# Patient Record
Sex: FEMALE | Race: WHITE | Employment: OTHER | URBAN - METROPOLITAN AREA
[De-identification: names, ages, dates, MRNs, and addresses within clinical notes are randomized per-mention and may not be internally consistent; named-entity substitution may affect disease eponyms.]

---

## 2017-01-21 ENCOUNTER — HOSPITAL ENCOUNTER (EMERGENCY)
Age: 27
Discharge: HOME | End: 2017-01-22
Attending: EMERGENCY MEDICINE | Admitting: EMERGENCY MEDICINE
Payer: SELF-PAY

## 2017-01-21 ENCOUNTER — APPOINTMENT (OUTPATIENT)
Dept: ULTRASOUND IMAGING | Age: 27
End: 2017-01-21
Attending: EMERGENCY MEDICINE
Payer: SELF-PAY

## 2017-01-21 DIAGNOSIS — B96.89 BV (BACTERIAL VAGINOSIS): ICD-10-CM

## 2017-01-21 DIAGNOSIS — N76.0 BV (BACTERIAL VAGINOSIS): ICD-10-CM

## 2017-01-21 DIAGNOSIS — O26.891 ABDOMINAL PAIN IN PREGNANCY, FIRST TRIMESTER: Primary | ICD-10-CM

## 2017-01-21 DIAGNOSIS — R19.7 DIARRHEA, UNSPECIFIED TYPE: ICD-10-CM

## 2017-01-21 DIAGNOSIS — R10.9 ABDOMINAL PAIN IN PREGNANCY, FIRST TRIMESTER: Primary | ICD-10-CM

## 2017-01-21 DIAGNOSIS — R11.2 NON-INTRACTABLE VOMITING WITH NAUSEA, UNSPECIFIED VOMITING TYPE: ICD-10-CM

## 2017-01-21 LAB
ALBUMIN SERPL BCP-MCNC: 3.8 G/DL (ref 3.4–5)
ALBUMIN/GLOB SERPL: 1.1 {RATIO} (ref 0.8–1.7)
ALP SERPL-CCNC: 47 U/L (ref 45–117)
ALT SERPL-CCNC: 15 U/L (ref 13–56)
ANION GAP BLD CALC-SCNC: 7 MMOL/L (ref 3–18)
APPEARANCE UR: CLEAR
AST SERPL W P-5'-P-CCNC: 5 U/L (ref 15–37)
BASOPHILS # BLD AUTO: 0 K/UL (ref 0–0.1)
BASOPHILS # BLD: 1 % (ref 0–2)
BILIRUB SERPL-MCNC: 0.2 MG/DL (ref 0.2–1)
BILIRUB UR QL: NEGATIVE
BUN SERPL-MCNC: 7 MG/DL (ref 7–18)
BUN/CREAT SERPL: 11 (ref 12–20)
CALCIUM SERPL-MCNC: 9.2 MG/DL (ref 8.5–10.1)
CHLORIDE SERPL-SCNC: 104 MMOL/L (ref 100–108)
CO2 SERPL-SCNC: 27 MMOL/L (ref 21–32)
COLOR UR: YELLOW
CREAT SERPL-MCNC: 0.62 MG/DL (ref 0.6–1.3)
DIFFERENTIAL METHOD BLD: ABNORMAL
EOSINOPHIL # BLD: 0.1 K/UL (ref 0–0.4)
EOSINOPHIL NFR BLD: 1 % (ref 0–5)
ERYTHROCYTE [DISTWIDTH] IN BLOOD BY AUTOMATED COUNT: 15.6 % (ref 11.6–14.5)
GLOBULIN SER CALC-MCNC: 3.4 G/DL (ref 2–4)
GLUCOSE SERPL-MCNC: 78 MG/DL (ref 74–99)
GLUCOSE UR STRIP.AUTO-MCNC: NEGATIVE MG/DL
HCT VFR BLD AUTO: 34.3 % (ref 35–45)
HGB BLD-MCNC: 11.6 G/DL (ref 12–16)
HGB UR QL STRIP: NEGATIVE
KETONES UR QL STRIP.AUTO: NEGATIVE MG/DL
LEUKOCYTE ESTERASE UR QL STRIP.AUTO: NEGATIVE
LIPASE SERPL-CCNC: 85 U/L (ref 73–393)
LYMPHOCYTES # BLD AUTO: 31 % (ref 21–52)
LYMPHOCYTES # BLD: 2.7 K/UL (ref 0.9–3.6)
MCH RBC QN AUTO: 28.2 PG (ref 24–34)
MCHC RBC AUTO-ENTMCNC: 33.8 G/DL (ref 31–37)
MCV RBC AUTO: 83.5 FL (ref 74–97)
MONOCYTES # BLD: 0.9 K/UL (ref 0.05–1.2)
MONOCYTES NFR BLD AUTO: 10 % (ref 3–10)
NEUTS SEG # BLD: 5.1 K/UL (ref 1.8–8)
NEUTS SEG NFR BLD AUTO: 57 % (ref 40–73)
NITRITE UR QL STRIP.AUTO: NEGATIVE
PH UR STRIP: 7.5 [PH] (ref 5–8)
PLATELET # BLD AUTO: 205 K/UL (ref 135–420)
PMV BLD AUTO: 10.5 FL (ref 9.2–11.8)
POTASSIUM SERPL-SCNC: 3.6 MMOL/L (ref 3.5–5.5)
PROT SERPL-MCNC: 7.2 G/DL (ref 6.4–8.2)
PROT UR STRIP-MCNC: NEGATIVE MG/DL
RBC # BLD AUTO: 4.11 M/UL (ref 4.2–5.3)
SODIUM SERPL-SCNC: 138 MMOL/L (ref 136–145)
SP GR UR REFRACTOMETRY: 1.01 (ref 1–1.03)
UROBILINOGEN UR QL STRIP.AUTO: 0.2 EU/DL (ref 0.2–1)
WBC # BLD AUTO: 8.8 K/UL (ref 4.6–13.2)

## 2017-01-21 PROCEDURE — 99285 EMERGENCY DEPT VISIT HI MDM: CPT

## 2017-01-21 PROCEDURE — 85025 COMPLETE CBC W/AUTO DIFF WBC: CPT | Performed by: PHYSICIAN ASSISTANT

## 2017-01-21 PROCEDURE — 83690 ASSAY OF LIPASE: CPT | Performed by: PHYSICIAN ASSISTANT

## 2017-01-21 PROCEDURE — 81003 URINALYSIS AUTO W/O SCOPE: CPT | Performed by: EMERGENCY MEDICINE

## 2017-01-21 PROCEDURE — 76817 TRANSVAGINAL US OBSTETRIC: CPT

## 2017-01-21 PROCEDURE — 84702 CHORIONIC GONADOTROPIN TEST: CPT | Performed by: PHYSICIAN ASSISTANT

## 2017-01-21 PROCEDURE — 80053 COMPREHEN METABOLIC PANEL: CPT | Performed by: PHYSICIAN ASSISTANT

## 2017-01-22 VITALS
TEMPERATURE: 98.3 F | DIASTOLIC BLOOD PRESSURE: 61 MMHG | HEART RATE: 83 BPM | SYSTOLIC BLOOD PRESSURE: 103 MMHG | WEIGHT: 145.6 LBS | RESPIRATION RATE: 16 BRPM | BODY MASS INDEX: 23.5 KG/M2 | OXYGEN SATURATION: 100 %

## 2017-01-22 LAB
HCG SERPL-ACNC: ABNORMAL MIU/ML (ref 0–10)
SERVICE CMNT-IMP: NORMAL
WET PREP GENITAL: NORMAL

## 2017-01-22 PROCEDURE — 87491 CHLMYD TRACH DNA AMP PROBE: CPT | Performed by: PHYSICIAN ASSISTANT

## 2017-01-22 PROCEDURE — 87210 SMEAR WET MOUNT SALINE/INK: CPT | Performed by: PHYSICIAN ASSISTANT

## 2017-01-22 RX ORDER — METRONIDAZOLE 500 MG/1
500 TABLET ORAL 2 TIMES DAILY
Qty: 14 TAB | Refills: 0 | Status: SHIPPED | OUTPATIENT
Start: 2017-01-22 | End: 2017-01-29

## 2017-01-22 NOTE — ED NOTES
Pt states she has been having cramping stomach pain for about a week and started having N/V/D yesterday and stabbing pelvic pain today. Pt states it is intermittent and a 6/10.  Pt states no medical problems, meds, does have mental health issues

## 2017-01-22 NOTE — ED NOTES
I have reviewed discharge instructions with the patient. The patient verbalized understanding. Pt ambulated from ED without any difficulty.

## 2017-01-22 NOTE — ED NOTES
Hourly rounding-pt resting on stretcher, vitals stable, call bell in reach, blanket provided, SO at bedside, no issues or concerns at this time.

## 2017-01-23 LAB
C TRACH RRNA SPEC QL NAA+PROBE: NEGATIVE
N GONORRHOEA RRNA SPEC QL NAA+PROBE: NEGATIVE
SPECIMEN SOURCE: NORMAL

## 2017-03-09 ENCOUNTER — HOSPITAL ENCOUNTER (EMERGENCY)
Age: 27
Discharge: PSYCHIATRIC OR SUBSTANCE USE FACILITY | End: 2017-03-10
Attending: EMERGENCY MEDICINE
Payer: SELF-PAY

## 2017-03-09 DIAGNOSIS — Z32.01 PREGNANCY TEST PERFORMED, PREGNANCY CONFIRMED: ICD-10-CM

## 2017-03-09 DIAGNOSIS — R44.0 AUDITORY HALLUCINATIONS: ICD-10-CM

## 2017-03-09 DIAGNOSIS — F22 DELUSIONS (HCC): ICD-10-CM

## 2017-03-09 DIAGNOSIS — E87.6 HYPOKALEMIA: Primary | ICD-10-CM

## 2017-03-09 LAB
AMPHET UR QL SCN: NEGATIVE
ANION GAP BLD CALC-SCNC: 12 MMOL/L (ref 3–18)
APPEARANCE UR: CLEAR
BACTERIA URNS QL MICRO: ABNORMAL /HPF
BARBITURATES UR QL SCN: NEGATIVE
BASOPHILS # BLD AUTO: 0 K/UL (ref 0–0.1)
BASOPHILS # BLD: 0 % (ref 0–2)
BENZODIAZ UR QL: NEGATIVE
BILIRUB UR QL: NEGATIVE
BUN SERPL-MCNC: 4 MG/DL (ref 7–18)
BUN/CREAT SERPL: 7 (ref 12–20)
CALCIUM SERPL-MCNC: 9.1 MG/DL (ref 8.5–10.1)
CANNABINOIDS UR QL SCN: NEGATIVE
CHLORIDE SERPL-SCNC: 100 MMOL/L (ref 100–108)
CO2 SERPL-SCNC: 23 MMOL/L (ref 21–32)
COCAINE UR QL SCN: NEGATIVE
COLOR UR: YELLOW
CREAT SERPL-MCNC: 0.61 MG/DL (ref 0.6–1.3)
DIFFERENTIAL METHOD BLD: ABNORMAL
EOSINOPHIL # BLD: 0 K/UL (ref 0–0.4)
EOSINOPHIL NFR BLD: 0 % (ref 0–5)
EPITH CASTS URNS QL MICRO: ABNORMAL /LPF (ref 0–5)
ERYTHROCYTE [DISTWIDTH] IN BLOOD BY AUTOMATED COUNT: 15.2 % (ref 11.6–14.5)
ETHANOL SERPL-MCNC: <3 MG/DL (ref 0–3)
GLUCOSE SERPL-MCNC: 115 MG/DL (ref 74–99)
GLUCOSE UR STRIP.AUTO-MCNC: NEGATIVE MG/DL
HCG SERPL-ACNC: ABNORMAL MIU/ML (ref 0–10)
HCT VFR BLD AUTO: 34.4 % (ref 35–45)
HDSCOM,HDSCOM: NORMAL
HGB BLD-MCNC: 12 G/DL (ref 12–16)
HGB UR QL STRIP: NEGATIVE
KETONES UR QL STRIP.AUTO: NEGATIVE MG/DL
LEUKOCYTE ESTERASE UR QL STRIP.AUTO: ABNORMAL
LYMPHOCYTES # BLD AUTO: 10 % (ref 21–52)
LYMPHOCYTES # BLD: 1.2 K/UL (ref 0.9–3.6)
MCH RBC QN AUTO: 29.3 PG (ref 24–34)
MCHC RBC AUTO-ENTMCNC: 34.9 G/DL (ref 31–37)
MCV RBC AUTO: 84.1 FL (ref 74–97)
METHADONE UR QL: NEGATIVE
MONOCYTES # BLD: 1 K/UL (ref 0.05–1.2)
MONOCYTES NFR BLD AUTO: 9 % (ref 3–10)
NEUTS SEG # BLD: 9.1 K/UL (ref 1.8–8)
NEUTS SEG NFR BLD AUTO: 81 % (ref 40–73)
NITRITE UR QL STRIP.AUTO: NEGATIVE
OPIATES UR QL: NEGATIVE
PCP UR QL: NEGATIVE
PH UR STRIP: 6 [PH] (ref 5–8)
PLATELET # BLD AUTO: 241 K/UL (ref 135–420)
PMV BLD AUTO: 10.2 FL (ref 9.2–11.8)
POTASSIUM SERPL-SCNC: 3.1 MMOL/L (ref 3.5–5.5)
PROT UR STRIP-MCNC: NEGATIVE MG/DL
RBC # BLD AUTO: 4.09 M/UL (ref 4.2–5.3)
RBC #/AREA URNS HPF: ABNORMAL /HPF (ref 0–5)
SODIUM SERPL-SCNC: 135 MMOL/L (ref 136–145)
SP GR UR REFRACTOMETRY: 1.01 (ref 1–1.03)
UROBILINOGEN UR QL STRIP.AUTO: 0.2 EU/DL (ref 0.2–1)
WBC # BLD AUTO: 11.3 K/UL (ref 4.6–13.2)
WBC URNS QL MICRO: ABNORMAL /HPF (ref 0–4)

## 2017-03-09 PROCEDURE — 80307 DRUG TEST PRSMV CHEM ANLYZR: CPT | Performed by: PHYSICIAN ASSISTANT

## 2017-03-09 PROCEDURE — 81001 URINALYSIS AUTO W/SCOPE: CPT | Performed by: PHYSICIAN ASSISTANT

## 2017-03-09 PROCEDURE — 85025 COMPLETE CBC W/AUTO DIFF WBC: CPT | Performed by: PHYSICIAN ASSISTANT

## 2017-03-09 PROCEDURE — 99285 EMERGENCY DEPT VISIT HI MDM: CPT

## 2017-03-09 PROCEDURE — 84702 CHORIONIC GONADOTROPIN TEST: CPT | Performed by: PHYSICIAN ASSISTANT

## 2017-03-09 PROCEDURE — 80048 BASIC METABOLIC PNL TOTAL CA: CPT | Performed by: PHYSICIAN ASSISTANT

## 2017-03-10 VITALS
RESPIRATION RATE: 18 BRPM | SYSTOLIC BLOOD PRESSURE: 141 MMHG | OXYGEN SATURATION: 99 % | TEMPERATURE: 97.5 F | HEART RATE: 105 BPM | DIASTOLIC BLOOD PRESSURE: 88 MMHG

## 2017-03-10 RX ORDER — POTASSIUM CHLORIDE 20 MEQ/1
40 TABLET, EXTENDED RELEASE ORAL
Status: DISCONTINUED | OUTPATIENT
Start: 2017-03-10 | End: 2017-03-10

## 2017-03-10 NOTE — ED NOTES
Received report from Machelle, [facility]. Pt currently outside of room 24 conversing with herself. Pt refusing to go into room. Pt noted to have flight of ideas. Pt waiting for transport to [facility]. Pt is on a TDO.

## 2017-03-10 NOTE — ED NOTES
Pt is in the hallway yelling racial remarks such as \"stupid niggers\", and \"black bitches\". Multiple staff members have attempted to redirect pt into her room but pt continues to be resistant.

## 2017-03-10 NOTE — ED NOTES
Pt arrived to the ED by Law Enforcement voluntary for inappropriate behavior. According to Pt BF, pt was DC from [facility] yesterday and pt is approx 3 mo pregnant. BF states pt was up all night and was speaking like several different people. BF called police. Pt displays inappropriate behavior and conversation, loose associations, and aggressive body language. Pt denies SI or HI. According to BF pt only takes latuda.  Hx Bipolar
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
## 2017-03-10 NOTE — ED NOTES
Pt at doorway of room yelling verbally abusive slurs. Pt redirected to room.  [facility]

## 2017-03-10 NOTE — BSMART NOTE
Pt has been accepted on a TDO back to [facility] by Dr Rosa Graf, per Delphia Habermann with [facility].
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
## 2017-03-10 NOTE — ED NOTES
Verbal report given to Tee Reynaga RN(name) on [facility]  being transferred to [facility]) for routine progression of care   Report consisted of patients Situation, Background, Assessment and Recommendations(SBAR). Information from the following report(s) SBAR, ED Summary, Procedure Summary, Intake/Output, MAR and Recent Results was reviewed with the receiving nurse. Opportunity for questions and clarification was provided.       Patient transported with law enforcement

## 2017-03-10 NOTE — ED NOTES
Pt ambulated to the restroom. Pt banging on paper towel dispenser. Pt redirected to room. Sitter at the bedside.

## 2017-04-17 ENCOUNTER — HOSPITAL ENCOUNTER (INPATIENT)
Age: 27
LOS: 3 days | Discharge: HOME | DRG: 781 | End: 2017-04-21
Attending: EMERGENCY MEDICINE | Admitting: PSYCHIATRY & NEUROLOGY
Payer: COMMERCIAL

## 2017-04-17 DIAGNOSIS — R45.851 SUICIDAL IDEATION: Primary | ICD-10-CM

## 2017-04-17 LAB
AMPHET UR QL SCN: NEGATIVE
ANION GAP BLD CALC-SCNC: 8 MMOL/L (ref 3–18)
APAP SERPL-MCNC: <2 UG/ML (ref 10–30)
APPEARANCE UR: CLEAR
BARBITURATES UR QL SCN: NEGATIVE
BASOPHILS # BLD AUTO: 0 K/UL (ref 0–0.1)
BASOPHILS # BLD: 0 % (ref 0–2)
BENZODIAZ UR QL: NEGATIVE
BILIRUB UR QL: NEGATIVE
BUN SERPL-MCNC: 3 MG/DL (ref 7–18)
BUN/CREAT SERPL: 6 (ref 12–20)
CALCIUM SERPL-MCNC: 9.3 MG/DL (ref 8.5–10.1)
CANNABINOIDS UR QL SCN: NEGATIVE
CHLORIDE SERPL-SCNC: 103 MMOL/L (ref 100–108)
CO2 SERPL-SCNC: 26 MMOL/L (ref 21–32)
COCAINE UR QL SCN: NEGATIVE
COLOR UR: YELLOW
CREAT SERPL-MCNC: 0.54 MG/DL (ref 0.6–1.3)
DIFFERENTIAL METHOD BLD: ABNORMAL
EOSINOPHIL # BLD: 0 K/UL (ref 0–0.4)
EOSINOPHIL NFR BLD: 0 % (ref 0–5)
ERYTHROCYTE [DISTWIDTH] IN BLOOD BY AUTOMATED COUNT: 14.2 % (ref 11.6–14.5)
ETHANOL SERPL-MCNC: <3 MG/DL (ref 0–3)
GLUCOSE SERPL-MCNC: 77 MG/DL (ref 74–99)
GLUCOSE UR STRIP.AUTO-MCNC: NEGATIVE MG/DL
HCT VFR BLD AUTO: 33.2 % (ref 35–45)
HDSCOM,HDSCOM: NORMAL
HGB BLD-MCNC: 11.4 G/DL (ref 12–16)
HGB UR QL STRIP: NEGATIVE
KETONES UR QL STRIP.AUTO: 15 MG/DL
LEUKOCYTE ESTERASE UR QL STRIP.AUTO: NEGATIVE
LYMPHOCYTES # BLD AUTO: 16 % (ref 21–52)
LYMPHOCYTES # BLD: 1.9 K/UL (ref 0.9–3.6)
MCH RBC QN AUTO: 30.2 PG (ref 24–34)
MCHC RBC AUTO-ENTMCNC: 34.3 G/DL (ref 31–37)
MCV RBC AUTO: 88.1 FL (ref 74–97)
METHADONE UR QL: NEGATIVE
MONOCYTES # BLD: 0.8 K/UL (ref 0.05–1.2)
MONOCYTES NFR BLD AUTO: 7 % (ref 3–10)
NEUTS SEG # BLD: 9.2 K/UL (ref 1.8–8)
NEUTS SEG NFR BLD AUTO: 77 % (ref 40–73)
NITRITE UR QL STRIP.AUTO: NEGATIVE
OPIATES UR QL: NEGATIVE
PCP UR QL: NEGATIVE
PH UR STRIP: 8.5 [PH] (ref 5–8)
PLATELET # BLD AUTO: 220 K/UL (ref 135–420)
PMV BLD AUTO: 9.3 FL (ref 9.2–11.8)
POTASSIUM SERPL-SCNC: 3.2 MMOL/L (ref 3.5–5.5)
PROT UR STRIP-MCNC: NEGATIVE MG/DL
RBC # BLD AUTO: 3.77 M/UL (ref 4.2–5.3)
SALICYLATES SERPL-MCNC: <2.8 MG/DL (ref 2.8–20)
SODIUM SERPL-SCNC: 137 MMOL/L (ref 136–145)
SP GR UR REFRACTOMETRY: 1.01 (ref 1–1.03)
UROBILINOGEN UR QL STRIP.AUTO: 1 EU/DL (ref 0.2–1)
WBC # BLD AUTO: 11.9 K/UL (ref 4.6–13.2)

## 2017-04-17 PROCEDURE — 99284 EMERGENCY DEPT VISIT MOD MDM: CPT

## 2017-04-17 PROCEDURE — 80048 BASIC METABOLIC PNL TOTAL CA: CPT | Performed by: EMERGENCY MEDICINE

## 2017-04-17 PROCEDURE — 80307 DRUG TEST PRSMV CHEM ANLYZR: CPT | Performed by: EMERGENCY MEDICINE

## 2017-04-17 PROCEDURE — 81003 URINALYSIS AUTO W/O SCOPE: CPT | Performed by: EMERGENCY MEDICINE

## 2017-04-17 PROCEDURE — 85025 COMPLETE CBC W/AUTO DIFF WBC: CPT | Performed by: EMERGENCY MEDICINE

## 2017-04-17 NOTE — IP AVS SNAPSHOT
[address] [facility] Patient: Gwendolyn Johns MRN: [id number] You are allergic to the following Allergen Reactions Robitussin (Guaifenesin) Nausea and Vomiting Recent Documentation Height Weight BMI OB Status Smoking Status 1.676 m 63.5 kg 22.6 kg/m2 Pregnant Current Every Day Smoker Emergency Contacts Name Discharge Info Relation Home Work Mobile None,None DECLINED CAREGIVER [4] About your hospitalization You were admitted on:  April 18, 2017 You last received care in the:  [facility] ADULT CHEM DEP You were discharged on:  April 21, 2017 Unit phone number:  [phone] Why you were hospitalized Your primary diagnosis was:  Not on File Your diagnoses also included:  Schizophrenia (Hcc) Providers Seen During Your Hospitalizations Provider Role Specialty Primary office phone Massiel Munroe DO Attending Provider Emergency Medicine [phone] Ely Dowell DO Attending Provider Emergency Medicine [phone] John Thomas MD Attending Provider Psychiatry [phone] Your Primary Care Physician (PCP) Primary Care Physician Office Phone Office Fax UNKNOWN, PROVIDER ** None ** ** None ** Follow-up Information Follow up With Details Comments Contact Info Provider Unknown   Patient not available to ask [facility]  Patient has follow up therapy appointment with [facility] with Mrs. Fortune on April 27, 2017 at 8:00 am.  [address] [place] [id number] [phone] Current Discharge Medication List  
  
START taking these medications Dose & Instructions Dispensing Information Comments Morning Noon Evening Bedtime  
 hydrOXYzine pamoate 25 mg capsule Commonly known as:  VISTARIL Your last dose was: Your next dose is:    
   
   
 Dose:  25 mg Take 1 Cap by mouth four (4) times daily as needed for Anxiety for up to 10 days. Indications: anxiety Quantity:  60 Cap Refills:  0  
     
   
   
   
  
 prenatal vit-calcium-iron-fa 27 mg iron- 1 mg Tab Commonly known as:  PRENATAL PLUS with CALCIUM Your last dose was: Your next dose is:    
   
   
 Dose:  1 Tab Take 1 Tab by mouth daily for 30 days. Indications: Pregnancy Quantity:  30 Tab Refills:  0 CONTINUE these medications which have CHANGED Dose & Instructions Dispensing Information Comments Morning Noon Evening Bedtime  
 lurasidone 20 mg Tab tablet Commonly known as:  Merrick Rollins What changed:   
- medication strength 
- how much to take - when to take this Your last dose was: Your next dose is:    
   
   
 Dose:  20 mg Take 1 Tab by mouth daily (with breakfast) for 30 days. Indications: DEPRESSION ASSOCIATED WITH BIPOLAR DISORDER Quantity:  30 Tab Refills:  0 STOP taking these medications   
 haloperidol 1 mg tablet Commonly known as:  HALDOL KlonoPIN 1 mg tablet Generic drug:  clonazePAM  
   
  
 LITHIUM CARBONATE PO  
   
  
 OLANZapine 10 mg tablet Commonly known as:  ZyPREXA OXcarbazepine 300 mg tablet Commonly known as:  TRILEPTAL RisperDAL 1 mg tablet Generic drug:  risperiDONE Where to Get Your Medications Information on where to get these meds will be given to you by the nurse or doctor. ! Ask your nurse or doctor about these medications  
  hydrOXYzine pamoate 25 mg capsule  
 lurasidone 20 mg Tab tablet  
 prenatal vit-calcium-iron-fa 27 mg iron- 1 mg Tab Discharge Instructions BEHAVIORAL HEALTH NURSING DISCHARGE NOTE Emergency Numbers [address] [facility] Desk: [phone] [place] [facility]: [phone] Suicide Prevention Line: 1 [phone] (TALK) The following personal items collected during your admission are returned to you:  
Dental Appliance: Dental Appliances: None Vision:   
Hearing Aid:   
Jewelry: Jewelry: None Clothing: Clothing: Footwear, Pajamas, Pants, Shirt, Socks, Undergarments, With patient Other Valuables: Other Valuables: Personal electronic devices (comment), Personal toiletries Valuables sent to safe: Personal Items Sent to Safe:  (computer) The discharge information has been reviewed with the patient. The patient verbalized understanding. [employer] Activation Thank you for requesting access to [employer]. Please follow the instructions below to securely access and download your online medical record. [employer] allows you to send messages to your doctor, view your test results, renew your prescriptions, schedule appointments, and more. How Do I Sign Up? 1. In your internet browser, go to [email or web address] 
2. Click on the First Time User? Click Here link in the Sign In box. You will be redirect to the New Member Sign Up page. 3. Enter your [employer]Tree Access Code exactly as it appears below. You will not need to use this code after youve completed the sign-up process. If you do not sign up before the expiration date, you must request a new code. [employer] Access Code: [email or web address] Expires: 2017  7:05 AM (This is the date your [employer] access code will ) 4. Enter the last four digits of your Social Security Number (xxxx) and Date of Birth (mm/dd/yyyy) as indicated and click Submit. You will be taken to the next sign-up page. 5. Create a My[employer]atingTree ID. This will be your My[employer]atingTree login ID and cannot be changed, so think of one that is secure and easy to remember. 6. Create a My[employer]atingTree password. You can change your password at any time. 7. Enter your Password Reset Question and Answer.  This can be used at a later time if you forget your password. 8. Enter your e-mail address. You will receive e-mail notification when new information is available in [address] 9. Click Sign Up. You can now view and download portions of your medical record. 10. Click the Download Summary menu link to download a portable copy of your medical information. Additional Information If you have questions, please visit the Frequently Asked Questions section of the [employer] website at [email or web address] Remember, [employer] is NOT to be used for urgent needs. For medical emergencies, dial 911. Patient armband removed and shredded Discharge Orders None [employer] Announcement We are excited to announce that we are making your provider's discharge notes available to you in [employer]. You will see these notes when they are completed and signed by the physician that discharged you from your recent hospital stay. If you have any questions or concerns about any information you see in [employer], please call the Health Information Department where you were seen or reach out to your Primary Care Provider for more information about your plan of care. Introducing [facility]! [facility] introduces The [employer] patient portal. Now you can access parts of your medical record, email your doctor's office, and request medication refills online. 1. In your internet browser, go to [email or web address] 2. Click on the First Time User? Click Here link in the Sign In box. You will see the New Member Sign Up page. 3. Enter your [employer] Access Code exactly as it appears below. You will not need to use this code after youve completed the sign-up process. If you do not sign up before the expiration date, you must request a new code. · [employer] Access Code: [email or web address] Expires: 5/28/2017  7:05 AM 
 
4.  Enter the last four digits of your Social Security Number (xxxx) and Date of Birth (mm/dd/yyyy) as indicated and click Submit. You will be taken to the next sign-up page. 5. Create a Cognia ID. This will be your Cognia login ID and cannot be changed, so think of one that is secure and easy to remember. 6. Create a Cognia password. You can change your password at any time. 7. Enter your Password Reset Question and Answer. This can be used at a later time if you forget your password. 8. Enter your e-mail address. You will receive e-mail notification when new information is available in [address] 9. Click Sign Up. You can now view and download portions of your medical record. 10. Click the Download Summary menu link to download a portable copy of your medical information. If you have questions, please visit the Frequently Asked Questions section of the [employer]ia website. Remember, [employer] is NOT to be used for urgent needs. For medical emergencies, dial 911. Now available from your iPhone and Android! General Information Please provide this summary of care documentation to your next provider. Patient Signature:  ____________________________________________________________ Date:  ____________________________________________________________  
  
[place] Anderson Provider Signature:  ____________________________________________________________ Date:  ____________________________________________________________

## 2017-04-17 NOTE — IP AVS SNAPSHOT
Current Discharge Medication List  
  
START taking these medications Dose & Instructions Dispensing Information Comments Morning Noon Evening Bedtime  
 hydrOXYzine pamoate 25 mg capsule Commonly known as:  VISTARIL Your last dose was: Your next dose is:    
   
   
 Dose:  25 mg Take 1 Cap by mouth four (4) times daily as needed for Anxiety for up to 10 days. Indications: anxiety Quantity:  60 Cap Refills:  0  
     
   
   
   
  
 prenatal vit-calcium-iron-fa 27 mg iron- 1 mg Tab Commonly known as:  PRENATAL PLUS with CALCIUM Your last dose was: Your next dose is:    
   
   
 Dose:  1 Tab Take 1 Tab by mouth daily for 30 days. Indications: Pregnancy Quantity:  30 Tab Refills:  0 CONTINUE these medications which have CHANGED Dose & Instructions Dispensing Information Comments Morning Noon Evening Bedtime  
 lurasidone 20 mg Tab tablet Commonly known as:  Sandra Patricio What changed:   
- medication strength 
- how much to take - when to take this Your last dose was: Your next dose is:    
   
   
 Dose:  20 mg Take 1 Tab by mouth daily (with breakfast) for 30 days. Indications: DEPRESSION ASSOCIATED WITH BIPOLAR DISORDER Quantity:  30 Tab Refills:  0 STOP taking these medications   
 haloperidol 1 mg tablet Commonly known as:  HALDOL KlonoPIN 1 mg tablet Generic drug:  clonazePAM  
   
  
 LITHIUM CARBONATE PO  
   
  
 OLANZapine 10 mg tablet Commonly known as:  ZyPREXA OXcarbazepine 300 mg tablet Commonly known as:  TRILEPTAL RisperDAL 1 mg tablet Generic drug:  risperiDONE Where to Get Your Medications Information on where to get these meds will be given to you by the nurse or doctor. ! Ask your nurse or doctor about these medications  
  hydrOXYzine pamoate 25 mg capsule lurasidone 20 mg Tab tablet  
 prenatal vit-calcium-iron-fa 27 mg iron- 1 mg Tab

## 2017-04-17 NOTE — ED PROVIDER NOTES
HPI Comments:   7:23 PM Ming Jett is an 22 week pregnant 32 y.o. Female with a h/o bipolar disorder and schizophrenia, who presents to the ED for the evaluation of psychiatric evaluation. Pt reports confusion SI, HI, and hallucinations. States that the voices tell her to take nude videos of her self. Also states that she was recently released from [facility], but has not really been able to care for herself since. States that in effort to get psychiatric help, she tried to get arrested and eloped from the waiting room earlier today. Also reports mild abd pain and bilateral low back pain. No vaginal bleeding. Pt does however suspect that she has an STD, as she reports getting meds for prior infection, but not completing them after her sx improved. Pt reports taking her psych meds as prescribed. Also states that she plotted on getting arrested and states that she eloped from the hospital this morning. States that her relationship with her boyfirend often stresses her out. No other complaints at this time. PCP: PROVIDER UNKNOWN     The history is provided by the patient. Past Medical History:   Diagnosis Date    Bipolar disorder ([address]     ETOH abuse     Marijuana abuse        Past Surgical History:   Procedure Laterality Date    HX  SECTION           History reviewed. No pertinent family history. Social History     Social History    Marital status: SINGLE     Spouse name: N/A    Number of children: N/A    Years of education: N/A     Occupational History    Not on file. Social History Main Topics    Smoking status: Current Every Day Smoker    Smokeless tobacco: Not on file    Alcohol use Yes    Drug use: Yes     Special: Marijuana    Sexual activity: Not on file     Other Topics Concern    Not on file     Social History Narrative         ALLERGIES: Robitussin [guaifenesin]    Review of Systems   Constitutional: Negative for chills and fever.    HENT: Negative for congestion and sneezing. Eyes: Negative for visual disturbance. Respiratory: Negative for cough and shortness of breath. Cardiovascular: Negative for chest pain. Gastrointestinal: Positive for abdominal pain. Negative for nausea and vomiting. Genitourinary: Negative for difficulty urinating and dysuria. Musculoskeletal: Positive for back pain. Skin: Negative for rash. Neurological: Negative for weakness and headaches. Psychiatric/Behavioral: Positive for confusion, hallucinations and suicidal ideas. The patient is nervous/anxious. Vitals:    04/17/17 1611 04/17/17 2040 04/17/17 2249 04/18/17 0431   BP: 115/72 116/54 100/56 105/61   Pulse: (!) 147 89 93 79   Resp: 22 21 16 20   Temp: 98.1 °F (36.7 °C) 97.3 °F (36.3 °C) 97.3 °F (36.3 °C) 97.8 °F (36.6 °C)   SpO2: 96% 100% 97% 100%   Weight: 59 kg (130 lb)  61.2 kg (135 lb)    Height: 5' 6\" (1.676 m)  5' 6\" (1.676 m)         96% within normal limits     Physical Exam   Constitutional: She is oriented to person, place, and time. She appears well-developed and well-nourished. HENT:   Head: Normocephalic and atraumatic. Neck: Neck supple. No JVD present. Cardiovascular: Normal rate and regular rhythm. Pulmonary/Chest: Effort normal and breath sounds normal. No respiratory distress. Abdominal: Soft. She exhibits no distension. There is no tenderness. There is no rebound and no guarding. Gravid Uterus   Musculoskeletal: She exhibits no edema. Neurological: She is alert and oriented to person, place, and time. Skin: Skin is warm and dry. No erythema. Psychiatric: Her mood appears anxious. Her speech is rapid and/or pressured. She expresses homicidal and suicidal ideation.    Rapid speech scattered thoughts        MDM  Number of Diagnoses or Management Options  Diagnosis management comments: 33 y/o female presents with SI, HI    Also mild abd pain, will check fetal hr, ua  No indication for pelvic at this time  Check basic labs, HR on my exam 90. Amount and/or Complexity of Data Reviewed  Clinical lab tests: ordered and reviewed      ED Course       Procedures      Medications ordered:   Medications - No data to display      Lab findings:  Labs Reviewed   CBC WITH AUTOMATED DIFF - Abnormal; Notable for the following:        Result Value    RBC 3.77 (*)     HGB 11.4 (*)     HCT 33.2 (*)     NEUTROPHILS 77 (*)     LYMPHOCYTES 16 (*)     ABS. NEUTROPHILS 9.2 (*)     All other components within normal limits   METABOLIC PANEL, BASIC - Abnormal; Notable for the following:     Potassium 3.2 (*)     BUN 3 (*)     Creatinine 0.54 (*)     BUN/Creatinine ratio 6 (*)     All other components within normal limits   URINALYSIS W/ RFLX MICROSCOPIC - Abnormal; Notable for the following:     pH (UA) 8.5 (*)     Ketone 15 (*)     All other components within normal limits   SALICYLATE - Abnormal; Notable for the following:     SALICYLATE <1.5 (*)     All other components within normal limits   ACETAMINOPHEN - Abnormal; Notable for the following:     ACETAMINOPHEN <2 (*)     All other components within normal limits   DRUG SCREEN, URINE   ETHYL ALCOHOL       Progress notes, Consult notes or additional Procedure notes:   9:08 PM Consult:  Discussed care with Ondina Garcia. Standard discussion; including history of patients chief complaint, available diagnostic results, and treatment course. Agrees to evaluate the pt.    2:53 AM CSB worker, Sallie Fong, at bedside to evaluate for TDO    Pt TDO, will be admitted. Dispo:  Patient was admitted in stable condition. 1. Suicidal ideation        SCRIBE ATTESTATION STATEMENT  Documented by: London Mortensen [facility] for, and in the presence of, Tala Torrez DO 7:28 PM     Signed by: Opal Diaz, 4/17/2017 7:28 PM     PROVIDER ATTESTATION STATEMENT  I personally performed the services described in the documentation, reviewed the documentation, as recorded by the scribe in my presence, and it accurately and completely records my words and actions.   Blease Good, DO

## 2017-04-17 NOTE — IP AVS SNAPSHOT
Summary of Care Report The Summary of Care report has been created to help improve care coordination. Users with access to [employer] or [address] (Web-based application) may access additional patient information including the Discharge Summary. If you are not currently a [address] user and need more information, please call the number listed below in the [place] [address] section and ask to be connected with Medical Records. Facility Information Name Address Phone [place] [id number] [facility] [address] [phone] Patient Information Patient Name Sex  Maple Seip ([id number]) Female 1990 Discharge Information Admitting Provider Service Area Unit Megan Ingram MD / [id number] [place] [address] Adult [facility] / [phone] Discharge Provider Discharge Date/Time Discharge Disposition Destination (none) 2017 Morning (Pending) AHR (none) Patient Language Language ENGLISH [13] Hospital Problems as of 2017  Never Reviewed Class Noted - Resolved Last Modified POA Active Problems Schizophrenia ([facility] [address]  2017 - Present 2017 by Megan Ingram MD Unknown Entered by Megan Ingram MD  
  
You are allergic to the following Allergen Reactions Robitussin (Guaifenesin) Nausea and Vomiting Current Discharge Medication List  
  
START taking these medications Dose & Instructions Dispensing Information Comments  
 hydrOXYzine pamoate 25 mg capsule Commonly known as:  VISTARIL Dose:  25 mg Take 1 Cap by mouth four (4) times daily as needed for Anxiety for up to 10 days. Indications: anxiety Quantity:  60 Cap Refills:  0  
   
 prenatal vit-calcium-iron-fa 27 mg iron- 1 mg Tab Commonly known as:  PRENATAL PLUS with CALCIUM Dose:  1 Tab Take 1 Tab by mouth daily for 30 days. Indications: Pregnancy Quantity:  30 Tab Refills:  0 CONTINUE these medications which have CHANGED Dose & Instructions Dispensing Information Comments  
 lurasidone 20 mg Tab tablet Commonly known as:  Juan Carlos Hutton What changed:   
- medication strength 
- how much to take - when to take this Dose:  20 mg Take 1 Tab by mouth daily (with breakfast) for 30 days. Indications: DEPRESSION ASSOCIATED WITH BIPOLAR DISORDER Quantity:  30 Tab Refills:  0 STOP taking these medications Comments  
 haloperidol 1 mg tablet Commonly known as:  HALDOL KlonoPIN 1 mg tablet Generic drug:  clonazePAM  
   
   
 LITHIUM CARBONATE PO  
   
   
 OLANZapine 10 mg tablet Commonly known as:  ZyPREXA OXcarbazepine 300 mg tablet Commonly known as:  TRILEPTAL RisperDAL 1 mg tablet Generic drug:  risperiDONE Follow-up Information Follow up With Details Comments Contact Info Provider Unknown   Patient not available to ask [facility]  Patient has follow up therapy appointment with [facility] with Mrs. Fortune on April 27, 2017 at 8:00 am.  [address] [facility] [address] [id number] 
[phone] Discharge Instructions BEHAVIORAL HEALTH NURSING DISCHARGE NOTE Emergency Numbers [facility] Desk: [phone] [place] [facility]: [phone] Suicide Prevention Line: [phone] (TALK) The following personal items collected during your admission are returned to you:  
Dental Appliance: Dental Appliances: None Vision:   
Hearing Aid:   
Jewelry: Jewelry: None Clothing: Clothing: Footwear, Pajamas, Pants, Shirt, Socks, Undergarments, With patient Other Valuables: Other Valuables: Personal electronic devices (comment), Personal toiletries Valuables sent to safe: Personal Items Sent to Safe:  (computer) The discharge information has been reviewed with the patient. The patient verbalized understanding. MyChart Activation Thank you for requesting access to [employer]. Please follow the instructions below to securely access and download your online medical record. [employer] allows you to send messages to your doctor, view your test results, renew your prescriptions, schedule appointments, and more. How Do I Sign Up? 1. In your internet browser, go to [email or web address] 
2. Click on the First Time User? Click Here link in the Sign In box. You will be redirect to the New Member Sign Up page. 3. Enter your [employer] Access Code exactly as it appears below. You will not need to use this code after youve completed the sign-up process. If you do not sign up before the expiration date, you must request a new code. [employer] Access Code: [email or web address] Expires: 2017  7:05 AM (This is the date your [employer] access code will ) 4. Enter the last four digits of your Social Security Number (xxxx) and Date of Birth (mm/dd/yyyy) as indicated and click Submit. You will be taken to the next sign-up page. 5. Create a [employer] ID. This will be your [employer] login ID and cannot be changed, so think of one that is secure and easy to remember. 6. Create a [employer] password. You can change your password at any time. 7. Enter your Password Reset Question and Answer. This can be used at a later time if you forget your password. 8. Enter your e-mail address. You will receive e-mail notification when new information is available in [address] 9. Click Sign Up. You can now view and download portions of your medical record. 10. Click the Download Summary menu link to download a portable copy of your medical information. Additional Information If you have questions, please visit the Frequently Asked Questions section of the [employer] website at [email or web address] Remember, [employer] is NOT to be used for urgent needs. For medical emergencies, dial 911. Patient armband removed and shredded Chart Review Routing History No Routing History on File

## 2017-04-17 NOTE — ED TRIAGE NOTES
Pt states she is 18 weeks pregnant. Pt states she would like to be committed to a mental health facility. Pt states she does not feel like she belongs in society. Pt denies SI & HI.  Pt states she would like to be committed for OCD, schizophrenia, disassociated identity disorder.

## 2017-04-17 NOTE — ED NOTES
I performed a brief evaluation, including history and physical, of the patient here in triage and I have determined that pt will need further treatment and evaluation from the main side ER physician. I have placed initial orders to help in expediting patients care.      April 17, 2017 at 5:18 PM - Melvina Saldana DO        Visit Vitals    /72 (BP 1 Location: Left arm, BP Patient Position: At rest)    Pulse (!) 147    Temp 98.1 °F (36.7 °C)    Resp 22    Ht 5' 6\" (1.676 m)    Wt 59 kg (130 lb)    SpO2 96%    BMI 20.98 kg/m2

## 2017-04-18 PROBLEM — F20.9 SCHIZOPHRENIA (HCC): Status: ACTIVE | Noted: 2017-04-18

## 2017-04-18 PROCEDURE — 74011250637 HC RX REV CODE- 250/637: Performed by: PSYCHIATRY & NEUROLOGY

## 2017-04-18 PROCEDURE — 65220000003 HC RM SEMIPRIVATE PSYCH

## 2017-04-18 RX ORDER — LORAZEPAM 2 MG/ML
1-2 INJECTION INTRAMUSCULAR
Status: DISCONTINUED | OUTPATIENT
Start: 2017-04-18 | End: 2017-04-19

## 2017-04-18 RX ORDER — LORAZEPAM 1 MG/1
1-2 TABLET ORAL
Status: DISCONTINUED | OUTPATIENT
Start: 2017-04-18 | End: 2017-04-19

## 2017-04-18 RX ORDER — HALOPERIDOL 5 MG/1
5 TABLET ORAL
Status: DISCONTINUED | OUTPATIENT
Start: 2017-04-18 | End: 2017-04-21 | Stop reason: HOSPADM

## 2017-04-18 RX ORDER — RISPERIDONE 1 MG/1
TABLET, FILM COATED ORAL 3 TIMES DAILY
COMMUNITY
End: 2017-04-21

## 2017-04-18 RX ORDER — TRAZODONE HYDROCHLORIDE 50 MG/1
50 TABLET ORAL
Status: DISCONTINUED | OUTPATIENT
Start: 2017-04-18 | End: 2017-04-21 | Stop reason: HOSPADM

## 2017-04-18 RX ORDER — HALOPERIDOL 5 MG/ML
5 INJECTION INTRAMUSCULAR
Status: DISCONTINUED | OUTPATIENT
Start: 2017-04-18 | End: 2017-04-21 | Stop reason: HOSPADM

## 2017-04-18 RX ADMIN — .BETA.-CAROTENE, SODIUM ACETATE, ASCORBIC ACID, CHOLECALCIFEROL, .ALPHA.-TOCOPHEROL ACETATE, DL-, THIAMINE MONONITRATE, RIBOFLAVIN, NIACINAMIDE, PYRIDOXINE HYDROCHLORIDE, FOLIC ACID, CYANOCOBALAMIN, CALCIUM CARBONATE, FERROUS FUMARATE, ZINC OXIDE AND CUPRIC OXIDE 1 TABLET: 2000; 2000; 120; 400; 22; 1.84; 3; 20; 10; 1; 12; 200; 27; 25; 2 TABLET ORAL at 12:33

## 2017-04-18 RX ADMIN — TRAZODONE HYDROCHLORIDE 50 MG: 50 TABLET ORAL at 21:08

## 2017-04-18 NOTE — ED NOTES
Patient's belongings locked away into locked closet room 7. Patient's belongings include a laptop, purse with cell phone, tramaine bear, large bag containing patient's clothes and shoes. Patient is presently in hospital attire, with red socks. Patient is cooperative, looks comfortable, and denies any complaints at this time.
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
## 2017-04-18 NOTE — ED NOTES
TRANSFER - OUT REPORT:    Verbal report given to rosario dahl RN(name) on Ashley Patel  being transferred to behavioral [facility](unit) for routine progression of care       Report consisted of patients Situation, Background, Assessment and   Recommendations(SBAR). Information from the following report(s) SBAR was reviewed with the receiving nurse. Lines:       Opportunity for questions and clarification was provided.       Patient transported with:   Shanice Gunter

## 2017-04-18 NOTE — BSMART NOTE
ART THERAPY GROUP PROGRESS NOTE    PATIENT SCHEDULED FOR GROUP AT: 14:30    ATTENDANCE: Full    PARTICIPATION LEVEL: Participates fully in the art process    ATTENTION LEVEL: Able to focus on task    FOCUS: Goals    SYMBOLIC & THEMATIC CONTENT AS NOTED IN IMAGERY: She was calm and presented with a blunted affect. Her associations and imagery and a detached and loose quality. She claimed that she felt as if she were \"empty, has no direction, and needs structure. She referenced a \"blue-eyed beast\" that she feels is \"attempting to save\" her from her addiction and get her on \"the right path. \" She also loosely mentioned \"sin and survival.\"

## 2017-04-18 NOTE — BSMART NOTE
Pt seen by Crisis in ED room [place] by Damaso Shah. CC: SI, HI, Command auditory hallucinations. Pt is alert, oriented, cooperative. Her speech has some latency, thought process is slow with some loose associations. She reports suicidal thoughts with multiple vague plans, to walk in the heat until she passes out or not eat until she dies. Pt reports she was thinking of 3 ways to kill a lady in the [place] earlier. Pt stated to ED staff she was hearing voices to take nude pictures of her self. When asked about this pt said she was using her cell phone as a mirror but did endorse hearing Levar and Tarnabod voices. Pt reports she has dissociative disorder and woke up as [place]\" earlier today and was planning parties and talking in a [place] accent. She also reported she was going to get arrested to get psychiatric help. Pt reports being D/C from [facility] one week ago after being in pt for 30 days. She states she is compliant with her medications. Pt presents a danger to self and others due to her mental illness and lacks capacity to make informed consent at this time. Plan: request TDO evaluation. Called and spoke with CSB worker, Petitioners statement filled out and notarized . Pt reported she brought in her Rx bottle of Latuda, and did not know the dose. We attempted to find the bottle so we could restart her on her medicine but could not find it in her belongings.

## 2017-04-18 NOTE — BH NOTES
Pt has been doing well this shift and observed interacting with others on unit. Since transfer to   unit,was social with select male on unit thinking that she and that person could play   cards in the room which was explained to her they could not. Has since that time has been compliant. Did complain of some nausea but reported no vomiting. Rested briefly then able to return to day area. Safety on unit wearing non skid socks  And informed to let staff know  of any further discomfort.

## 2017-04-19 ENCOUNTER — APPOINTMENT (OUTPATIENT)
Dept: ULTRASOUND IMAGING | Age: 27
DRG: 781 | End: 2017-04-19
Attending: PSYCHIATRY & NEUROLOGY
Payer: COMMERCIAL

## 2017-04-19 PROCEDURE — 74011250637 HC RX REV CODE- 250/637: Performed by: PSYCHIATRY & NEUROLOGY

## 2017-04-19 PROCEDURE — 65220000003 HC RM SEMIPRIVATE PSYCH

## 2017-04-19 PROCEDURE — 76815 OB US LIMITED FETUS(S): CPT

## 2017-04-19 RX ORDER — DIPHENHYDRAMINE HCL 25 MG
25 CAPSULE ORAL
Status: DISCONTINUED | OUTPATIENT
Start: 2017-04-19 | End: 2017-04-21 | Stop reason: HOSPADM

## 2017-04-19 RX ORDER — HYDROXYZINE PAMOATE 25 MG/1
25 CAPSULE ORAL
Status: DISCONTINUED | OUTPATIENT
Start: 2017-04-19 | End: 2017-04-21 | Stop reason: HOSPADM

## 2017-04-19 RX ADMIN — TRAZODONE HYDROCHLORIDE 50 MG: 50 TABLET ORAL at 21:22

## 2017-04-19 RX ADMIN — .BETA.-CAROTENE, SODIUM ACETATE, ASCORBIC ACID, CHOLECALCIFEROL, .ALPHA.-TOCOPHEROL ACETATE, DL-, THIAMINE MONONITRATE, RIBOFLAVIN, NIACINAMIDE, PYRIDOXINE HYDROCHLORIDE, FOLIC ACID, CYANOCOBALAMIN, CALCIUM CARBONATE, FERROUS FUMARATE, ZINC OXIDE AND CUPRIC OXIDE 1 TABLET: 2000; 2000; 120; 400; 22; 1.84; 3; 20; 10; 1; 12; 200; 27; 25; 2 TABLET ORAL at 08:30

## 2017-04-19 RX ADMIN — LURASIDONE HYDROCHLORIDE 20 MG: 20 TABLET, FILM COATED ORAL at 14:16

## 2017-04-19 NOTE — PROGRESS NOTES
Problem: Falls - Risk of  Goal: *Absence of falls  Will have no fall daily during his hospital stay. Outcome: Progressing Towards Goal  Patient remains free of falls while hospitalized. Problem: Suicide/Homicide (Adult/Pediatric)  Goal: *STG: Remains safe in hospital  Will not engage in any self injurious behaviors daily during her hospital stay. Outcome: Progressing Towards Goal  Patient remains safe while hospitalized. Goal: *STG/LTG: No longer expresses self destructive or suicidal/homicidal thoughts  Will have decreased or no ideations daily during her hospital stay. Outcome: Progressing Towards Goal  Patient denies SI/HI. Comments:   Patient has been mainly in her room resting today, although she does come out for medications and meals. Patient is pleasant and cooperative on the unit and denies SI/HI and A/V/H. Patient states that she just needs to get her depression under control. Patient is excited about her pregnancy and is hopeful that everything goes smoothly. Patient had an episode earlier where she felt like her blood sugar was dropping and she was given a snack and she felt better. Patient was questioned about being diabetic and she stated that she has issues with her blood sugar dropping and then she feels dizzy.

## 2017-04-19 NOTE — BH NOTES
Bruna Marin is  participating in [place]. Group time: 30 minutes    Personal goal for participation: Community announcement    Goal orientation: community    Group therapy participation: fully participated    Therapeutic interventions reviewed and discussed: Staff discussed  Staff discussed the Mental Health programs offered. Unit schedule for groups,  Visiting hours, patient advocate name and phone number and where this information is posted on the unit. , etc,,. Report any maintenance/housekeeping or treatment concerns to staff so it can be addressed. Impression of participation: Pt.did not have any maintenance/housekeeping or treatment concerns to report to staff .

## 2017-04-19 NOTE — H&P
BEHAVIORAL HEALTH ADMISSION NOTE    Patient: Hannah Cook               Sex: female          DOA: 4/17/2017       YOB: 1990      Age:  32 y.o. HISTORY OF PRESENT ILLNESS:       CC: Psychosis    HPI: The patient is an 22 weeks pregnant, single, unemployed, domicile (lives w/ baby father) WF w/ a PPhx of OCD, schizophrenia, and disassociated identity disorder/Multiple PD. Patient has a history of inpatient admission at [facility] for 30 days and was last discharged 1 week ago. On this admission patient was admitted under volunteer status for psychosis, delusional, and H/S/I/P. Per chart patient stated that she doesnt feel like she belongs in society. She reports having auditory hallucination telling her to take nude video of herself. She reports that she hasnt been able to take care of herself since discharged from [facility]. Patient aslo reports that she wants to kill the father of her unborn. She tried to get arrested by eloping from the waiting room prior to admission. Per chart patient was at the ED early today but walked out [facility] w/ a stranger reportedly to do drugs w/ him. She reports that she has mild abdominal pain and lower back pain. She was also suspect of having STD but UA are negative for UTI. She reported getting meds for prior infection, but not completing them after her symptoms improved. Patient also did endorse hearing [employer] and [place] voices. Patient reports that she has dissociative disorder and woke up as \"[place]\" prior to admission. Patient brought in her Rx bottle of Arlon Meo and has been on latuda for 1 one. She does understand the risk, benefit, and the risk of not take medications during her pregnancy. In addition when she was asked why she came to the hospital she endorsed, \" I was having suicidal thoughts, subconscious homicidal thoughts, lapse in memory, felt overwhelmed about life and having night ren.  At present, patient reports that shes feeling safe in the hospital. She denies A/V/H and H/S/I/P. She reports having mood swing but NOT manic-depressive episode. She reports that she was having perceptual disturbance prior to admission but they are diminished. She denies being depressed and willing to comply w/ treatment recommendation in order to improve her psychiatric symptoms so she can go home. Patient denies having accessed to firearms or weapon at home. Active Problems:    Schizophrenia ([facility] [address] (2017)         Past Medical History:   Diagnosis Date    Bipolar disorder ([facility] [address]     ETOH abuse     Marijuana abuse         Past Surgical History:   Procedure Laterality Date    HX  SECTION         Social History   Substance Use Topics    Smoking status: Current Every Day Smoker    Smokeless tobacco: Not on file    Alcohol use Yes        History reviewed. No pertinent family history. Allergies   Allergen Reactions    Robitussin [Guaifenesin] Nausea and Vomiting        Prior to Admission medications    Medication Sig Start Date End Date Taking? Authorizing Provider   risperiDONE (RISPERDAL) 1 mg tablet Take  by mouth three (3) times daily. Yes Historical Provider   LURASIDONE HCL (LATUDA PO) Take  by mouth. Jm Mendez MD   clonazePAM (KLONOPIN) 1 mg tablet Take  by mouth two (2) times a day. Jm Mendez MD   OLANZapine (ZYPREXA) 10 mg tablet Take 10 mg by mouth nightly. Jm Mendez MD   OXcarbazepine (TRILEPTAL) 300 mg tablet Take  by mouth. Jm Mendez MD   LITHIUM CARBONATE PO Take  by mouth. Jm Mendez MD   haloperidol (HALDOL) 1 mg tablet Take  by mouth.     Jm Mendez MD       VITALS:    Visit Vitals    BP (!) 89/52 (BP 1 Location: Right arm, BP Patient Position: Sitting)    Pulse 83    Temp 97.3 °F (36.3 °C)    Resp 14    Ht 5' 6\" (1.676 m)    Wt 63.5 kg (140 lb)    LMP 2016    SpO2 100%    BMI 22.6 kg/m2       Labs: Reviewed and in chart  PSYCHIATRIC HISTORY:  DIAGNOSIS: OCD, schizophrenia, and disassociated identity disorder/Multiple PD by history  CURRENT PSYCHIATRIST: MARIAN  THERAPIST: TBAVINASH  ADMISSIONS: Inpatient admission at [facility] for 30 days and was last discharged 1 week ago. SUICIDE ATTEMPTS: None prior      REVIEW OF SYSTEMS:     GENERAL:Patient alert, awake and oriented times 3, able to communicate full sentences and not in distress. HEENT: No change in vision, no earache, tinnitus, sore throat or sinus congestion. NECK: No pain or stiffness. PULMONARY: No shortness of breath, cough or wheeze. GASTROINTESTINAL: No abdominal pain, nausea, vomiting or diarrhea, melena or bright red blood per rectum. GENITOURINARY: No urinary frequency, urgency, hesitancy or dysuria. MUSCULOSKELETAL: No joint or muscle pain, no back pain, no recent trauma. DERMATOLOGIC: No rash, no itching, no lesions. ENDOCRINE: No polyuria, polydipsia, no heat or cold intolerance. No recent change in weight. HEMATOLOGICAL: No anemia or easy bruising or bleeding. \NEUROLOGIC: No headache, seizures, numbness, tingling or weakness. \denies f/c, pain, n/v, d/c, SOB, CP, weakness/numbness, difficulty urinating    MINI MENTAL STATUS EXAM: :   Orientation- Oriented in all spheres  Short-term memory: shows no evidence of impairment  Attention:Normal  Repeat phrase \"no ifs, ands, or buts. \"  Follow three stage command- follow written command (CLOSE YOUR EYES)\- write a spontaneous sentence  Copy a simple design      MENTAL STATUS EXAM:  Appearance:shows no evidence of impairment  Behavior: shows no evidence of impairment  Motor: within normal limits  Speech: shows no evidence of impairment  Mood: within normal limits  Affect: anxious  Thought Process: shows no evidence of impairment  Thought Content: no evidence of impairment  Perception: None elicited  Cognition:  appropriate decision making  Insight: The patient shows little insight  Judgment: is psychologically impaired    RISK ASSESSMENT:   Prior Attempts: YES  Lethality of Attempts: YES  Weapons at [employer] at Home: NO  Alcohol/Drug Use: NO  Protective Factors:contacts reliable for safety, denies intent, future oriented/reasn to live, no organized plan and no means/access to weapons      ASSESSMENT: The patient is an 18 weeks pregnant, single, unemployed, domicile (lives w/ baby father) WF w/ a PPhx of OCD, schizophrenia, and disassociated identity disorder/Multiple PD. Patient has a history of inpatient admission at [facility] for 30 days and was last discharged 1 week ago. On this admission patient was admitted under volunteer status for psychosis, delusional, and H/S/I/P. Per chart patient stated that she doesnt feel like she belongs in society. She reports having auditory hallucination telling her to take nude video of herself. She reports that she hasnt been able to take care of herself since discharged from [facility]. Patient aslo reports that she wants to kill the father of her unborn. She tried to get arrested by eloping from the waiting room prior to admission. Per chart patient was at the ED early today but walked out [facility] w/ a stranger reportedly to do drugs w/ him. At present, patient reports that shes feeling safe in the hospital. She denies A/V/H and H/S/I/P. She reports having mood swing but NOT manic-depressive episode. She reports that she was having perceptual disturbance prior to admission but they are diminished. She denies being depressed and willing to comply w/ treatment recommendation in order to improve her psychiatric symptoms so she can go home. Axis I:  Schizophrenia, paranoid and disassociated identity disorder and OCD by history  Axis II: Deferred  Axis II: 18 weeks pregnant. Axis IV: Poor psychosocial supports  Axis V:  GAF: 30    Plan:  1. Continue with inpatient psychiatric treatment  2. Continue with suicide or assault precautions  3. Patient is to continue with Art/OT and family therapy sessions  4.  Will need to talk with outpatient psychiatrist/therapist for more collateral  5. Treatment plan: Restart all home medical medications and consult medicine if possible.  -Patient voices understanding of the risk, benefit, alternative treatment, and the risk of no treatment.   -Patient requests to be on Psychotropic medications (Latuda and Hydrozyzine)  -Per On call OB-GYN: Order an OB US to detect fetus heartbeat and LND will f/u w/ patient.  -Patient consents and willing to comply with treatment. -Psychotropic medications:  -1. Start: Bendaryl 25 mg PO qHS PRN (Cat B)  -2. Start: Latuda 20 mg PO qdaily w/ food (Cat B)  -3. Start: Hydrozyzine 25-50 mg PO q6hrs PRN  (Cat C)    6.  Labs: None necessary at this time  7. SW to help with disposition    EST LOS: 3-5 days    Disposition:  Home w/Family           ___________________________________________________    Attending Physician: Karmen Huggins MD     ALLERGIES:   Allergies   Allergen Reactions    Robitussin [Guaifenesin] Nausea and Vomiting       SUBSTANCE USE:none    Patient Vitals for the past 24 hrs:   Temp Pulse Resp BP   04/19/17 0930 97.3 °F (36.3 °C) 83 14 (!) 89/52

## 2017-04-19 NOTE — BH NOTES
Late Entry:    ARUN Contact: Pt.is a 32year old female with history of Schizophrenia paranoid type. Pt.was admitted to the facility for having command hallucinations to harm her boyfriend. SW met with pt to discuss d.c planning. Pt. Stated she had an argument with her boyfriend. Pt stated the boyfriend and her only have been dating for 6 -7 months. Pt stated the  boyfriend said something to annoy her. Pt. Stated she was having thoughts to harm him but rojas snot want to . Pt stated she has multiple personality disorder. Pt stated one of her personality was trying to get her to do something. Pt stated she was starting to feel weird and decided to come to the  Hospital for help. Pt stated she has been feeling out of sorts. SW provided pt with mental health education, discussed coping skills, safety plan and aftercare. Pt. Stated she receives outpt mental health services with [facility]. Pt. Stated she plans  to her home with her father.

## 2017-04-19 NOTE — BH NOTES
GROUP THERAPY PROGRESS NOTE    Ashley Patel is participating in Leisure-Creative Group. Group time: 30 minutes    Personal goal for participation: The Pt will participate in group activities with their peers that will produce healthy coping skills. Goal orientation: relaxation    Group therapy participation: active    Therapeutic interventions reviewed and discussed:     Impression of participation: The Pt attended and participated fully in group.

## 2017-04-19 NOTE — PROGRESS NOTES
conducted an initial consultation and Spiritual Assessment for Inés Lane, who is a 32 y.o.,female. Patients Primary Language is: [place]. According to the patients EMR Zoroastrianism Affiliation is: No Episcopalian. The reason the Patient came to the hospital is:   Patient Active Problem List    Diagnosis Date Noted    Schizophrenia [facility]) 04/18/2017        The  provided the following Interventions:  Initiated a relationship of care and support. Explored issues of sadi, belief, spirituality and Denominational/ritual needs while hospitalized. Listened empathically. Provided chaplaincy education. Provided information about Spiritual Care Services. Offered prayer and assurance of continued prayers on patient's behalf. Chart reviewed. The following outcomes where achieved:  Patient shared limited information about both their medical narrative and spiritual journey/beliefs.  confirmed Patient's Zoroastrianism Affiliation. Patient processed feeling about current hospitalization. Patient expressed gratitude for 's visit. Assessment:  Patient does not have any Denominational/cultural needs that will affect patients preferences in health care. There are no spiritual or Denominational issues which require intervention at this time. Plan:  Chaplains will continue to follow and will provide pastoral care on an as needed/requested basis.  recommends bedside caregivers page  on duty if patient shows signs of acute spiritual or emotional distress.       [address] [facility]   [phone]

## 2017-04-20 VITALS
SYSTOLIC BLOOD PRESSURE: 95 MMHG | HEART RATE: 70 BPM | OXYGEN SATURATION: 100 % | WEIGHT: 140 LBS | TEMPERATURE: 97.2 F | RESPIRATION RATE: 16 BRPM | DIASTOLIC BLOOD PRESSURE: 58 MMHG | HEIGHT: 66 IN | BODY MASS INDEX: 22.5 KG/M2

## 2017-04-20 PROCEDURE — 65220000003 HC RM SEMIPRIVATE PSYCH

## 2017-04-20 PROCEDURE — 74011250637 HC RX REV CODE- 250/637: Performed by: PSYCHIATRY & NEUROLOGY

## 2017-04-20 RX ADMIN — .BETA.-CAROTENE, SODIUM ACETATE, ASCORBIC ACID, CHOLECALCIFEROL, .ALPHA.-TOCOPHEROL ACETATE, DL-, THIAMINE MONONITRATE, RIBOFLAVIN, NIACINAMIDE, PYRIDOXINE HYDROCHLORIDE, FOLIC ACID, CYANOCOBALAMIN, CALCIUM CARBONATE, FERROUS FUMARATE, ZINC OXIDE AND CUPRIC OXIDE 1 TABLET: 2000; 2000; 120; 400; 22; 1.84; 3; 20; 10; 1; 12; 200; 27; 25; 2 TABLET ORAL at 08:30

## 2017-04-20 RX ADMIN — LURASIDONE HYDROCHLORIDE 20 MG: 20 TABLET, FILM COATED ORAL at 08:30

## 2017-04-20 NOTE — BH NOTES
Lenora Narayan is  participating in [place]. Group time: 15 minutes    Personal goal for participation: Community announcement    Goal orientation: community    Group therapy participation: fully participated    Therapeutic interventions reviewed and discussed: Staff discussed  Staff discussed the Mental Health programs offered. Unit schedule for groups,  Visiting hours, patient advocate name and phone number and where this information is posted on the unit. , etc,,. Report any maintenance/housekeeping or treatment concerns to staff so it can be addressed. Impression of participation: Pt.did not have any maintenance/housekeeping or treatment concerns to report to staff .

## 2017-04-20 NOTE — BSMART NOTE
OCCUPATIONAL THERAPY PROGRESS NOTE  Group Time:  0885  Attendance: The patient attended full group. Participation:  The patient participated with moderate elaboration in the activity. Attention:  The patient was able to focus on the activity. Interaction:  The patient acknowledges others or responds to questions,  with no spontaneous interaction. Began to participate more as group progressed. Answers on subject.

## 2017-04-20 NOTE — PROGRESS NOTES
Behavioral Health Progress Note      4/20/2017    Jcarlos Servin    Current Diagnosis: Schizophrenia, paranoid and disassociated identity disorder and OCD by history      Report from the nursing staff changes in the medical condition while patient has been on the unit:    Patient Vitals for the past 24 hrs:   Temp Pulse Resp BP   04/20/17 0830 97.2 °F (36.2 °C) 70 16 95/58       No results found for this or any previous visit (from the past 24 hour(s)). Sleep:shows no evidence of impairment    Interval intake: Patient reports feeling better since admission. She reports that she is excited about her pregnancy and she hopes that there is NO complication and it will be smooth. Patient hasn't posed any management problems on the unit since admission. She has been calm, cooperative, and compliant w/ meds tolerating them well w/o any SE reported. She has been attending psychotherapy group sessions and participating in group activities. She indicates to one of the staff that she just need to get her depression under controlled. She denies A/VH and H/S/I/P. NO neurovegetative si/x reported or observed. Mental Status Exam: Affect/Mood are brighter. Insight/judgment are improving. Treatment Plan:  -Patient voices understanding of the risk, benefit, alternative treatment, and the risk of no treatment.   -Patient requests to be on Psychotropic medications (Latuda and Hydrozyzine)  -Per On call OB-GYN: Order an OB US to detect fetus heartbeat and LND will f/u w/ patient.  -Patient consents and willing to comply with treatment. -Psychotropic medications:  -1. Continue: Bendaryl 25 mg PO qHS PRN (Cat B)  -2. Continue: Latuda 20 mg PO qdaily w/ food (Cat B)  -3. Continue: Hydrozyzine 25-50 mg PO q6hrs PRN (Cat C)     OB/US: 4/19/2017: Single viable intrauterine gestation whose average ultrasound age is 24 weeks 4  Days. LND will be called bc patient has a history of vaginosis. Nurse was informed to call LND. Anticipated Discharge:  Tomorrow: Friday: 4/21/2017    Navjot Lyle MD  4/20/2017

## 2017-04-20 NOTE — BH NOTES
Pt out for vital signs,meals and medication. She did attend group briefly but also spent   time in room resting but not sleeping when approached on rounds. Reports feeling a   little better but still concerned about her physical issues and hoping to resolve the issue    regarding family and job. Continue to offer support and 1-1 to allow to express feelings.

## 2017-04-20 NOTE — BH NOTES
Patient has been up at will on unit. She spent most of the evening in milieu. She was cooperative and compliant. she has been medication compliant. Will continue to monitor.

## 2017-04-20 NOTE — BH NOTES
GROUP THERAPY PROGRESS NOTE    Donna Verdugo is not  participating in [place]. Staff encouraged and pt refused.

## 2017-04-20 NOTE — BH NOTES
ARUN discussed case with the treating psychiatrist    SW Contact: Pt.is a 32year old female with history of Schizophrenia paranoid type. Pt.was admitted to the facility for having command hallucinations to harm her boyfriend. SW met with pt to discuss d.c planning. Pt. Expressed she was feeling okay. Pt stated she plans to continue to take medications. Pt.is dn eying ideations and hallucinations. SW discussed continued compliance, safety plan and support system. Pt. Stated she has a support system.  Pt. Has an appointment with [facility] behavioral healthcare for 4/27/17 @ 8:00 with her therapist.

## 2017-04-20 NOTE — BSMART NOTE
ART THERAPY GROUP PROGRESS NOTE    PATIENT SCHEDULED FOR GROUP AT: 14:45    ATTENDANCE: Full    PARTICIPATION LEVEL: Participates fully in the art process    ATTENTION LEVEL: Able to focus on task    FOCUS: Problem-solving    SYMBOLIC & THEMATIC CONTENT AS NOTED IN IMAGERY: She was calm and presented with a flat affect. Her imagery had a bizarre and detached quality and she spoke of following the [employer]" using \"durgs for medicational ailments and purposes. \"

## 2017-04-21 PROCEDURE — 74011250637 HC RX REV CODE- 250/637: Performed by: PSYCHIATRY & NEUROLOGY

## 2017-04-21 RX ORDER — HYDROXYZINE PAMOATE 25 MG/1
25 CAPSULE ORAL
Qty: 60 CAP | Refills: 0 | Status: SHIPPED | OUTPATIENT
Start: 2017-04-21 | End: 2017-05-01

## 2017-04-21 RX ADMIN — LURASIDONE HYDROCHLORIDE 20 MG: 20 TABLET, FILM COATED ORAL at 08:27

## 2017-04-21 RX ADMIN — .BETA.-CAROTENE, SODIUM ACETATE, ASCORBIC ACID, CHOLECALCIFEROL, .ALPHA.-TOCOPHEROL ACETATE, DL-, THIAMINE MONONITRATE, RIBOFLAVIN, NIACINAMIDE, PYRIDOXINE HYDROCHLORIDE, FOLIC ACID, CYANOCOBALAMIN, CALCIUM CARBONATE, FERROUS FUMARATE, ZINC OXIDE AND CUPRIC OXIDE 1 TABLET: 2000; 2000; 120; 400; 22; 1.84; 3; 20; 10; 1; 12; 200; 27; 25; 2 TABLET ORAL at 08:27

## 2017-04-21 NOTE — BH NOTES
SW Contact: Pt.is a 32year old female with history of Schizophrenia paranoid type. Pt.was admitted to the facility for having command hallucinations to harm her boyfriend. SW met with pt to discuss d.c planning. Pt. stated she was feeling better. Pt denies ideations and hallucinations. Pt stated she does not want to harm anyone. SW discussed positive  ways to control moods, positive distractions, continued compliance, safety plan and support system. Pt. plans to return to current address. Pt.s  transportation to home fail through. The pt. will require a bus pass. Pt. Has an appointment with [facility] behavioral healthcare for 4/27/17 @ 8:00 with her therapist.    Case was discussed with the treating psychiatrist and unit nurses.

## 2017-04-21 NOTE — DISCHARGE INSTRUCTIONS
BEHAVIORAL HEALTH NURSING DISCHARGE NOTE      Emergency Numbers    : [facility] Emergency Services: [phone]  Suicide Prevention Line: 6 [phone] (TALK)      The following personal items collected during your admission are returned to you:   Dental Appliance: Dental Appliances: None  Vision:    Hearing Aid:    Jewelry: Jewelry: None  Clothing: Clothing: Footwear, Pajamas, Pants, Shirt, Socks, Undergarments, With patient  Other Valuables: Other Valuables: Personal electronic devices (comment), Personal toiletries  Valuables sent to safe: Personal Items Sent to Safe:  (computer)        The discharge information has been reviewed with the patient. The patient verbalized understanding. [employer] Activation    Thank you for requesting access to [employer]. Please follow the instructions below to securely access and download your online medical record. [employer] allows you to send messages to your doctor, view your test results, renew your prescriptions, schedule appointments, and more. How Do I Sign Up? 1. In your internet browser, go to [email or web address]  2. Click on the First Time User? Click Here link in the Sign In box. You will be redirect to the New Member Sign Up page. 3. Enter your [employer] Access Code exactly as it appears below. You will not need to use this code after youve completed the sign-up process. If you do not sign up before the expiration date, you must request a new code. [employer] Access Code: [email or web address]  Expires: 2017  7:05 AM (This is the date your [employer] access code will )    4. Enter the last four digits of your Social Security Number (xxxx) and Date of Birth (mm/dd/yyyy) as indicated and click Submit. You will be taken to the next sign-up page. 5. Create a [employer] ID. This will be your [employer] login ID and cannot be changed, so think of one that is secure and easy to remember. 6. Create a [employer] password.  You can change your password at any time. 7. Enter your Password Reset Question and Answer. This can be used at a later time if you forget your password. 8. Enter your e-mail address. You will receive e-mail notification when new information is available in [address] 9. Click Sign Up. You can now view and download portions of your medical record. 10. Click the Download Summary menu link to download a portable copy of your medical information. Additional Information    If you have questions, please visit the Frequently Asked Questions section of the [employer] website at [email or web address] Remember, [employer] is NOT to be used for urgent needs. For medical emergencies, dial 911.     Patient armband removed and shredded

## 2017-04-21 NOTE — BH NOTES
Patient has been discharged to self and will follow up with [facility]. Patient has been provided with information regarding mental health follow up care, with (scripts) and follow up appointment Provided. Patient has been educated regarding seeking additional support if needed with information listed on discharge paper work and emergency card provided. Patient has been escorted off unit to call taxi service per request and is awaiting transportation for transport to home.

## 2017-04-21 NOTE — DISCHARGE SUMMARY
[facility]  Discharge Summary     Patient ID:  Brandi Gonzalez  [id number]  50 y.o.  1990    Admit date: 2017    Discharge date and time: 2017 and morning. Admission Diagnoses: Schizophrenia [facility])    Discharge Diagnoses: Schizophrenia, paranoid and disassociated identity disorder and OCD by history    Disposition: home    Discharged Condition: good    Past Medical History:   Diagnosis Date    Bipolar disorder ([address]     ETOH abuse     Marijuana abuse       History reviewed. No pertinent family history. Social History   Substance Use Topics    Smoking status: Current Every Day Smoker    Smokeless tobacco: Not on file    Alcohol use Yes     Past Surgical History:   Procedure Laterality Date    HX  SECTION        Prior to Admission medications    Medication Sig Start Date End Date Taking? Authorizing Provider   risperiDONE (RISPERDAL) 1 mg tablet Take  by mouth three (3) times daily. Yes Historical Provider   LURASIDONE HCL (LATUDA PO) Take  by mouth. Jm Mendez MD   clonazePAM (KLONOPIN) 1 mg tablet Take  by mouth two (2) times a day. Jm Mendez MD   OLANZapine (ZYPREXA) 10 mg tablet Take 10 mg by mouth nightly. Jm Mendez MD   OXcarbazepine (TRILEPTAL) 300 mg tablet Take  by mouth. Jm Mendez MD   LITHIUM CARBONATE PO Take  by mouth. Jm Mendez MD   haloperidol (HALDOL) 1 mg tablet Take  by mouth. Jm Mendez MD     Allergies   Allergen Reactions    Robitussin [Guaifenesin] Nausea and Vomiting      Hospital course: The patient was admitted to the special treatment unit on 2017. The patient was engaged in individual and group therapies, and occupational therapy. The patient was involved in chemical dependence educational classes and NA/AA. During hospitalization patient posed NO management problems. Patient was compliant w/ meds and w/ meals w/o any SE reported or observed. Patient also attended psychotherapy group sessions.  Initially patient mood was labile and she was anxious but once psychotropic medications were started patient mood improved and stabilized. Patient reports that the current treatment regimen is effective at controlling his psychiatric symptoms. At the time of discharge, the patient denied homicidal or suicidal ideation. Patient was not psychotic and was capable of self-care and competent to make their own financial and medical decisions. The patient has an understanding of treatment recommendations and medication management on discharge. Discharge Exams:   Mental Status exam: WNL   Physical exam: No exam performed today, NO physical complaints reported. Chest X-Ray: None  ECG: None    Lab/Data Review: All lab results for the last 24 hours reviewed. Consultations (including impressions and outcomes): None necessary  Psychiatric Testing: Reality based  Treatment and Response: Effective  Significant adverse reaction to drugs: none  Procedures/Operations: none  Aftercare safety treatment plan was discussed with the patient before discharge. Discharge medications:  -Patient voices understanding of the risk, benefit, alternative treatment, and the risk of no treatment.   -Patient requests to be on Psychotropic medications (Latuda and Hydrozyzine) because she voces that   the benefit outweigh the risk.   -Per On call OB-GYN: Order an OB US and they were able to detect fetus heartbeat. -Per LND patient will f/u w/ outpatient OB/GYN  -Patient consents and willing to comply with treatment. -Psychotropic medications:    -1. Continue: Latuda 20 mg PO qdaily w/ food (Cat B)  -2. Continue: Hydrozyzine 25-50 mg PO q6hrs PRN (Cat C)  -3. Continue: Prenatal Vitamin 1 T PO daily      OB/US: 4/19/2017: Single viable intrauterine gestation whose average ultrasound age is 24 weeks 4 Days.     Activity: Activity as tolerated  Diet: Regular Diet    All f/u were arranged for the patient by the discharge planner at the time of discharge.        Signed:  Starr Koroma MD  4/21/2017  6:38 AM

## 2017-04-25 ENCOUNTER — HOSPITAL ENCOUNTER (EMERGENCY)
Age: 27
Discharge: PSYCHIATRIC OR SUBSTANCE USE FACILITY | End: 2017-04-25
Attending: EMERGENCY MEDICINE | Admitting: EMERGENCY MEDICINE
Payer: SELF-PAY

## 2017-04-25 VITALS
BODY MASS INDEX: 20.89 KG/M2 | OXYGEN SATURATION: 100 % | RESPIRATION RATE: 16 BRPM | TEMPERATURE: 97.9 F | SYSTOLIC BLOOD PRESSURE: 103 MMHG | WEIGHT: 130 LBS | DIASTOLIC BLOOD PRESSURE: 59 MMHG | HEIGHT: 66 IN | HEART RATE: 90 BPM

## 2017-04-25 DIAGNOSIS — R45.1 AGITATION: ICD-10-CM

## 2017-04-25 DIAGNOSIS — F29 PSYCHOSIS, UNSPECIFIED PSYCHOSIS TYPE (HCC): Primary | ICD-10-CM

## 2017-04-25 DIAGNOSIS — Z32.01 PREGNANCY TEST PERFORMED, PREGNANCY CONFIRMED: ICD-10-CM

## 2017-04-25 LAB
ALBUMIN SERPL BCP-MCNC: 3.2 G/DL (ref 3.4–5)
ALBUMIN/GLOB SERPL: 0.8 {RATIO} (ref 0.8–1.7)
ALP SERPL-CCNC: 52 U/L (ref 45–117)
ALT SERPL-CCNC: 20 U/L (ref 13–56)
AMPHET UR QL SCN: NEGATIVE
ANION GAP BLD CALC-SCNC: 9 MMOL/L (ref 3–18)
APPEARANCE UR: CLEAR
AST SERPL W P-5'-P-CCNC: 16 U/L (ref 15–37)
BARBITURATES UR QL SCN: NEGATIVE
BASOPHILS # BLD AUTO: 0 K/UL (ref 0–0.1)
BASOPHILS # BLD: 0 % (ref 0–2)
BENZODIAZ UR QL: NEGATIVE
BILIRUB SERPL-MCNC: 0.4 MG/DL (ref 0.2–1)
BILIRUB UR QL: NEGATIVE
BUN SERPL-MCNC: 5 MG/DL (ref 7–18)
BUN/CREAT SERPL: 11 (ref 12–20)
CALCIUM SERPL-MCNC: 8.8 MG/DL (ref 8.5–10.1)
CANNABINOIDS UR QL SCN: NEGATIVE
CHLORIDE SERPL-SCNC: 103 MMOL/L (ref 100–108)
CO2 SERPL-SCNC: 25 MMOL/L (ref 21–32)
COCAINE UR QL SCN: NEGATIVE
COLOR UR: YELLOW
CREAT SERPL-MCNC: 0.47 MG/DL (ref 0.6–1.3)
DIFFERENTIAL METHOD BLD: ABNORMAL
EOSINOPHIL # BLD: 0 K/UL (ref 0–0.4)
EOSINOPHIL NFR BLD: 0 % (ref 0–5)
ERYTHROCYTE [DISTWIDTH] IN BLOOD BY AUTOMATED COUNT: 13.7 % (ref 11.6–14.5)
ETHANOL SERPL-MCNC: <3 MG/DL (ref 0–3)
GLOBULIN SER CALC-MCNC: 3.9 G/DL (ref 2–4)
GLUCOSE SERPL-MCNC: 77 MG/DL (ref 74–99)
GLUCOSE UR STRIP.AUTO-MCNC: NEGATIVE MG/DL
HCG SERPL-ACNC: ABNORMAL MIU/ML (ref 0–10)
HCG UR QL: POSITIVE
HCT VFR BLD AUTO: 32.7 % (ref 35–45)
HDSCOM,HDSCOM: NORMAL
HGB BLD-MCNC: 11.4 G/DL (ref 12–16)
HGB UR QL STRIP: NEGATIVE
KETONES UR QL STRIP.AUTO: 15 MG/DL
LEUKOCYTE ESTERASE UR QL STRIP.AUTO: NEGATIVE
LYMPHOCYTES # BLD AUTO: 17 % (ref 21–52)
LYMPHOCYTES # BLD: 1.7 K/UL (ref 0.9–3.6)
MCH RBC QN AUTO: 30.4 PG (ref 24–34)
MCHC RBC AUTO-ENTMCNC: 34.9 G/DL (ref 31–37)
MCV RBC AUTO: 87.2 FL (ref 74–97)
METHADONE UR QL: NEGATIVE
MONOCYTES # BLD: 0.9 K/UL (ref 0.05–1.2)
MONOCYTES NFR BLD AUTO: 9 % (ref 3–10)
NEUTS SEG # BLD: 7.1 K/UL (ref 1.8–8)
NEUTS SEG NFR BLD AUTO: 74 % (ref 40–73)
NITRITE UR QL STRIP.AUTO: NEGATIVE
OPIATES UR QL: NEGATIVE
PCP UR QL: NEGATIVE
PH UR STRIP: 7 [PH] (ref 5–8)
PLATELET # BLD AUTO: 223 K/UL (ref 135–420)
PMV BLD AUTO: 9.2 FL (ref 9.2–11.8)
POTASSIUM SERPL-SCNC: 4.1 MMOL/L (ref 3.5–5.5)
PROT SERPL-MCNC: 7.1 G/DL (ref 6.4–8.2)
PROT UR STRIP-MCNC: NEGATIVE MG/DL
RBC # BLD AUTO: 3.75 M/UL (ref 4.2–5.3)
SODIUM SERPL-SCNC: 137 MMOL/L (ref 136–145)
SP GR UR REFRACTOMETRY: 1.01 (ref 1–1.03)
UROBILINOGEN UR QL STRIP.AUTO: 0.2 EU/DL (ref 0.2–1)
WBC # BLD AUTO: 9.7 K/UL (ref 4.6–13.2)

## 2017-04-25 PROCEDURE — 80307 DRUG TEST PRSMV CHEM ANLYZR: CPT | Performed by: EMERGENCY MEDICINE

## 2017-04-25 PROCEDURE — 99283 EMERGENCY DEPT VISIT LOW MDM: CPT

## 2017-04-25 PROCEDURE — 80053 COMPREHEN METABOLIC PANEL: CPT | Performed by: EMERGENCY MEDICINE

## 2017-04-25 PROCEDURE — 85025 COMPLETE CBC W/AUTO DIFF WBC: CPT | Performed by: EMERGENCY MEDICINE

## 2017-04-25 PROCEDURE — 84702 CHORIONIC GONADOTROPIN TEST: CPT | Performed by: EMERGENCY MEDICINE

## 2017-04-25 PROCEDURE — 81003 URINALYSIS AUTO W/O SCOPE: CPT | Performed by: EMERGENCY MEDICINE

## 2017-04-25 PROCEDURE — 81025 URINE PREGNANCY TEST: CPT | Performed by: EMERGENCY MEDICINE

## 2017-04-25 RX ORDER — HYDROXYZINE 50 MG/ML
50 INJECTION, SOLUTION INTRAMUSCULAR
Status: DISCONTINUED | OUTPATIENT
Start: 2017-04-25 | End: 2017-04-25

## 2017-04-25 RX ORDER — LORAZEPAM 1 MG/1
1 TABLET ORAL
Status: DISCONTINUED | OUTPATIENT
Start: 2017-04-25 | End: 2017-04-25

## 2017-04-25 RX ORDER — DIPHENHYDRAMINE HCL 25 MG
25 CAPSULE ORAL
Status: DISCONTINUED | OUTPATIENT
Start: 2017-04-25 | End: 2017-04-26 | Stop reason: HOSPADM

## 2017-04-25 RX ORDER — HYDROXYZINE 50 MG/ML
25 INJECTION, SOLUTION INTRAMUSCULAR
Status: DISCONTINUED | OUTPATIENT
Start: 2017-04-25 | End: 2017-04-26 | Stop reason: HOSPADM

## 2017-04-25 NOTE — ED NOTES
VS updated  Pt refused meds.  Will return to pharmacy  Report given to Diego Azul RN at [facility]
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
## 2017-04-26 NOTE — ED NOTES
EMTALA completed, signed by MD< RN< and [place] police  Form copied and given to  to be scanned into pt's chart  Original copy, facesheet, discharge paperwork, and pt's belongings sent with pt via [place] police due to TDO

## 2017-05-13 ENCOUNTER — HOSPITAL ENCOUNTER (EMERGENCY)
Age: 27
Discharge: PSYCHIATRIC OR SUBSTANCE USE FACILITY | End: 2017-05-14
Attending: EMERGENCY MEDICINE
Payer: SELF-PAY

## 2017-05-13 DIAGNOSIS — F20.0 PARANOID SCHIZOPHRENIA (HCC): Primary | ICD-10-CM

## 2017-05-13 PROCEDURE — 99284 EMERGENCY DEPT VISIT MOD MDM: CPT

## 2017-05-13 PROCEDURE — 80307 DRUG TEST PRSMV CHEM ANLYZR: CPT | Performed by: EMERGENCY MEDICINE

## 2017-05-14 VITALS
OXYGEN SATURATION: 98 % | HEART RATE: 94 BPM | TEMPERATURE: 98 F | BODY MASS INDEX: 20.56 KG/M2 | RESPIRATION RATE: 18 BRPM | WEIGHT: 127.4 LBS | DIASTOLIC BLOOD PRESSURE: 66 MMHG | SYSTOLIC BLOOD PRESSURE: 110 MMHG

## 2017-05-14 LAB
AMPHET UR QL SCN: NEGATIVE
ANION GAP BLD CALC-SCNC: 6 MMOL/L (ref 3–18)
BARBITURATES UR QL SCN: NEGATIVE
BASOPHILS # BLD AUTO: 0 K/UL (ref 0–0.1)
BASOPHILS # BLD: 0 % (ref 0–2)
BENZODIAZ UR QL: NEGATIVE
BUN SERPL-MCNC: 6 MG/DL (ref 7–18)
BUN/CREAT SERPL: 11 (ref 12–20)
CALCIUM SERPL-MCNC: 8.7 MG/DL (ref 8.5–10.1)
CANNABINOIDS UR QL SCN: NEGATIVE
CHLORIDE SERPL-SCNC: 104 MMOL/L (ref 100–108)
CO2 SERPL-SCNC: 29 MMOL/L (ref 21–32)
COCAINE UR QL SCN: NEGATIVE
CREAT SERPL-MCNC: 0.54 MG/DL (ref 0.6–1.3)
DIFFERENTIAL METHOD BLD: ABNORMAL
EOSINOPHIL # BLD: 0.1 K/UL (ref 0–0.4)
EOSINOPHIL NFR BLD: 1 % (ref 0–5)
ERYTHROCYTE [DISTWIDTH] IN BLOOD BY AUTOMATED COUNT: 13.3 % (ref 11.6–14.5)
ETHANOL SERPL-MCNC: <3 MG/DL (ref 0–3)
GLUCOSE SERPL-MCNC: 87 MG/DL (ref 74–99)
HCT VFR BLD AUTO: 30.3 % (ref 35–45)
HDSCOM,HDSCOM: NORMAL
HGB BLD-MCNC: 10.6 G/DL (ref 12–16)
LYMPHOCYTES # BLD AUTO: 19 % (ref 21–52)
LYMPHOCYTES # BLD: 2.1 K/UL (ref 0.9–3.6)
MCH RBC QN AUTO: 30.8 PG (ref 24–34)
MCHC RBC AUTO-ENTMCNC: 35 G/DL (ref 31–37)
MCV RBC AUTO: 88.1 FL (ref 74–97)
METHADONE UR QL: NEGATIVE
MONOCYTES # BLD: 0.9 K/UL (ref 0.05–1.2)
MONOCYTES NFR BLD AUTO: 8 % (ref 3–10)
NEUTS SEG # BLD: 8 K/UL (ref 1.8–8)
NEUTS SEG NFR BLD AUTO: 72 % (ref 40–73)
OPIATES UR QL: NEGATIVE
PCP UR QL: NEGATIVE
PLATELET # BLD AUTO: 196 K/UL (ref 135–420)
PMV BLD AUTO: 8.8 FL (ref 9.2–11.8)
POTASSIUM SERPL-SCNC: 3.3 MMOL/L (ref 3.5–5.5)
RBC # BLD AUTO: 3.44 M/UL (ref 4.2–5.3)
SODIUM SERPL-SCNC: 139 MMOL/L (ref 136–145)
WBC # BLD AUTO: 11.2 K/UL (ref 4.6–13.2)

## 2017-05-14 PROCEDURE — 80307 DRUG TEST PRSMV CHEM ANLYZR: CPT | Performed by: EMERGENCY MEDICINE

## 2017-05-14 PROCEDURE — 80048 BASIC METABOLIC PNL TOTAL CA: CPT | Performed by: EMERGENCY MEDICINE

## 2017-05-14 PROCEDURE — 85025 COMPLETE CBC W/AUTO DIFF WBC: CPT | Performed by: EMERGENCY MEDICINE

## 2017-05-14 NOTE — ED NOTES
[employer] officer at doorway to room with ECO received from [facility]. Pt is currently resting in bed on her left side, family has gone home.

## 2017-05-14 NOTE — BSMART NOTE
1300 - Per Hari EARL clinician, Client is accepted for admission at [facility] on TDO by Avni Rader.  confirmed bed, number for report is [phone].
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
## 2017-05-14 NOTE — ED NOTES
Behavioral safety check sheet filled out on paper, scanned into chart covering time frame: [id number] today.

## 2017-05-14 NOTE — ED TRIAGE NOTES
Pt states she feels like she has water on her left knee. .  Pt states she is having cramping in left fallopian tubes. Pt states she is 26 weeks. Pt alert then has episode of word salad. Friend states she has been acting weird today. Pt now denies abdominal pain. While I am entering information.   Pt states \" I am sorry I thought I heard you say my name with your hand\"  Pt states she took her latuda this morning, she states she then vomited,  And took an additional dose of latuda

## 2017-05-14 NOTE — BSMART NOTE
0730 - Per Jordan Orosco clinician, bed search continues for TDO placement and will be resumed by on-coming shift.  notified.

## 2017-05-14 NOTE — ED NOTES
Patient sitting at the side of the bed. Family members at bedside. Pt denies any needs. Will continue to monitor.

## 2017-05-14 NOTE — ED PROVIDER NOTES
HPI Comments: 12:38 AM Stevenson Briones is a 32 y.o. female with a h/o Bipolar disorder, who presents to the ED for the evaluation of Paranoia. Pt states that her paranoia onset earlier today after thinking of germs. Pt significant other states that the pt has been hallucinating and talking to herself at home. Pt denies SI or HI. Pt reports several pending appointments with her  and therapists. No other complaints at this time. PCP: None       The history is provided by the patient. Past Medical History:   Diagnosis Date    Bipolar disorder ([address]     ETOH abuse     Marijuana abuse        Past Surgical History:   Procedure Laterality Date    HX  SECTION           History reviewed. No pertinent family history. Social History     Social History    Marital status: SINGLE     Spouse name: N/A    Number of children: N/A    Years of education: N/A     Occupational History    Not on file. Social History Main Topics    Smoking status: Current Every Day Smoker    Smokeless tobacco: Not on file    Alcohol use Yes    Drug use: Yes     Special: Marijuana    Sexual activity: Not on file     Other Topics Concern    Not on file     Social History Narrative         ALLERGIES: Robitussin [guaifenesin]    Review of Systems   Unable to perform ROS: Psychiatric disorder       Vitals:    17 2342   BP: 114/63   Pulse: 88   Resp: 15   Temp: 97.9 °F (36.6 °C)   SpO2: 99%   Weight: 57.8 kg (127 lb 6.4 oz)        99% on RA, indicating adequate oxygenation. Physical Exam   Constitutional: She is oriented to person, place, and time. She appears well-developed and well-nourished. HENT:   Head: Normocephalic and atraumatic. Right Ear: External ear normal.   Left Ear: External ear normal.   Nose: Nose normal.   Mouth/Throat: Oropharynx is clear and moist.   Eyes: Conjunctivae and EOM are normal. Pupils are equal, round, and reactive to light. Neck: Normal range of motion.  Neck supple. Cardiovascular: Regular rhythm, normal heart sounds and intact distal pulses. Pulmonary/Chest: Effort normal and breath sounds normal. No respiratory distress. She has no wheezes. She has no rales. She exhibits no tenderness. Abdominal: Soft. Bowel sounds are normal. She exhibits no distension and no mass. There is no tenderness. There is no rebound and no guarding. Musculoskeletal: Normal range of motion. She exhibits no edema. Neurological: She is alert and oriented to person, place, and time. Skin: Skin is warm and dry. No rash noted. No erythema. Psychiatric: She has a normal mood and affect. Her behavior is normal. Judgment normal. Her speech is tangential. Thought content is paranoid. Nursing note and vitals reviewed. MDM  Number of Diagnoses or Management Options  Paranoid schizophrenia [facility]):   Diagnosis management comments: Patient presents with flight of ideas,paranoia, intermittent incoherent speech  Will trend labs, refer to Crisis  . Amount and/or Complexity of Data Reviewed  Clinical lab tests: ordered  Tests in the radiology section of CPT®: ordered    Risk of Complications, Morbidity, and/or Mortality  Presenting problems: moderate      ED Course       Procedures    Medications ordered:   Medications - No data to display      Lab findings:  Recent Results (from the past 12 hour(s))   DRUG SCREEN, URINE    Collection Time: 05/13/17 11:48 PM   Result Value Ref Range    BENZODIAZEPINE NEGATIVE  NEG      BARBITURATES NEGATIVE  NEG      THC (TH-CANNABINOL) NEGATIVE  NEG      OPIATES NEGATIVE  NEG      PCP(PHENCYCLIDINE) NEGATIVE  NEG      COCAINE NEGATIVE  NEG      AMPHETAMINE NEGATIVE  NEG      METHADONE NEGATIVE  NEG      HDSCOM (NOTE)        X-Ray, CT or other radiology findings or impressions:  No results found. Progress notes, Consult notes or additional Procedure notes:   1:34 AM Consult:  Discussed care with Ondina Alves.  Standard discussion; including history of patients chief complaint, available diagnostic results, and treatment course. Agrees to evaluate the pt.     2:30 AM Consult:  Discussed care with Ondina Alves. Standard discussion; including history of patients chief complaint, available diagnostic results, and treatment course. States that she will consult [facility] for TDO. Dispo:  Patient was Transferred to a psychiatric facility     No diagnosis found. Patient's Medications   Start Taking    No medications on file   Continue Taking    LURASIDONE (LATUDA) 20 MG TAB TABLET    Take 1 Tab by mouth daily (with breakfast) for 30 days. Indications: DEPRESSION ASSOCIATED WITH BIPOLAR DISORDER    PRENATAL VIT-CALCIUM-IRON-FA (PRENATAL PLUS WITH CALCIUM) 27 MG IRON- 1 MG TAB    Take 1 Tab by mouth daily for 30 days. Indications: Pregnancy   These Medications have changed    No medications on file   Stop Taking    No medications on file       SCRIBE ATTESTATION STATEMENT  Documented by: Anitha Hickman. Colleen Umana for, and in the presence of, Madhu Purcell MD 12:37 AM     Signed by: Anitha Hickman. Esthela [address], 5/14/2017 12:37 AM     PROVIDER ATTESTATION STATEMENT  I personally performed the services described in the documentation, reviewed the documentation, as recorded by the scribe in my presence, and it accurately and completely records my words and actions.   Madhu Purcell MD

## 2017-05-14 NOTE — ED NOTES
Pt gives authorization for ED staff to update her boyfriend, Avelino Sheffield, on her admission status and treatment plan. Boyfriend is going home but will call to check on her and pt requests that ED staff update him at that time.

## 2017-07-30 ENCOUNTER — HOSPITAL ENCOUNTER (INPATIENT)
Age: 27
LOS: 8 days | Discharge: HOME | DRG: 781 | End: 2017-08-07
Attending: PSYCHIATRY & NEUROLOGY | Admitting: PSYCHIATRY & NEUROLOGY
Payer: COMMERCIAL

## 2017-07-30 ENCOUNTER — HOSPITAL ENCOUNTER (EMERGENCY)
Age: 27
Discharge: PSYCHIATRIC OR SUBSTANCE USE FACILITY | End: 2017-07-30
Attending: OBSTETRICS & GYNECOLOGY | Admitting: OBSTETRICS & GYNECOLOGY
Payer: MEDICAID

## 2017-07-30 ENCOUNTER — HOSPITAL ENCOUNTER (EMERGENCY)
Age: 27
Discharge: PSYCHIATRIC OR SUBSTANCE USE FACILITY | End: 2017-07-30
Attending: EMERGENCY MEDICINE | Admitting: OBSTETRICS & GYNECOLOGY
Payer: MEDICAID

## 2017-07-30 VITALS — DIASTOLIC BLOOD PRESSURE: 68 MMHG | SYSTOLIC BLOOD PRESSURE: 116 MMHG | OXYGEN SATURATION: 98 % | HEART RATE: 90 BPM

## 2017-07-30 VITALS
HEART RATE: 110 BPM | DIASTOLIC BLOOD PRESSURE: 70 MMHG | OXYGEN SATURATION: 100 % | TEMPERATURE: 98.3 F | RESPIRATION RATE: 18 BRPM | SYSTOLIC BLOOD PRESSURE: 108 MMHG

## 2017-07-30 DIAGNOSIS — Z3A.34 34 WEEKS GESTATION OF PREGNANCY: ICD-10-CM

## 2017-07-30 DIAGNOSIS — R45.6 VIOLENT BEHAVIOR: ICD-10-CM

## 2017-07-30 DIAGNOSIS — F20.3 UNDIFFERENTIATED SCHIZOPHRENIA (HCC): Primary | ICD-10-CM

## 2017-07-30 PROBLEM — O46.90 VAGINAL BLEEDING IN PREGNANCY: Status: ACTIVE | Noted: 2017-07-30

## 2017-07-30 LAB
ABO + RH BLD: NORMAL
ALBUMIN SERPL BCP-MCNC: 2.9 G/DL (ref 3.4–5)
ALBUMIN/GLOB SERPL: 0.8 {RATIO} (ref 0.8–1.7)
ALP SERPL-CCNC: 110 U/L (ref 45–117)
ALT SERPL-CCNC: 14 U/L (ref 13–56)
AMPHET UR QL SCN: NEGATIVE
ANION GAP BLD CALC-SCNC: 10 MMOL/L (ref 3–18)
ANION GAP BLD CALC-SCNC: 12 MMOL/L (ref 3–18)
APPEARANCE UR: CLEAR
AST SERPL W P-5'-P-CCNC: 20 U/L (ref 15–37)
BACTERIA URNS QL MICRO: ABNORMAL /HPF
BARBITURATES UR QL SCN: NEGATIVE
BASOPHILS # BLD AUTO: 0 K/UL (ref 0–0.06)
BASOPHILS # BLD AUTO: 0 K/UL (ref 0–0.06)
BASOPHILS # BLD: 0 % (ref 0–2)
BASOPHILS # BLD: 0 % (ref 0–2)
BENZODIAZ UR QL: NEGATIVE
BILIRUB SERPL-MCNC: 0.6 MG/DL (ref 0.2–1)
BILIRUB UR QL: NEGATIVE
BLOOD GROUP ANTIBODIES SERPL: NORMAL
BUN SERPL-MCNC: 5 MG/DL (ref 7–18)
BUN SERPL-MCNC: 6 MG/DL (ref 7–18)
BUN/CREAT SERPL: 11 (ref 12–20)
BUN/CREAT SERPL: 8 (ref 12–20)
CALCIUM SERPL-MCNC: 8.4 MG/DL (ref 8.5–10.1)
CALCIUM SERPL-MCNC: 8.7 MG/DL (ref 8.5–10.1)
CANNABINOIDS UR QL SCN: NEGATIVE
CHLORIDE SERPL-SCNC: 103 MMOL/L (ref 100–108)
CHLORIDE SERPL-SCNC: 105 MMOL/L (ref 100–108)
CO2 SERPL-SCNC: 21 MMOL/L (ref 21–32)
CO2 SERPL-SCNC: 24 MMOL/L (ref 21–32)
COCAINE UR QL SCN: NEGATIVE
COLOR UR: YELLOW
CREAT SERPL-MCNC: 0.54 MG/DL (ref 0.6–1.3)
CREAT SERPL-MCNC: 0.59 MG/DL (ref 0.6–1.3)
DIFFERENTIAL METHOD BLD: ABNORMAL
DIFFERENTIAL METHOD BLD: ABNORMAL
EOSINOPHIL # BLD: 0 K/UL (ref 0–0.4)
EOSINOPHIL # BLD: 0 K/UL (ref 0–0.4)
EOSINOPHIL NFR BLD: 0 % (ref 0–5)
EOSINOPHIL NFR BLD: 0 % (ref 0–5)
EPITH CASTS URNS QL MICRO: ABNORMAL /LPF (ref 0–5)
ERYTHROCYTE [DISTWIDTH] IN BLOOD BY AUTOMATED COUNT: 13.3 % (ref 11.6–14.5)
ERYTHROCYTE [DISTWIDTH] IN BLOOD BY AUTOMATED COUNT: 13.4 % (ref 11.6–14.5)
ETHANOL SERPL-MCNC: <3 MG/DL (ref 0–3)
GLOBULIN SER CALC-MCNC: 3.7 G/DL (ref 2–4)
GLUCOSE SERPL-MCNC: 78 MG/DL (ref 74–99)
GLUCOSE SERPL-MCNC: 86 MG/DL (ref 74–99)
GLUCOSE UR STRIP.AUTO-MCNC: NEGATIVE MG/DL
HBA1C MFR BLD: 4.6 % (ref 4.2–5.6)
HBV SURFACE AG SER QL: 0.14 INDEX
HBV SURFACE AG SER QL: NEGATIVE
HCT VFR BLD AUTO: 31.1 % (ref 35–45)
HCT VFR BLD AUTO: 31.6 % (ref 35–45)
HDSCOM,HDSCOM: NORMAL
HGB BLD-MCNC: 10.6 G/DL (ref 12–16)
HGB BLD-MCNC: 11 G/DL (ref 12–16)
HGB UR QL STRIP: NEGATIVE
HIV1+2 AB SPEC QL IA.RAPID: NEGATIVE
KETONES UR QL STRIP.AUTO: ABNORMAL MG/DL
LEUKOCYTE ESTERASE UR QL STRIP.AUTO: ABNORMAL
LYMPHOCYTES # BLD AUTO: 22 % (ref 21–52)
LYMPHOCYTES # BLD AUTO: 23 % (ref 21–52)
LYMPHOCYTES # BLD: 1.9 K/UL (ref 0.9–3.6)
LYMPHOCYTES # BLD: 2.4 K/UL (ref 0.9–3.6)
MCH RBC QN AUTO: 29.5 PG (ref 24–34)
MCH RBC QN AUTO: 30 PG (ref 24–34)
MCHC RBC AUTO-ENTMCNC: 34.1 G/DL (ref 31–37)
MCHC RBC AUTO-ENTMCNC: 34.8 G/DL (ref 31–37)
MCV RBC AUTO: 86.1 FL (ref 74–97)
MCV RBC AUTO: 86.6 FL (ref 74–97)
METHADONE UR QL: NEGATIVE
MONOCYTES # BLD: 1 K/UL (ref 0.05–1.2)
MONOCYTES # BLD: 1.3 K/UL (ref 0.05–1.2)
MONOCYTES NFR BLD AUTO: 12 % (ref 3–10)
MONOCYTES NFR BLD AUTO: 12 % (ref 3–10)
NEUTS SEG # BLD: 6 K/UL (ref 1.8–8)
NEUTS SEG # BLD: 6.7 K/UL (ref 1.8–8)
NEUTS SEG NFR BLD AUTO: 65 % (ref 40–73)
NEUTS SEG NFR BLD AUTO: 66 % (ref 40–73)
NITRITE UR QL STRIP.AUTO: NEGATIVE
OPIATES UR QL: NEGATIVE
PCP UR QL: NEGATIVE
PH UR STRIP: 6.5 [PH] (ref 5–8)
PLATELET # BLD AUTO: 196 K/UL (ref 135–420)
PLATELET # BLD AUTO: 204 K/UL (ref 135–420)
PMV BLD AUTO: 8.6 FL (ref 9.2–11.8)
PMV BLD AUTO: 8.6 FL (ref 9.2–11.8)
POTASSIUM SERPL-SCNC: 3.6 MMOL/L (ref 3.5–5.5)
POTASSIUM SERPL-SCNC: 3.9 MMOL/L (ref 3.5–5.5)
PROT SERPL-MCNC: 6.6 G/DL (ref 6.4–8.2)
PROT UR STRIP-MCNC: NEGATIVE MG/DL
RBC # BLD AUTO: 3.59 M/UL (ref 4.2–5.3)
RBC # BLD AUTO: 3.67 M/UL (ref 4.2–5.3)
RBC #/AREA URNS HPF: 0 /HPF (ref 0–5)
RPR SER QL: NONREACTIVE
RUBV IGG SER-IMP: NORMAL
SODIUM SERPL-SCNC: 137 MMOL/L (ref 136–145)
SODIUM SERPL-SCNC: 138 MMOL/L (ref 136–145)
SP GR UR REFRACTOMETRY: 1.01 (ref 1–1.03)
SPECIMEN EXP DATE BLD: NORMAL
UROBILINOGEN UR QL STRIP.AUTO: 0.2 EU/DL (ref 0.2–1)
WBC # BLD AUTO: 10.5 K/UL (ref 4.6–13.2)
WBC # BLD AUTO: 9 K/UL (ref 4.6–13.2)
WBC URNS QL MICRO: ABNORMAL /HPF (ref 0–4)

## 2017-07-30 PROCEDURE — 84112 EVAL AMNIOTIC FLUID PROTEIN: CPT

## 2017-07-30 PROCEDURE — 80307 DRUG TEST PRSMV CHEM ANLYZR: CPT | Performed by: OBSTETRICS & GYNECOLOGY

## 2017-07-30 PROCEDURE — 86803 HEPATITIS C AB TEST: CPT | Performed by: OBSTETRICS & GYNECOLOGY

## 2017-07-30 PROCEDURE — 85025 COMPLETE CBC W/AUTO DIFF WBC: CPT | Performed by: OBSTETRICS & GYNECOLOGY

## 2017-07-30 PROCEDURE — 86762 RUBELLA ANTIBODY: CPT | Performed by: OBSTETRICS & GYNECOLOGY

## 2017-07-30 PROCEDURE — 59025 FETAL NON-STRESS TEST: CPT

## 2017-07-30 PROCEDURE — 74011250636 HC RX REV CODE- 250/636: Performed by: EMERGENCY MEDICINE

## 2017-07-30 PROCEDURE — 96376 TX/PRO/DX INJ SAME DRUG ADON: CPT

## 2017-07-30 PROCEDURE — 81001 URINALYSIS AUTO W/SCOPE: CPT | Performed by: OBSTETRICS & GYNECOLOGY

## 2017-07-30 PROCEDURE — 99285 EMERGENCY DEPT VISIT HI MDM: CPT

## 2017-07-30 PROCEDURE — 87340 HEPATITIS B SURFACE AG IA: CPT | Performed by: OBSTETRICS & GYNECOLOGY

## 2017-07-30 PROCEDURE — 86592 SYPHILIS TEST NON-TREP QUAL: CPT | Performed by: OBSTETRICS & GYNECOLOGY

## 2017-07-30 PROCEDURE — 83036 HEMOGLOBIN GLYCOSYLATED A1C: CPT | Performed by: OBSTETRICS & GYNECOLOGY

## 2017-07-30 PROCEDURE — 80053 COMPREHEN METABOLIC PANEL: CPT | Performed by: OBSTETRICS & GYNECOLOGY

## 2017-07-30 PROCEDURE — 74011250637 HC RX REV CODE- 250/637: Performed by: EMERGENCY MEDICINE

## 2017-07-30 PROCEDURE — 99284 EMERGENCY DEPT VISIT MOD MDM: CPT

## 2017-07-30 PROCEDURE — 87086 URINE CULTURE/COLONY COUNT: CPT | Performed by: OBSTETRICS & GYNECOLOGY

## 2017-07-30 PROCEDURE — 36415 COLL VENOUS BLD VENIPUNCTURE: CPT | Performed by: OBSTETRICS & GYNECOLOGY

## 2017-07-30 PROCEDURE — 96374 THER/PROPH/DIAG INJ IV PUSH: CPT

## 2017-07-30 PROCEDURE — 86900 BLOOD TYPING SEROLOGIC ABO: CPT | Performed by: OBSTETRICS & GYNECOLOGY

## 2017-07-30 PROCEDURE — 80048 BASIC METABOLIC PNL TOTAL CA: CPT | Performed by: EMERGENCY MEDICINE

## 2017-07-30 PROCEDURE — 65220000003 HC RM SEMIPRIVATE PSYCH

## 2017-07-30 PROCEDURE — 80307 DRUG TEST PRSMV CHEM ANLYZR: CPT | Performed by: EMERGENCY MEDICINE

## 2017-07-30 PROCEDURE — 74011250636 HC RX REV CODE- 250/636: Performed by: NURSE PRACTITIONER

## 2017-07-30 PROCEDURE — 86703 HIV-1/HIV-2 1 RESULT ANTBDY: CPT | Performed by: OBSTETRICS & GYNECOLOGY

## 2017-07-30 PROCEDURE — 87491 CHLMYD TRACH DNA AMP PROBE: CPT | Performed by: OBSTETRICS & GYNECOLOGY

## 2017-07-30 RX ORDER — DIPHENHYDRAMINE HYDROCHLORIDE 50 MG/ML
50 INJECTION, SOLUTION INTRAMUSCULAR; INTRAVENOUS ONCE
Status: COMPLETED | OUTPATIENT
Start: 2017-07-30 | End: 2017-07-30

## 2017-07-30 RX ORDER — DIPHENHYDRAMINE HCL 50 MG
50 CAPSULE ORAL
Status: DISCONTINUED | OUTPATIENT
Start: 2017-07-30 | End: 2017-08-02 | Stop reason: HOSPADM

## 2017-07-30 RX ORDER — DIPHENHYDRAMINE HYDROCHLORIDE 50 MG/ML
50 INJECTION, SOLUTION INTRAMUSCULAR; INTRAVENOUS
Status: DISCONTINUED | OUTPATIENT
Start: 2017-07-30 | End: 2017-08-02 | Stop reason: HOSPADM

## 2017-07-30 RX ORDER — HALOPERIDOL 5 MG/1
5 TABLET ORAL
Status: DISCONTINUED | OUTPATIENT
Start: 2017-07-30 | End: 2017-08-02 | Stop reason: HOSPADM

## 2017-07-30 RX ORDER — HALOPERIDOL 5 MG/ML
5 INJECTION INTRAMUSCULAR
Status: DISCONTINUED | OUTPATIENT
Start: 2017-07-30 | End: 2017-08-02 | Stop reason: HOSPADM

## 2017-07-30 RX ADMIN — LURASIDONE HYDROCHLORIDE 20 MG: 20 TABLET, FILM COATED ORAL at 17:54

## 2017-07-30 RX ADMIN — DIPHENHYDRAMINE HYDROCHLORIDE 50 MG: 50 INJECTION, SOLUTION INTRAMUSCULAR; INTRAVENOUS at 21:47

## 2017-07-30 RX ADMIN — DIPHENHYDRAMINE HYDROCHLORIDE 50 MG: 50 INJECTION, SOLUTION INTRAMUSCULAR; INTRAVENOUS at 16:24

## 2017-07-30 NOTE — ED NOTES
Purposeful rounding completed:    Side rails up x 2:  YES  Bed in low position and wheels locked: YES  Call bell within reach: N/A (Sitter at bedside)  Comfort addressed: YES    Toileting needs addressed: YES  Plan of care reviewed/updated with patient and or family members: NO  IV site assessed: YES  Pain assessed and addressed: YES, 0

## 2017-07-30 NOTE — ED NOTES
Spoke with manager at [employer] at [phone], patient is a resident there and her bill is paid by [facility], they know nothing about this patient

## 2017-07-30 NOTE — PROGRESS NOTES
1540: Patient in room yelling, \"Nigger Get out of my room! \" and word salad words, Patient kicking at RN and throwing ice chips. 1545: Security on floor following code formerly [facility] 1558: Patient got into wheelchair with security, discharged off unit per Dr. Dana Velasco.

## 2017-07-30 NOTE — ED TRIAGE NOTES
Patient 8 months pregnant. Was in OB then transferred down to ED after patient got combative with incoherent speech.

## 2017-07-30 NOTE — PROGRESS NOTES
Code gray called. Security on floor. Pt screaming and throwing things.  Medically cleared from ob floor

## 2017-07-30 NOTE — ED NOTES
Per Sitter at bedside, patient stating she is hungry and would like something to eat. Lean cuisine meal currently being prepped for patient.

## 2017-07-30 NOTE — PROGRESS NOTES
Patient took herself off monitor. Pt is out of control. Yelling obscenities at sitter, pulling her clothes off. Code silver called. Security on unit. Pt naked, peeing on the floor, throwing ice at staff, pulling monitors off. Nursing Supervisor Glynn on floor and accompanied myself, security and this patient to ER.  Bed #1

## 2017-07-30 NOTE — ED NOTES
6:11 PM Assumed care of patient awaiting TDO and placement. Sissy Alfaro from [facility] is working on TDO paper work. Bairon Rojoton is aware of needing a bed. Gregoria Minor NP     8:50 PM  Patient more calm, although continues singing out, will attempt to remove foot restraints. Gregoria Minor NP     9:51 PM   Patient has been accepted by Dr Holger Howard at [facility], TDO is to be brought to department by [facility] and patient transported by Police.   Gregoria Minor NP

## 2017-07-30 NOTE — ROUTINE PROCESS
Pt arrived to unit via EMS with complaints of bleeding, no bleeding noted, Monitors applied and BP obtained.

## 2017-07-30 NOTE — ED NOTES
Spoke with [facility], she is a [facility] patient that they go and check on daily, I asked [facility] to please call them and call us so we can get information on this patient
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
## 2017-07-30 NOTE — ED NOTES
Attempted to take patient off restraints, but she became combative again. Restraint stayed in place. Police at bedside.

## 2017-07-30 NOTE — ED NOTES
Patient has sitter in the room and police officers at bedside. Patient is awaiting transport to bed in [facility]. Patient is conversing with sitter and singing.

## 2017-07-30 NOTE — PROGRESS NOTES
Labor and delivery screening visit  Triage Visit    Name: Devin Boudreaux MRN: [id number]  SSN: [id number]    YOB: 1990  Age: 32 y.o. Sex: female    UNASSIGNED PATIENT  Patient seen and examined    Maxine Renee is a 32 y.o.  female who is due 2017, by Other Basis. This makes her 33w6d. She  is being seen for vaginal bleeding. Patient is a poor historian. H/o significant for schizoaffective disorder. She is being seen in screening location 3416/01. Admits to masturbation 2 days ago, but did not answer my question if had intercourse recently. Patient speaks incoherently intermittently. Has had no prenatal care, but also states she was in [place] or [facility]. Patient speaks in \"spanglish\". Denies contractions, decreased fetal movement, leak of fluid. Denies urinary symptoms  H/o prior CD x 1, delivered by a Dr. Janes Awan from [place]. Patient uncertain if she wants a TOLAC or ERCD. Probably does not have the mental capacity to make this decision. Denies urinary symptoms. Patient was brought in by EMS. No information given as far as where patient was picked up from. Patient states she lives in a hotel. States that baby may have two dads, wants paternity testing. States it could be a \"Dwain\" or \"Gopal\"  First child was was adopted. Patient gave me her mother's phone number [phone], but no answer and unable to leave a message, and message states the voicemail belongs to a [employer]. Patient states this is her stepdad/grandfather. Spoke informally with an EMS who has previously took care of this patient. He confirmed that she resides in a hotel on [address], unable to recall hotel name. States there's concern about possible trafficking? He states there is a . Patient states her 's name is Sravanthi Childress, but does not have her contact information.         Additional Diagnoses:Present on Admission:  **None**       Historical Additional  Problem List.:   Problem List as of 2017  Never Reviewed          Codes Class Noted - Resolved    Vaginal bleeding in pregnancy ICD-10-CM: O46.90  ICD-9-CM: 641.93  2017 - Present        Schizophrenia ([facility] ICD-10-CM: F20.9  ICD-9-CM: 295.90  2017 - Present             Allergies   Allergen Reactions    Robitussin [Guaifenesin] Nausea and Vomiting     Past Medical History:   Diagnosis Date    Bipolar disorder ([facility]     ETOH abuse     Marijuana abuse        Prior to Admission medications    Not on File        Objective:  Visit Vitals    /68    Pulse 90    SpO2 98%       Prenatal Labs:   No results found for: RUBELLAEXT, GRBSEXT, HBSAGEXT, HIVEXT, RPREXT, GONNOEXT, CHLAMEXT    WBC   Date/Time Value Ref Range Status   2017 01:17 AM 11.2 4.6 - 13.2 K/uL Final   2017 12:05 PM 9.7 4.6 - 13.2 K/uL Final   2017 08:21 PM 11.9 4.6 - 13.2 K/uL Final     HGB   Date/Time Value Ref Range Status   2017 01:17 AM 10.6 (L) 12.0 - 16.0 g/dL Final   2017 12:05 PM 11.4 (L) 12.0 - 16.0 g/dL Final   2017 08:21 PM 11.4 (L) 12.0 - 16.0 g/dL Final     PLATELET   Date/Time Value Ref Range Status   2017 01:17  135 - 420 K/uL Final       Labs:  No results found for this or any previous visit (from the past 24 hour(s)). Fetal Heart Rate:   Fetal Non-stress Test: Reactive  [place]:  irregular           Estimated Date of Delivery: 17  OB History      Para Term  AB Living    1         SAB TAB Ectopic Molar Multiple Live Births                     Physical Exam:   Patient without distress  HEENT: No obvious head trauma, she can hear at a conversational level, vision is adequate, and no choking or difficulty swallowing. Neurologically: She oriented to time and place as well as understands her situation and give a good medical history.     Abdomen: benign, gravid, nontender, no CVA tenderness; fundal size appropriate to said gestational age  Pelvic: External genitalia normal without inflammation, lesions or abnormal discharge  Extremities: with some swelling but not hyperreflexia  CervicalExam: / / / closed/thick high; no vaginal bleeding, no old clots. Limited TAUS:  Cephalic  BPP 8/8  BPD 39A1N  Placenta:  Anterior/fundal    Impression/Plan:     Vaginal bleeding in pregnancy  No prenatal care  Schizoaffective disorder      Plan:  CEFM, PO hydration, routine prenatal labs, including T&S to determine if patient needs Rhogam.    No obstetrical issues at this time. No evidence of bleeding or  labor. Patient may follow up in our office if planning to delivery at [facility]. However, patient not stable for discharge home, especially since there is no information regarding patient's residence. Will need admission to a psychiatric unit. Will transfer to ED after labs have been obtained. Appreciate assistance. Encounter time : 45 minutes    I have verbalized the plan of care with patient. The patient was given a full opportunity to ask questions and indicated that her questions had been answered.                                                          Signed By:  Valerie Gonsalez DO     2017

## 2017-07-30 NOTE — ED PROVIDER NOTES
The history is provided by the patient and medical records. Pt brought into ED after being seen by Labor and Delivery. Pt is a  who presents with c/o vaginal bleeding. Pt examined by OB upstairs and is cleared medically as pt believed to be 33 weeks 6days. No vaginal bleeding. Pt is followed by [facility] team per [facility] (nurse called) and is living in a hotel paid for by the [facility] team.   being contacted for current med list.    Pt has h/o schizophrenia; is not oriented to place, time. H/o ETOH abuse. Past Medical History:   Diagnosis Date    Bipolar disorder ([address]     ETOH abuse     Marijuana abuse     Psychiatric disorder        Past Surgical History:   Procedure Laterality Date    HX  SECTION           History reviewed. No pertinent family history. Social History     Social History    Marital status: SINGLE     Spouse name: N/A    Number of children: N/A    Years of education: N/A     Occupational History    Not on file. Social History Main Topics    Smoking status: Current Every Day Smoker    Smokeless tobacco: Not on file    Alcohol use Yes    Drug use: Yes     Special: Marijuana    Sexual activity: Not on file     Other Topics Concern    Not on file     Social History Narrative         ALLERGIES: Robitussin [guaifenesin]    Review of Systems   Unable to perform ROS: Psychiatric disorder   Genitourinary: Positive for pelvic pain and vaginal bleeding. All other systems reviewed and are negative. Vitals:    17 1659   BP: 110/68   Pulse: 84   Resp: 16   Temp: 98.3 °F (36.8 °C)   SpO2: 100%            Physical Exam   Constitutional: She is oriented to person, place, and time. She appears well-developed. She appears distressed. Combative, swining at nurses, tech. Restraints placed. HENT:   Head: Normocephalic and atraumatic. Eyes: Pupils are equal, round, and reactive to light. Neck: No JVD present. No tracheal deviation present.  No thyromegaly present. Cardiovascular: Normal rate, regular rhythm and normal heart sounds. Exam reveals no gallop and no friction rub. No murmur heard. Pulmonary/Chest: Effort normal and breath sounds normal. No stridor. No respiratory distress. She has no wheezes. She has no rales. She exhibits no tenderness. Abdominal: Soft. She exhibits distension. She exhibits no mass. There is no tenderness. There is no rebound and no guarding. Musculoskeletal: She exhibits no edema or tenderness. Lymphadenopathy:     She has no cervical adenopathy. Neurological: She is alert and oriented to person, place, and time. Skin: Skin is warm and dry. No rash noted. No erythema. No pallor. Psychiatric:   delusional   Nursing note and vitals reviewed. MDM  Number of Diagnoses or Management Options  34 weeks gestation of pregnancy:   Undifferentiated schizophrenia [facility]):   Violent behavior:   Diagnosis management comments: Pt placed in 4 point restraints. Performing labs.  bpm obtained by me. Pelvic exam shows os closed. Waiting for psychiatry and [facility] consults. 3:63 PM  Police arrived; [facility] will be called by police. Psychiatrist Derek Sharif remembers pt from prior visit in April. Was on Latuda 20mg bid with PO and Vistaril 50mg PO bid.      5:37 PM  Spoke with neda at [facility]; there is a bed available. She will start having psychiatry review chart. 5:48 PM  [facility] has arrived. 5:50 PM  [address] made aware that [facility] has arrived. Amount and/or Complexity of Data Reviewed  Clinical lab tests: ordered and reviewed      ED Course       Pelvic Exam  Date/Time: 7/30/2017 4:32 PM  Performed by: PA  Procedure duration:  3 minutes. Type of exam performed: speculum. External genitalia appearance: normal.    Cervical exam:  normal and os closed. Specimen(s) collected:  none.   Patient tolerance: Patient tolerated the procedure well with no immediate complications  Comments: States \"my cervix is connected to my television\". Recent Results (from the past 12 hour(s))   DRUG SCREEN, URINE    Collection Time: 07/30/17  2:25 PM   Result Value Ref Range    BENZODIAZEPINE NEGATIVE  NEG      BARBITURATES NEGATIVE  NEG      THC (TH-CANNABINOL) NEGATIVE  NEG      OPIATES NEGATIVE  NEG      PCP(PHENCYCLIDINE) NEGATIVE  NEG      COCAINE NEGATIVE  NEG      AMPHETAMINES NEGATIVE  NEG      METHADONE NEGATIVE       HDSCOM (NOTE)    URINALYSIS W/MICROSCOPIC    Collection Time: 07/30/17  2:25 PM   Result Value Ref Range    Color YELLOW      Appearance CLEAR      Specific gravity 1.009 1.005 - 1.030      pH (UA) 6.5 5.0 - 8.0      Protein NEGATIVE  NEG mg/dL    Glucose NEGATIVE  NEG mg/dL    Ketone TRACE (A) NEG mg/dL    Bilirubin NEGATIVE  NEG      Blood NEGATIVE  NEG      Urobilinogen 0.2 0.2 - 1.0 EU/dL    Nitrites NEGATIVE  NEG      Leukocyte Esterase SMALL (A) NEG      WBC 4 to 6 0 - 4 /hpf    RBC 0 0 - 5 /hpf    Epithelial cells 1+ 0 - 5 /lpf    Bacteria FEW (A) NEG /hpf   TYPE & SCREEN    Collection Time: 07/30/17  2:48 PM   Result Value Ref Range    Crossmatch Expiration 08/02/2017     ABO/Rh(D) A POSITIVE     Antibody screen NEG    CBC WITH AUTOMATED DIFF    Collection Time: 07/30/17  2:48 PM   Result Value Ref Range    WBC 9.0 4.6 - 13.2 K/uL    RBC 3.59 (L) 4.20 - 5.30 M/uL    HGB 10.6 (L) 12.0 - 16.0 g/dL    HCT 31.1 (L) 35.0 - 45.0 %    MCV 86.6 74.0 - 97.0 FL    MCH 29.5 24.0 - 34.0 PG    MCHC 34.1 31.0 - 37.0 g/dL    RDW 13.4 11.6 - 14.5 %    PLATELET 394 172 - 367 K/uL    MPV 8.6 (L) 9.2 - 11.8 FL    NEUTROPHILS 66 40 - 73 %    LYMPHOCYTES 22 21 - 52 %    MONOCYTES 12 (H) 3 - 10 %    EOSINOPHILS 0 0 - 5 %    BASOPHILS 0 0 - 2 %    ABS. NEUTROPHILS 6.0 1.8 - 8.0 K/UL    ABS. LYMPHOCYTES 1.9 0.9 - 3.6 K/UL    ABS. MONOCYTES 1.0 0.05 - 1.2 K/UL    ABS. EOSINOPHILS 0.0 0.0 - 0.4 K/UL    ABS.  BASOPHILS 0.0 0.0 - 0.06 K/UL    DF AUTOMATED     METABOLIC PANEL, COMPREHENSIVE    Collection Time: 07/30/17  2:48 PM   Result Value Ref Range    Sodium 137 136 - 145 mmol/L    Potassium 3.9 3.5 - 5.5 mmol/L    Chloride 103 100 - 108 mmol/L    CO2 24 21 - 32 mmol/L    Anion gap 10 3.0 - 18 mmol/L    Glucose 78 74 - 99 mg/dL    BUN 6 (L) 7.0 - 18 MG/DL    Creatinine 0.54 (L) 0.6 - 1.3 MG/DL    BUN/Creatinine ratio 11 (L) 12 - 20      GFR est AA >60 >60 ml/min/1.73m2    GFR est non-AA >60 >60 ml/min/1.73m2    Calcium 8.4 (L) 8.5 - 10.1 MG/DL    Bilirubin, total 0.6 0.2 - 1.0 MG/DL    ALT (SGPT) 14 13 - 56 U/L    AST (SGOT) 20 15 - 37 U/L    Alk. phosphatase 110 45 - 117 U/L    Protein, total 6.6 6.4 - 8.2 g/dL    Albumin 2.9 (L) 3.4 - 5.0 g/dL    Globulin 3.7 2.0 - 4.0 g/dL    A-G Ratio 0.8 0.8 - 1.7     HIV 1/2 RAPID SCREEN    Collection Time: 07/30/17  2:48 PM   Result Value Ref Range    HIV-1/2 rapid screen NEGATIVE      RUBELLA AB, IGG    Collection Time: 07/30/17  2:48 PM   Result Value Ref Range    Rubella, IgG Ql. IMMUNE IMM     HEMOGLOBIN A1C W/O EAG    Collection Time: 07/30/17  2:48 PM   Result Value Ref Range    Hemoglobin A1c 4.6 4.2 - 5.6 %   CBC WITH AUTOMATED DIFF    Collection Time: 07/30/17  4:11 PM   Result Value Ref Range    WBC 10.5 4.6 - 13.2 K/uL    RBC 3.67 (L) 4.20 - 5.30 M/uL    HGB 11.0 (L) 12.0 - 16.0 g/dL    HCT 31.6 (L) 35.0 - 45.0 %    MCV 86.1 74.0 - 97.0 FL    MCH 30.0 24.0 - 34.0 PG    MCHC 34.8 31.0 - 37.0 g/dL    RDW 13.3 11.6 - 14.5 %    PLATELET 392 728 - 358 K/uL    MPV 8.6 (L) 9.2 - 11.8 FL    NEUTROPHILS 65 40 - 73 %    LYMPHOCYTES 23 21 - 52 %    MONOCYTES 12 (H) 3 - 10 %    EOSINOPHILS 0 0 - 5 %    BASOPHILS 0 0 - 2 %    ABS. NEUTROPHILS 6.7 1.8 - 8.0 K/UL    ABS. LYMPHOCYTES 2.4 0.9 - 3.6 K/UL    ABS. MONOCYTES 1.3 (H) 0.05 - 1.2 K/UL    ABS. EOSINOPHILS 0.0 0.0 - 0.4 K/UL    ABS.  BASOPHILS 0.0 0.0 - 0.06 K/UL    DF AUTOMATED     METABOLIC PANEL, BASIC    Collection Time: 07/30/17  4:11 PM   Result Value Ref Range    Sodium 138 136 - 145 mmol/L    Potassium 3.6 3.5 - 5.5 mmol/L    Chloride 105 100 - 108 mmol/L    CO2 21 21 - 32 mmol/L    Anion gap 12 3.0 - 18 mmol/L    Glucose 86 74 - 99 mg/dL    BUN 5 (L) 7.0 - 18 MG/DL    Creatinine 0.59 (L) 0.6 - 1.3 MG/DL    BUN/Creatinine ratio 8 (L) 12 - 20      GFR est AA >60 >60 ml/min/1.73m2    GFR est non-AA >60 >60 ml/min/1.73m2    Calcium 8.7 8.5 - 10.1 MG/DL   ETHYL ALCOHOL    Collection Time: 07/30/17  4:11 PM   Result Value Ref Range    ALCOHOL(ETHYL),SERUM <3 0 - 3 MG/DL       5:50 PM  Diagnosis:   1. Undifferentiated schizophrenia ([address]    2. Violent behavior    3. 34 weeks gestation of pregnancy          Disposition: tbd; pt will be TDO'd once all is finalized as patient unable to care for herself and pregnancy. Care turned over to oncoming provider at end of shift.     Follow-up Information     None          Patient's Medications    No medications on file

## 2017-07-30 NOTE — IP AVS SNAPSHOT
Summary of Care Report The Summary of Care report has been created to help improve care coordination. Users with access to [employer] or [address] (Web-based application) may access additional patient information including the Discharge Summary. If you are not currently a [address] user and need more information, please call the number listed below in the [place] [address] section and ask to be connected with Medical Records. Facility Information Name Address Phone [address] [facility] [facility] [address] [phone] Patient Information Patient Name Sex  Magalis Burns ([id number]) Female 1990 Discharge Information Admitting Provider Service Area Unit Triston Riddle MD / Ryan 1 Adult Chem Dep / [phone] Discharge Provider Discharge Date/Time Discharge Disposition Destination Triston Riddle MD / [phone] 2017 22:40 (Pending) [facility] (none) Patient Language Language ENGLISH [13] Hospital Problems as of 2017  Never Reviewed Class Noted - Resolved Last Modified POA Active Problems * (Principal)Schizophrenia ([facility]  2017 - Present 2017 by Triston Riddle MD Unknown Entered by Triston Riddle MD  
  
Non-Hospital Problems as of 2017  Never Reviewed Class Noted - Resolved Last Modified Active Problems Vaginal bleeding in pregnancy  2017 - Present 2017 by Claudene Maffucci, DO Entered by Claudene Maffucci, DO You are allergic to the following Allergen Reactions Robitussin (Guaifenesin) Nausea and Vomiting Current Discharge Medication List  
  
ASK your doctor about these medications Dose & Instructions Dispensing Information Comments OTHER(NON-FORMULARY)  Dose:  20 mg  
 Take 20 mg by mouth daily. Refills:  0  
   
 VISTARIL 25 mg capsule Generic drug:  hydrOXYzine pamoate Dose:  25 mg Take 25 mg by mouth four (4) times daily as needed for Anxiety. Refills:  0 Follow-up Information Follow up With Details Comments Contact Info On 2017 at 11am with Dr. Natasha Laughlin 
[address] [place], [address] [place];  
Phone: [phone]). Discharge Instructions BEHAVIORAL HEALTH NURSING DISCHARGE NOTE Emergency Numbers [address] [facility] Desk: [phone] [place] [facility]: [phone] Suicide Prevention Line: [phone] (TALK) The following personal items collected during your admission are returned to you:  
Dental Appliance: Dental Appliances: None Vision:   
Hearing Aid:   
Jewelry: Jewelry: None Clothing: Clothing: Shorts, Shirt, Footwear, Undergarments, With patient Other Valuables: Other Valuables:  (watch, id) Valuables sent to safe: The discharge information has been reviewed with the patient. The patient verbalized understanding. [employer]ven Activation Thank you for requesting access to [employer]leven. Please follow the instructions below to securely access and download your online medical record. [employer]leven allows you to send messages to your doctor, view your test results, renew your prescriptions, schedule appointments, and more. How Do I Sign Up? In your internet browser, go to [email or web address] Click on the First Time User? Click Here link in the Sign In box. You will be redirect to the New Member Sign Up page. Enter your [employer]leven Access Code exactly as it appears below. You will not need to use this code after youve completed the sign-up process. If you do not sign up before the expiration date, you must request a new code. [employer]leven Access Code: [email or web address] Expires: 10/28/2017 10:47 PM (This is the date your [employer]leven access code will ) Enter the last four digits of your Social Security Number (xxxx) and Date of Birth (mm/dd/yyyy) as indicated and click Submit. You will be taken to the next sign-up page. Create a [employer] ID. This will be your [employer] login ID and cannot be changed, so think of one that is secure and easy to remember. Create a [employer] password. You can change your password at any time. Enter your Password Reset Question and Answer. This can be used at a later time if you forget your password. Enter your e-mail address. You will receive e-mail notification when new information is available in [address] Click Sign Up. You can now view and download portions of your medical record. Click the [email or web address] link to download a portable copy of your medical information. Additional Information If you have questions, please visit the Frequently Asked Questions section of the [employer] website at [email or web address] Remember, [employer] is NOT to be used for urgent needs. For medical emergencies, dial 911. Patient armband removed and shredded Chart Review Routing History No Routing History on File

## 2017-07-30 NOTE — IP AVS SNAPSHOT
[address] [facility] Patient: Janessa Kennedy MRN: [id number] You are allergic to the following Allergen Reactions Robitussin (Guaifenesin) Nausea and Vomiting Recent Documentation OB Status Smoking Status Pregnant Current Every Day Smoker Emergency Contacts Name Discharge Info Relation Home Work Mobile Gopal Sheffield DISCHARGE CAREGIVER [3] Boyfriend [17] [phone] About your hospitalization You were admitted on:  July 30, 2017 You last received care in the:  [facility] ADULT CHEM DEP You were discharged on:  August 7, 2017 Unit phone number:  [phone] Why you were hospitalized Your primary diagnosis was:  Schizophrenia (Hcc) Providers Seen During Your Hospitalizations Provider Role Specialty Primary office phone Theresa Fernando MD Attending Provider Psychiatry [phone] Gisselle Dalal MD Attending Provider Psychiatry [phone] Your Primary Care Physician (PCP) Primary Care Physician Office Phone Office Fax NONE ** None ** ** None ** Follow-up Information Follow up With Details Comments Contact Info On 8/2/2017 at 11am with Dr. Nahun Jernigan 
[address] [place] [address] [address] [place], [address];  
Phone: [phone]). Current Discharge Medication List  
  
ASK your doctor about these medications Dose & Instructions Dispensing Information Comments Morning Noon Evening Bedtime OTHER(NON-FORMULARY) Your last dose was: Your next dose is:    
   
   
 Dose:  20 mg Take 20 mg by mouth daily. Refills:  0  
     
   
   
   
  
 VISTARIL 25 mg capsule Generic drug:  hydrOXYzine pamoate Your last dose was:     
   
Your next dose is:    
   
   
 Dose:  25 mg  
 Take 25 mg by mouth four (4) times daily as needed for Anxiety. Refills:  0 Discharge Instructions BEHAVIORAL HEALTH NURSING DISCHARGE NOTE Emergency Numbers [facility] Desk: [phone] [facility] Emergency Services: [phone] Suicide Prevention Line: 1 [phone] (TALK) The following personal items collected during your admission are returned to you:  
Dental Appliance: Dental Appliances: None Vision:   
Hearing Aid:   
Jewelry: Jewelry: None Clothing: Clothing: Shorts, Shirt, Footwear, Undergarments, With patient Other Valuables: Other Valuables:  (watch, id) Valuables sent to safe: The discharge information has been reviewed with the patient. The patient verbalized understanding. Qik Activation Thank you for requesting access to Qik. Please follow the instructions below to securely access and download your online medical record. Qik allows you to send messages to your doctor, view your test results, renew your prescriptions, schedule appointments, and more. How Do I Sign Up? In your internet browser, go to [email or web address] Click on the First Time User? Click Here link in the Sign In box. You will be redirect to the New Member Sign Up page. Enter your Qik Access Code exactly as it appears below. You will not need to use this code after youve completed the sign-up process. If you do not sign up before the expiration date, you must request a new code. Qik Access Code: [email or web address] Expires: 10/28/2017 10:47 PM (This is the date your Qik access code will ) Enter the last four digits of your Social Security Number (xxxx) and Date of Birth (mm/dd/yyyy) as indicated and click Submit. You will be taken to the next sign-up page. Create a Qik ID. This will be your Qik login ID and cannot be changed, so think of one that is secure and easy to remember. Create a [employer] password. You can change your password at any time. Enter your Password Reset Question and Answer. This can be used at a later time if you forget your password. Enter your e-mail address. You will receive e-mail notification when new information is available in [address] Click Sign Up. You can now view and download portions of your medical record. Click the Xeebel link to download a portable copy of your medical information. Additional Information If you have questions, please visit the Frequently Asked Questions section of the [employer] website at [email or web address] Remember, [employer] is NOT to be used for urgent needs. For medical emergencies, dial 911. Patient armband removed and shredded Discharge Orders None Introducing [facility]! [address] introduces [employer] patient portal. Now you can access parts of your medical record, email your doctor's office, and request medication refills online. 1. In your internet browser, go to [email or web address] 2. Click on the First Time User? Click Here link in the Sign In box. You will see the New Member Sign Up page. 3. Enter your [employer] Access Code exactly as it appears below. You will not need to use this code after youve completed the sign-up process. If you do not sign up before the expiration date, you must request a new code. · [employer] Access Code: [email or web address] Expires: 10/28/2017 10:47 PM 
 
4. Enter the last four digits of your Social Security Number (xxxx) and Date of Birth (mm/dd/yyyy) as indicated and click Submit. You will be taken to the next sign-up page. 5. Create a [employer] ID. This will be your [employer] login ID and cannot be changed, so think of one that is secure and easy to remember. 6. Create a [employer] password. You can change your password at any time. 7. Enter your Password Reset Question and Answer.  This can be used at a later time if you forget your password. 8. Enter your e-mail address. You will receive e-mail notification when new information is available in [address] 9. Click Sign Up. You can now view and download portions of your medical record. 10. Click the Download Summary menu link to download a portable copy of your medical information. If you have questions, please visit the Frequently Asked Questions section of the [employer] website. Remember, [employer] is NOT to be used for urgent needs. For medical emergencies, dial 911. Now available from your iPhone and Android! General Information Please provide this summary of care documentation to your next provider. Patient Signature:  ____________________________________________________________ Date:  ____________________________________________________________  
  
Joan Sandoval Provider Signature:  ____________________________________________________________ Date:  ____________________________________________________________

## 2017-07-30 NOTE — ED NOTES
Purposeful rounding completed:    Side rails up x 2:  YES  Bed in low position and wheels locked: YES  Call bell within reach: NO (Sitter at bedside)  Comfort addressed: YES    Toileting needs addressed: YES  Plan of care reviewed/updated with patient and or family members: YES  IV site assessed: YES  Pain assessed and addressed: YES, 0

## 2017-07-30 NOTE — CONSULTS
Name: Sonya Richards  Date: 2017  Time: 4:30 PM via telepsychiatry  : 1990  Reason for consult: psychosis, agitation   History of Present Illness: Sonya Richards is a 32 y.o. woman who is 34 weeks pregnant with schizophrenia. She first presented to the hospital for vaginal bleeding but quickly became agitated, screaming curses and throwing things at staff. She has exhibited delusional thinking, saying that her cervix controls the TV. She has been given IV benadryl and put on 4 point restraints to maintain safety. Patient is disorganized and minimally able to answer questions but is able to confirm a schizophrenia/schizoaffective diagnosis. She is followed by PACT for regular mental health follow up and is temporarily set up in a hotel. A call to PACT has been placed to get updated information on patient's outpatient regimen. In between tangents, patient is able to say that she does not want to hurt herself or other people. Patient has been hospitalized for psychosis with hallucinations this spring. Patient will not outright admit to hallucinations during interview but does appear to be responding to internal stimuli. SI/HI/Self harm/Violence: denies SI/HI    Collateral: EMR, hospital staff: PAU [place]  Psychiatric History/Treatment History: most recent inpatient (per EMR)  to 17 where patient's psychosis was treated with latuda and vistaril; outpatient follow up with PACT; past meds include Li, trileptal, klonopin, and risperdal      Drug/Alcohol History: UDS negative; past alcohol and marijuana  Medical History:   Past Medical History:   Diagnosis Date    Bipolar disorder ([facility] [address]     ETOH abuse     Marijuana abuse      Medications & Freq:   Prior to Admission medications    Not on File   per d/c summary in April: latuda 20mg daily and vistaril 25mg q6h prn    Allergies:    Allergies   Allergen Reactions    Robitussin [Guaifenesin] Nausea and Vomiting     Family Psych History/History of suicide: No family history on file. Social History: [employer], followed by PACT    Mental Status Exam:   Appearance and behavior: disheveled, writhing wild-eyed while in restraints on gurney  Speech: childlike voice, mostly incoherent  Affect and mood: distressed  Association and thought processes: disorganized   Thought content: SI/HI: denies   Perception: AVH: unclear but seems to be responding to internal stimuli; Delusions: bizarre  Sensorium and orientation: alert but does not seem oriented  Memory and Intellectual functioning: impaired  Insight and judgment: poor    Impression/Risk Assessment:   Zoë Cole is a 32 y.o. woman with schizophrenia who is 34 weeks pregnant. She is psychotic with delusions, disorganization, and the appearance of responding to internal stimuli. Patient denies thoughts to harm self or others but is otherwise limited in her ability to communicate her wishes. Her decision making capacity is impaired. Risk factors include limited supports and florid psychosis. Principal Diagnosis: F20.9 Schizophrenia    Other Diagnoses:  Patient Active Problem List   Diagnosis Code    [facility] F20.9    Vaginal bleeding in pregnancy O46.90     Treatment Recommendations:  1. Disposition: inpatient psychiatry -- will need TDO  2. Psychiatric medications: latuda 20mg twice daily, vistaril 50mg twice daily -- may need adjusted as outpatient records are updated    The above were discussed with the patient and the referring provider; able parties stated understanding and agreement with the recommendations.

## 2017-07-30 NOTE — IP AVS SNAPSHOT
Hedy Patel 
 
 
 [address] [facility] Patient: Nisreen Saini MRN: [id number] VD Current Discharge Medication List  
  
ASK your doctor about these medications Dose & Instructions Dispensing Information Comments Morning Noon Evening Bedtime OTHER(NON-FORMULARY) Your last dose was: Your next dose is:    
   
   
 Dose:  20 mg Take 20 mg by mouth daily. Refills:  0  
     
   
   
   
  
 VISTARIL 25 mg capsule Generic drug:  hydrOXYzine pamoate Your last dose was: Your next dose is:    
   
   
 Dose:  25 mg Take 25 mg by mouth four (4) times daily as needed for Anxiety. Refills:  0

## 2017-07-31 LAB
C TRACH RRNA SPEC QL NAA+PROBE: NEGATIVE
HCV AB SER IA-ACNC: 0.08 INDEX
HCV AB SERPL QL IA: NEGATIVE
HCV COMMENT,HCGAC: NORMAL
N GONORRHOEA RRNA SPEC QL NAA+PROBE: NEGATIVE
SPECIMEN SOURCE: NORMAL

## 2017-07-31 PROCEDURE — 74011250636 HC RX REV CODE- 250/636: Performed by: PSYCHIATRY & NEUROLOGY

## 2017-07-31 PROCEDURE — 74011250637 HC RX REV CODE- 250/637: Performed by: PSYCHIATRY & NEUROLOGY

## 2017-07-31 PROCEDURE — 65220000003 HC RM SEMIPRIVATE PSYCH

## 2017-07-31 RX ORDER — HYDROXYZINE PAMOATE 25 MG/1
25 CAPSULE ORAL
COMMUNITY
End: 2017-08-07

## 2017-07-31 RX ADMIN — PRENATAL VITAMINS-IRON FUMARATE 27 MG IRON-FOLIC ACID 0.8 MG TABLET 1 TABLET: at 13:08

## 2017-07-31 RX ADMIN — LURASIDONE HYDROCHLORIDE 40 MG: 20 TABLET, FILM COATED ORAL at 13:04

## 2017-07-31 RX ADMIN — HALOPERIDOL 5 MG: 5 TABLET ORAL at 20:57

## 2017-07-31 RX ADMIN — HALOPERIDOL LACTATE 5 MG: 5 INJECTION, SOLUTION INTRAMUSCULAR at 09:20

## 2017-07-31 NOTE — BH NOTES
GROUP THERAPY PROGRESS NOTE    Archana Wills is participating in Recreational Therapy. Group time: 30 min    Personal goal for participation: Being able to use music as a relaxing tool. Goal orientation: relaxation    Group therapy participation: active    Therapeutic interventions reviewed and discussed: Music Therapy    Impression of participation: Pt actively participated in group activity.

## 2017-07-31 NOTE — BH NOTES
Pt back out in the day area disrupting the unit dancing cursing imitating this nurse banging on the tables.

## 2017-07-31 NOTE — ED NOTES
Patient transported by JOSE JUAN STEINBERG [facility] with LITTLE

## 2017-07-31 NOTE — BH NOTES
Pt agitated talking nonstop not making any sense with word salad. She is cursing using racial slurs exposing herself in the day are. Pt is disrupting the unit. She poured water all over the desk slamming her hand on the desk. Pt will not follow any direction. Making very inappropriate sexual comments calling staff names. She was medicated for agitation with Benadryl 50 mg IM and Haldol 5 mg IM after multiple attempts to keep her calm and redirect her failed.

## 2017-07-31 NOTE — BSMART NOTE
OCCUPATIONAL THERAPY PROGRESS NOTE    Group Time:  3840  Attendance: The patient attended 1/4 of group. The patient left and returned to activity at least once. Initially eating lunch. Participation:   The patient seemed unable to participate in the activity. Attention:  The patient was unable to attend to the activity. Interaction:  The patient shows no awareness of others. Talking to self in group, occasional smiling. Unable to participate due to preoccupation and distraction. Did respond to direct questions (concrete, simple, generic).

## 2017-07-31 NOTE — ED NOTES
Spoke with smita from [facility], per smita patient has an accepting physician at [facility]) and Gisella Kauffman is enroute to ED with TDO.  Number for report is [phone]

## 2017-07-31 NOTE — H&P
[address] [facility] ADMIT NOTE-P    Name:  Gabriel Fang  MR#:  [id number]  :  1990  Account #:  [de-identified]  Date of Adm:  2017      CHIEF COMPLAINT: Psychotic disorder. HISTORY OF PRESENT ILLNESS: The patient is a 20-year-old  female with a history of schizophrenia who is 34 weeks pregnant (due 17) presented under TDO for inpatient psychiatric hospitalization after displaying decompensated psychotic symptoms. The patient presented to this facility by way of [facility]. While at the outside hospital, the patient was verbally aggressive towards staff, disrobed herself in the emergency  room and urinated on the floor and expressed delusional statements  that her cervix is connected to the television. Of note, this patient was  recently discharged from [facility] on 2017. Per record  review, she has a PACT team.    On initial assessment, the patient required much encouragement and  redirection to join the treatment team for the session. She is very  disorganized on initial assessment. The patient is unable to give any  direct answers to questioning. She does not appear to be in any acute  distress. The patient currently denies any suicidal or homicidal ideation. She  was unable to answer questions related to hallucinations and delusions  due to her active thought disorder. MEDICAL REVIEW OF SYSTEMS:  The patient reports poor sleep. Appetite is fair. A 14-point review of system was completed. Significant  findings were found in the HPI or mental status examination. PSYCHIATRIC TREATMENT HISTORY: Chart review states the  patient had a PACT team. She was previously tried on  Lithium, Trileptal, Klonopin and Risperdal. The patient recently  discharged from [facility] on 2017. Other psychiatric  hospitalizations as well as outpatient treatment information is unknown  at this time. ALLERGIES: ROBITUSSIN.     MEDICAL DIAGNOSES: Current 8 month pregnancy. MEDICATIONS:  1. Latuda 20 mg b.i.d. with meals. 2. Vistaril 50 mg b.i.d. p.r.n. SUBSTANCE ABUSE HISTORY: Unknown. FAMILY HISTORY: Unknown. SOCIAL HISTORY: Chart review states that the patient lives in a [facility],  [place], room [address], [facility]. VITAL SIGNS: Blood pressure 108/73, heart rate 90, respiration rate  18, temperature 97.4 degrees Fahrenheit. LABORATORIES:   Results for orders placed or performed during the hospital encounter of 07/30/17   CBC WITH AUTOMATED DIFF   Result Value Ref Range    WBC 10.5 4.6 - 13.2 K/uL    RBC 3.67 (L) 4.20 - 5.30 M/uL    HGB 11.0 (L) 12.0 - 16.0 g/dL    HCT 31.6 (L) 35.0 - 45.0 %    MCV 86.1 74.0 - 97.0 FL    MCH 30.0 24.0 - 34.0 PG    MCHC 34.8 31.0 - 37.0 g/dL    RDW 13.3 11.6 - 14.5 %    PLATELET 084 219 - 068 K/uL    MPV 8.6 (L) 9.2 - 11.8 FL    NEUTROPHILS 65 40 - 73 %    LYMPHOCYTES 23 21 - 52 %    MONOCYTES 12 (H) 3 - 10 %    EOSINOPHILS 0 0 - 5 %    BASOPHILS 0 0 - 2 %    ABS. NEUTROPHILS 6.7 1.8 - 8.0 K/UL    ABS. LYMPHOCYTES 2.4 0.9 - 3.6 K/UL    ABS. MONOCYTES 1.3 (H) 0.05 - 1.2 K/UL    ABS. EOSINOPHILS 0.0 0.0 - 0.4 K/UL    ABS. BASOPHILS 0.0 0.0 - 0.06 K/UL    DF AUTOMATED     METABOLIC PANEL, BASIC   Result Value Ref Range    Sodium 138 136 - 145 mmol/L    Potassium 3.6 3.5 - 5.5 mmol/L    Chloride 105 100 - 108 mmol/L    CO2 21 21 - 32 mmol/L    Anion gap 12 3.0 - 18 mmol/L    Glucose 86 74 - 99 mg/dL    BUN 5 (L) 7.0 - 18 MG/DL    Creatinine 0.59 (L) 0.6 - 1.3 MG/DL    BUN/Creatinine ratio 8 (L) 12 - 20      GFR est AA >60 >60 ml/min/1.73m2    GFR est non-AA >60 >60 ml/min/1.73m2    Calcium 8.7 8.5 - 10.1 MG/DL   ETHYL ALCOHOL   Result Value Ref Range    ALCOHOL(ETHYL),SERUM <3 0 - 3 MG/DL     MENTAL STATUS: The patient is a 60-year-old  female. She is currently 34 weeks pregnant with a due date  on 09/11/2017. The patient is grossly disorganized. Her speech is  rapid and pressured.  Mood and affect are euphoric. Thought process is  tangential and incoherent. Thought content is significant for delusions,  ideas of reference and responding to internal stimuli. She is unable to  report any hallucinations. The patient denies any suicidal or homicidal  ideation. Her insight and judgment are poor due to psychotic process. ASSESSMENT AND PLAN:    PSYCHIATRIC DIAGNOSIS: Schizophrenia. MEDICAL DIAGNOSIS: 34 weeks pregnant    PSYCHOSOCIAL AND CONTEXTUAL FACTORS: Noncompliance  with medication regimen. LEVEL OF IMPAIRMENT AND DISABILITY: Severe. The patient is a 72-year-old 29 weeks pregnant female who  presents for acute stabilization after experiencing decompensated  psychotic symptoms. It is unclear if the patient was psychiatrically  stable upon her discharge from [facility] on 07/28/2017. The  patient is unable to provide a clear understanding of her mental illness  and medication regimen. Chart review suggests that the patient is  recently prescribed Latuda and Vistaril. Due to her pregnancy status  will continue with Latuda and monitor for any side effects. Will  consider  OB/GYN support as patient is in her late pregnancy. PLAN:  1. Admit the patient to locked Behavioral Health Unit. 2. Start Latuda 40 mg daily with lunch. This medicine must be given  with a meal of 350 calories. 3. Continue Vistaril p.r.n. for anxiety. 4.  Discussed case with Dr. Bassam Best (Ob-Gyn) at [phone] who recommended twice daily fetal heart monitoring while hospitalized. 5.  will contact [facility] for records, as well as her  PACT team for continuity of care. 6. Tentative date of discharge pending.         MD Aide Santamaria / Emile Mims  D:  07/31/2017   10:09  T:  07/31/2017   10:41  Job #:  [id number]

## 2017-07-31 NOTE — ED NOTES
Per Winsome Addison RN, patient received Benadryl. Noticied that patient is calling out, but is not physically violent and is not directing any violence towards staff. Ankle restraints removed. Informed patient that if she is not violent then ankle restraints can be discontinued.

## 2017-07-31 NOTE — PROGRESS NOTES
NUTRITION    Nutrition Screen      RECOMMENDATIONS / PLAN:     - Adjust diet double portions.   - Start HS snack-yogurt     NUTRITION DIAGNOSIS & INTERVENTIONS:     [x] Meals/Snacks: general/healthful diet-adjust diet to double portions, HS snack  [] Coordination of nutrition care     Nutrition Diagnosis:  Inadequate protein energy intake related to increased needs of pregnancy third trimester as evidenced by diet order does not provide adequate kcal, protein. ASSESSMENT:     Pt stopped RD in common area with questions-questions answered to pt satisfaction. Reports good appetite, pre-pregnancy weight of 120 lbs, and current weight of 144 lbs. Average intake adequate to meet patients estimated nutritional needs:   [] Yes     [x] No      [] Unable to determine at this time    Diet: DIET REGULAR    Food Allergies: none  Current Appetite:   [x] Good     [] Fair     [] Poor     [] Other:  Appetite/meal intake prior to admission:   [x] Good     [] Fair     [] Poor     [] Other:   Feeding Limitations:  [] Swallowing Difficulty       [] Chewing Difficulty       [] Other   Current Meal Intake: No data found. Gastrointestinal Issues:  [] Yes    [x] No   Skin Integrity:  WDL    Pertinent Medications:  Reviewed. Prenatal + iron   Labs:  Reviewed      Anthropometrics:  Ht Readings from Last 1 Encounters:   04/25/17 5' 6\" (1.676 m)       There were no vitals filed for this visit. There is no height or weight on file to calculate BMI.       Weight History:   Weight Metrics 5/13/2017 4/25/2017 4/18/2017 2/27/2017 1/21/2017 11/14/2016 10/16/2016   Weight 127 lb 6.4 oz 130 lb 140 lb 140 lb 145 lb 9.6 oz 110 lb 145 lb   BMI 20.56 kg/m2 20.98 kg/m2 22.6 kg/m2 22.6 kg/m2 23.5 kg/m2 17.75 kg/m2 22.05 kg/m2       Admitting Diagnosis: Schizophrenia  Schizophrenia ([facility] [address]  Past Medical History:   Diagnosis Date    Bipolar disorder ([facility]     ETOH abuse     Marijuana abuse     Psychiatric disorder         Education Needs: [x] None identified  [] Identified - Not appropriate at this time  []  Identified and addressed - refer to education log  Learning Limitations:   [] None identified  [x] Identified psychotic disorder   Cultural, Restorationist & ethnic food preferences identified:  [x] None    [] Yes       ESTIMATED NUTRITION NEEDS:     4437-6426 kcal (MSJx1.2-1.3  + 452) Based on:   [x] Pre-pregnancy BW 54 kg     [] IBW        71  gm protein (1.1 gm/kg), 1 mL/kcal  Based on:   [x] Actual BW  61 kg     [] IBW          MONITORING & EVALUATION:     Nutrition Goal(s):   1. Po intake of meals will meet >75% of patient estimated nutritional needs within the next 7 days.   Outcome:   [] Met    []  Not Met   [x] New/Initial Goal    Monitor:  [x] Meal intake    [x] Diet tolerance    [] Supplement intake    Previous Recommendations (for follow-up assessments only):     []   Implemented       []   Not Implemented (RD to address)    [] No Recommendation Made      Discharge Planning: regular diet   [x]  Participated in care planning, discharge planning, & interdisciplinary rounds as appropriate      April Giles,  [address] [place] [address]   Pager: [phone]

## 2017-07-31 NOTE — BH NOTES
Provider received called from L&D concerning monitoring recommendations. Provider order OB/GYN consult and left voicemail for Dr. Alisha Cline at [phone]. Need monitoring recommendations for a 31 yo female who is psychotic and 34 weeks pregnant.

## 2017-07-31 NOTE — BH NOTES
Patient is erratic and loose in her conversation. Patient refuses to wear any pants or gowns. Patient is talking non-stop without maintaining a single sentence. Patient is cursing and calling all the staff some sort of racial slur. Patient has tried to proposition peers. Staff is maintaining a 1:1 observation.

## 2017-07-31 NOTE — BSMART NOTE
ART ACTIVITY PROGRESS NOTE    PATIENT SCHEDULED FOR GROUP AT :  2078    The patient was not disturbed for group at staff discretion. Patient had been medication prior to group and was in bed.
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
## 2017-07-31 NOTE — BH NOTES
GROUP THERAPY PROGRESS NOTE    Omar Shine is not participating in [place].      Group time: 30 minutes    Personal goal for participation: none    Goal orientation: community    Group therapy participation: uncontrolled    Therapeutic interventions reviewed and discussed: goals and procedures    Impression of participation: encouraged

## 2017-07-31 NOTE — PROGRESS NOTES
Patient is TDO status; escorted to [facility] [address] by police and security officers; patient is oriented to person, place, and time, but exhibits loose associations, flight of ideas, disorganized, irritable affect; required verbal redirection for calling staff names like the \"N\" word, cake, banana; disrobed from scrub pants, then removed disposable belief; refused to put on hospital gown; patient has no roommate at this time; according to chart, patient is 34 weeks pregnant, denied abdominal discomfort; patient is uncooperative; refused to answer questions for admission assessment; DOE [place], is 1:1 with patient; will continue to monitor and redirect as needed.

## 2017-08-01 LAB
BACTERIA SPEC CULT: NORMAL
CHOLEST SERPL-MCNC: 225 MG/DL
EST. AVERAGE GLUCOSE BLD GHB EST-MCNC: NORMAL MG/DL
HBA1C MFR BLD: 4.5 % (ref 4.2–5.6)
HDLC SERPL-MCNC: 90 MG/DL (ref 40–60)
HDLC SERPL: 2.5 {RATIO} (ref 0–5)
LDLC SERPL CALC-MCNC: 111.2 MG/DL (ref 0–100)
LIPID PROFILE,FLP: ABNORMAL
SERVICE CMNT-IMP: NORMAL
TRIGL SERPL-MCNC: 119 MG/DL (ref ?–150)
VLDLC SERPL CALC-MCNC: 23.8 MG/DL

## 2017-08-01 PROCEDURE — 74011250637 HC RX REV CODE- 250/637: Performed by: PSYCHIATRY & NEUROLOGY

## 2017-08-01 PROCEDURE — 83036 HEMOGLOBIN GLYCOSYLATED A1C: CPT | Performed by: PSYCHIATRY & NEUROLOGY

## 2017-08-01 PROCEDURE — 36415 COLL VENOUS BLD VENIPUNCTURE: CPT | Performed by: PSYCHIATRY & NEUROLOGY

## 2017-08-01 PROCEDURE — 80061 LIPID PANEL: CPT | Performed by: PSYCHIATRY & NEUROLOGY

## 2017-08-01 PROCEDURE — 65220000003 HC RM SEMIPRIVATE PSYCH

## 2017-08-01 RX ADMIN — ALUMINUM HYDROXIDE AND MAGNESIUM HYDROXIDE 30 ML: 200; 200 SUSPENSION ORAL at 12:17

## 2017-08-01 RX ADMIN — DIPHENHYDRAMINE HYDROCHLORIDE 50 MG: 50 CAPSULE ORAL at 11:35

## 2017-08-01 RX ADMIN — ALUMINUM HYDROXIDE AND MAGNESIUM HYDROXIDE 30 ML: 200; 200 SUSPENSION ORAL at 08:47

## 2017-08-01 RX ADMIN — ALUMINUM HYDROXIDE AND MAGNESIUM HYDROXIDE 30 ML: 200; 200 SUSPENSION ORAL at 19:00

## 2017-08-01 RX ADMIN — DIPHENHYDRAMINE HYDROCHLORIDE 50 MG: 50 CAPSULE ORAL at 20:59

## 2017-08-01 RX ADMIN — PRENATAL VITAMINS-IRON FUMARATE 27 MG IRON-FOLIC ACID 0.8 MG TABLET 1 TABLET: at 08:16

## 2017-08-01 RX ADMIN — LURASIDONE HYDROCHLORIDE 40 MG: 20 TABLET, FILM COATED ORAL at 08:16

## 2017-08-01 NOTE — BSMART NOTE
SW ENCOUNTER: The patient stated that she is feeling good, organized and creative this morning. She was sitting int he day area coloring without incident and concern. The patient did not speak with a [place] accent today; seemed amenable, alert and presented good eye contact. She denied current SI/HI, intent, AVH and concerns regarding her health and safety. The patient disclosed that she has been speaking with the father of her unborn child via phone since current hospitalization and that they are working on their relationship. She also disclosed that she is  (6 years) to a man named Leigh Randhawa but she does not have contact with him; has a 9year old son who has been adopted by her mother. The SW updated the patient on information provided by her [employer] [facility] CM. The patient will continue to be monitored.

## 2017-08-01 NOTE — BH NOTES
GROUP THERAPY PROGRESS NOTE    Nisreen Saini is participating in [place]. Group time: 15 minutes    Goal orientation: community    Group therapy participation: active    Therapeutic interventions reviewed and discussed:   Unit Guidelines and daily routine were reviewed. Impression of participation:   Tian Best stated \"I don't really have a goal for the day but I do like to color. \"  Patient was told she could be given coloring material right after group.

## 2017-08-01 NOTE — BH NOTES
The patient has been up at will on unit. She has been cooperative and compliant. She complained of indigestion earlier this evening and received relief with PRN medication ordered for indigestion. There has been no aggressive behavior this evening, and patient has not been agitated. She has been pleasant and cooperative. She contracts for safety.

## 2017-08-01 NOTE — BH NOTES
Patient has been maintained on 1 to 1 with staff with every 15 minute checks done. At beginning of shift patient was agitated and try to throw a cup of water on staff . Patient was redirected about her behavior  and she was able to calm down. She has slept good during the night. Fasting lab was done this morning.

## 2017-08-01 NOTE — BSMART NOTE
ART ACTIVITY PROGRESS NOTE    PATIENT SCHEDULED FOR GROUP AT : 0945    ART ACTIVITY: paper tetrahedron    ATTENDANCE : Patient attended the entire session. PARTICIPATION LEVEL :. Needs only minimal encouragement. ATTENTION LEVEL : Needs occasional re-direction. Patient came willingly to group. During introductions, in response to the question \"what would you being doing if you were at home now and feeling good? \" Patient responded that she would be making some coffee, smoking. She quickly detieriorated into an almost word salad, at one point mentioning sugar and crack. It was not possible to follow her train of thought when questioned. Patient said she had problems with her physical body because she had a head injury to the back of her head and to her frontal lobes. Patient was sensitive to perceived criticism and later challenged the therapist's question about crack. She was able to perform the activity, answering on the sides of the tetrahedron, general questions - such as: a positive thing about me that a friend would say. When repeating back what she had written, her response became longer and a litany of clang associations. Patient was able to assemble the tetrahedron without instruction, in fact, she refused instruction and problem-solved herself. Patient's affect was bright, speech was rapid, but, not particularly pressured. She did seem to lack awareness of how she is acting and also seemed to intellectualize her actions.

## 2017-08-01 NOTE — BSMART NOTE
OCCUPATIONAL THERAPY PROGRESS NOTE    Group Time:  5082  Attendance:  . The patient attended 3/4 of group. Participation:  The patient participated with moderate elaboration in the activity. Attention:  The patient needed frequent redirection to activity. Interaction:  The patient occasionally  interacts with others. Loose and very disorganized in activity approach and unable to follow instructions. Became angry at this recorder (? Reason other than reality orientation) and began yelling, cursing, demeaning and had to leave group. Apologized about 15 minutes later.

## 2017-08-01 NOTE — BH NOTES
MHT Note:  Patient has been pleasant and cooperative this shift. She has participated in all unit activities. Patient has flight of ideas when trying to have a conversation but has been much more able to hold conversation than reported from the previous shift. She speaks in a soft, quiet voice. She has been observed responding to internal stimuli, i.e., talking and laughing to self at times. She has been meal and medication compliant. Will continue to monitor for safety and provide support.

## 2017-08-01 NOTE — BH NOTES
GROUP THERAPY PROGRESS NOTE    Felicia Fields is participating in Recre[place] Therapy.      Group time: 45 minutes    Personal goal for participation: relaxation    Goal orientation: relaxation    Group therapy participation: minimal    Therapeutic interventions reviewed and discussed: PT watched educational program    Impression of participation: happy

## 2017-08-01 NOTE — PROGRESS NOTES
Problem: Falls - Risk of  Goal: *Absence of falls  Patient will remain free from falls during hospital stay. Outcome: Progressing Towards Goal  No falls     Problem: Psychosis  Goal: *STG: Decreased hallucinations  Patient will verbalize decreased hallucinations daily during hospital stay. Outcome: Not Progressing Towards Goal  Variance: Patient Condition  Comments: Auditory   Goal: *STG: Decreased delusional thinking  Patient will demonstrate decreased delusional thinking daily while hospitalized. Outcome: Not Progressing Towards Goal  Variance: Patient Condition  Comments: Paranoid   Goal: *STG: Remains safe in hospital  Patient will demonstrate safe behavior daily while in the hospital.   Outcome: Progressing Towards Goal  No unsafe behavior today   Goal: *STG: Participates in individual and group therapy  Daily, patient will attend 2-3 groups while in the hospital.   Outcome: Progressing Towards Goal  Participating in all groups today   Goal: *STG/LTG: Complies with medication therapy  Patient will comply with medications daily while in the hospital.   Outcome: Progressing Towards Goal  Compliant   Goal: *STG/LTG: Demonstrates improved social functioning by responding appropriately to staff  Patient will demonstrate improved social functioning by responding appropriately to staff AEB patient will refrain from calling names and using profane language. Outcome: Progressing Towards Goal  Not cursing or using racial slurs today and is able to have brief conversation. Problem: Nutrition Deficit  Goal: *Optimize nutritional status  Outcome: Progressing Towards Goal  Eating all meals and drinking fluids. Comments:   Pt states she feels better today and that she thinks she is making progress. She is able to briefly make sense in conversation. She does however continue to ramble and not make any sense at times.  She will just make random statements at times like Easter in July why isn't it Misty in July.  Yesterday she was mainly talking in word salad but is not doing that today. Pt is not cursing using racial slurs or aggressive today. She has been participating in all groups today and is compliant with medications. She frequently comes to the desk making somatic complaints. She is complaining of heartburn after she eats and she is 34 weeks pregnant but she makes odd statements as well like she thinks the baby has heartburn too because she feels it. She continues to be preoccupied and distracted with a far off stare but denies any hallucinations. Pt states she is here because she was having difficulty with her pregnancy and had to be checked out. She stated her vagina was bleeding and that is why she had to come to the hospital. Pt is restless and pacing at times.

## 2017-08-01 NOTE — PROGRESS NOTES
[address]  Inpatient Progress Note     Date of Service: 08/01/17  Hospital Day: 2     Subjective/Interval History   08/01/17    Treatment Team Notes:  Notes reviewed and/or discussed and report that Linda Crespo is received a Haldol injection due to escalating aggressive behaviors, disrobing self and disrupting peers. Pt is compliant with her medications. Patient interview: Linda Crespo was interviewed by this writer today. Pt continues to be delusional. She denies AVH. She further denies any side effects to her medication regimen. She complains of heart burn. Pt feels her baby kicking.        Objective     Vitals:    07/30/17 2332 07/31/17 0732 07/31/17 2015 08/01/17 0758   BP: 117/66 108/73 110/75 101/72   Pulse: 100 90 88 95   Resp: 16 18 18 18   Temp: 98.1 °F (36.7 °C) 97.4 °F (36.3 °C) 97.6 °F (36.4 °C) 97 °F (36.1 °C)       Mental Status Examination     Appearance/Hygiene 31 yo CF, 34 weeks pregnant   Behavior/Social Relatedness odd   Musculoskeletal Gait/Station: appropriate  Tone (flaccid, cogwheeling, spastic): appropriate  Psychomotor (hyperkinetic, hypokinetic): appropriate   Involuntary movements (tics, dyskinesias, akathisa, stereotypies): none   Speech   Accented speech, low volume, good articulation   Mood   euthymic   Affect    stable   Thought Process Linear and goal directed   Thought Content and Perceptual Disturbances Denies self-injurious behavior (SIB), suicidal ideation (SI), aggressive behavior or homicidal ideation (HI)    Denies auditory and visual hallucinations   Sensorium and Cognition  AOx4, attention intact, memory intact, language use appropriate, and fund of knowledge age appropriate   Insight  poor   Judgment poor        Assessment/Plan      Psychiatric Diagnoses: schizophrenia    Medical Diagnoses: 34 weeks pregnant    Psychosocial and contextual factors: non-compliance    Level of impairment/disability: severe Wynona Massed is a 32 y.o. who is currently stabilizing. Pt is less agitated today and clear. She continues to be delusional and somewhat disorganized. Records report pt is taking Latuda 80mg daily. Will adjust regimen      1. Increase Latuda to home dosage of 80mg with breakfast.   2.  Reviewed instructions, risks, benefits and side effects of medications  3. Pt will need close follow up with mental health resources due to her current pregnancy, medication non-compliance and housing instability.    4.  Disposition/Discharge Date: pending    MD DR. JOS[facility]  Psychiatry

## 2017-08-01 NOTE — BH NOTES
Nurse Melinda Chang from labor and delivery came to assess patients fetal heart tone and determined they were 138 bpm. Patient denies any pain or discomfort. She did report having slight vaginal bleeding, MD aware.

## 2017-08-01 NOTE — BH NOTES
Pt came to the desk asking for Vistaril for anxiety. She has Benadryl ordered and was medicated with 50 MG PO Benadryl as requested. She stated she suffers from anxiety disorder and her anxiety is making her psychotic.

## 2017-08-02 ENCOUNTER — HOSPITAL ENCOUNTER (EMERGENCY)
Age: 27
Discharge: HOME | End: 2017-08-03
Attending: OBSTETRICS & GYNECOLOGY | Admitting: OBSTETRICS & GYNECOLOGY
Payer: MEDICAID

## 2017-08-02 LAB
APPEARANCE UR: CLEAR
BILIRUB UR QL: NEGATIVE
COLOR UR: YELLOW
GLUCOSE UR QL STRIP.AUTO: NEGATIVE MG/DL
KETONES UR-MCNC: NEGATIVE MG/DL
LEUKOCYTE ESTERASE UR QL STRIP: ABNORMAL
NITRITE UR QL: NEGATIVE
PH UR: 7 [PH] (ref 5–9)
PROT UR QL: NEGATIVE MG/DL
RBC # UR STRIP: NEGATIVE /UL
SERVICE CMNT-IMP: ABNORMAL
SP GR UR: 1.01 (ref 1–1.02)
UROBILINOGEN UR QL: 0.2 EU/DL (ref 0.2–1)

## 2017-08-02 PROCEDURE — 81003 URINALYSIS AUTO W/O SCOPE: CPT

## 2017-08-02 PROCEDURE — 65220000003 HC RM SEMIPRIVATE PSYCH

## 2017-08-02 PROCEDURE — 74011250637 HC RX REV CODE- 250/637: Performed by: PSYCHIATRY & NEUROLOGY

## 2017-08-02 RX ORDER — ACETAMINOPHEN 500 MG
1000 TABLET ORAL ONCE
Status: COMPLETED | OUTPATIENT
Start: 2017-08-03 | End: 2017-08-03

## 2017-08-02 RX ORDER — SODIUM CHLORIDE, SODIUM LACTATE, POTASSIUM CHLORIDE, CALCIUM CHLORIDE 600; 310; 30; 20 MG/100ML; MG/100ML; MG/100ML; MG/100ML
999 INJECTION, SOLUTION INTRAVENOUS CONTINUOUS
Status: DISCONTINUED | OUTPATIENT
Start: 2017-08-03 | End: 2017-08-03 | Stop reason: HOSPADM

## 2017-08-02 RX ADMIN — ALUMINUM HYDROXIDE AND MAGNESIUM HYDROXIDE 30 ML: 200; 200 SUSPENSION ORAL at 19:05

## 2017-08-02 RX ADMIN — PRENATAL VITAMINS-IRON FUMARATE 27 MG IRON-FOLIC ACID 0.8 MG TABLET 1 TABLET: at 08:56

## 2017-08-02 RX ADMIN — LURASIDONE HYDROCHLORIDE 80 MG: 20 TABLET, FILM COATED ORAL at 08:56

## 2017-08-02 RX ADMIN — ALUMINUM HYDROXIDE AND MAGNESIUM HYDROXIDE 30 ML: 200; 200 SUSPENSION ORAL at 08:59

## 2017-08-02 RX ADMIN — HALOPERIDOL 5 MG: 5 TABLET ORAL at 22:03

## 2017-08-02 RX ADMIN — DIPHENHYDRAMINE HYDROCHLORIDE 50 MG: 50 CAPSULE ORAL at 13:11

## 2017-08-02 RX ADMIN — ALUMINUM HYDROXIDE AND MAGNESIUM HYDROXIDE 30 ML: 200; 200 SUSPENSION ORAL at 13:11

## 2017-08-02 NOTE — BH NOTES
GROUP THERAPY PROGRESS NOTE    Gaurang Whitehead is participating in [place].      Group time: 30 minutes    Personal goal for participation: talk to the Dr    Goal orientation: community    Group therapy participation: active    Therapeutic interventions reviewed and discussed: goals and procedures    Impression of participation: encouraged

## 2017-08-02 NOTE — IP AVS SNAPSHOT
Hedy Patel 
 
 
 [address] [facility] [address] Patient: Nisreen Saini MRN: [id number] BVN:[id number] You are allergic to the following Allergen Reactions Robitussin (Guaifenesin) Nausea and Vomiting Recent Documentation Height Weight BMI OB Status Smoking Status 1.626 m 70.3 kg 26.61 kg/m2 Pregnant Current Every Day Smoker Emergency Contacts Name Discharge Info Relation Home Work Mobile ChuckpowerGopal DISCHARGE CAREGIVER [3] Boyfriend [17] [phone] About your hospitalization You were admitted on:  N/A You last received care in the:  [facility] [id number] Merg[facility] with [facility] You were discharged on:  August 3, 2017 Unit phone number:  [phone] Why you were hospitalized Your primary diagnosis was:  Not on File Providers Seen During Your Hospitalizations Provider Role Specialty Primary office phone Afshin De Luna MD Attending Provider Obstetrics & Gynecology [phone] Your Primary Care Physician (PCP) Primary Care Physician Office Phone Office Fax NONE ** None ** ** None ** Follow-up Information Follow up With Details Comments Contact Info None   None ([phone]) Patient stated that they have no PCP Current Discharge Medication List  
  
ASK your doctor about these medications Dose & Instructions Dispensing Information Comments Morning Noon Evening Bedtime BENADRYL ALLERGY PO Your last dose was: Your next dose is: Take  by mouth. Refills:  0  
     
   
   
   
  
 LATUDA PO Your last dose was: Your next dose is: Take  by mouth. Refills:  0 MAALOX PO Your last dose was: Your next dose is: Take  by mouth. Refills:  0  
     
   
   
   
  
 OTHER(NON-FORMULARY) Your last dose was: Your next dose is:    
   
   
 Dose:  20 mg Take 20 mg by mouth daily. Refills:  0  
     
   
   
   
  
 VISTARIL 25 mg capsule Generic drug:  hydrOXYzine pamoate Your last dose was: Your next dose is:    
   
   
 Dose:  25 mg Take 25 mg by mouth four (4) times daily as needed for Anxiety. Refills:  0 Discharge Instructions Prenatal Discharge Instructions Follow up appointment: Keep scheduled follow up appointment, or make follow up appointment when discharged. Diet: As tolerated. Remember to drink plenty of fluids, especially water. Activity: As tolerated. Medications: As prescribed. Call your physician or return to Labor and Delivery if any of the following symptoms occur: ? Signs of labor (contractions every 5-10 minutes for 1 hour) ? Vaginal bleeding ? Rupture of amniotic membranes (water breaks) ? Fever ? Decreased fetal movement (less than 5-10 movements in 1 hour) Discharge Instructions Attachments/References PREGNANCY: WEEKS 34 TO 36 (ENGLISH) PREGNANCY: WHEN TO CALL (AFTER 20 WEEKS): GENERAL INFO (ENGLISH) PREGNANCY: KICK COUNTS (ENGLISH) PREGNANCY: PRECAUTIONS (ENGLISH) Discharge Orders None Introducing [facility]! Juan Ramon Wills introduces [employer] patient portal. Now you can access parts of your medical record, email your doctor's office, and request medication refills online. 1. In your internet browser, go to [email or web address] 2. Click on the First Time User? Click Here link in the Sign In box. You will see the New Member Sign Up page. 3. Enter your [employer] Access Code exactly as it appears below. You will not need to use this code after youve completed the sign-up process. If you do not sign up before the expiration date, you must request a new code. · [employer] Access Code: [email or web address] Expires: 10/28/2017 10:47 PM 
 
 4. Enter the last four digits of your Social Security Number (xxxx) and Date of Birth (mm/dd/yyyy) as indicated and click Submit. You will be taken to the next sign-up page. 5. Create a [facility] ID. This will be your [facility]Path login ID and cannot be changed, so think of one that is secure and easy to remember. 6. Create a [employer] password. You can change your password at any time. 7. Enter your Password Reset Question and Answer. This can be used at a later time if you forget your password. 8. Enter your e-mail address. You will receive e-mail notification when new information is available in [address] 9. Click Sign Up. You can now view and download portions of your medical record. 10. Click the Download Summary menu link to download a portable copy of your medical information. If you have questions, please visit the Frequently Asked Questions section of the [employer]Path website. Remember, [employer] is NOT to be used for urgent needs. For medical emergencies, dial 911. Now available from your iPhone and Android! General Information Please provide this summary of care documentation to your next provider. Patient Signature:  ____________________________________________________________ Date:  ____________________________________________________________  
  
[facility] Signature:  ____________________________________________________________ Date:  ____________________________________________________________ More Information Weeks 34 to 36 of Your Pregnancy: Care Instructions Your Care Instructions By now, your baby and your belly have grown quite large. It is almost time to give birth. A full-term pregnancy can deliver between 37 and 42 weeks. Your baby's lungs are almost ready to breathe air.  The bones in your baby's head are now firm enough to protect it, but soft enough to move down through the birth canal. 
 You may feel excited, happy, anxious, or scared. You may wonder how you will know if you are in labor or what to expect during labor. Try to be flexible in your expectations of the birth. Because each birth is different, there is no way to know exactly what childbirth will be like for you. This care sheet will help you know what to expect and how to prepare. This may make your childbirth easier. If you haven't already had the Tdap shot during this pregnancy, talk to your doctor about getting it. It will help protect your  against pertussis infection. In the 36th week, most women have a test for group B streptococcus (GBS). GBS is a common bacteria that can live in the vagina and rectum. It can make your baby sick after birth. If you test positive, you will get antibiotics during labor. The medicine will keep your baby from getting the bacteria. Follow-up care is a key part of your treatment and safety. Be sure to make and go to all appointments, and call your doctor if you are having problems. It's also a good idea to know your test results and keep a list of the medicines you take. How can you care for yourself at home? Learn about pain relief choices · Pain is different for every woman. Talk with your doctor about your feelings about pain. · You can choose from several types of pain relief. These include medicine or breathing techniques, as well as comfort measures. You can use more than one option. · If you choose to have pain medicine during labor, talk to your doctor about your options. Some medicines lower anxiety and help with some of the pain. Others make your lower body numb so that you won't feel pain. · Be sure to tell your doctor about your pain medicine choice before you start labor or very early in your labor. You may be able to change your mind as labor progresses. · Rarely, a woman is put to sleep by medicine given through a mask or an IV. Labor and delivery · The first stage of labor has three parts: early, active, and transition. ¨ Most women have early labor at home. You can stay busy or rest, eat light snacks, drink clear fluids, and start counting contractions. ¨ When talking during a contraction gets hard, you may be moving to active labor. During active labor, you should head for the hospital if you are not there already. ¨ You are in active labor when contractions come every 3 to 4 minutes and last about 60 seconds. Your cervix is opening more rapidly. ¨ If your water breaks, contractions will come faster and stronger. ¨ During transition, your cervix is stretching, and contractions are coming more rapidly. ¨ You may want to push, but your cervix might not be ready. Your doctor will tell you when to push. · The second stage starts when your cervix is completely opened and you are ready to push. ¨ Contractions are very strong to push the baby down the birth canal. 
¨ You will feel the urge to push. You may feel like you need to have a bowel movement. ¨ You may be coached to push with contractions. These contractions will be very strong, but you will not have them as often. You can get a little rest between contractions. ¨ You may be emotional and irritable. You may not be aware of what is going on around you. ¨ One last push, and your baby is born. · The third stage is when a few more contractions push out the placenta. This may take 30 minutes or less. · The fourth stage is the welcome recovery. You may feel overwhelmed with emotions and exhausted but alert. This is a good time to start breastfeeding. Where can you learn more? Go to http://devendra-earnest.info/. Enter F925 in the search box to learn more about \"Weeks 34 to 36 of Your Pregnancy: Care Instructions. \" Current as of: March 16, 2017 Content Version: 11.3 © 3267-4556 [employer].  Care instructions adapted under license by [address] (which disclaims liability or warranty for this information). If you have questions about a medical condition or this instruction, always ask your healthcare professional. [address] any warranty or liability for your use of this information. Learning About When to Call Your Doctor During Pregnancy (After 20 Weeks) Your Care Instructions It's common to have concerns about what might be a problem during pregnancy. Although most pregnant women don't have any serious problems, it's important to know when to call your doctor if you have certain symptoms or signs of labor. These are general suggestions. Your doctor may give you some more information about when to call. When to call your doctor (after 20 weeks) Call 911 anytime you think you may need emergency care. For example, call if: 
· You have severe vaginal bleeding. · You have sudden, severe pain in your belly. · You passed out (lost consciousness). · You have a seizure. · You see or feel the umbilical cord. · You think you are about to deliver your baby and can't make it safely to the hospital. 
Call your doctor now or seek immediate medical care if: 
· You have vaginal bleeding. · You have belly pain. · You have a fever. · You have symptoms of preeclampsia, such as: 
¨ Sudden swelling of your face, hands, or feet. ¨ New vision problems (such as dimness or blurring). ¨ A severe headache. · You have a sudden release of fluid from your vagina. (You think your water broke.) · You think that you may be in labor. This means that you've had at least 4 contractions within 20 minutes or at least 8 contractions in an hour. · You notice that your baby has stopped moving or is moving much less than normal. 
· You have symptoms of a urinary tract infection. These may include: 
¨ Pain or burning when you urinate. ¨ A frequent need to urinate without being able to pass much urine. ¨ Pain in the flank, which is just below the rib cage and above the waist on either side of the back. ¨ Blood in your urine. Watch closely for changes in your health, and be sure to contact your doctor if: 
· You have vaginal discharge that smells bad. · You have skin changes, such as: ¨ A rash. ¨ Itching. ¨ Yellow color to your skin. · You have other concerns about your pregnancy. If you have labor signs at 37 weeks or more If you have signs of labor at 37 weeks or more, your doctor may tell you to call when your labor becomes more active. Symptoms of active labor include: 
· Contractions that are regular. · Contractions that are less than 5 minutes apart. · Contractions that are hard to talk through. Follow-up care is a key part of your treatment and safety. Be sure to make and go to all appointments, and call your doctor if you are having problems. It's also a good idea to know your test results and keep a list of the medicines you take. Where can you learn more? Go to http://devendra-earnest.info/. Enter  in the search box to learn more about \"Learning About When to Call Your Doctor During Pregnancy (After 20 Weeks). \" 
Current as of: March 16, 2017 Content Version: 11.3 © 1437-1261 [employer]. Care instructions adapted under license by [employer] (which disclaims liability or warranty for this information). If you have questions about a medical condition or this instruction, always ask your healthcare professional. [place] [address] any warranty or liability for your use of this information. Counting Your Baby's Kicks: Care Instructions Your Care Instructions Counting your baby's kicks is one way your doctor can tell that your baby is healthy. Most womenespecially in a first pregnancyfeel their baby move for the first time between 16 and 22 weeks.  The movement may feel like flutters rather than kicks. Your baby may move more at certain times of the day. When you are active, you may notice less kicking than when you are resting. At your prenatal visits, your doctor will ask whether the baby is active. In your last trimester, your doctor may ask you to count the number of times you feel your baby move. Follow-up care is a key part of your treatment and safety. Be sure to make and go to all appointments, and call your doctor if you are having problems. It's also a good idea to know your test results and keep a list of the medicines you take. How do you count fetal kicks? · A common method of checking your baby's movement is to count the number of kicks or moves you feel in 1 hour. Ten movements (such as kicks, flutters, or rolls) in 1 hour are normal. Some doctors suggest that you count in the morning until you get to 10 movements. Then you can quit for that day and start again the next day. · Pick your baby's most active time of day to count. This may be any time from morning to evening. · If you do not feel 10 movements in an hour, your baby may be sleeping. Wait for the next hour and count again. When should you call for help? Call your doctor now or seek immediate medical care if: 
· You noticed that your baby has stopped moving or is moving much less than normal. 
Watch closely for changes in your health, and be sure to contact your doctor if you have any problems. Where can you learn more? Go to http://devendra-earnest.info/. Enter W954 in the search box to learn more about \"Counting Your Baby's Kicks: Care Instructions. \" Current as of: March 16, 2017 Content Version: 11.3 © 8606-7365 [employer]. Care instructions adapted under license by [employer] (which disclaims liability or warranty for this information).  If you have questions about a medical condition or this instruction, always ask your healthcare professional. [place] [address] any warranty or liability for your use of this information. Pregnancy Precautions: Care Instructions Your Care Instructions There is no sure way to prevent labor before your due date ( labor) or to prevent most other pregnancy problems. But there are things you can do to increase your chances of a healthy pregnancy. Go to your appointments, follow your doctor's advice, and take good care of yourself. Eat well, and exercise (if your doctor agrees). And make sure to drink plenty of water. Follow-up care is a key part of your treatment and safety. Be sure to make and go to all appointments, and call your doctor if you are having problems. It's also a good idea to know your test results and keep a list of the medicines you take. How can you care for yourself at home? · Make sure you go to your prenatal appointments. At each visit, your doctor will check your blood pressure. Your doctor will also check to see if you have protein in your urine. High blood pressure and protein in urine are signs of preeclampsia. This condition can be dangerous for you and your baby. · Drink plenty of fluids, enough so that your urine is light yellow or clear like water. Dehydration can cause contractions. If you have kidney, heart, or liver disease and have to limit fluids, talk with your doctor before you increase the amount of fluids you drink. · Tell your doctor right away if you notice any symptoms of an infection, such as: ¨ Burning when you urinate. ¨ A foul-smelling discharge from your vagina. ¨ Vaginal itching. ¨ Unexplained fever. ¨ Unusual pain or soreness in your uterus or lower belly. · Eat a balanced diet. Include plenty of foods that are high in calcium and iron. ¨ Foods high in calcium include milk, cheese, yogurt, almonds, and broccoli. ¨ Foods high in iron include red meat, shellfish, poultry, eggs, beans, raisins, whole-grain bread, and leafy green vegetables. · Do not smoke. If you need help quitting, talk to your doctor about stop-smoking programs and medicines. These can increase your chances of quitting for good. · Do not drink alcohol or use illegal drugs. · Follow your doctor's directions about activity. Your doctor will let you know how much, if any, exercise you can do. · Ask your doctor if you can have sex. If you are at risk for early labor, your doctor may ask you to not have sex. · Take care to prevent falls. During pregnancy, your joints are loose, and your balance is off. Sports such as bicycling, skiing, or in-line skating can increase your risk of falling. And don't ride horses or motorcycles, dive, water ski, scuba dive, or parachute jump while you are pregnant. · Avoid getting very hot. Do not use saunas or hot tubs. Avoid staying out in the sun in hot weather for long periods. Take acetaminophen (Tylenol) to lower a high fever. · Do not take any over-the-counter or herbal medicines or supplements without talking to your doctor or pharmacist first. 
When should you call for help? Call 911 anytime you think you may need emergency care. For example, call if: 
· You passed out (lost consciousness). · You have severe vaginal bleeding. · You have severe pain in your belly or pelvis. · You have had fluid gushing or leaking from your vagina and you know or think the umbilical cord is bulging into your vagina. If this happens, immediately get down on your knees so your rear end (buttocks) is higher than your head. This will decrease the pressure on the cord until help arrives. Call your doctor now or seek immediate medical care if: 
· You have signs of preeclampsia, such as: 
¨ Sudden swelling of your face, hands, or feet. ¨ New vision problems (such as dimness or blurring). ¨ A severe headache. · You have any vaginal bleeding. · You have belly pain or cramping. · You have a fever. · You have had regular contractions (with or without pain) for an hour. This means that you have 8 or more within 1 hour or 4 or more in 20 minutes after you change your position and drink fluids. · You have a sudden release of fluid from your vagina. · You have low back pain or pelvic pressure that does not go away. · You notice that your baby has stopped moving or is moving much less than normal. 
Watch closely for changes in your health, and be sure to contact your doctor if you have any problems. Where can you learn more? Go to http://devendra-earnest.info/. Enter [id number] in the search box to learn more about \"Pregnancy Precautions: Care Instructions. \" Current as of: March 16, 2017 Content Version: 11.3 © 7123-9640 [employer]. Care instructions adapted under license by [employer] (which disclaims liability or warranty for this information). If you have questions about a medical condition or this instruction, always ask your healthcare professional. [address] any warranty or liability for your use of this information.

## 2017-08-02 NOTE — PROGRESS NOTES
[address]  Inpatient Progress Note     Date of Service: 08/02/17  Hospital Day: 3     Subjective/Interval History   08/02/17    Treatment Team Notes:  Notes reviewed and/or discussed and report that Yariel Sykes did not receive fetal monitoring yesterday; staff will follow up today. She did not receive any interval PRN medications for agitation. Patient interview: Yariel Sykes was interviewed by this writer today. Pt warmly greeted this provider upon approach. She reports feeling more of the \"light\" today. She continues to engage in disorganized and delusional thinking. Pt reports feeling her baby kick. Denies AVH and SI. She is compliant on her Latuda without side effects. Objective     Vitals:    07/31/17 2015 08/01/17 0758 08/01/17 1900 08/02/17 0748   BP: 110/75 101/72 111/74 108/70   Pulse: 88 95 97 85   Resp: 18 18 18 17   Temp: 97.6 °F (36.4 °C) 97 °F (36.1 °C) 97.4 °F (36.3 °C) 97.1 °F (36.2 °C)       Mental Status Examination     Appearance/Hygiene 31 yo CF, 34 weeks pregnant, edematous lower extremities   Behavior/Social Relatedness Pleasant but disorganized   Musculoskeletal Gait/Station: appropriate  Tone (flaccid, cogwheeling, spastic): appropriate  Psychomotor (hyperkinetic, hypokinetic): appropriate   Involuntary movements (tics, dyskinesias, akathisa, stereotypies): none   Speech   Accented speech, low volume, good articulation   Mood   elated   Affect    elated   Thought Process Linear and goal directed   Thought Content and Perceptual Disturbances +delusions  +ideas of reference  Denies SI/HI/AVH.     Sensorium and Cognition  AOx4, attention intact, memory intact, language use appropriate, and fund of knowledge age appropriate   Insight  poor   Judgment poor        Assessment/Plan      Psychiatric Diagnoses: schizophrenia    Medical Diagnoses: 34 weeks pregnant    Psychosocial and contextual factors: non-compliance    Level of impairment/disability: severe    Nisreen Saini is a 32 y.o. who is experiencing reduced psychomotor agitation and is displaying more appropriate social interactions but continues to related in a disorganized manner. Pt is still delusional. TDO converted to IC. 1.  Continue Latuda 80mg with breakfast.   2.  Reviewed instructions, risks, benefits and side effects of medications  3. Pt will need close follow up with mental health resources due to her current pregnancy, medication non-compliance and housing instability. SW to follow up with pt's  for updates and resources.    4.  Disposition/Discharge Date: pending    Timmy Kennedy MD [facility]  [facility]ry

## 2017-08-02 NOTE — PROGRESS NOTES
Problem: Falls - Risk of  Goal: *Absence of falls  Patient will remain free from falls during hospital stay. Outcome: Progressing Towards Goal  No falls reported/observed    Problem: Psychosis  Goal: *STG: Decreased hallucinations  Patient will verbalize decreased hallucinations daily during hospital stay. Outcome: Progressing Towards Goal  Denied hallucinations  Goal: *STG: Remains safe in hospital  Patient will demonstrate safe behavior daily while in the hospital.   Outcome: Progressing Towards Goal  No aggressive or self-injurious behaviors    Comments:   Patient ate her breakfast and was compliant with medications. She denied SI/HI/AVH. She discussed getting a nicotine patch and stated that she had pre-eclampsia with her first pregnancy but could regulate her blood pressure with smoking \"smoking kept my blood pressure low. \" This writer advised her that smoking does not keep her blood pressure low and educated about smoking dangers when pregnant. Patient has been interacting with peers in the milieu appropriately although with loose associations and flight of ideas. She will remain on suicide precautions. Staff will continue to monitor q15 minutes for safety. Staff and nurses will continue to provide a safe and supportive environment.

## 2017-08-02 NOTE — IP AVS SNAPSHOT
[address] [place] [address] Patient: Mary Gray MRN: [id number] DSK:[id number] Current Discharge Medication List  
  
ASK your doctor about these medications Dose & Instructions Dispensing Information Comments Morning Noon Evening Bedtime BENADRYL ALLERGY PO Your last dose was: Your next dose is: Take  by mouth. Refills:  0  
     
   
   
   
  
 LATUDA PO Your last dose was: Your next dose is: Take  by mouth. Refills:  0 MAALOX PO Your last dose was: Your next dose is: Take  by mouth. Refills:  0  
     
   
   
   
  
 OTHER(NON-FORMULARY) Your last dose was: Your next dose is:    
   
   
 Dose:  20 mg Take 20 mg by mouth daily. Refills:  0  
     
   
   
   
  
 VISTARIL 25 mg capsule Generic drug:  hydrOXYzine pamoate Your last dose was: Your next dose is:    
   
   
 Dose:  25 mg Take 25 mg by mouth four (4) times daily as needed for Anxiety. Refills:  0

## 2017-08-02 NOTE — BH NOTES
A call was placed earlier this PM by this nurse to Labor and Delivery to have someone come over to do FHT, but no one has shown up.

## 2017-08-02 NOTE — BH NOTES
GROUP THERAPY PROGRESS NOTE    Jolanda Meckel is participating in Recreational Therapy. Group time: 30 min    Personal goal for participation: Social interaction and leisure activity    Goal orientation: social    Group therapy participation: active    Therapeutic interventions reviewed and discussed: Positive social interaction and recreation    Impression of participation: Pt.was polite,elevated in mood and interacting and participating positively with peers/staff.

## 2017-08-02 NOTE — BSMART NOTE
SW COLLATERAL: Patient is now reinvestment. The SW received a call from the patient's reinvestment CM Ms. Kevin Florjose ([phone]) with [employer]; she will send over further authorization for more days.

## 2017-08-02 NOTE — BH NOTES
Pt transferred to [address] [place] [address]. Report given to charge nurse.  Belongings sent with pt

## 2017-08-02 NOTE — BH NOTES
Patient came to the nurses station wanting to know why she didn't get her ultrasound performed yesterday. I didn't see any notes explaining why the fetal monitoring was not performed will continue to monitor.

## 2017-08-02 NOTE — BSMART NOTE
OCCUPATIONAL THERAPY PROGRESS NOTE  Group Time:  0657  Attendance: The patient attended full group. Participation:  The patient participated with moderate elaboration in the activity. Attention:  The patient needed redirection to activity several times. Interaction:  The patient frequently interacts with others. Mood more stable, remains intrusive with peers and needing frequent redirection. Still disorganized, less so than previous days.

## 2017-08-02 NOTE — BH NOTES
Ninette Carrel is participating in Music Therapy. Group time: 4121    Personal goal for participation:  Relax while listening to Aromatherapy music    Goal orientation: relaxation    Group therapy participation: active    Therapeutic interventions reviewed and discussed: Staff encouraged Pt.  To participate in listening to soft music    Impression of participation:  Pt. was calm relax and coloring zing patterns while listening to [address]

## 2017-08-02 NOTE — IP AVS SNAPSHOT
Summary of Care Report The Summary of Care report has been created to help improve care coordination. Users with access to [employer]edo or [address] (Web-based application) may access additional patient information including the Discharge Summary. If you are not currently a [address] user and need more information, please call the number listed below in the [place] [address] section and ask to be connected with Medical Records. Facility Information Name Address Phone [place] [id number] [facility] [address] [phone] Patient Information Patient Name Sex  Bre Howard ([id number]) Female 1990 Discharge Information Admitting Provider Service Area Unit Gamaliel Moran MD / [address] Labor & Delivery / [phone] Discharge Provider Discharge Date/Time Discharge Disposition Destination (none) 8/3/2017 (Pending) P[facility] (none) Patient Language Language ENGLISH [13] Non-Hospital Problems as of 8/3/2017  Never Reviewed Class Noted - Resolved Last Modified Active Problems Schizophrenia ([facility] [address]  2017 - Present 2017 by Tracie Velásquez MD  
  Entered by Tracie Velásquez MD  
  Vaginal bleeding in pregnancy  2017 - Present 2017 by David Roland DO Entered by David Roland DO You are allergic to the following Allergen Reactions Robitussin (Guaifenesin) Nausea and Vomiting Current Discharge Medication List  
  
ASK your doctor about these medications Dose & Instructions Dispensing Information Comments BENADRYL ALLERGY PO Take  by mouth. Refills:  0  
   
 LATUDA PO Take  by mouth. Refills:  0 MAALOX PO Take  by mouth. Refills:  0  
   
 OTHER(NON-FORMULARY) Dose:  20 mg Take 20 mg by mouth daily. Refills:  0  
   
 VISTARIL 25 mg capsule Generic drug:  hydrOXYzine pamoate Dose:  25 mg Take 25 mg by mouth four (4) times daily as needed for Anxiety. Refills:  0 Follow-up Information Follow up With Details Comments Contact Info None   None (395) Patient stated that they have no PCP Discharge Instructions Prenatal Discharge Instructions Follow up appointment: Keep scheduled follow up appointment, or make follow up appointment when discharged. Diet: As tolerated. Remember to drink plenty of fluids, especially water. Activity: As tolerated. Medications: As prescribed. Call your physician or return to Labor and Delivery if any of the following symptoms occur: ? Signs of labor (contractions every 5-10 minutes for 1 hour) ? Vaginal bleeding ? Rupture of amniotic membranes (water breaks) ? Fever ? Decreased fetal movement (less than 5-10 movements in 1 hour) Chart Review Routing History No Routing History on File

## 2017-08-03 VITALS
WEIGHT: 155 LBS | HEART RATE: 104 BPM | DIASTOLIC BLOOD PRESSURE: 78 MMHG | RESPIRATION RATE: 16 BRPM | BODY MASS INDEX: 26.46 KG/M2 | HEIGHT: 64 IN | SYSTOLIC BLOOD PRESSURE: 121 MMHG | TEMPERATURE: 98 F

## 2017-08-03 PROCEDURE — 99284 EMERGENCY DEPT VISIT MOD MDM: CPT

## 2017-08-03 PROCEDURE — 74011250637 HC RX REV CODE- 250/637: Performed by: OBSTETRICS & GYNECOLOGY

## 2017-08-03 PROCEDURE — 74011250637 HC RX REV CODE- 250/637: Performed by: PSYCHIATRY & NEUROLOGY

## 2017-08-03 PROCEDURE — 96365 THER/PROPH/DIAG IV INF INIT: CPT

## 2017-08-03 PROCEDURE — 65220000003 HC RM SEMIPRIVATE PSYCH

## 2017-08-03 PROCEDURE — 59025 FETAL NON-STRESS TEST: CPT

## 2017-08-03 PROCEDURE — 74011250636 HC RX REV CODE- 250/636: Performed by: OBSTETRICS & GYNECOLOGY

## 2017-08-03 RX ORDER — ADHESIVE BANDAGE
30 BANDAGE TOPICAL DAILY PRN
Status: DISCONTINUED | OUTPATIENT
Start: 2017-08-03 | End: 2017-08-07 | Stop reason: HOSPADM

## 2017-08-03 RX ORDER — DIPHENHYDRAMINE HCL 25 MG
25-50 CAPSULE ORAL
Status: DISCONTINUED | OUTPATIENT
Start: 2017-08-03 | End: 2017-08-07 | Stop reason: HOSPADM

## 2017-08-03 RX ORDER — ACETAMINOPHEN 325 MG/1
650 TABLET ORAL
Status: DISCONTINUED | OUTPATIENT
Start: 2017-08-03 | End: 2017-08-07 | Stop reason: HOSPADM

## 2017-08-03 RX ORDER — DIPHENHYDRAMINE HYDROCHLORIDE 50 MG/ML
25-50 INJECTION, SOLUTION INTRAMUSCULAR; INTRAVENOUS
Status: DISCONTINUED | OUTPATIENT
Start: 2017-08-03 | End: 2017-08-07 | Stop reason: HOSPADM

## 2017-08-03 RX ADMIN — LURASIDONE HYDROCHLORIDE 80 MG: 40 TABLET, FILM COATED ORAL at 08:30

## 2017-08-03 RX ADMIN — PRENATAL VITAMINS-IRON FUMARATE 27 MG IRON-FOLIC ACID 0.8 MG TABLET 1 TABLET: at 08:33

## 2017-08-03 RX ADMIN — DIPHENHYDRAMINE HYDROCHLORIDE 50 MG: 25 CAPSULE ORAL at 15:31

## 2017-08-03 RX ADMIN — ACETAMINOPHEN 650 MG: 325 TABLET ORAL at 17:38

## 2017-08-03 RX ADMIN — ACETAMINOPHEN 1000 MG: 500 TABLET ORAL at 00:25

## 2017-08-03 RX ADMIN — SODIUM CHLORIDE, SODIUM LACTATE, POTASSIUM CHLORIDE, AND CALCIUM CHLORIDE 999 ML: 600; 310; 30; 20 INJECTION, SOLUTION INTRAVENOUS at 00:10

## 2017-08-03 NOTE — PROGRESS NOTES
Called for floor for FHTs on patient. -130 with no audible decreases. Pt reports feeling contractions. Abd palpates soft. Reports pos fetal movement. Reports first delivery was . Discussed with patient's primary RN. Call placed to OB on call to evaluate need for further eval in L&D.

## 2017-08-03 NOTE — BH NOTES
Pt.presented with labile moods frequently fluctuating from pleasant/elevated to irritable/angry. Pt.became verbally aggressive with multiple staff members throughout course of shift. She also verbalized delusions of grandeur while yelling cursing and expressing that staff \"is not intelligent enough to comprehend her\". Pt.also stated that her \" creates satellites that vaporize human life\". Her thought process was disorganized/tangential and she demonstrated poor to no insight. Staff assisted in orienting her to reality while encouraging her to express how she feels appropriately, openly and honestly. She was initially not receptive to staff, but took time to self while ranting and cursing and was able to regroup and communicate calmly and appropriately. She denied any suicidal or homicidal ideations but expressed feeling aggressive at times. She also informed staff that she was having contractions which were 2 minutes apart and that she was in a great deal of pelvic/back pain. Staff informed patient that she will be transferred to labor/delivery unit. Pt.was receptive and compliant. Staff will continue to monitor and support until pt. is transferred. 22:09pm  Pt.became increasingly agitated while pacing, cursing and becoming verbally aggressive/abusive to staff. She also engaged in making multiple threats against staff.

## 2017-08-03 NOTE — BH NOTES
GROUP THERAPY PROGRESS NOTE    Emily Lees is participating in Goals Group and Community. Goal orientation: personal    Group therapy participation: disruptive    Therapeutic interventions reviewed and discussed: rules and automatic thoughts\" worksheet    Impression of participation: pt was very disruptive at the beginning of group and was encouraged to go to her room and escorted by staff.

## 2017-08-03 NOTE — BH NOTES
Psych MD on-call called and said that we will transfer the patient to Labor and Delivery for monitoring.

## 2017-08-03 NOTE — BSMART NOTE
SW ENCOUNTER: The patient stated that she was feeling patient today; denied current SI/HI, intent and AVH. She stated that she would like to stay in the hospital until she has the baby because she wants to be sure that she and the baby remain safe (physically and mentally). She stated that she has a history of post-par dum depression and psychosis and she doesn't want to do anything to harm the baby or herself when she is in a psychotic state. She stated that she plans to put the baby up for adoption to a family member because she knows that she does not have the means to adequately provide for her child; however, she would still like to have contact with her child. The patient stated that she signed a GENIA for her mother and the child's father to relay information regarding her care; is still interested in receiving services with [facility]. SW COLLATERAL: The SW returned a call and left a message for MsSejal Gabbi Noemi of [facility] PACT team to provide update on the patient.  The contact number is [phone] or [phone]

## 2017-08-03 NOTE — ROUTINE PROCESS
2310: Rcd  34w2d pt from Adult psych unit with c/o contractions which began around 9pm tonight. Pt reports she got haldol around  and contractions stopped after that. Reports first baby was delivered by emergency  6 years ago. EFM and TOCO applied, abd soft to palpation. Pt denies vaginal bleeding, or LOF. Reports pos fetal movement. Pt followed by [employer] in [place], and Bahman Fountain MD.     2352: Spoke with Dr Bassam Best regarding patient's arrival, complaints, NST, ctx pattern, and SVE. Orders rcd to monitor patient for 1 hr. May have IV fluids and PO tylenol for back pain. 0011: Pt reports bed is talking to her. Telling her to get out of bed. Also reports body is changing size. 65: Spoke with Dr Bassam Best regarding FHR including deceleration early deceleration to 76 which lasted one minute and resolved spontaneously. Unchanged SVE and tylenol administration. Reviewed normal liver enzymes. Af no more decelerations after 1 hr patient may return to psych. 0148: EFM adjusted pt sitting up     0207: Pt's tracing is under test patient. MRN [id number]    1511: Verbal and written discharge instructions given to patient. Pt verbalizes understanding. Given opportunity for questions and concerns. 0235: Pt off unit via wheelchair in stable condition with RN and security to Adult unit behavioral health.

## 2017-08-03 NOTE — BH NOTES
Janessa Kennedy is  participating in Leisure-Creative Group. Group time: 8294    Personal goal for participation:  Positive life changes    Goal orientation: leisure    Group therapy participation: active    Therapeutic interventions reviewed and discussed: Staff encouraged Pt.  To think positive    Impression of participation: Pt.  chose to be positive about life and making positive changes to turn a bad situation into a better  situation

## 2017-08-03 NOTE — BH NOTES
Labor and delivery nurse assessed fetal heart tones and they were WNL at about 124 bpm.  L and D nurse unable to say for sure if patient was having contractions and suggested that patient be placed on the monitor and have cervix assessed. Patient In distress at times no fluids observed to be leaked. Psych MD on-call notified of patient's condition. MD would like to speak with the OB-GYN on call. OB-GYN MD paged via hospital  and given instructions to call Dr. Maritza Mullins directly.

## 2017-08-03 NOTE — BH NOTES
Spoke with Hamzah Nunez RN from Labor and Delivery to see if they had a nurse who would be able to come and assess patients fetal heart tones. The nurse relayed that they were in the middle of a discharge and would get to her as soon as possible or would call back with an estimated time frame of when they would be available.

## 2017-08-03 NOTE — PROGRESS NOTES
Problem: Psychosis  Goal: *STG: Decreased delusional thinking  AEB decreasing delusional thinking to none prior to discharge from hospital  Outcome: Progressing Towards Goal  Patient remains with grandiose delusions. Goal: *STG/LTG: Demonstrates improved thought patterns as evidenced by logical and coherent speech  AEB demonstrating improved thought patterns as evidenced by logical and coherent speech daily while hospitalized. Outcome: Not Progressing Towards Goal  Patient labile in emotions and incoherent thoughts    Comments:   Patient returned from the Labor and Delivery alert and ready to get into bed. Patient remained silent and only spoke to a choice few. Will continue to monitor patient for safety and provide one to one support as needed.

## 2017-08-03 NOTE — H&P
High Risk Obstetrics Progress Note    Name: Barbara Godinez MRN: [id number]  SSN: [id number]    YOB: 1990  Age: 32 y.o. Sex: female      Subjective:      LOS: 0 days    Estimated Date of Delivery: 17   Gestational Age Today: 26w3d     Patient admitted for evaluation of labor pains and for monitoring. . States she does have mild abdominal pain and mild contractions and does not have nausea and vomiting, pelvic pressure, vaginal bleeding  and vaginal leaking of fluid . Objective:     Vitals:  Blood pressure 121/78, pulse (!) 104, temperature 98 °F (36.7 °C), resp. rate 16, height 5' 4\" (1.626 m), weight 155 lb (70.3 kg). Temp (24hrs), Av.5 °F (36.4 °C), Min:97 °F (36.1 °C), Max:98 °F (59.6 °C)    Systolic (02VLK), PQF:229 , Min:111 , OKY:851      Diastolic (03SVJ), TJS:39, Min:74, Max:78       Intake and Output:          Physical Exam:  Abdomen: soft, nontender  Perineum: blood absent, amniotic fluid absent  Cervical Exam: Closed/Thick/High       Membranes:  Intact    Uterine Activity:  Frequency: Irregular and moderate contractions. Fetal Heart Rate:  Reactive  Baseline: 150 per minute  Accelerations: yes        Labs:   Recent Results (from the past 36 hour(s))   POC URINE MACROSCOPIC    Collection Time: 17 11:50 PM   Result Value Ref Range    Color YELLOW      Appearance CLEAR      Spec. gravity (POC) 1.015 1.001 - 1.023      pH, urine  (POC) 7.0 5.0 - 9.0      Protein (POC) NEGATIVE  NEG mg/dL    Glucose, urine (POC) NEGATIVE  NEG mg/dL    Ketones (POC) NEGATIVE  NEG mg/dL    Bilirubin (POC) NEGATIVE  NEG      Blood (POC) NEGATIVE  NEG      Urobilinogen (POC) 0.2 0.2 - 1.0 EU/dL    Nitrite (POC) NEGATIVE  NEG      Leukocyte esterase (POC) SMALL (A) NEG      Performed by Ray Garcia        Assessment and Plan: Active Problems:    * No active hospital problems. *     normal 34+ weeks Pregnancy with dehydration. Reactive NST.     Signed By: Omero Donovan MD August 3, 2017

## 2017-08-03 NOTE — DISCHARGE INSTRUCTIONS
Prenatal Discharge Instructions  Follow up appointment: Keep scheduled follow up appointment, or make follow up appointment when discharged. Diet: As tolerated. Remember to drink plenty of fluids, especially water. Activity: As tolerated. Medications: As prescribed.      Call your physician or return to Labor and Delivery if any of the following symptoms occur:   Signs of labor (contractions every 5-10 minutes for 1 hour)   Vaginal bleeding   Rupture of amniotic membranes (water breaks)   Fever   Decreased fetal movement (less than 5-10 movements in 1 hour)

## 2017-08-03 NOTE — BH NOTES
Patient came to desk stating that she feels \"out of sorts\" and feels anxious. Patient states that it started about 30 minutes prior. Patient medicated with Benadryl 50mg per order. Patient was appropriate during interaction and has been present in the milieu attending groups since.

## 2017-08-03 NOTE — BH NOTES
Pt has been in and out of day area interacting with select peers. Pt loud and intrusive at times requiring redirection. Pt had one outburst during this morning but was able to regroup and join milieu again. Pt denies SI. Pt continues to endorse AH. Pt continues to talk to self at times. Fetal heart assessed this am and noted 158. Pt has not voiced any complaints related to pregnancy. Rounds maintained Q 15 mins for safety.  Staff will continue to offer a safe and supportive environment

## 2017-08-03 NOTE — BH NOTES
MD on-call was paged and notified of OB-GYN oncall being paged for a second time. Psych MD on-call was made aware of patients mood. Patient was medicated with PRN medication for agitation. Patient yelling racial slurs and other explicts. Encouraged patient to try to remain calm patient then became more agitated and continued pacing the unit. Psych MD on-call stated to monitor patient for 30 minutes and if no return call from OB-GYN noted then patient will need to go to the ER.

## 2017-08-03 NOTE — PROGRESS NOTES
[address]  Inpatient Progress Note     Date of Service: 08/03/17  Hospital Day: 4     Subjective/Interval History   08/03/17    Treatment Team Notes:  Notes reviewed and/or discussed and report that Archana Wills experienced [place] contractions after being monitored in L&D. She received fluids and returned back in stable condition. Prior to going to L&D, pt was experiencing increasing verbal aggressiveness towards staff. She also appeared disorganized. She received Haldol PRN for these behaviors. Patient interview: Archana Wills was interviewed by this writer today. Pt appeared clearer today. She reported interest in giving her baby to her mother for adoption. Pt feels she is unable to care for the baby due to her mental illness. Pt denies auditory hallucinations today; she discussed \"not wanting to pay attention to the low frequency. \"  She is compliant with Latuda 80mg. No EPS or TD symptoms noted. Objective     Vitals:    08/01/17 0758 08/01/17 1900 08/02/17 0748 08/03/17 0806   BP: 101/72 111/74 108/70 111/74   Pulse: 95 97 85 83   Resp: 18 18 17 15   Temp: 97 °F (36.1 °C) 97.4 °F (36.3 °C) 97.1 °F (36.2 °C) 97 °F (36.1 °C)       Mental Status Examination     Appearance/Hygiene 33 yo CF, 34 weeks pregnant, edematous lower extremities   Behavior/Social Relatedness Pleasant, non-aggressive   Musculoskeletal Gait/Station: appropriate  Tone (flaccid, cogwheeling, spastic): appropriate  Psychomotor (hyperkinetic, hypokinetic): appropriate   Involuntary movements (tics, dyskinesias, akathisa, stereotypies): none   Speech   low volume, good articulation   Mood   euthymic   Affect    stable   Thought Process Loose associations   Thought Content and Perceptual Disturbances No delusions  Denies SI/HI/AVH.     Sensorium and Cognition  AOx4, attention intact, memory intact, language use appropriate, and fund of knowledge age appropriate   Insight  improving   Judgment improving        Assessment/Plan      Psychiatric Diagnoses: schizophrenia    Medical Diagnoses: 34 weeks pregnant    Psychosocial and contextual factors: non-compliance    Level of impairment/disability: severe    Michelle Sofia is a 32 y.o. who is experiencing improving psychotic symptoms and better insight into her mental illness. Pt is receiving twice daily fetal heart monitoring; she experienced [place] contractions yesterday. 1.  Continue Latuda 80mg with breakfast. Consider Risperdal if episodic psychosis and aggression continue. Pt is at high risk due to her pregnancy and decompensated mental illness. Pt would benefit from tighter management of her psychotic illness. Will weigh risks vs benefits for continuing Latuda (Cat B) vs starting Risperdal (cat C). 2.  Pt has PACT who sees her daily. She also has case management. Will consider discharge home and follow up with outpt team.  Pt's psychotic symptoms need to be better managed before discharge.    3.  Disposition/Discharge Date: pending    MD [facility] [place]  Psychiatry

## 2017-08-03 NOTE — BH NOTES
GROUP THERAPY PROGRESS NOTE    Gaurang Whitehead is participating in [employer] Object Matrix time: 1hour  Personal goal for participation:  Seek information on Alcohol      Goal orientation: active  Group therapy participation:   Fully participated    Therapeutic interventions reviewed and discussed: Staff encouraged Pt. To participate in Group    Impression of participation:  [address] members reviewed a film, then held an open discussion with Pt.  Pt. Shayy Briscoe and received feedback while in group

## 2017-08-03 NOTE — BSMART NOTE
ART THERAPY GROUP PROGRESS NOTE    PATIENT SCHEDULED FOR GROUP AT: 14:00    ATTENDANCE: Full    PARTICIPATION LEVEL: Participates fully in the art process    ATTENTION LEVEL: Able to focus on task    FOCUS: Goals     SYMBOLIC & THEMATIC CONTENT AS NOTED IN IMAGERY: Her mood was calm and she was invested in the task at hand. She participated in group discussion and interacted with group members. Her thought process and associations were circumstantial with a loose quality. At times her verbalizations were relevant, but often trailed off, were intellectualized and loose, and were difficult to follow. She was focused on wanting to work on [employer] and \"being assertive vs aggressive. \" She asked this writer if I would tell Dr. Jennifer Delong that she was working on communication skills and being assertive vs aggressive, with the idea that she \"offended him earlier and needs to apologize. \" Intermittent confusion and disorganization was noted in both imagery and associations.

## 2017-08-04 PROCEDURE — 65220000003 HC RM SEMIPRIVATE PSYCH

## 2017-08-04 PROCEDURE — 74011250637 HC RX REV CODE- 250/637: Performed by: PSYCHIATRY & NEUROLOGY

## 2017-08-04 RX ADMIN — PRENATAL VITAMINS-IRON FUMARATE 27 MG IRON-FOLIC ACID 0.8 MG TABLET 1 TABLET: at 08:06

## 2017-08-04 RX ADMIN — DIPHENHYDRAMINE HYDROCHLORIDE 25 MG: 25 CAPSULE ORAL at 19:46

## 2017-08-04 RX ADMIN — LURASIDONE HYDROCHLORIDE 80 MG: 40 TABLET, FILM COATED ORAL at 08:06

## 2017-08-04 RX ADMIN — DIPHENHYDRAMINE HYDROCHLORIDE 25 MG: 25 CAPSULE ORAL at 17:07

## 2017-08-04 NOTE — BH NOTES
GROUP THERAPY PROGRESS NOTE    Brigid العلي is participating in [place]. Group time: 30 minutes    Personal goal for participation: Pt listened to unit guidelines discussed and encouraged to focus    on own issues and goals for discharged. Goal orientation: personal    Group therapy participation: minimal    Therapeutic interventions reviewed and discussed:     Impression of participation: Pt  Was compliant with sitting and listening to what was being discussed. and did ask questions.
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
## 2017-08-04 NOTE — BH NOTES
Patient came to the nurse statin and ask if she could walk for a little while due to the patient feeling that the baby is work and she isn't able to sleep, the patient is currently walking up and down the hallway will continue to monitor.
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
## 2017-08-04 NOTE — BH NOTES
Patient is constantly asking staff where are the orders for additional ultrasound, patient informed to speak to physician regarding order will continue to monitor.

## 2017-08-04 NOTE — BH NOTES
GROUP THERAPY PROGRESS NOTE    Nasreen Rosas is participating in Recreational Therapy. Group time: 45 minutes    Goal orientation: relaxation    Group therapy participation: active    Impression of participation:   Bushra Reinoso enjoyed getting some fresh air and sitting on the bench outside. She spent a while talking with this writer about her anxiety about her upcoming birth and discharge, as well as her optimistic plans for her new life. Patient also enjoyed playing the piano inside in the recreation area.

## 2017-08-04 NOTE — PROGRESS NOTES
Problem: Psychosis  Goal: *STG: Decreased delusional thinking  AEB decreasing delusional thinking to none prior to discharge from hospital   Outcome: Not Progressing Towards Goal  Pt remains delusional in thought process  Goal: *STG/LTG: Demonstrates improved thought patterns as evidenced by logical and coherent speech  AEB demonstrating improved thought patterns as evidenced by logical and coherent speech daily while hospitalized. Outcome: Not Progressing Towards Goal  Pt's thoughts remain loose and disorganized    Comments:   Pt has been in and out of day area interacting minimally with peers and staff. Pt's thoughts remain loose and disorganized, tangential at times. Pt denies SI. Pt less intrusive this shift. Pt has not been as loud in day area. Pt has not had any verbal outbursts. Rounds maintained Q 15 mins for safety.  Staff will continue to offer a safe and supportive environment

## 2017-08-04 NOTE — BH NOTES
Pt has been calm and cooperative this shift; not a management problem. She has spent most of the shift in her room, however she did come out for meals, to converse with staff and during groups. Pt ate most of her dinner meal and 100% of her snack. Pt has conversed with select peers, but otherwise has isolated herself today. States she's had a much better day today than yesterday. Her biggest concern is her pregnancy. Pt was informed to continue communication with her physician. Pt has utilized non-skid footwear and is free of falls. Pt denies SI, HI, AH and VH. Pt contracts for safety on the unit.

## 2017-08-05 PROCEDURE — 65220000003 HC RM SEMIPRIVATE PSYCH

## 2017-08-05 PROCEDURE — 74011250637 HC RX REV CODE- 250/637: Performed by: PSYCHIATRY & NEUROLOGY

## 2017-08-05 RX ADMIN — PRENATAL VITAMINS-IRON FUMARATE 27 MG IRON-FOLIC ACID 0.8 MG TABLET 1 TABLET: at 08:25

## 2017-08-05 RX ADMIN — LURASIDONE HYDROCHLORIDE 80 MG: 40 TABLET, FILM COATED ORAL at 08:24

## 2017-08-05 RX ADMIN — MAGNESIUM HYDROXIDE 30 ML: 400 SUSPENSION ORAL at 13:38

## 2017-08-05 RX ADMIN — ACETAMINOPHEN 650 MG: 325 TABLET ORAL at 19:55

## 2017-08-05 NOTE — BH NOTES
Patient awake for several long intervals. When awake, spent time reading or sitting quietly. Polite with staff.

## 2017-08-05 NOTE — BH NOTES
Called L&D to follow up on nurse to check [address] on pt, spoke to [place], states is aware BID, updated pt hx menstrual bleeding recently,and visit to ED, states will send nurse.

## 2017-08-05 NOTE — BH NOTES
Patient present in day room throughout this shift, pacing in hallway, participated in evening groups, and socialized with peers. Patient presents pleasant and calm. Showered in the evening. Will continue to monitor per safety precautions.

## 2017-08-05 NOTE — BH NOTES
Made call at L&D regarding FHT BID as ordered, aware, states L&D have it on their board, nurse will come to unit this morning for FHT. Pt up ad jarred, ate meals, voiced no concerns.

## 2017-08-05 NOTE — PROGRESS NOTES
Problem: Psychosis  Goal: *STG: Decreased delusional thinking  AEB decreasing delusional thinking to none prior to discharge from hospital   Outcome: Not Progressing Towards Goal  Patient continues to verbalize delusional thoughts. Goal: *STG/LTG: Demonstrates improved thought patterns as evidenced by logical and coherent speech  AEB demonstrating improved thought patterns as evidenced by logical and coherent speech daily while hospitalized. Outcome: Progressing Towards Goal  Verbalized feelings with staff during 1:1. Torrey Rae Comments:   Patient verbalized having a chip in her hand. Also verbalized feeling that the FBI is watching her. Verbalized a need to develop a birth plan. Voiced intent to deliver at [facility].  Reports family member that adopted her other son is planning to adopt this son as well

## 2017-08-05 NOTE — BH NOTES
GROUP THERAPY PROGRESS NOTE    Linda Crespo is participating in Reality orientation and Community. Group time: 20 minutes    Personal goal for participation: have a great day to be discharged.      Goal orientation: community    Group therapy participation: active    Therapeutic interventions reviewed and discussed: rules and regulations

## 2017-08-05 NOTE — PROGRESS NOTES
Called to unit to obtain FHR, pt reports occasional contractions but no more than 1-2 per hour that are not painful. FHR aubdible via doppler at 130bpm,no audible decrease heard over 2 minutes, increase to 150 was heard with return to 130. Pt reports active fetal movement.

## 2017-08-05 NOTE — BH NOTES
IRINEO Note: The above pt has been pleas rio upon approach this shift. She has not been a management problem this shift. She verbally contract for safety and denies any self-harm at this time. She has not experienced any falls at this time. -A-Problem #1 cont. -P-Maintain a safe and supportive environment.

## 2017-08-05 NOTE — BH NOTES
GROUP THERAPY PROGRESS NOTE    Natividad Sierra is participating in Recreational Therapy.      Group time: 45 minutes     Personal goal for participation: Fresh air outside     Goal orientation: relaxation     Group therapy participation: active     Therapeutic interventions reviewed and discussed: Patients went outside for a fresh air break     Impression of participation: Patient actively participated, spending the group pacing outside enjoying the air and sun.

## 2017-08-06 PROCEDURE — 65220000003 HC RM SEMIPRIVATE PSYCH

## 2017-08-06 PROCEDURE — 74011250637 HC RX REV CODE- 250/637: Performed by: PSYCHIATRY & NEUROLOGY

## 2017-08-06 RX ADMIN — LURASIDONE HYDROCHLORIDE 80 MG: 40 TABLET, FILM COATED ORAL at 08:47

## 2017-08-06 RX ADMIN — PRENATAL VITAMINS-IRON FUMARATE 27 MG IRON-FOLIC ACID 0.8 MG TABLET 1 TABLET: at 08:47

## 2017-08-06 RX ADMIN — DIPHENHYDRAMINE HYDROCHLORIDE 25 MG: 25 CAPSULE ORAL at 19:55

## 2017-08-06 NOTE — PROGRESS NOTES
Problem: *Psychosis: Discharge Outcome  Goal: *Absent or Controlled Active Psychotic Symptoms  Outcome: Progressing Towards Goal  Patient is pleasnt and cooperative. Comments:   Patient states that the FBI is watching her, but they are in [place]. Denies being paranoid about the FBI watching her. Reports being at [facility] the past 5 months and not having any place to live upon discharge. Reports her family in [place] is going to adopt her son, but doesn't have room for her to live there.

## 2017-08-06 NOTE — BH NOTES
GROUP THERAPY PROGRESS NOTE    Barbara Godinez is participating in Recreational Therapy. Group time: 50 minutes    Personal goal for participation: Fresh air and activity group downstairs    Goal orientation: relaxation    Group therapy participation: active    Therapeutic interventions reviewed and discussed: Active    Impression of participation: Patient enjoyed going outside for fresh air and recreation group.

## 2017-08-06 NOTE — BH NOTES
Psychiatry progress note    Chart reviewed, patient interviewed. Patient stated she was hospitalized due to schizophrenia and an anger issue. Patient complained she had strange outbursts that were inappropriate. Patient stated she is learning to creatively express herself. Today she feels \"clarity\". Patient reports taking Fernanda Brick and feels it helps her mood. When asked about voices, patient said she hears all kinds of sounds. Says she is a very perceptive person. She stated she is rationalizing out loud coming from her mouth. Patient stated she is anxious and worrying about her future. Doesn't know where she will be going next. Read from a journal about astrology, tibetan monks, mindfulness and relating to a witch. On exam, constricted/calm. No SI, HI or VH. +AH. Insight and judgment questionable. Meds -  Latuda 80 qDay. Assessment - schizophrenia. Low grade/residual psychotic symptoms. Conservative management/use of medications is indicated due to pregnancy. Plan is to continue current treatment plan.

## 2017-08-06 NOTE — BH NOTES
Per L/D nurse , patient has fht at rate of 135-140 in mid right abdomen and patient verbalizes she feels baby movement. States, Deisy Neal is in my rib right now. \" Staff continue to monitor for active baby movement per patient reports.

## 2017-08-06 NOTE — BH NOTES
Psychiatry progress note     Chart reviewed, patient interviewed. Patient states she feels balanced and [place] is helping her mood however she complained of anxiety and her thoughts are complicated and overwhelming. Patient reported having \"trippy psychadelic thoughts\" and chronic auditory hallucinations. Stated she always hears sound frequencies. Discussed her efforts to cope - evolution of her mind, feeling defective, the gravity of the sun. Stated she feels lost and her thoughts were everywhere. Patient requested clarity and affirmation. Supportive therapy conducted. Patient is working on her living skills.     On exam, constricted/calm. No SI, no HI, no VH, +AH. Insight and judgment intact - patient able to identify that she is suffering from psychosis.     Meds -  Latuda 80 qDay.     Assessment - schizophrenia. Patient continues to have low grade/residual psychotic symptoms with intact reality testing. Conservative management/use of medications is indicated due to pregnancy. Plan is to continue current treatment plan.

## 2017-08-06 NOTE — BH NOTES
L&D was called to remind of [address] monitoring BID, staff nurse L states she will come today. Pt states she is doing good and baby is kicking, up ad jarred.

## 2017-08-06 NOTE — BH NOTES
GROUP THERAPY PROGRESS NOTE    Christel oLve is participating in [place].      Group time: 30 minutes    Personal goal for participation: have a great day    Goal orientation: community    Group therapy participation: active    Therapeutic interventions reviewed and discussed: unit rules

## 2017-08-07 VITALS
TEMPERATURE: 96.6 F | HEART RATE: 87 BPM | DIASTOLIC BLOOD PRESSURE: 64 MMHG | SYSTOLIC BLOOD PRESSURE: 106 MMHG | RESPIRATION RATE: 16 BRPM

## 2017-08-07 PROCEDURE — 74011250637 HC RX REV CODE- 250/637: Performed by: PSYCHIATRY & NEUROLOGY

## 2017-08-07 RX ADMIN — PRENATAL VITAMINS-IRON FUMARATE 27 MG IRON-FOLIC ACID 0.8 MG TABLET 1 TABLET: at 08:21

## 2017-08-07 RX ADMIN — DIPHENHYDRAMINE HYDROCHLORIDE 25 MG: 25 CAPSULE ORAL at 08:41

## 2017-08-07 RX ADMIN — LURASIDONE HYDROCHLORIDE 80 MG: 40 TABLET, FILM COATED ORAL at 08:21

## 2017-08-07 NOTE — PROGRESS NOTES
[address]  Inpatient Progress Note     Date of Service: 08/07/17  Hospital Day: 8     Subjective/Interval History   08/07/17    Treatment Team Notes:  Notes reviewed and/or discussed and report that Courtney Leija continues to be followed by L&D for fetal heart monitoring. Staff reported that pt was paranoid about the FBI earlier in the weekend. Pt did not display and elizabeth psychotic symptoms for the remainder of the weekend. She appears behaviorally appropriate. Patient interview: Courtney Leija was interviewed by this writer today. Pt again discussed her interest in follow up with the PACT due to her concerns of social skills. She recalls seeing her PACT last week and was greeted by familiar faces. Pt feels comfortable with discharge to the PACT. She denies any psychotic symptoms, further denying any auditory /visual hallucinations, thought insertion, thought broadcasting, or delusions. She specifically reported feeling safe while hospitalized and not fearing the FBI. She continues to be apprehensive about discharge due to her recent discharge from [facility] and subsequent decompensation. Pt denies any depression, anxiety or irritability. She continues to feel her baby kick; he is active this morning. Pt is compliant with her Latuda without any TD or EPS symptoms.      Objective     Vitals:    08/04/17 0730 08/05/17 0819 08/06/17 0746 08/07/17 0817   BP: 106/70 119/71 104/68 106/64   Pulse: 87 100 80 87   Resp: 18 16 16 16   Temp: 97 °F (36.1 °C) 96.9 °F (36.1 °C) 96.6 °F (35.9 °C)        Mental Status Examination     Appearance/Hygiene 31 yo CF, 34 weeks pregnant, edematous lower extremities   Behavior/Social Relatedness Pleasant, non-aggressive   Musculoskeletal Gait/Station: appropriate  Tone (flaccid, cogwheeling, spastic): appropriate  Psychomotor (hyperkinetic, hypokinetic): appropriate   Involuntary movements (tics, dyskinesias, akathisa, stereotypies): none Speech   low volume, good articulation   Mood   euthymic   Affect    stable   Thought Process Tight, linear   Thought Content and Perceptual Disturbances No delusions  Denies SI/HI/AVH. Sensorium and Cognition  AOx4, grossly intact   Insight  improving   Judgment improving        Assessment/Plan      Psychiatric Diagnoses: schizophrenia    Medical Diagnoses: 34 weeks pregnant    Psychosocial and contextual factors: non-compliance    Level of impairment/disability: moderate    Brigid العلي is a 32 y.o. who is stabilizing. Pt requires closely follow up with PACT due to her chronic mental illness and pregnancy. Pt educated on [address] and need to be taken with a full meal.  Pt feels better about transitioning to another housing arrangement other than the hotel. She is clear today without any obvious symptoms/signs of thought disorder. 1.  Continue Latuda 80mg with breakfast.  2.  Pt has PACT who sees her daily. She also has case management.    3.  Disposition/Discharge Date: Monday with PACT team    Triston Riddle MD DR. [facility]  [facility]ry

## 2017-08-07 NOTE — BH NOTES
Patient is being discharged off unit with her belongings, discharge paperwork and medications as patient is Reinvestment. Patient is being picked up by the PACT team and transported to her appointment. Patient was picked up by the PACT .

## 2017-08-07 NOTE — BH NOTES
\"I don't want to go back to [facility]" \"I feel like my life has been taken over by the state. \"  Patient states that she made some bad decisions that brought her to where she is now, but feels hopeful for the future. Patient states that she tends to act aggressive when she is scared and she's tired of going from one hospital to the next. Patient states as soon as she gets to a place in her life where she is feeling like she's making progress, she trusts the wrong person and ends up getting heartbroken or taken advantage of. Patient was offered emotional support by this Writer and Patient appeared to be in better spirits after. Patient has been medication compliant, ate 100% of meals and snacks and wore non-slip footwear throughout the shift. Patient had no falls this evening.

## 2017-08-07 NOTE — DISCHARGE INSTRUCTIONS
BEHAVIORAL HEALTH NURSING DISCHARGE NOTE      Emergency Numbers    : [facility] Emergency Services: [phone]  Suicide Prevention Line: 3 [phone] (TALK)      The following personal items collected during your admission are returned to you:   Dental Appliance: Dental Appliances: None  Vision:    Hearing Aid:    Jewelry: Jewelry: None  Clothing: Clothing: Shorts, Shirt, Footwear, Undergarments, With patient  Other Valuables: Other Valuables:  (watch, id)  Valuables sent to safe: The discharge information has been reviewed with the patient. The patient verbalized understanding. [address] Activation    Thank you for requesting access to [employer]. Please follow the instructions below to securely access and download your online medical record. [employer] allows you to send messages to your doctor, view your test results, renew your prescriptions, schedule appointments, and more. How Do I Sign Up? In your internet browser, go to [email or web address]  Click on the First Time User? Click Here link in the Sign In box. You will be redirect to the New Member Sign Up page. Enter your [employer] Access Code exactly as it appears below. You will not need to use this code after youve completed the sign-up process. If you do not sign up before the expiration date, you must request a new code. [employer] Access Code: [email or web address]  Expires: 10/28/2017 10:47 PM (This is the date your Inxero access code will )    Enter the last four digits of your Social Security Number (xxxx) and Date of Birth (mm/dd/yyyy) as indicated and click Submit. You will be taken to the next sign-up page. Create a Inxero ID. This will be your [employer] login ID and cannot be changed, so think of one that is secure and easy to remember. Create a In[employer] password. You can change your password at any time. Enter your Password Reset Question and Answer.  This can be used at a later time if you forget your password. Enter your e-mail address. You will receive e-mail notification when new information is available in [address] Click Sign Up. You can now view and download portions of your medical record. Click the [employer] link to download a portable copy of your medical information. Additional Information    If you have questions, please visit the Frequently Asked Questions section of the [employer] website at [email or web address] Remember, [employer] is NOT to be used for urgent needs. For medical emergencies, dial 911.     Patient armband removed and shredded

## 2017-08-11 NOTE — DISCHARGE SUMMARY
[facility]  Inpatient Psychiatry   Discharge Summary     Admit date: 7/30/2017    Discharge date and time: 8/7/2017 11:10 AM    Discharge Physician: Harris Harrington MD    DISCHARGE DIAGNOSES     Psychiatric Diagnoses: schizophrenia     Medical Diagnoses: 34 weeks pregnant     Psychosocial and contextual factors: non-compliance     Level of impairment/disability: moderate       HOSPITAL COURSE     The patient is a 25-year-old female with a history of schizophrenia who is 34 weeks pregnant (due 9/11/17) presented under TDO for inpatient psychiatric hospitalization after displaying decompensated psychotic symptoms. The patient presented to this facility by way of [facility]. While at the outside hospital, the patient was verbally aggressive towards staff, disrobed herself in the emergency room and urinated on the floor and expressed delusional statements that her cervix is connected to the television. Of note, this patient was recently discharged from [facility] on 07/28/2017. Per record review, she has a PACT team.     On initial assessment, the patient required much encouragement and redirection to join  the treatment team for the session. She is very disorganized on initial assessment. The patient is unable to give any direct answers to questioning. She does not appear to be in any acute distress. Throughout the patient's hospitalization, her compliance with treatment and psychotic symptoms improved with increasing dosages of Latuda. She was discharged on a regimen of Latuda 80mg with breakfast. Compliance on this medication was stressed; pt was reminded to take medication with a full meal to ensue effectiveness. Once psychotic symptoms improved and cognition stabilized, Ms. Brian Lindsay reported instability with living on her own. She also discussed a history of trauma.   Due to her current pregnancy and fear of independence, her PACT team was able to identify new housing with additional social support. While hospitalized, Mrs. Luis Lau received twice daily fetal heart monitoring. She experienced one episode of [place] contractions. Upon discharge, Mrs. Luis Lau was cognitively and behavioral appropriate. She denied any active auditory/visual hallucinations or delusions. She appeared eager to work with her PACT team for housing and continue treatment for her PTSD. DISPOSITION/FOLLOW-UP     Disposition:  Patient was discharged too new housing identified by the PACT team with follow-up made below:     The patient will be followed by MsSejal Derik Burnette of [employer] PACT team ([phone] or [phone]) upon discharge. She stated that on Monday they will  the patient and her medications and transport her to her doctor's appointment with Dr. Chau Nava at 11 am at [address], [place], [address];   Phone: [phone]). They would like the discharge information (H&P and labs) faxed to the doctor at [phone]. The patient will remain in the [address] [place] [address] [employer] [facility] (with [employer]) and they will ensure housing. The patient's reinvestment CM Ms. Darien James ([phone]) with [employer] would also need the patient's discharge paperwork faxed to her at [phone]. MEDICATION CHANGES   Outpatient medications:  No current facility-administered medications on file prior to encounter. No current outpatient prescriptions on file prior to encounter.          Medications discontinued during hospitalization:  Medications Discontinued During This Encounter   Medication Reason    lurasidone (LATUDA) tablet 40 mg     lurasidone (LATUDA) tablet 40 mg     lurasidone (LATUDA) tablet 80 mg Patient Discharge    aluminum-magnesium hydroxide (MAALOX) oral suspension 30 mL Patient Discharge    prenatal vit-iron fumarate-fa tablet 1 Tab Patient Discharge    haloperidol lactate (HALDOL) injection 5 mg Patient Discharge    haloperidol (HALDOL) tablet 5 mg Patient Discharge  diphenhydrAMINE (BENADRYL) injection 50 mg Patient Discharge    diphenhydrAMINE (BENADRYL) capsule 50 mg Patient Discharge    hydrOXYzine pamoate (VISTARIL) 25 mg capsule Discontinued at Discharge   Mata Goldberg, Discontinued at Discharge    acetaminophen (TYLENOL) tablet 650 mg Patient Discharge    lurasidone (LATUDA) tablet 80 mg Patient Discharge    prenatal vit-iron fumarate-fa tablet 1 Tab Patient Discharge    diphenhydrAMINE (BENADRYL) injection 25-50 mg Patient Discharge    diphenhydrAMINE (BENADRYL) capsule 25-50 mg Patient Discharge    magnesium hydroxide (MILK OF MAGNESIA) 400 mg/5 mL oral suspension 30 mL Patient Discharge         Discharged medication:  Discharge Medication List as of 8/7/2017  9:33 AM      CONTINUE these medications which have NOT CHANGED    Details   hydrOXYzine pamoate (VISTARIL) 25 mg capsule Take 25 mg by mouth four (4) times daily as needed for Anxiety. , Historical Med      OTHER,NON-FORMULARY, Take 20 mg by mouth daily. , Historical Med             Instructions, risks, benefits and side effects were discussed in detail prior to discharge.        LABS/IMAGING DURING ADMISSION     Results for orders placed or performed during the hospital encounter of 07/30/17   LIPID PANEL   Result Value Ref Range    LIPID PROFILE          Cholesterol, total 225 (H) <200 MG/DL    Triglyceride 119 <150 MG/DL    HDL Cholesterol 90 (H) 40 - 60 MG/DL    LDL, calculated 111.2 (H) 0 - 100 MG/DL    VLDL, calculated 23.8 MG/DL    CHOL/HDL Ratio 2.5 0 - 5.0     HEMOGLOBIN A1C WITH EAG   Result Value Ref Range    Hemoglobin A1c 4.5 4.2 - 5.6 %    Est. average glucose Cannot be calulated mg/dL        DISCHARGE MENTAL STATUS EVALUATION     Appearance/Hygiene 31 yo CF, 34 weeks pregnant, edematous lower extremities   Behavior/Social Relatedness Pleasant, non-aggressive   Musculoskeletal Gait/Station: appropriate  Tone (flaccid, cogwheeling, spastic): appropriate  Psychomotor (hyperkinetic, hypokinetic): appropriate   Involuntary movements (tics, dyskinesias, akathisa, stereotypies): none   Speech                          low volume, good articulation   Mood                          euthymic   Affect                                                   stable   Thought Process Tight, linear   Thought Content and Perceptual Disturbances No delusions  Denies SI/HI/AVH. Sensorium and Cognition              AOx4, grossly intact   Insight              improving   Judgment improving          SUICIDE RISK ASSESSMENT     [] Admission  [x] Discharge     Key Factors:   Current admission precipitated by suicide attempt?   []  Yes     2    [x]  No     1     Suicide Attempt History  [] Past attempts of high lethality    2 []  Past attempts of low lethality    1 [x]  No previous attempts       0   Suicidal Ideation []  Constant suicidal thoughts      2 []  Intermittent or fleeting suicidal  thoughts  1 [x]  Denies current suicidal thoughts    0   Suicide Plan   []  Has plan with actual OR potential access to planned method    2 []  Has plan without access to planned method      1 [x]  No plan            0   Plan Lethality []  Highly lethal plan (Carbon monoxide, gun, hanging, jumping)    2 []  Moderate lethality of plan          1 [x]  Low lethality of plan (biting, head banging, superficial scratching, pillow over face)  0   Safety Plan Agreement  []  Unwilling OR unable to agree due to impaired reality testing   2   []  Patient is ambivalent and/or guarded      1 [x]  Reliably agrees        0   Current Morbid Thoughts (reunion fantasies, preoccupations with death) []  Constantly     2     []  Frequently    1 [x]  Rarely    0   Elopement Risk  []  High risk     2 []  Moderate risk    1 [x]   Low risk    0   Symptoms    []  Hopeless  []  Helpless  []  Anhedonia   []  Guilt/shame  []  Anger/rage  []  Anxiety  []  Insomnia   []  Agitation   []  Impulsivity  []  5-6 symptoms present    2 []  3-4 symptoms present    1 [x]  0-2 symptoms present    0     Scoring Key:  10 or higher = Imminent Risk (consider 1:1)  4 - 9 = Moderate Risk (consider q 15 minute observation)Attended alcohol, tobacco, prescription and other drug psychoeducation group.   0 - 3 = Low Risk (consider q 30 minute observation)    Total Score: 2  ------------------------------------------------------------------------------------------------------------------  PLEASE ADDRESS THE FOLLOWING 5 ISSUES     Physician's Subjective Appraisal of Risk (check one):  []  Patient replies not trustworthy: several non-verbal cues. []  Patient replies questionable: trustworthy: at least 1 non-verbal cue. [x]  Patient replies appear trustworthy. Family History of Suicide? Unknown to pt  []  Yes  []  No    Protective measures (select all that apply):  []  Successful past responses to stress  []  Spiritual/Lutheran beliefs  [x]  Capacity for reality testing  []  Positive therapeutic relationships  [x]  Social supports/connections  [x]  Positive coping skills  []  Frustration tolerance/optimism  []  Children or pets in the home  [x]  Sense of responsibility to family  [x]  Agrees to treatment plan and follow up    Others (list):    High Risk Diagnoses (select all that apply):  []  Depression/Bipolar Disorder  []  Dual Diagnosis  []  Cardiovascular Disease  [x]  Schizophrenia  []  Chronic Pain  []  Epilepsy  []  Cancer  []  Personality Disorder  []  HIV/AIDS  []  Multiple Sclerosis    Dangerousness Assessment (Suicide, homicide, property destruction. ..)    Risk Factors reviewed and risk assessed to be:  [] low  [] low-moderate  [] moderate   [x] moderate-high  [] high     Protection factors reviewed and risk assessed to be:  [] low  [] low-moderate  [x] moderate   [] moderate-high  [] high     Response to treatment and risk assessed to be:  [] low  [x] low-moderate  [] moderate   [] moderate-high  [] high     Support reviewed and risk assessed to be:  [] low  [] low-moderate  [x] moderate   [] moderate-high  [] high     Acceptance of Discharge and outpatient treatment reviewed and risk assessed to be:    [] low  [x] low-moderate  [] moderate   [] moderate-high  [] high   Overall risk assessed to be:  [] low  [] low-moderate  [x] moderate   [] moderate-high  [] high     Completion of discharge was greater than 30 minutes. Over 50% of today's discharge was geared towards counseling and coordination of care.           Jose Leonardo MD  Psychiatry  [facility]

## 2017-12-11 NOTE — BSMART NOTE
OCCUPATIONAL THERAPY PROGRESS NOTE  Group Time:  1341  Attendance: The patient attended full group. Participation:  The patient participated fully in the activity. Attention:  The patient was able to focus on the activity. Interaction:  The patient occasionally  interacts with others. Activity results somewhat disorganized and over-inclusive with trouble in being cohesive. Mentions healing with crystals, liking endorphins, and more conventional things about self with noticeable absense of others in her depictions of self in [facility] collage\" activity. Patient called stating her BP has been very high, this morning was 156/98. She stated she is taking Darvon and concerned its affecting her BP. Please call her at [phone].  Thank you

## 2018-04-02 ENCOUNTER — OFFICE VISIT (OUTPATIENT)
Dept: INTERNAL MEDICINE CLINIC | Age: 28
End: 2018-04-02

## 2018-04-02 VITALS
HEART RATE: 97 BPM | DIASTOLIC BLOOD PRESSURE: 75 MMHG | WEIGHT: 161 LBS | SYSTOLIC BLOOD PRESSURE: 110 MMHG | TEMPERATURE: 97.9 F | HEIGHT: 66 IN | BODY MASS INDEX: 25.88 KG/M2 | RESPIRATION RATE: 16 BRPM | OXYGEN SATURATION: 99 %

## 2018-04-02 DIAGNOSIS — F31.60 BIPOLAR AFFECTIVE DISORDER, CURRENT EPISODE MIXED, CURRENT EPISODE SEVERITY UNSPECIFIED (HCC): ICD-10-CM

## 2018-04-02 DIAGNOSIS — F33.9 RECURRENT DEPRESSION (HCC): Primary | ICD-10-CM

## 2018-04-02 DIAGNOSIS — Z76.89 ENCOUNTER TO ESTABLISH CARE: ICD-10-CM

## 2018-04-02 RX ORDER — LURASIDONE HYDROCHLORIDE 40 MG/1
40 TABLET, FILM COATED ORAL
Refills: 0 | COMMUNITY
Start: 2018-03-12 | End: 2018-06-27 | Stop reason: ALTCHOICE

## 2018-04-02 RX ORDER — CHLORPROMAZINE HYDROCHLORIDE 50 MG/1
TABLET, FILM COATED ORAL
Refills: 0 | COMMUNITY
Start: 2018-03-12 | End: 2018-06-27 | Stop reason: ALTCHOICE

## 2018-04-02 RX ORDER — DIVALPROEX SODIUM 500 MG/1
TABLET, EXTENDED RELEASE ORAL
Refills: 0 | COMMUNITY
Start: 2018-03-09 | End: 2018-06-27 | Stop reason: DRUGHIGH

## 2018-04-02 RX ORDER — ADHESIVE BANDAGE
30 BANDAGE TOPICAL DAILY PRN
COMMUNITY
End: 2018-06-27 | Stop reason: ALTCHOICE

## 2018-04-02 RX ORDER — RANITIDINE 150 MG/1
150 TABLET, FILM COATED ORAL 2 TIMES DAILY
COMMUNITY
End: 2018-04-02 | Stop reason: ALTCHOICE

## 2018-04-02 RX ORDER — DIVALPROEX SODIUM 250 MG/1
500 TABLET, DELAYED RELEASE ORAL 2 TIMES DAILY
COMMUNITY
End: 2019-05-30 | Stop reason: CLARIF

## 2018-04-02 RX ORDER — LURASIDONE HYDROCHLORIDE 80 MG/1
TABLET, FILM COATED ORAL
Refills: 0 | COMMUNITY
Start: 2018-03-09 | End: 2018-06-27 | Stop reason: ALTCHOICE

## 2018-04-02 NOTE — MR AVS SNAPSHOT
[address] .o. [facility] 4128 [facility] 
[phone] Patient: Alejandra Martínez MRN: [id number] EQJ:[id number] Visit Information Date & Time Provider Department Dept. Phone Encounter #  
 4/2/2018  9:30 AM Lizette Molina [facility] [phone] Follow-up Instructions Return in about 2 weeks (around 4/16/2018) for Physical Exam. Upcoming Health Maintenance Date Due Pneumococcal 19-64 Medium Risk (1 of 1 - PPSV23) 3/13/2009 DTaP/Tdap/Td series (1 - Tdap) 3/13/2011 PAP AKA CERVICAL CYTOLOGY 3/13/2011 Influenza Age 5 to Adult 8/1/2017 Allergies as of 4/2/2018  Review Complete On: 4/2/2018 By: Jarad Rayo NP No Known Allergies Current Immunizations  Never Reviewed No immunizations on file. Not reviewed this visit Vitals BP Pulse Temp Resp Height(growth percentile) Weight(growth percentile) 110/75 (BP 1 Location: Left arm, BP Patient Position: Sitting) 97 97.9 °F (36.6 °C) (Temporal) 16 5' 6\" (1.676 m) 161 lb (73 kg) LMP SpO2 BMI OB Status Smoking Status 11/23/2017 (Approximate) 99% 25.99 kg/m2 Unknown Current Every Day Smoker Vitals History BMI and BSA Data Body Mass Index Body Surface Area  
 25.99 kg/m 2 1.84 m 2 Preferred Pharmacy Pharmacy Name Phone [facility] [phone] [facility] 20 [phone] Your Updated Medication List  
  
   
This list is accurate as of 4/2/18 10:41 AM.  Always use your most recent med list.  
  
  
  
  
 chlorproMAZINE 50 mg tablet Commonly known as:  THORAZINE  
take 1 tablet by mouth three times a day * DEPAKOTE 250 mg tablet Generic drug:  divalproex DR Take 250 mg by mouth daily. * divalproex  mg ER tablet Commonly known as:  DEPAKOTE ER Take at 8 pm  
  
 * LATUDA 80 mg Tab tablet Generic drug:  lurasidone take 1 tablet by mouth once daily AT 5PM  
  
 * LATUDA 40 mg Tab tablet Generic drug:  lurasidone Take 40 mg by mouth daily (with breakfast). FITZGERALD MILK OF MAGNESIA 400 mg/5 mL suspension Generic drug:  magnesium hydroxide Take 30 mL by mouth daily as needed for Constipation. * Notice: This list has 4 medication(s) that are the same as other medications prescribed for you. Read the directions carefully, and ask your doctor or other care provider to review them with you. Follow-up Instructions Return in about 2 weeks (around 4/16/2018) for Physical Exam.  
  
  
Introducing [facility]! [employer] introduces [employer] patient portal. Now you can access parts of your medical record, email your doctor's office, and request medication refills online. 1. In your internet browser, go to [email or web address] 2. Click on the First Time User? Click Here link in the Sign In box. You will see the New Member Sign Up page. 3. Enter your [employer] Access Code exactly as it appears below. You will not need to use this code after youve completed the sign-up process. If you do not sign up before the expiration date, you must request a new code. · [employer] Access Code: Neto Cavazos Expires: 7/1/2018  9:19 AM 
 
4. Enter the last four digits of your Social Security Number (xxxx) and Date of Birth (mm/dd/yyyy) as indicated and click Submit. You will be taken to the next sign-up page. 5. Create a StartXt ID. This will be your [employer] login ID and cannot be changed, so think of one that is secure and easy to remember. 6. Create a [employer] password. You can change your password at any time. 7. Enter your Password Reset Question and Answer. This can be used at a later time if you forget your password. 8. Enter your e-mail address. You will receive e-mail notification when new information is available in [address] 9. Click Sign Up. You can now view and download portions of your medical record. 10. Click the Download Summary menu link to download a portable copy of your medical information. If you have questions, please visit the Frequently Asked Questions section of the [employer] website. Remember, [employer] is NOT to be used for urgent needs. For medical emergencies, dial 911. Now available from your iPhone and Android! Please provide this summary of care documentation to your next provider. If you have any questions after today's visit, please call [phone].

## 2018-04-02 NOTE — PROGRESS NOTES
Chief Complaint   Patient presents with    New Patient     ESTABLISH CARE    Complete Physical     There are no preventive care reminders to display for this patient.

## 2018-04-03 PROBLEM — F20.0 SCHIZOPHRENIA, PARANOID, CHRONIC WITH ACUTE EXACERBATION (HCC): Status: ACTIVE | Noted: 2018-02-05

## 2018-04-03 PROBLEM — F17.210 CIGARETTE NICOTINE DEPENDENCE WITHOUT COMPLICATION: Status: ACTIVE | Noted: 2018-02-04

## 2018-04-03 PROBLEM — Z00.8 ENCOUNTER FOR OTHER GENERAL EXAMINATION (CODE): Status: ACTIVE | Noted: 2018-02-04

## 2018-04-03 PROBLEM — F33.9 RECURRENT DEPRESSION (HCC): Status: ACTIVE | Noted: 2018-04-03

## 2018-04-03 RX ORDER — RANITIDINE 150 MG/1
150 TABLET, FILM COATED ORAL
COMMUNITY
End: 2018-04-03 | Stop reason: SDUPTHER

## 2018-04-03 RX ORDER — DIVALPROEX SODIUM 250 MG/1
250 TABLET, DELAYED RELEASE ORAL
COMMUNITY
Start: 2018-02-15 | End: 2018-04-03 | Stop reason: SDUPTHER

## 2018-04-03 RX ORDER — DIVALPROEX SODIUM 500 MG/1
500 TABLET, DELAYED RELEASE ORAL
COMMUNITY
Start: 2018-02-14 | End: 2018-04-03 | Stop reason: SDUPTHER

## 2018-04-03 RX ORDER — CHLORPROMAZINE HYDROCHLORIDE 50 MG/1
50 TABLET, FILM COATED ORAL
COMMUNITY
Start: 2018-02-14 | End: 2018-04-03 | Stop reason: SDUPTHER

## 2018-04-03 NOTE — PROGRESS NOTES
HISTORY OF PRESENT ILLNESS  J Carlos Heredia is a 29 y.o. female. HPI  Chief Complaint   Patient presents with    New Patient     ESTABLISH CARE    Complete Physical     Discussed with patient what is needed to establish care. Patient states has had labs in the most recent past.  Will obtain those labs. Past Medical History:   Diagnosis Date    Alcoholic ([address]     Sober 3 months; Weekly AAA meeting; Had substance abuse counseling;     GERD (gastroesophageal reflux disease)     Phobia     [employer]    Post partum depression     Pregnancy     Psychotic disorder     Depression/  Clayborn Liseth, NP; Bipolar disorder, mood swings. Social History     Social History    Marital status: SINGLE     Spouse name: N/A    Number of children: N/A    Years of education: N/A     Occupational History    Not on file. Social History Main Topics    Smoking status: Current Every Day Smoker     Packs/day: 1.00    Smokeless tobacco: Current User    Alcohol use No      Comment: Sober for x 3 months    Drug use: Yes     Special: Other      Comment: Alcohol    Sexual activity: No     Other Topics Concern    Not on file     Social History Narrative    ;  2 children; unemployed    Disabled related to mental issues/ 2017       Review of Systems   Constitutional: Negative. Negative for chills, fever and malaise/fatigue. HENT: Negative. Eyes: Negative. Respiratory: Negative. Negative for cough, shortness of breath and wheezing. Cardiovascular: Negative. Negative for chest pain and leg swelling. Gastrointestinal: Negative for abdominal pain, constipation, diarrhea, nausea and vomiting. Genitourinary: Negative. Musculoskeletal: Negative. Skin: Negative. Neurological: Negative. Physical Exam   Constitutional: She is oriented to person, place, and time. She appears well-developed and well-nourished. No distress. HENT:   Head: Normocephalic and atraumatic.    Cardiovascular: Normal rate, regular rhythm, normal heart sounds and intact distal pulses. Exam reveals no gallop and no friction rub. No murmur heard. Pulmonary/Chest: Effort normal and breath sounds normal. No respiratory distress. She has no wheezes. She has no rales. Abdominal: Soft. Bowel sounds are normal.   Musculoskeletal: Normal range of motion. Neurological: She is alert and oriented to person, place, and time. Skin: Skin is warm and dry. She is not diaphoretic. Nursing note and vitals reviewed. Plan of care and AVS reviewed with patient who verbalized understanding. ASSESSMENT and PLAN    ICD-10-CM ICD-9-CM    1. Recurrent depression (HCC) F33.9 296.30    2. Bipolar affective disorder, current episode mixed, current episode severity unspecified ([facility] F31.60 296.60    3. Encounter to establish care Z76.89 V65.8    F/U 2 weeks physical exam  Will get labs if no report of labs have been obtained at that time.

## 2018-04-19 ENCOUNTER — OFFICE VISIT (OUTPATIENT)
Dept: INTERNAL MEDICINE CLINIC | Age: 28
End: 2018-04-19

## 2018-04-19 VITALS
TEMPERATURE: 97.6 F | SYSTOLIC BLOOD PRESSURE: 115 MMHG | HEART RATE: 104 BPM | BODY MASS INDEX: 26.65 KG/M2 | HEIGHT: 66 IN | WEIGHT: 165.8 LBS | OXYGEN SATURATION: 100 % | DIASTOLIC BLOOD PRESSURE: 66 MMHG | RESPIRATION RATE: 20 BRPM

## 2018-04-19 DIAGNOSIS — K21.9 GASTROESOPHAGEAL REFLUX DISEASE WITHOUT ESOPHAGITIS: Primary | ICD-10-CM

## 2018-04-19 PROBLEM — O34.219 PREVIOUS CESAREAN DELIVERY AFFECTING PREGNANCY, ANTEPARTUM: Status: ACTIVE | Noted: 2017-09-08

## 2018-04-19 PROBLEM — F39 EPISODIC MOOD DISORDER (HCC): Status: ACTIVE | Noted: 2017-08-16

## 2018-04-19 LAB
HCG URINE, QL. (POC): NEGATIVE
VALID INTERNAL CONTROL?: YES

## 2018-04-19 RX ORDER — RANITIDINE 150 MG/1
150 TABLET, FILM COATED ORAL
Qty: 30 TAB | Refills: 5 | Status: SHIPPED | OUTPATIENT
Start: 2018-04-19 | End: 2018-10-29 | Stop reason: SDUPTHER

## 2018-04-19 NOTE — MR AVS SNAPSHOT
[address] 5145 [facility] 
[phone] Patient: Tiffanie Ramirez MRN: [id number]:1[id number] Visit Information Date & Time Provider Department Dept. Phone Encounter #  
 4/19/2018  9:00 AM Chantale Singleton NP [facility] Care [phone] [id number] Upcoming Health Maintenance Date Due  
 OB 3RD TRIMESTER TDAP 6/12/2017 PAP AKA CERVICAL CYTOLOGY 4/2/2021 DTaP/Tdap/Td series (2 - Td) 4/2/2028 Allergies as of 4/19/2018  Review Complete On: 4/19/2018 By: Chantale Singleton NP Severity Noted Reaction Type Reactions Robitussin [Guaifenesin]  10/05/2016    Nausea and Vomiting Current Immunizations  Reviewed on 4/20/2017 No immunizations on file. Not reviewed this visit You Were Diagnosed With   
  
 Codes Comments Gastroesophageal reflux disease without esophagitis    -  Primary ICD-10-CM: K21.9 ICD-9-CM: 530.81 Vitals BP Pulse Temp Resp Height(growth percentile) Weight(growth percentile)  
 115/66 (BP 1 Location: Left arm, BP Patient Position: Sitting) (!) 104 97.6 °F (36.4 °C) (Temporal) 20 5' 6\" (1.676 m) 165 lb 12.8 oz (75.2 kg) LMP SpO2 BMI OB Status Smoking Status (LMP Unknown) 100% 26.76 kg/m2 Pregnant Current Every Day Smoker Vitals History BMI and BSA Data Body Mass Index Body Surface Area  
 26.76 kg/m 2 1.87 m 2 [facility] [phone] [place] [address] [phone] Your Updated Medication List  
  
   
This list is accurate as of 4/19/18  9:21 AM.  Always use your most recent med list.  
  
  
  
  
 BENADRYL ALLERGY PO Take  by mouth. chlorproMAZINE 50 mg tablet Commonly known as:  THORAZINE  
take 1 tablet by mouth three times a day * DEPAKOTE 250 mg tablet Generic drug:  divalproex DR Take 250 mg by mouth daily. * divalproex  mg ER tablet Commonly known as:  DEPAKOTE ER Take at 8 pm  
  
 * LATUDA PO Take  by mouth. * lurasidone 80 mg Tab tablet Commonly known as:  Euell Yusuf Take 1 Tab by mouth daily (with breakfast). Indications: SCHIZOPHRENIA  
  
 * LATUDA 80 mg Tab tablet Generic drug:  lurasidone  
take 1 tablet by mouth once daily AT 5PM  
  
 * LATUDA 40 mg Tab tablet Generic drug:  lurasidone Take 40 mg by mouth daily (with breakfast). MAALOX PO Take  by mouth. FITZGERALD MILK OF MAGNESIA 400 mg/5 mL suspension Generic drug:  magnesium hydroxide Take 30 mL by mouth daily as needed for Constipation. raNITIdine 150 mg tablet Commonly known as:  ZANTAC Take 1 Tab by mouth nightly. * Notice: This list has 6 medication(s) that are the same as other medications prescribed for you. Read the directions carefully, and ask your doctor or other care provider to review them with you. Prescriptions Sent to Pharmacy Refills  
 raNITIdine (ZANTAC) 150 mg tablet 5 Sig: Take 1 Tab by mouth nightly. Class: Normal  
 Pharmacy: [address] #: [phone] Route: Oral  
  
Introducing [facility]! [address] [facility] introduces [employer] patient portal. Now you can access parts of your medical record, email your doctor's office, and request medication refills online. 1. In your internet browser, go to [email or web address] 2. Click on the First Time User? Click Here link in the Sign In box. You will see the New Member Sign Up page. 3. Enter your [employer] Access Code exactly as it appears below. You will not need to use this code after youve completed the sign-up process. If you do not sign up before the expiration date, you must request a new code. · [employer] Access Code: Adam Moon Expires: 7/1/2018  9:19 AM 
 
 4. Enter the last four digits of your Social Security Number (xxxx) and Date of Birth (mm/dd/yyyy) as indicated and click Submit. You will be taken to the next sign-up page. 5. Create a [employer] ID. This will be your [employer] login ID and cannot be changed, so think of one that is secure and easy to remember. 6. Create a [employer] password. You can change your password at any time. 7. Enter your Password Reset Question and Answer. This can be used at a later time if you forget your password. 8. Enter your e-mail address. You will receive e-mail notification when new information is available in [address] 9. Click Sign Up. You can now view and download portions of your medical record. 10. Click the Download Summary menu link to download a portable copy of your medical information. If you have questions, please visit the Frequently Asked Questions section of the [employer] website. Remember, [employer] is NOT to be used for urgent needs. For medical emergencies, dial 911. Now available from your iPhone and Android! Please provide this summary of care documentation to your next provider. Your primary care clinician is listed as NONE. If you have any questions after today's visit, please call [phone].

## 2018-04-19 NOTE — PROGRESS NOTES
HISTORY OF PRESENT ILLNESS  Jaswinder Muir is a 29 y.o. female. HPI  Chief Complaint   Patient presents with    Heartburn     REQUEST MEDICATION     Patient in requesting medication for heartburn  Patient had indicated she was pregnant but states has not seen OB GYN.  was told by a spirit that she was pregnant. Will check status of pregnancy before prescribing treatment.  has been taking zantac at home for [facility] and is out. Urine pregnancy test is negative. Review of Systems   Constitutional: Negative. HENT: Negative. Eyes: Negative. Respiratory: Negative. Negative for cough, shortness of breath and wheezing. Cardiovascular: Negative. Negative for chest pain and leg swelling. Gastrointestinal: Positive for heartburn. Negative for abdominal pain, constipation, diarrhea, nausea and vomiting. States at times heartburn gets severe. Skin: Negative. Physical Exam   Constitutional: She is oriented to person, place, and time. She appears well-developed and well-nourished. No distress. HENT:   Head: Normocephalic. Eyes: Conjunctivae are normal.   Cardiovascular: Normal rate, regular rhythm, normal heart sounds and intact distal pulses. Exam reveals no gallop and no friction rub. No murmur heard. Pulmonary/Chest: Effort normal and breath sounds normal.   Abdominal: Soft. Bowel sounds are normal. She exhibits no distension. There is no tenderness. There is no rebound and no guarding. Musculoskeletal: Normal range of motion. Neurological: She is alert and oriented to person, place, and time. Skin: Skin is warm and dry. She is not diaphoretic. Nursing note and vitals reviewed. Plan of care and AVS reviewed with patient who verbalized understanding. ASSESSMENT and PLAN    ICD-10-CM ICD-9-CM    1.  Gastroesophageal reflux disease without esophagitis K21.9 530.81 raNITIdine (ZANTAC) 150 mg tablet      AMB POC URINE PREGNANCY TEST, VISUAL COLOR COMPARISON Started zantac 150 mg. Patient to let office know if not effective. Advised patient that urine test indicated she was not pregnant. F/U prn.

## 2018-04-19 NOTE — PROGRESS NOTES
Chief Complaint   Patient presents with    Heartburn     REQUEST MEDICATION     1. Have you been to the ER, urgent care clinic since your last visit? Hospitalized since your last visit? Yes When: 04.2017 Where: [facility]  Reason for visit: Mental Health    2. Have you seen or consulted any other health care providers outside of the [address] [place] [address] since your last visit? Include any pap smears or colon screening.  No     Health Maintenance Due   Topic Date Due    OB 3RD TRIMESTER TDAP  06/12/2017

## 2018-06-26 NOTE — TELEPHONE ENCOUNTER
Requested Prescriptions     Pending Prescriptions Disp Refills    diphenhydrAMINE (BENADRYL) 25 mg capsule [Pharmacy Med Name: Sharla Brock 25 MG CAPSULE] 90 Cap      Sig: Take one capsule by mouth three times a day     Future Appointments:  No future appointments.    Last Appointment With Me:  4/19/2018   Last Filled:   04.25.2017  Changes Made to Medication on Last Visit:  Last ordered by Dr. Nato Block

## 2018-06-27 ENCOUNTER — OFFICE VISIT (OUTPATIENT)
Dept: INTERNAL MEDICINE CLINIC | Age: 28
End: 2018-06-27

## 2018-06-27 VITALS
HEIGHT: 66 IN | TEMPERATURE: 96.6 F | OXYGEN SATURATION: 98 % | WEIGHT: 160 LBS | HEART RATE: 102 BPM | BODY MASS INDEX: 25.71 KG/M2 | DIASTOLIC BLOOD PRESSURE: 74 MMHG | SYSTOLIC BLOOD PRESSURE: 125 MMHG | RESPIRATION RATE: 16 BRPM

## 2018-06-27 DIAGNOSIS — O34.219 PREVIOUS CESAREAN DELIVERY AFFECTING PREGNANCY, ANTEPARTUM: ICD-10-CM

## 2018-06-27 DIAGNOSIS — F20.0 PARANOID SCHIZOPHRENIA (HCC): ICD-10-CM

## 2018-06-27 DIAGNOSIS — Z72.0 TOBACCO ABUSE: ICD-10-CM

## 2018-06-27 DIAGNOSIS — J01.00 ACUTE MAXILLARY SINUSITIS, RECURRENCE NOT SPECIFIED: ICD-10-CM

## 2018-06-27 DIAGNOSIS — F20.0 SCHIZOPHRENIA, PARANOID, CHRONIC WITH ACUTE EXACERBATION (HCC): Primary | ICD-10-CM

## 2018-06-27 DIAGNOSIS — F39 EPISODIC MOOD DISORDER (HCC): ICD-10-CM

## 2018-06-27 RX ORDER — AMOXICILLIN 500 MG/1
500 TABLET, FILM COATED ORAL 3 TIMES DAILY
Qty: 21 TAB | Refills: 0 | Status: SHIPPED | OUTPATIENT
Start: 2018-06-27 | End: 2018-07-04

## 2018-06-27 RX ORDER — HYDROXYZINE PAMOATE 25 MG/1
25 CAPSULE ORAL
COMMUNITY
End: 2019-05-08 | Stop reason: SDUPTHER

## 2018-06-27 RX ORDER — DIPHENHYDRAMINE HCL 25 MG
4 CAPSULE ORAL AS NEEDED
Qty: 50 EACH | Refills: 5 | Status: SHIPPED | OUTPATIENT
Start: 2018-06-27 | End: 2018-07-19

## 2018-06-27 RX ORDER — DESVENLAFAXINE SUCCINATE 50 MG/1
50 TABLET, EXTENDED RELEASE ORAL DAILY
COMMUNITY
End: 2020-01-24

## 2018-06-27 RX ORDER — HALOPERIDOL 5 MG/1
5 TABLET ORAL
COMMUNITY
End: 2018-07-19

## 2018-06-27 RX ORDER — DIPHENHYDRAMINE HCL 25 MG
CAPSULE ORAL
Qty: 90 CAP | Refills: 1 | Status: SHIPPED | OUTPATIENT
Start: 2018-06-27 | End: 2018-06-27 | Stop reason: ALTCHOICE

## 2018-06-27 RX ORDER — CLOZAPINE 25 MG/1
50 TABLET ORAL 2 TIMES DAILY
COMMUNITY
End: 2020-01-24 | Stop reason: ALTCHOICE

## 2018-06-27 NOTE — PATIENT INSTRUCTIONS

## 2018-06-27 NOTE — MR AVS SNAPSHOT
[address] [facility] 
[phone] Patient: Sendy Weaver MRN: [id number] EGR: Visit Information Date & Time Provider Department Dept. Phone Encounter #  
 2018 11:30 AM MD Yani Pfeiffer Dr [id number] Follow-up Instructions Return in about 6 weeks (around 2018) for routine follow up. Upcoming Health Maintenance Date Due Influenza Age 5 to Adult 2018 PAP AKA CERVICAL CYTOLOGY 2021 DTaP/Tdap/Td series (2 - Td) 2028 Allergies as of 2018  Review Complete On: 2018 By: Janelle Calvo MD  
  
 Severity Noted Reaction Type Reactions Robitussin [Guaifenesin]  10/05/2016    Nausea and Vomiting Current Immunizations  Reviewed on 2017 No immunizations on file. Not reviewed this visit You Were Diagnosed With   
  
 Codes Comments Schizophrenia, paranoid, chronic with acute exacerbation ([facility]    -  Primary ICD-10-CM: F20.0 ICD-9-CM: 295.34 Episodic mood disorder (HCC)     ICD-10-CM: F39 
ICD-9-CM: 296.90 Paranoid schizophrenia ([facility]     ICD-10-CM: F20.0 ICD-9-CM: 295.30 Previous  delivery affecting pregnancy, antepartum     ICD-10-CM: O34.219 ICD-9-CM: 369.24 Acute maxillary sinusitis, recurrence not specified     ICD-10-CM: J01.00 ICD-9-CM: 461.0 Tobacco abuse     ICD-10-CM: Z72.0 ICD-9-CM: 305.1 Vitals BP Pulse Temp Resp Height(growth percentile) Weight(growth percentile) 125/74 (BP 1 Location: Left arm, BP Patient Position: Sitting) (!) 102 96.6 °F (35.9 °C) (Oral) 16 5' 6\" (1.676 m) 160 lb (72.6 kg) LMP SpO2 BMI OB Status Smoking Status 2018 98% 25.82 kg/m2 Recent pregnancy Current Every Day Smoker BMI and BSA Data Body Mass Index Body Surface Area  
 25.82 kg/m 2 1.84 m 2 Preferred Pharmacy Pharmacy Name Phone [address], [facility]. [phone] Your Updated Medication List  
  
   
This list is accurate as of 6/27/18 12:05 PM.  Always use your most recent med list.  
  
  
  
  
 amoxicillin 500 mg Tab Take 500 mg by mouth three (3) times daily for 7 days. cloZAPine 25 mg tablet Commonly known as:  CLOZARIL Take 50 mg by mouth two (2) times a day. DEPAKOTE 250 mg tablet Generic drug:  divalproex DR Take 500 mg by mouth two (2) times a day. desvenlafaxine succinate 50 mg ER tablet Commonly known as:  PRISTIQ Take 50 mg by mouth.  
  
 haloperidol 5 mg tablet Commonly known as:  HALDOL Take 5 mg by mouth nightly. hydrOXYzine pamoate 25 mg capsule Commonly known as:  VISTARIL Take 25 mg by mouth three (3) times daily as needed for Itching. nicotine 4 mg gum Commonly known as:  Arley Or Take 1 Each by mouth as needed for Smoking Cessation for up to 30 days. raNITIdine 150 mg tablet Commonly known as:  ZANTAC Take 1 Tab by mouth nightly. Prescriptions Sent to Pharmacy Refills  
 amoxicillin 500 mg tab 0 Sig: Take 500 mg by mouth three (3) times daily for 7 days. Class: Normal  
 Pharmacy: [address] [place] [address], [place] [address]. Ph #: [phone] Route: Oral  
 nicotine (NICORETTE) 4 mg gum 5 Sig: Take 1 Each by mouth as needed for Smoking Cessation for up to 30 days. Class: Normal  
 Pharmacy: [address] [place] [address]. Ph #: [phone] Route: Oral  
  
We Performed the Following AMB POC EKG ROUTINE W/ 12 LEADS, INTER & REP [23057 CPT(R)] Follow-up Instructions Return in about 6 weeks (around 8/8/2018) for routine follow up. Patient Instructions Stopping Smoking: Care Instructions Your Care Instructions Cigarette smokers crave the nicotine in cigarettes.  Giving it up is much harder than simply changing a habit. Your body has to stop craving the nicotine. It is hard to quit, but you can do it. There are many tools that people use to quit smoking. You may find that combining tools works best for you. There are several steps to quitting. First you get ready to quit. Then you get support to help you. After that, you learn new skills and behaviors to become a nonsmoker. For many people, a necessary step is getting and using medicine. Your doctor will help you set up the plan that best meets your needs. You may want to attend a smoking cessation program to help you quit smoking. When you choose a program, look for one that has proven success. Ask your doctor for ideas. You will greatly increase your chances of success if you take medicine as well as get counseling or join a cessation program. 
Some of the changes you feel when you first quit tobacco are uncomfortable. Your body will miss the nicotine at first, and you may feel short-tempered and grumpy. You may have trouble sleeping or concentrating. Medicine can help you deal with these symptoms. You may struggle with changing your smoking habits and rituals. The last step is the tricky one: Be prepared for the smoking urge to continue for a time. This is a lot to deal with, but keep at it. You will feel better. Follow-up care is a key part of your treatment and safety. Be sure to make and go to all appointments, and call your doctor if you are having problems. It's also a good idea to know your test results and keep a list of the medicines you take. How can you care for yourself at home? · Ask your family, friends, and coworkers for support. You have a better chance of quitting if you have help and support. · Join a support group, such as Nicotine Anonymous, for people who are trying to quit smoking.  
· Consider signing up for a smoking cessation program, such as the American Lung Association's Freedom from Smoking program. 
 · Set a quit date. Pick your date carefully so that it is not right in the middle of a big deadline or stressful time. Once you quit, do not even take a puff. Get rid of all ashtrays and lighters after your last cigarette. Clean your house and your clothes so that they do not smell of smoke. · Learn how to be a nonsmoker. Think about ways you can avoid those things that make you reach for a cigarette. ¨ Avoid situations that put you at greatest risk for smoking. For some people, it is hard to have a drink with friends without smoking. For others, they might skip a coffee break with coworkers who smoke. ¨ Change your daily routine. Take a different route to work or eat a meal in a different place. · Cut down on stress. Calm yourself or release tension by doing an activity you enjoy, such as reading a book, taking a hot bath, or gardening. · Talk to your doctor or pharmacist about nicotine replacement therapy, which replaces the nicotine in your body. You still get nicotine but you do not use tobacco. Nicotine replacement products help you slowly reduce the amount of nicotine you need. These products come in several forms, many of them available over-the-counter: ¨ Nicotine patches ¨ Nicotine gum and lozenges ¨ Nicotine inhaler · Ask your doctor about bupropion (Wellbutrin) or varenicline (Chantix), which are prescription medicines. They do not contain nicotine. They help you by reducing withdrawal symptoms, such as stress and anxiety. · Some people find hypnosis, acupuncture, and massage helpful for ending the smoking habit. · Eat a healthy diet and get regular exercise. Having healthy habits will help your body move past its craving for nicotine. · Be prepared to keep trying. Most people are not successful the first few times they try to quit. Do not get mad at yourself if you smoke again.  Make a list of things you learned and think about when you want to try again, such as next week, next month, or next year. Where can you learn more? Go to http://devendra-earnest.info/. Enter W478 in the search box to learn more about \"Stopping Smoking: Care Instructions. \" Current as of: March 20, 2017 Content Version: 11.4 © 6653-2141 [employer]. Care instructions adapted under license by [employer] (which disclaims liability or warranty for this information). If you have questions about a medical condition or this instruction, always ask your healthcare professional. [address] any warranty or liability for your use of this information. Introducing [facility]! Jocy Macdonald introduces EnerTrac patient portal. Now you can access parts of your medical record, email your doctor's office, and request medication refills online. 1. In your internet browser, go to [email or web address] 2. Click on the First Time User? Click Here link in the Sign In box. You will see the New Member Sign Up page. 3. Enter your EnerTrac Access Code exactly as it appears below. You will not need to use this code after youve completed the sign-up process. If you do not sign up before the expiration date, you must request a new code. · EnerTrac Access Code: Lizzie Herrera Expires: 7/1/2018  9:19 AM 
 
4. Enter the last four digits of your Social Security Number (xxxx) and Date of Birth (mm/dd/yyyy) as indicated and click Submit. You will be taken to the next sign-up page. 5. Create a [email or web address] ID. This will be your EnerTrac login ID and cannot be changed, so think of one that is secure and easy to remember. 6. Create a EnerTrac password. You can change your password at any time. 7. Enter your Password Reset Question and Answer. This can be used at a later time if you forget your password. 8. Enter your e-mail address. You will receive e-mail notification when new information is available in [address] 9. Click Sign Up. You can now view and download portions of your medical record. 10. Click the Download Summary menu link to download a portable copy of your medical information. If you have questions, please visit the Frequently Asked Questions section of the Tebla website. Remember, Tebla is NOT to be used for urgent needs. For medical emergencies, dial 911. Now available from your iPhone and Android! Please provide this summary of care documentation to your next provider. Your primary care clinician is listed as Jas Samuel. If you have any questions after today's visit, please call [phone].

## 2018-06-27 NOTE — PROGRESS NOTES
Subjective:   Magdalena Mcfarlane is a 29 y.o. female who complains of congestion, sneezing, sore throat, cough described as productive of yellow sputum, headache and bilateral sinus pain for 21 days, gradually improving since that time. She denies a history of shortness of breath, vomiting, weight loss and wheezing. Mood good the medicines work, not suicidal.  Evaluation to date: none. Treatment to date: none. Patient does smoke cigarettes. Readt to quit. Relevant PMH: schizophrenia , seeing Ms. Thiago Bales, needs EKG, had labs done already  . Allergies   Allergen Reactions    Robitussin [Guaifenesin] Nausea and Vomiting         Patient Active Problem List    Diagnosis Date Noted    Recurrent depression ([facility] 2018    Schizophrenia, paranoid, chronic with acute exacerbation ([facility] 2018    Cigarette nicotine dependence without complication     Encounter for other general examination (CODE) 2018    Previous  delivery affecting pregnancy, antepartum 2017    Single delivery by  2017    Labor and delivery indication for care or intervention 2017    Episodic mood disorder ([facility] 2017    Vaginal bleeding in pregnancy 2017    Schizophrenia ([facility] 2017     Current Outpatient Prescriptions   Medication Sig Dispense Refill    cloZAPine (CLOZARIL) 25 mg tablet Take 50 mg by mouth two (2) times a day.  desvenlafaxine succinate (PRISTIQ) 50 mg ER tablet Take 50 mg by mouth.  haloperidol (HALDOL) 5 mg tablet Take 5 mg by mouth nightly.  hydrOXYzine pamoate (VISTARIL) 25 mg capsule Take 25 mg by mouth three (3) times daily as needed for Itching.  raNITIdine (ZANTAC) 150 mg tablet Take 1 Tab by mouth nightly. 30 Tab 5    divalproex DR (DEPAKOTE) 250 mg tablet Take 500 mg by mouth two (2) times a day.        Allergies   Allergen Reactions    Robitussin [Guaifenesin] Nausea and Vomiting     Social History   Substance Use Topics    Smoking status: Current Every Day Smoker     Packs/day: 1.00    Smokeless tobacco: Current User    Alcohol use No      Comment: Sober for x 3 months        Review of Systems  Pertinent items are noted in HPI. Objective:     Visit Vitals    /74 (BP 1 Location: Left arm, BP Patient Position: Sitting)    Pulse (!) 102    Temp 96.6 °F (35.9 °C) (Oral)    Resp 16    Ht 5' 6\" (1.676 m)    Wt 160 lb (72.6 kg)    LMP 2018    SpO2 98%    BMI 25.82 kg/m2     General:  alert, cooperative, no distress   Eyes: negative   Ears: normal TM's and external ear canals AU   Sinuses: Normal paranasal sinuses without tenderness   Mouth:  Lips, mucosa, and tongue normal. Teeth and gums normal   Neck: supple, symmetrical, trachea midline and no adenopathy. Heart: S1 and S2 normal, no murmurs noted. Lungs: clear to auscultation bilaterally   Abdomen: soft, non-tender. Bowel sounds normal. No masses,  no organomegaly      Assessment/Plan:   sinusitis  Suggested symptomatic OTC remedies. Antibiotics per orders. Encounter Diagnoses   Name Primary?  Schizophrenia, paranoid, chronic with acute exacerbation (HCC) Yes    Episodic mood disorder (HCC)     Paranoid schizophrenia ([facility]     Previous  delivery affecting pregnancy, antepartum     Acute maxillary sinusitis, recurrence not specified     Tobacco abuse      Orders Placed This Encounter    AMB POC EKG ROUTINE W/ 12 LEADS, INTER & REP    cloZAPine (CLOZARIL) 25 mg tablet    desvenlafaxine succinate (PRISTIQ) 50 mg ER tablet    haloperidol (HALDOL) 5 mg tablet    hydrOXYzine pamoate (VISTARIL) 25 mg capsule    amoxicillin 500 mg tab    nicotine (NICORETTE) 4 mg gum   . ICD-10-CM ICD-9-CM    1. Schizophrenia, paranoid, chronic with acute exacerbation ([facility] F20.0 295.34 AMB POC EKG ROUTINE W/ 12 LEADS, INTER & REP   2. Episodic mood disorder (HCC) F39 296.90 AMB POC EKG ROUTINE W/ 12 LEADS, INTER & REP   3.  Paranoid schizophrenia ([facility] F20.0 295.30 AMB POC EKG ROUTINE W/ 12 LEADS, INTER & REP   4. Previous  delivery affecting pregnancy, antepartum O34.219 654.23 AMB POC EKG ROUTINE W/ 12 LEADS, INTER & REP     Orders Placed This Encounter    AMB POC EKG ROUTINE W/ 12 LEADS, INTER & REP     Order Specific Question:   Reason for Exam:     Answer:   screening    cloZAPine (CLOZARIL) 25 mg tablet     Sig: Take 50 mg by mouth two (2) times a day.  desvenlafaxine succinate (PRISTIQ) 50 mg ER tablet     Sig: Take 50 mg by mouth.  haloperidol (HALDOL) 5 mg tablet     Sig: Take 5 mg by mouth nightly. Follow-up Disposition: Not on File    Discussed possible side affects, precautions, and drug interactions and possible benefits of the medication(s). The patient was counseled on the dangers of tobacco use, and was advised to quit. Reviewed strategies to maximize success, including pharmacotherapy (nicotine gum). Chronic Conditions Addressed Today     1. Schizophrenia ([facility]     Relevant Medications     cloZAPine (CLOZARIL) 25 mg tablet     desvenlafaxine succinate (PRISTIQ) 50 mg ER tablet     haloperidol (HALDOL) 5 mg tablet     Other Relevant Orders     AMB POC EKG ROUTINE W/ 12 LEADS, INTER & REP (Completed)    2. Schizophrenia, paranoid, chronic with acute exacerbation (HCC) - Primary     Relevant Medications     cloZAPine (CLOZARIL) 25 mg tablet     desvenlafaxine succinate (PRISTIQ) 50 mg ER tablet     haloperidol (HALDOL) 5 mg tablet     Other Relevant Orders     AMB POC EKG ROUTINE W/ 12 LEADS, INTER & REP (Completed)    3. Episodic mood disorder (HCC)     Relevant Medications     cloZAPine (CLOZARIL) 25 mg tablet     desvenlafaxine succinate (PRISTIQ) 50 mg ER tablet     haloperidol (HALDOL) 5 mg tablet     Other Relevant Orders     AMB POC EKG ROUTINE W/ 12 LEADS, INTER & REP (Completed)    4.  Previous  delivery affecting pregnancy, antepartum     Relevant Orders     AMB POC EKG ROUTINE W/ 12 LEADS, INTER & REP (Completed)      Acute Diagnoses Addressed Today     Acute maxillary sinusitis, recurrence not specified            Relevant Medications        amoxicillin 500 mg tab    Tobacco abuse            Relevant Medications        cloZAPine (CLOZARIL) 25 mg tablet        desvenlafaxine succinate (PRISTIQ) 50 mg ER tablet        haloperidol (HALDOL) 5 mg tablet

## 2018-07-19 ENCOUNTER — OFFICE VISIT (OUTPATIENT)
Dept: INTERNAL MEDICINE CLINIC | Age: 28
End: 2018-07-19

## 2018-07-19 ENCOUNTER — TELEPHONE (OUTPATIENT)
Dept: INTERNAL MEDICINE CLINIC | Age: 28
End: 2018-07-19

## 2018-07-19 VITALS
TEMPERATURE: 99.2 F | SYSTOLIC BLOOD PRESSURE: 106 MMHG | BODY MASS INDEX: 25.88 KG/M2 | OXYGEN SATURATION: 97 % | RESPIRATION RATE: 18 BRPM | HEIGHT: 66 IN | WEIGHT: 161 LBS | HEART RATE: 104 BPM | DIASTOLIC BLOOD PRESSURE: 72 MMHG

## 2018-07-19 DIAGNOSIS — R19.7 DIARRHEA, UNSPECIFIED TYPE: Primary | ICD-10-CM

## 2018-07-19 DIAGNOSIS — F20.0 PARANOID SCHIZOPHRENIA (HCC): ICD-10-CM

## 2018-07-19 RX ORDER — PALIPERIDONE PALMITATE 234 MG/1.5ML
INJECTION INTRAMUSCULAR
Refills: 1 | COMMUNITY
Start: 2018-05-14 | End: 2018-07-19

## 2018-07-19 RX ORDER — PALIPERIDONE PALMITATE 156 MG/ML
INJECTION INTRAMUSCULAR
Refills: 0 | COMMUNITY
Start: 2018-04-12 | End: 2018-07-19

## 2018-07-19 RX ORDER — CHLORPROMAZINE HYDROCHLORIDE 50 MG/1
TABLET, FILM COATED ORAL
Refills: 0 | COMMUNITY
Start: 2018-04-16 | End: 2018-07-19

## 2018-07-19 RX ORDER — LURASIDONE HYDROCHLORIDE 80 MG/1
TABLET, FILM COATED ORAL
Refills: 0 | COMMUNITY
Start: 2018-04-12 | End: 2018-07-19

## 2018-07-19 NOTE — PATIENT INSTRUCTIONS
Try daily metamucil and daily probiotic to slow the diarrhea. Diarrhea: Care Instructions  Your Care Instructions    Diarrhea is loose, watery stools (bowel movements). The exact cause is often hard to find. Sometimes diarrhea is your body's way of getting rid of what caused an upset stomach. Viruses, food poisoning, and many medicines can cause diarrhea. Some people get diarrhea in response to emotional stress, anxiety, or certain foods. Almost everyone has diarrhea now and then. It usually isn't serious, and your stools will return to normal soon. The important thing to do is replace the fluids you have lost, so you can prevent dehydration. The doctor has checked you carefully, but problems can develop later. If you notice any problems or new symptoms, get medical treatment right away. Follow-up care is a key part of your treatment and safety. Be sure to make and go to all appointments, and call your doctor if you are having problems. It's also a good idea to know your test results and keep a list of the medicines you take. How can you care for yourself at home? · Watch for signs of dehydration, which means your body has lost too much water. Dehydration is a serious condition and should be treated right away. Signs of dehydration are:  ¨ Increasing thirst and dry eyes and mouth. ¨ Feeling faint or lightheaded. ¨ Darker urine, and a smaller amount of urine than normal.  · To prevent dehydration, drink plenty of fluids, enough so that your urine is light yellow or clear like water. Choose water and other caffeine-free clear liquids until you feel better. If you have kidney, heart, or liver disease and have to limit fluids, talk with your doctor before you increase the amount of fluids you drink. · Begin eating small amounts of mild foods the next day, if you feel like it. ¨ Try yogurt that has live cultures of Lactobacillus.  (Check the label.)  ¨ Avoid spicy foods, fruits, alcohol, and caffeine until 48 hours after all symptoms are gone. ¨ Avoid chewing gum that contains sorbitol. ¨ Avoid dairy products (except for yogurt with Lactobacillus) while you have diarrhea and for 3 days after symptoms are gone. · The doctor may recommend that you take over-the-counter medicine, such as loperamide (Imodium), if you still have diarrhea after 6 hours. Read and follow all instructions on the label. Do not use this medicine if you have bloody diarrhea, a high fever, or other signs of serious illness. Call your doctor if you think you are having a problem with your medicine. When should you call for help? Call 911 anytime you think you may need emergency care. For example, call if:    · You passed out (lost consciousness).     · Your stools are maroon or very bloody.    Call your doctor now or seek immediate medical care if:    · You are dizzy or lightheaded, or you feel like you may faint.     · Your stools are black and look like tar, or they have streaks of blood.     · You have new or worse belly pain.     · You have symptoms of dehydration, such as:  ¨ Dry eyes and a dry mouth. ¨ Passing only a little dark urine. ¨ Feeling thirstier than usual.     · You have a new or higher fever.    Watch closely for changes in your health, and be sure to contact your doctor if:    · Your diarrhea is getting worse.     · You see pus in the diarrhea.     · You are not getting better after 2 days (48 hours). Where can you learn more? Go to http://devendra-earnest.info/. Enter T325 in the search box to learn more about \"Diarrhea: Care Instructions. \"  Current as of: November 20, 2017  Content Version: 11.7  © 7204-4274 [employer]. Care instructions adapted under license by [employer] (which disclaims liability or warranty for this information).  If you have questions about a medical condition or this instruction, always ask your healthcare professional. Sonja Montana disclaims any warranty or liability for your use of this information.

## 2018-07-19 NOTE — MR AVS SNAPSHOT
[address] [place] [address] 
[phone] Patient: Costa Benito MRN: [id number] TJP:[id number] Visit Information Date & Time Provider Department Dept. Phone Encounter #  
 7/19/2018 10:45 AM Nusrat Monson  Leon Rivero [id number] Upcoming Health Maintenance Date Due Influenza Age 5 to Adult 8/1/2018 PAP AKA CERVICAL CYTOLOGY 4/2/2021 DTaP/Tdap/Td series (2 - Td) 4/2/2028 Allergies as of 7/19/2018  Review Complete On: 7/19/2018 By: Kalani Manuel LPN Severity Noted Reaction Type Reactions Robitussin [Guaifenesin]  10/05/2016    Nausea and Vomiting Current Immunizations  Reviewed on 4/20/2017 No immunizations on file. Not reviewed this visit You Were Diagnosed With   
  
 Codes Comments Diarrhea, unspecified type    -  Primary ICD-10-CM: R19.7 ICD-9-CM: 787.91 [facility]     ICD-10-CM: F20.0 ICD-9-CM: 295.30 Vitals BP Pulse Temp Resp Height(growth percentile) Weight(growth percentile) 106/72 (BP 1 Location: Right arm, BP Patient Position: Sitting) (!) 104 99.2 °F (37.3 °C) (Oral) 18 5' 6\" (1.676 m) 161 lb (73 kg) LMP SpO2 Breastfeeding? BMI OB Status Smoking Status 06/12/2018 (Approximate) 97% No 25.99 kg/m2 Recent pregnancy Current Every Day Smoker Vitals History BMI and BSA Data Body Mass Index Body Surface Area  
 25.99 kg/m 2 1.84 m 2 Preferred Pharmacy Pharmacy Name Phone [address] [address] [facility]. [phone] Your Updated Medication List  
  
   
This list is accurate as of 7/19/18 10:58 AM.  Always use your most recent med list.  
  
  
  
  
 cloZAPine 25 mg tablet Commonly known as:  CLOZARIL Take 50 mg by mouth two (2) times a day. DEPAKOTE 250 mg tablet Generic drug:  divalproex   
 Take 500 mg by mouth two (2) times a day. desvenlafaxine succinate 50 mg ER tablet Commonly known as:  PRISTIQ Take 50 mg by mouth. hydrOXYzine pamoate 25 mg capsule Commonly known as:  VISTARIL Take 25 mg by mouth three (3) times daily as needed for Itching. raNITIdine 150 mg tablet Commonly known as:  ZANTAC Take 1 Tab by mouth nightly. Patient Instructions Try daily metamucil and daily probiotic to slow the diarrhea. Diarrhea: Care Instructions Your Care Instructions Diarrhea is loose, watery stools (bowel movements). The exact cause is often hard to find. Sometimes diarrhea is your body's way of getting rid of what caused an upset stomach. Viruses, food poisoning, and many medicines can cause diarrhea. Some people get diarrhea in response to emotional stress, anxiety, or certain foods. Almost everyone has diarrhea now and then. It usually isn't serious, and your stools will return to normal soon. The important thing to do is replace the fluids you have lost, so you can prevent dehydration. The doctor has checked you carefully, but problems can develop later. If you notice any problems or new symptoms, get medical treatment right away. Follow-up care is a key part of your treatment and safety. Be sure to make and go to all appointments, and call your doctor if you are having problems. It's also a good idea to know your test results and keep a list of the medicines you take. How can you care for yourself at home? · Watch for signs of dehydration, which means your body has lost too much water. Dehydration is a serious condition and should be treated right away. Signs of dehydration are: 
¨ Increasing thirst and dry eyes and mouth. ¨ Feeling faint or lightheaded.  
¨ Darker urine, and a smaller amount of urine than normal. 
· To prevent dehydration, drink plenty of fluids, enough so that your urine is light yellow or clear like water. Choose water and other caffeine-free clear liquids until you feel better. If you have kidney, heart, or liver disease and have to limit fluids, talk with your doctor before you increase the amount of fluids you drink. · Begin eating small amounts of mild foods the next day, if you feel like it. ¨ Try yogurt that has live cultures of Lactobacillus. (Check the label.) ¨ Avoid spicy foods, fruits, alcohol, and caffeine until 48 hours after all symptoms are gone. ¨ Avoid chewing gum that contains sorbitol. ¨ Avoid dairy products (except for yogurt with Lactobacillus) while you have diarrhea and for 3 days after symptoms are gone. · The doctor may recommend that you take over-the-counter medicine, such as loperamide (Imodium), if you still have diarrhea after 6 hours. Read and follow all instructions on the label. Do not use this medicine if you have bloody diarrhea, a high fever, or other signs of serious illness. Call your doctor if you think you are having a problem with your medicine. When should you call for help? Call 911 anytime you think you may need emergency care. For example, call if: 
  · You passed out (lost consciousness).  
  · Your stools are maroon or very bloody.  
 Call your doctor now or seek immediate medical care if: 
  · You are dizzy or lightheaded, or you feel like you may faint.  
  · Your stools are black and look like tar, or they have streaks of blood.  
  · You have new or worse belly pain.  
  · You have symptoms of dehydration, such as: ¨ Dry eyes and a dry mouth. ¨ Passing only a little dark urine. ¨ Feeling thirstier than usual.  
  · You have a new or higher fever.  
 Watch closely for changes in your health, and be sure to contact your doctor if: 
  · Your diarrhea is getting worse.  
  · You see pus in the diarrhea.  
  · You are not getting better after 2 days (48 hours). Where can you learn more? Go to http://devendra-earnest.info/. Enter N528 in the search box to learn more about \"Diarrhea: Care Instructions. \" Current as of: November 20, 2017 Content Version: 11.7 © 4891-6150 [employer]. Care instructions adapted under license by [employer] (which disclaims liability or warranty for this information). If you have questions about a medical condition or this instruction, always ask your healthcare professional. [address] any warranty or liability for your use of this information. Introducing [facility] & HEALTH SERVICES! Pavithra Garcia introduces Smartjog patient portal. Now you can access parts of your medical record, email your doctor's office, and request medication refills online. 1. In your internet browser, go to [email or web address] 2. Click on the First Time User? Click Here link in the Sign In box. You will see the New Member Sign Up page. 3. Enter your Smartjog Access Code exactly as it appears below. You will not need to use this code after youve completed the sign-up process. If you do not sign up before the expiration date, you must request a new code. · Smartjog Access Code: [email or web address] Expires: 10/17/2018 10:11 AM 
 
4. Enter the last four digits of your Social Security Number (xxxx) and Date of Birth (mm/dd/yyyy) as indicated and click Submit. You will be taken to the next sign-up page. 5. Create a Smartjog ID. This will be your Smartjog login ID and cannot be changed, so think of one that is secure and easy to remember. 6. Create a Smartjog password. You can change your password at any time. 7. Enter your Password Reset Question and Answer. This can be used at a later time if you forget your password. 8. Enter your e-mail address. You will receive e-mail notification when new information is available in [address] 9. Click Sign Up. You can now view and download portions of your medical record. 10. Click the Download Summary menu link to download a portable copy of your medical information. If you have questions, please visit the Frequently Asked Questions section of the [employer] website. Remember, [employer] is NOT to be used for urgent needs. For medical emergencies, dial 911. Now available from your iPhone and Android! Please provide this summary of care documentation to your next provider. Your primary care clinician is listed as Fanny William. If you have any questions after today's visit, please call [phone].

## 2018-07-19 NOTE — PROGRESS NOTES
Subjective:      Patient : This patient is a 29 y.o. female with complicated mental health history that presents today 3 weeks of diarrhea. Liquid stool 4-5 times each morning. No blood in the stool. Started clozaril 4 weeks ago. Believes she saw the OB implant a blimp in her at the time of delivery of her baby last year. Past Medical History:   Diagnosis Date    Alcoholic ([facility]     Sober 3 months; Weekly AAA meeting; Had substance abuse counseling;     Bipolar disorder ([facility]     Cirrhosis ([facility]     Past not current issue per patient    ETOH abuse     GERD (gastroesophageal reflux disease)     Head injury     Marijuana abuse     Phobia     [place] [facility]    Post partum depression     Pregnancy     Psychiatric disorder     Psychotic disorder     Depression/  Janeth Ask, NP; Bipolar disorder, mood swings.  [facility]     Stroke [facility]) 2011     Current Outpatient Prescriptions   Medication Sig    psyllium husk-aspartame (METAMUCIL FIBER SINGLES) 3.4 gram pwpk packet Take 1 Packet by mouth daily.  Lactobacillus Reuteri (BIOGAIA PROBIOTIC) Take 1 Each by mouth daily.  cloZAPine (CLOZARIL) 25 mg tablet Take 50 mg by mouth two (2) times a day.  desvenlafaxine succinate (PRISTIQ) 50 mg ER tablet Take 50 mg by mouth.  hydrOXYzine pamoate (VISTARIL) 25 mg capsule Take 25 mg by mouth three (3) times daily as needed for Itching.  raNITIdine (ZANTAC) 150 mg tablet Take 1 Tab by mouth nightly.  divalproex DR (DEPAKOTE) 250 mg tablet Take 500 mg by mouth two (2) times a day. No current facility-administered medications for this visit. Allergies   Allergen Reactions    Robitussin [Guaifenesin] Nausea and Vomiting     Social History   Substance Use Topics    Smoking status: Current Every Day Smoker     Packs/day: 0.50    Smokeless tobacco: Former User    Alcohol use No      Comment: Sober for x 3 months       ROS per HPI.     Objective:     Visit Vitals    BP 106/72 (BP 1 Location: Right arm, BP Patient Position: Sitting)    Pulse (!) 104    Temp 99.2 °F (37.3 °C) (Oral)    Resp 18    Ht 5' 6\" (1.676 m)    Wt 161 lb (73 kg)    LMP 06/12/2018 (Approximate)    SpO2 97%    Breastfeeding No    BMI 25.99 kg/m2       Skin: no pallor, normal turgor  HEENT:  Normocephalic, no conjunctival inflammation, pupils equal round and reactive to light, MMM, no oral lesions  Heart: regular rate and rhythm, no murmurs, rubs or gallops  Lungs: no increased respiratory effort, clear to ausculation bilaterally  Abdomen: soft, protruberant, no tenderness, not distended. Normal bowel sounds. No rebound or guarding. Extremities:no edema or cyanosis  Neuro awake and oriented    REviewed CMP, CBC and CT Abd Pelvis from [place] [address] in June - all were normal.    Assessment:       ICD-10-CM ICD-9-CM    1. Diarrhea, unspecified type R19.7 787.91    2. Paranoid schizophrenia ([facility] F20.0 295.30      Abdominal exam, recent labs and imaging are all normal.  Diarrhea may be a side effect of clozaril. Try metamucil and probiotics. Many of her abdominal complaints are rooted in her uncontrolled paranoia which psych is addressing. Plan:     Orders Placed This Encounter    DISCONTD: Malaika Apley 234 mg/1.5 mL injection     Refill:  1    DISCONTD: INVEGA SUSTENNA 156 mg/mL injection     Sig: INJECT INTRAMUSCULARLY ON 4/12/2018     Refill:  0    DISCONTD: LATUDA 80 mg tab tablet     Refill:  0    DISCONTD: chlorproMAZINE (THORAZINE) 50 mg tablet     Refill:  0    psyllium husk-aspartame (METAMUCIL FIBER SINGLES) 3.4 gram pwpk packet     Sig: Take 1 Packet by mouth daily. Dispense:  30 Packet     Refill:  5    Lactobacillus Reuteri (BIOGAIA PROBIOTIC)     Sig: Take 1 Each by mouth daily. Dispense:  30 Each     Refill:  0       Spent >20 minutes face to face counseling patient. Verbal and written instructions (see AVS) provided.   Patient expresses understanding of diagnosis and treatment plan.

## 2018-07-19 NOTE — PROGRESS NOTES
Chief Complaint   Patient presents with    Diarrhea     X 3 WEEKS    Abdominal Pain     X 3 WEEKS; INCREASED APPETITE, NAUSEA     1. Have you been to the ER, urgent care clinic since your last visit? Hospitalized since your last visit? No    2. Have you seen or consulted any other health care providers outside of the [facility] since your last visit? Include any pap smears or colon screening. No     There are no preventive care reminders to display for this patient.

## 2018-07-26 ENCOUNTER — OFFICE VISIT (OUTPATIENT)
Dept: INTERNAL MEDICINE CLINIC | Age: 28
End: 2018-07-26

## 2018-07-26 VITALS
BODY MASS INDEX: 25.39 KG/M2 | HEIGHT: 66 IN | OXYGEN SATURATION: 97 % | SYSTOLIC BLOOD PRESSURE: 113 MMHG | WEIGHT: 158 LBS | RESPIRATION RATE: 16 BRPM | DIASTOLIC BLOOD PRESSURE: 72 MMHG | TEMPERATURE: 95.4 F | HEART RATE: 118 BPM

## 2018-07-26 DIAGNOSIS — R19.7 DIARRHEA, UNSPECIFIED TYPE: Primary | ICD-10-CM

## 2018-07-26 DIAGNOSIS — F20.9 SCHIZOPHRENIA, UNSPECIFIED TYPE (HCC): ICD-10-CM

## 2018-07-26 RX ORDER — DIPHENOXYLATE HYDROCHLORIDE AND ATROPINE SULFATE 2.5; .025 MG/1; MG/1
1 TABLET ORAL
Qty: 12 TAB | Refills: 0 | Status: SHIPPED | OUTPATIENT
Start: 2018-07-26 | End: 2019-05-08

## 2018-07-26 RX ORDER — METRONIDAZOLE 500 MG/1
500 TABLET ORAL 3 TIMES DAILY
Qty: 21 TAB | Refills: 0 | Status: SHIPPED | OUTPATIENT
Start: 2018-07-26 | End: 2018-08-02

## 2018-07-26 NOTE — PROGRESS NOTES
HISTORY OF PRESENT ILLNESS  Bertrum Hodgkin is a 29 y.o. female. Diarrhea    The history is provided by the patient. This is a new problem. Episode onset: 1.5 months ago. The problem occurs 5 to 10 times per day. The problem has been gradually worsening. There has been no fever. The stool consistency is described as watery and loose. Associated symptoms include abdominal pain. Pertinent negatives include no cough and no anal bleeding. Risk factors include recent antibiotic use (amoxicillin 1 mo ago ). Abdominal Pain   Associated symptoms include abdominal pain. Saw Tyeshastacey OREILLYkristy thought to be from meds. Metamucil not helping now with Abdo paIN  Roommate also has diarrhea. Past Surgical History:   Procedure Laterality Date    HX  SECTION      HX  SECTION      C section x 2;   &     HX RHINOPLASTY      HX TUBAL LIGATION  2017       Social History     Social History    Marital status: SINGLE     Spouse name: N/A    Number of children: 2    Years of education: N/A     Occupational History    disabled      Social History Main Topics    Smoking status: Current Every Day Smoker     Packs/day: 0.50    Smokeless tobacco: Former User    Alcohol use No      Comment: Sober for x 3 months    Drug use: Yes     Special: Marijuana, Other      Comment: Alcohol    Sexual activity: No     Other Topics Concern    Not on file     Social History Narrative    ** Merged History Encounter **         ;  2 children; unemployed    Disabled related to mental issues/ 2017     [address], other women there has some loose stools. Review of Systems   Constitutional: Negative for fever. Respiratory: Negative for cough. Gastrointestinal: Positive for abdominal pain, diarrhea and nausea. Negative for anal bleeding and blood in stool. Genitourinary: Negative for dysuria.        Physical Exam  Visit Vitals    /72 (BP 1 Location: Left arm, BP Patient Position: At rest)    Pulse (!) 118    Temp 95.4 °F (35.2 °C) (Oral)    Resp 16    Ht 5' 6\" (1.676 m)    Wt 158 lb (71.7 kg)    SpO2 97%    BMI 25.5 kg/m2     WD WN female NAD  Heart RRR without murmers clicks or rubs  Lungs CTA  Abdo soft + general tender  Ext no edema    ASSESSMENT and PLAN  Encounter Diagnoses   Name Primary?  Diarrhea, unspecified type Yes    Schizophrenia, unspecified type ([facility]      Orders Placed This Encounter    C DIFFICILE TOXIN A & B BY EIA    CULTURE, STOOL    metroNIDAZOLE (FLAGYL) 500 mg tablet    diphenoxylate-atropine (LOMOTIL) 2.5-0.025 mg per tablet     Do tests prior to AB. Follow-up Disposition:  Return in about 1 week (around 8/2/2018).

## 2018-07-26 NOTE — MR AVS SNAPSHOT
[address] [address] [facility] 
[phone] Patient: Kaylee Valencia MRN: [id number] Visit Information Date & Time Provider Department Dept. Phone Encounter #  
 2018 11:45 AM Montrell Jones  Leon Rivero [id number] Follow-up Instructions Return in about 1 week (around 2018). Upcoming Health Maintenance Date Due Influenza Age 5 to Adult 2018 PAP AKA CERVICAL CYTOLOGY 2021 DTaP/Tdap/Td series (2 - Td) 2028 Allergies as of 2018  Review Complete On: 2018 By: Montrell Jones MD  
  
 Severity Noted Reaction Type Reactions Robitussin [Guaifenesin]  10/05/2016    Nausea and Vomiting Current Immunizations  Reviewed on 2017 No immunizations on file. Not reviewed this visit You Were Diagnosed With   
  
 Codes Comments Diarrhea, unspecified type    -  Primary ICD-10-CM: R19.7 ICD-9-CM: 787.91 [facility] unspecified type [facility]     ICD-10-CM: F20.9 ICD-9-CM: 295.90 Vitals BP Pulse Temp Resp Height(growth percentile) Weight(growth percentile) 113/72 (BP 1 Location: Left arm, BP Patient Position: At rest) (!) 118 95.4 °F (35.2 °C) (Oral) 16 5' 6\" (1.676 m) 158 lb (71.7 kg) SpO2 BMI OB Status Smoking Status 97% 25.5 kg/m2 Recent pregnancy Current Every Day Smoker Vitals History BMI and BSA Data Body Mass Index Body Surface Area 25.5 kg/m 2 1.83 m 2 Preferred Pharmacy Pharmacy Name Phone [address] [facility]. [phone] Your Updated Medication List  
  
   
This list is accurate as of 18 12:41 PM.  Always use your most recent med list.  
  
  
  
  
 cloZAPine 25 mg tablet Commonly known as:  CLOZARIL Take 50 mg by mouth two (2) times a day. DEPAKOTE 250 mg tablet Generic drug:  divalproex   
 Take 500 mg by mouth two (2) times a day. desvenlafaxine succinate 50 mg ER tablet Commonly known as:  PRISTIQ Take 50 mg by mouth. diphenoxylate-atropine 2.5-0.025 mg per tablet Commonly known as:  LOMOTIL Take 1 Tab by mouth four (4) times daily as needed for Diarrhea. Max Daily Amount: 4 Tabs. hydrOXYzine pamoate 25 mg capsule Commonly known as:  VISTARIL Take 25 mg by mouth three (3) times daily as needed for Itching. Lactobacillus Reuteri Commonly known as:  BIOGAIA PROBIOTIC Take 1 Each by mouth daily. metroNIDAZOLE 500 mg tablet Commonly known as:  FLAGYL Take 1 Tab by mouth three (3) times daily for 7 days. psyllium husk-aspartame 3.4 gram Pwpk packet Commonly known as:  METAMUCIL FIBER SINGLES Take 1 Packet by mouth daily. raNITIdine 150 mg tablet Commonly known as:  ZANTAC Take 1 Tab by mouth nightly. Prescriptions Printed Refills diphenoxylate-atropine (LOMOTIL) 2.5-0.025 mg per tablet 0 Sig: Take 1 Tab by mouth four (4) times daily as needed for Diarrhea. Max Daily Amount: 4 Tabs. Class: Print Route: Oral  
  
Prescriptions Sent to Pharmacy Refills  
 metroNIDAZOLE (FLAGYL) 500 mg tablet 0 Sig: Take 1 Tab by mouth three (3) times daily for 7 days. Class: Normal  
 Pharmacy: [address] [place] [address], [place] [address].  #: [phone] Route: Oral  
  
We Performed the Following C DIFFICILE TOXIN A & B BY EIA [22463 CPT(R)] CULTURE, STOOL C5058677 CPT(R)] Follow-up Instructions Return in about 1 week (around 8/2/2018). Introducing [facility] & HEALTH SERVICES! Jeff López introduces Private Practice patient portal. Now you can access parts of your medical record, email your doctor's office, and request medication refills online. 1. In your internet browser, go to [email or web address] 2. Click on the First Time User? Click Here link in the Sign In box.  You will see the New Member Sign Up page. 3. Enter your Tixers Access Code exactly as it appears below. You will not need to use this code after youve completed the sign-up process. If you do not sign up before the expiration date, you must request a new code. · [employer]ers Access Code: [email or web address] Expires: 10/17/2018 10:11 AM 
 
4. Enter the last four digits of your Social Security Number (xxxx) and Date of Birth (mm/dd/yyyy) as indicated and click Submit. You will be taken to the next sign-up page. 5. Create a [employer] ID. This will be your Tixers login ID and cannot be changed, so think of one that is secure and easy to remember. 6. Create a Tixers password. You can change your password at any time. 7. Enter your Password Reset Question and Answer. This can be used at a later time if you forget your password. 8. Enter your e-mail address. You will receive e-mail notification when new information is available in [address] 9. Click Sign Up. You can now view and download portions of your medical record. 10. Click the Download Summary menu link to download a portable copy of your medical information. If you have questions, please visit the Frequently Asked Questions section of the [employer] website. Remember, [employer] is NOT to be used for urgent needs. For medical emergencies, dial 911. Now available from your iPhone and Android! Please provide this summary of care documentation to your next provider. Your primary care clinician is listed as Moreno Rudd. If you have any questions after today's visit, please call [phone].

## 2018-07-26 NOTE — PROGRESS NOTES
C/o abdominal pain with bloating and diarrhea many times a day X about 6 weeks  Almaz Cotton LPN  3/52/2663  37:24 PM

## 2018-07-27 ENCOUNTER — CLINICAL SUPPORT (OUTPATIENT)
Dept: INTERNAL MEDICINE CLINIC | Age: 28
End: 2018-07-27

## 2018-07-27 DIAGNOSIS — R19.7 DIARRHEA, UNSPECIFIED TYPE: Primary | ICD-10-CM

## 2018-07-31 NOTE — PROGRESS NOTES
Chief Complaint   Patient presents with    Labs Only     STOOL     The patient presents with lab drop off only.

## 2018-08-01 LAB
C DIFF TOX A+B STL QL IA: NEGATIVE
CAMPYLOBACTER STL CULT: NORMAL
E COLI SXT STL QL IA: NEGATIVE
SALM + SHIG STL CULT: NORMAL

## 2018-08-03 ENCOUNTER — OFFICE VISIT (OUTPATIENT)
Dept: INTERNAL MEDICINE CLINIC | Age: 28
End: 2018-08-03

## 2018-08-03 VITALS
HEIGHT: 66 IN | SYSTOLIC BLOOD PRESSURE: 107 MMHG | BODY MASS INDEX: 25.71 KG/M2 | OXYGEN SATURATION: 98 % | RESPIRATION RATE: 16 BRPM | WEIGHT: 160 LBS | DIASTOLIC BLOOD PRESSURE: 67 MMHG | TEMPERATURE: 97.1 F | HEART RATE: 88 BPM

## 2018-08-03 DIAGNOSIS — R19.7 DIARRHEA, UNSPECIFIED TYPE: Primary | ICD-10-CM

## 2018-08-03 DIAGNOSIS — F20.0 SCHIZOPHRENIA, PARANOID, CHRONIC WITH ACUTE EXACERBATION (HCC): ICD-10-CM

## 2018-08-03 NOTE — PROGRESS NOTES
Chief Complaint   Patient presents with    Abdominal Pain     abd pain, passing gas, diarrhea, chest pain, burping frequently and headaches     I have reviewed the patient's medical history in detail and updated the computerized patient record. Health Maintenance reviewed. 1. Have you been to the ER, urgent care clinic since your last visit? Hospitalized since your last visit?no    2. Have you seen or consulted any other health care providers outside of the [facility] since your last visit? Include any pap smears or colon screening. No    Encouraged pt to discuss pt's wishes with spouse/partner/family and bring them in the next appt to follow thru with the Advanced Directive    No flowsheet data found. PHQ over the last two weeks 4/2/2018   Little interest or pleasure in doing things Not at all   Feeling down, depressed, irritable, or hopeless Not at all   Total Score PHQ 2 0       Abuse Screening Questionnaire 4/2/2018   Do you ever feel afraid of your partner? N   Are you in a relationship with someone who physically or mentally threatens you? N   Is it safe for you to go home?  Y       ADL Assessment 4/2/2018   Feeding yourself No Help Needed   Getting from bed to chair No Help Needed   Getting dressed No Help Needed   Bathing or showering No Help Needed   Walk across the room (includes cane/walker) No Help Needed   Using the telphone No Help Needed   Taking your medications No Help Needed   Preparing meals No Help Needed   Managing money (expenses/bills) No Help Needed   Moderately strenuous housework (laundry) No Help Needed   Shopping for personal items (toiletries/medicines) No Help Needed   Shopping for groceries No Help Needed   Driving Help Needed   Climbing a flight of stairs No Help Needed   Getting to places beyond walking distances Help Needed

## 2018-08-03 NOTE — PROGRESS NOTES
HISTORY OF PRESENT ILLNESS  Emily Martini is a 29 y.o. female. Abdominal Pain   The history is provided by the patient. This is a new problem. Episode onset: 6 weeks. The problem occurs daily (3 loose bowels). The problem has been gradually improving. Associated symptoms include chest pain and abdominal pain. Associated symptoms comments: Still loose firming up some. Treatments tried: metronidazol lomotil. The treatment provided mild relief. Currently having some psychiatric issues, there is some issues at her current place of residence, [facility]. This is a residential treatment house, she can leave very  there if she desires  Stool studies Clostridium difficile and cultures have been negative.     Patient Active Problem List   Diagnosis Code    Cigarette nicotine dependence without complication N95.023    Encounter for other general examination (CODE) Z00.8    Schizophrenia, paranoid, chronic with acute exacerbation ([address] F20.0    Recurrent depression ([address] F33.9    Schizophrenia ([facility] F20.9    Vaginal bleeding in pregnancy O46.90    Labor and delivery indication for care or intervention O75.9    Episodic mood disorder ([facility] F39    Previous  delivery affecting pregnancy, antepartum O34.219    Single delivery by  O80     Social History     Social History    Marital status: SINGLE     Spouse name: N/A    Number of children: 2    Years of education: N/A     Occupational History    disabled      Social History Main Topics    Smoking status: Current Every Day Smoker     Packs/day: 0.50    Smokeless tobacco: Former User    Alcohol use No      Comment: Sober for x 3 months    Drug use: Yes     Special: Marijuana, Other      Comment: Alcohol    Sexual activity: No     Other Topics Concern    Not on file     Social History Narrative    ** Merged History Encounter **         ;  2 children; unemployed    Disabled related to mental issues/ 2017    2 boys live with her mom.    Currently in [facility]   Cardiovascular: Positive for chest pain. Gastrointestinal: Positive for abdominal pain and diarrhea. Psychiatric/Behavioral: Positive for depression. Negative for substance abuse. Physical Exam  Visit Vitals    /67 (BP 1 Location: Left arm, BP Patient Position: Sitting)    Pulse 88    Temp 97.1 °F (36.2 °C) (Oral)    Resp 16    Ht 5' 6\" (1.676 m)    Wt 160 lb (72.6 kg)    SpO2 98%    BMI 25.82 kg/m2     WD WN female NAD, flat affect  Heart RRR without murmers clicks or rubs  Lungs CTA  Abdo soft nontender  Ext no edema    ASSESSMENT and PLAN  Encounter Diagnoses   Name Primary?  Diarrhea, unspecified type Yes    Schizophrenia, paranoid, chronic with acute exacerbation ([facility]      Orders Placed This Encounter    AMB SUPPLY ORDER     We discussed further workup of her diarrhea but since it is improving, no change. Diarrhea or other symptoms especially her abdominal pain were to worsen she can call for referral to gastroenterology. We will try and get records from her psychiatrist.  Role of her antipsychotics symptoms is unclear. Does relate some issues with her therapist.  Follow-up Disposition:  Return in about 4 weeks (around 8/31/2018) for routine follow up.

## 2018-08-03 NOTE — MR AVS SNAPSHOT
[address] [address]38 [facility] 
[phone] Patient: Kaylie Sellers MRN: [id number] Visit Information Date & Time Provider Department Dept. Phone Encounter #  
 8/3/2018  3:00 PM Kellie Sharp MD [address] [facility] [phone] Follow-up Instructions Return in about 4 weeks (around 8/31/2018) for routine follow up. Upcoming Health Maintenance Date Due Influenza Age 5 to Adult 8/1/2018 PAP AKA CERVICAL CYTOLOGY 4/2/2021 DTaP/Tdap/Td series (2 - Td) 4/2/2028 Allergies as of 8/3/2018  Review Complete On: 8/3/2018 By: Davina Merritt LPN Severity Noted Reaction Type Reactions Robitussin [Guaifenesin]  10/05/2016    Nausea and Vomiting Current Immunizations  Reviewed on 4/20/2017 No immunizations on file. Not reviewed this visit You Were Diagnosed With   
  
 Codes Comments Diarrhea, unspecified type    -  Primary ICD-10-CM: R19.7 ICD-9-CM: 787.91 Schizophrenia, paranoid, chronic with acute exacerbation [facility]     ICD-10-CM: F20.0 ICD-9-CM: 295.34 Vitals BP Pulse Temp Resp Height(growth percentile) Weight(growth percentile) 107/67 (BP 1 Location: Left arm, BP Patient Position: Sitting) 88 97.1 °F (36.2 °C) (Oral) 16 5' 6\" (1.676 m) 160 lb (72.6 kg) SpO2 BMI OB Status Smoking Status 98% 25.82 kg/m2 Recent pregnancy Current Every Day Smoker BMI and BSA Data Body Mass Index Body Surface Area  
 25.82 kg/m 2 1.84 m 2 Preferred Pharmacy Pharmacy Name Phone [address] [facility]. [phone] Your Updated Medication List  
  
   
This list is accurate as of 8/3/18  3:37 PM.  Always use your most recent med list.  
  
  
  
  
 cloZAPine 25 mg tablet Commonly known as:  CLOZARIL Take 50 mg by mouth two (2) times a day. DEPAKOTE 250 mg tablet Generic drug:  divalproex DR Take 500 mg by mouth two (2) times a day. desvenlafaxine succinate 50 mg ER tablet Commonly known as:  PRISTIQ Take 50 mg by mouth. diphenoxylate-atropine 2.5-0.025 mg per tablet Commonly known as:  LOMOTIL Take 1 Tab by mouth four (4) times daily as needed for Diarrhea. Max Daily Amount: 4 Tabs. hydrOXYzine pamoate 25 mg capsule Commonly known as:  VISTARIL Take 25 mg by mouth three (3) times daily as needed for Itching. Lactobacillus Reuteri Commonly known as:  BIOGAIA PROBIOTIC Take 1 Each by mouth daily. psyllium husk-aspartame 3.4 gram Pwpk packet Commonly known as:  METAMUCIL FIBER SINGLES Take 1 Packet by mouth daily. raNITIdine 150 mg tablet Commonly known as:  ZANTAC Take 1 Tab by mouth nightly. We Performed the Following AMB SUPPLY ORDER [[id number] Custom] Comments:  
 Please send me medical records, [address], [address] [place] [address] Follow-up Instructions Return in about 4 weeks (around 8/31/2018) for routine follow up. Introducing [facility]! Joyce Contreras introduces "[employer]" patient portal. Now you can access parts of your medical record, email your doctor's office, and request medication refills online. 1. In your internet browser, go to [email or web address] 2. Click on the First Time User? Click Here link in the Sign In box. You will see the New Member Sign Up page. 3. Enter your "[employer]" Access Code exactly as it appears below. You will not need to use this code after youve completed the sign-up process. If you do not sign up before the expiration date, you must request a new code. · "[employer]" Access Code: [email or web address] Expires: 10/17/2018 10:11 AM 
 
4. Enter the last four digits of your Social Security Number (xxxx) and Date of Birth (mm/dd/yyyy) as indicated and click Submit. You will be taken to the next sign-up page. 5. Create a [employer] ID. This will be your [employer] login ID and cannot be changed, so think of one that is secure and easy to remember. 6. Create a [employer] password. You can change your password at any time. 7. Enter your Password Reset Question and Answer. This can be used at a later time if you forget your password. 8. Enter your e-mail address. You will receive e-mail notification when new information is available in [address] 9. Click Sign Up. You can now view and download portions of your medical record. 10. Click the Download Summary menu link to download a portable copy of your medical information. If you have questions, please visit the Frequently Asked Questions section of the [employer] website. Remember, [employer] is NOT to be used for urgent needs. For medical emergencies, dial 911. Now available from your iPhone and Android! Please provide this summary of care documentation to your next provider. Your primary care clinician is listed as Eva Barrientos. If you have any questions after today's visit, please call [phone].

## 2018-08-07 ENCOUNTER — CLINICAL SUPPORT (OUTPATIENT)
Dept: INTERNAL MEDICINE CLINIC | Age: 28
End: 2018-08-07

## 2018-08-07 VITALS
DIASTOLIC BLOOD PRESSURE: 54 MMHG | OXYGEN SATURATION: 97 % | HEIGHT: 66 IN | HEART RATE: 99 BPM | TEMPERATURE: 97.4 F | RESPIRATION RATE: 20 BRPM | SYSTOLIC BLOOD PRESSURE: 116 MMHG

## 2018-08-07 DIAGNOSIS — Z11.1 ENCOUNTER FOR PPD TEST: Primary | ICD-10-CM

## 2018-08-07 NOTE — PROGRESS NOTES
Chief Complaint   Patient presents with    PPD Placement     Tuberculin skin test applied to left ventral forearm. PPD Placement note  Barron Stallworth, 29 y.o. female is here today for placement of PPD test  Reason for PPD test: [id number]  Pt taken PPD test before: yes  Verified in allergy area and with patient that they are not allergic to the products PPD is made of (Phenol or Tween). Yes  Is patient taking any oral or IV steroid medication now or have they taken it in the last month? no  Has the patient ever received the BCG vaccine?: no  Has the patient been in recent contact with anyone known or suspected of having active TB disease?: no       Date of exposure (if applicable): n/a       Name of person they were exposed to (if applicable): n/a  O: Alert and oriented in NAD. P:  PPD placed on 8/7/2018. Patient advised to return for reading within 48-72 hours.

## 2018-08-09 ENCOUNTER — CLINICAL SUPPORT (OUTPATIENT)
Dept: INTERNAL MEDICINE CLINIC | Age: 28
End: 2018-08-09

## 2018-08-09 DIAGNOSIS — Z11.1 SCREENING FOR TUBERCULOSIS: Primary | ICD-10-CM

## 2018-08-09 LAB
MM INDURATION POC: 0 MM (ref 0–5)
PPD POC: NEGATIVE NEGATIVE

## 2018-08-09 NOTE — PROGRESS NOTES
Negative result to PPD placed on left forearm on 8/7/18 - form completed and given back to patient - see copy in chart Hitesh Hendricks LPN  3/4/9906  0:33 PM

## 2018-08-17 ENCOUNTER — TELEPHONE (OUTPATIENT)
Dept: INTERNAL MEDICINE CLINIC | Age: 28
End: 2018-08-17

## 2018-08-17 NOTE — TELEPHONE ENCOUNTER
Mar, from the [facility], called in reference to needing pts last office notes. She said that pt said that one of her meds were discharged but they have not gotten records of it. Please fax to [phone].

## 2018-10-29 DIAGNOSIS — K21.9 GASTROESOPHAGEAL REFLUX DISEASE WITHOUT ESOPHAGITIS: ICD-10-CM

## 2018-10-29 RX ORDER — RANITIDINE 150 MG/1
150 TABLET, FILM COATED ORAL
Qty: 30 TAB | Refills: 5 | Status: SHIPPED | OUTPATIENT
Start: 2018-10-29 | End: 2019-05-08

## 2018-10-29 NOTE — TELEPHONE ENCOUNTER
Requested Prescriptions     Pending Prescriptions Disp Refills    raNITIdine (ZANTAC) 150 mg tablet 30 Tab 5     Sig: Take 1 Tab by mouth nightly. Last office visit 2486  Future ?   Last filled  4//19/18  Changes made to medication on last visit  none

## 2019-01-11 ENCOUNTER — HOSPITAL ENCOUNTER (OUTPATIENT)
Dept: LAB | Age: 29
Discharge: HOME | End: 2019-01-11
Payer: COMMERCIAL

## 2019-01-11 LAB
BASOPHILS # BLD: 0 K/UL (ref 0–0.1)
BASOPHILS NFR BLD: 0 % (ref 0–2)
DIFFERENTIAL METHOD BLD: ABNORMAL
EOSINOPHIL # BLD: 0 K/UL (ref 0–0.4)
EOSINOPHIL NFR BLD: 0 % (ref 0–5)
ERYTHROCYTE [DISTWIDTH] IN BLOOD BY AUTOMATED COUNT: 15.1 % (ref 11.6–14.5)
HCT VFR BLD AUTO: 36.1 % (ref 35–45)
HGB BLD-MCNC: 11.3 G/DL (ref 12–16)
LYMPHOCYTES # BLD: 2.3 K/UL (ref 0.9–3.6)
LYMPHOCYTES NFR BLD: 29 % (ref 21–52)
MCH RBC QN AUTO: 24.9 PG (ref 24–34)
MCHC RBC AUTO-ENTMCNC: 31.3 G/DL (ref 31–37)
MCV RBC AUTO: 79.5 FL (ref 74–97)
MONOCYTES # BLD: 0.8 K/UL (ref 0.05–1.2)
MONOCYTES NFR BLD: 10 % (ref 3–10)
NEUTS SEG # BLD: 4.7 K/UL (ref 1.8–8)
NEUTS SEG NFR BLD: 61 % (ref 40–73)
PLATELET # BLD AUTO: 258 K/UL (ref 135–420)
PMV BLD AUTO: 9.6 FL (ref 9.2–11.8)
RBC # BLD AUTO: 4.54 M/UL (ref 4.2–5.3)
WBC # BLD AUTO: 7.8 K/UL (ref 4.6–13.2)

## 2019-01-11 PROCEDURE — 36415 COLL VENOUS BLD VENIPUNCTURE: CPT

## 2019-01-11 PROCEDURE — 85025 COMPLETE CBC W/AUTO DIFF WBC: CPT

## 2019-02-20 ENCOUNTER — OFFICE VISIT (OUTPATIENT)
Dept: INTERNAL MEDICINE CLINIC | Age: 29
End: 2019-02-20

## 2019-02-20 VITALS
WEIGHT: 206 LBS | OXYGEN SATURATION: 98 % | TEMPERATURE: 97.6 F | RESPIRATION RATE: 16 BRPM | HEART RATE: 106 BPM | DIASTOLIC BLOOD PRESSURE: 78 MMHG | HEIGHT: 66 IN | BODY MASS INDEX: 33.11 KG/M2 | SYSTOLIC BLOOD PRESSURE: 117 MMHG

## 2019-02-20 DIAGNOSIS — R35.0 URINARY FREQUENCY: ICD-10-CM

## 2019-02-20 DIAGNOSIS — R06.83 SNORING: ICD-10-CM

## 2019-02-20 DIAGNOSIS — D50.9 IRON DEFICIENCY ANEMIA, UNSPECIFIED IRON DEFICIENCY ANEMIA TYPE: ICD-10-CM

## 2019-02-20 DIAGNOSIS — Z86.718 HISTORY OF DVT OF LOWER EXTREMITY: ICD-10-CM

## 2019-02-20 DIAGNOSIS — K21.9 GASTROESOPHAGEAL REFLUX DISEASE, ESOPHAGITIS PRESENCE NOT SPECIFIED: ICD-10-CM

## 2019-02-20 DIAGNOSIS — R53.83 FATIGUE, UNSPECIFIED TYPE: Primary | ICD-10-CM

## 2019-02-20 LAB
BILIRUB UR QL STRIP: NEGATIVE
GLUCOSE UR-MCNC: NEGATIVE MG/DL
KETONES P FAST UR STRIP-MCNC: NEGATIVE MG/DL
PH UR STRIP: 7 [PH] (ref 4.6–8)
PROT UR QL STRIP: NEGATIVE
SP GR UR STRIP: 1.01 (ref 1–1.03)
UA UROBILINOGEN AMB POC: NORMAL (ref 0.2–1)
URINALYSIS CLARITY POC: CLEAR
URINALYSIS COLOR POC: YELLOW
URINE BLOOD POC: NORMAL
URINE LEUKOCYTES POC: NEGATIVE
URINE NITRITES POC: NEGATIVE

## 2019-02-20 RX ORDER — RANITIDINE 150 MG/1
150 TABLET, FILM COATED ORAL 2 TIMES DAILY
Qty: 180 TAB | Refills: 2 | Status: SHIPPED | OUTPATIENT
Start: 2019-02-20 | End: 2019-02-20 | Stop reason: SDUPTHER

## 2019-02-20 RX ORDER — FERROUS SULFATE 325(65) MG
325 TABLET, DELAYED RELEASE (ENTERIC COATED) ORAL
Qty: 90 TAB | Refills: 1 | Status: SHIPPED | OUTPATIENT
Start: 2019-02-20 | End: 2019-05-30 | Stop reason: SDUPTHER

## 2019-02-20 RX ORDER — RANITIDINE 150 MG/1
150 TABLET, FILM COATED ORAL 2 TIMES DAILY
Qty: 180 TAB | Refills: 2 | Status: SHIPPED | OUTPATIENT
Start: 2019-02-20 | End: 2019-05-30 | Stop reason: SDUPTHER

## 2019-02-20 RX ORDER — FERROUS SULFATE 325(65) MG
325 TABLET, DELAYED RELEASE (ENTERIC COATED) ORAL
Qty: 90 TAB | Refills: 1 | Status: SHIPPED | OUTPATIENT
Start: 2019-02-20 | End: 2019-02-20 | Stop reason: SDUPTHER

## 2019-02-20 RX ORDER — LANOLIN ALCOHOL/MO/W.PET/CERES
325 CREAM (GRAM) TOPICAL
COMMUNITY
Start: 2017-09-11 | End: 2019-05-08

## 2019-02-20 NOTE — PROGRESS NOTES
FAMILY MEDICINE CLINIC NOTE    S: The patient presents for establishment of care. She complains of perisitent fatigue, weight gain and edema every day for the last 6 months. She reports urinary frequency and urgency daily for the last 2 weeks. No nausea, fever or flank pain. No dysuria. She has a history of right DVT for which occurred January 2018, the patient has been lost to follow up She is not currently on anticoagulation therapy. History of iron deficiency anemia, taking ferrous sulfate 3 times per week. Current Outpatient Medications on File Prior to Visit   Medication Sig Dispense Refill    ferrous sulfate 325 mg (65 mg iron) tablet 325 mg.      cloZAPine (CLOZARIL) 25 mg tablet Take 50 mg by mouth two (2) times a day.  desvenlafaxine succinate (PRISTIQ) 50 mg ER tablet Take 50 mg by mouth.  hydrOXYzine pamoate (VISTARIL) 25 mg capsule Take 25 mg by mouth three (3) times daily as needed for Itching.  divalproex DR (DEPAKOTE) 250 mg tablet Take 500 mg by mouth two (2) times a day.  raNITIdine (ZANTAC) 150 mg tablet Take 1 Tab by mouth nightly. 30 Tab 5    diphenoxylate-atropine (LOMOTIL) 2.5-0.025 mg per tablet Take 1 Tab by mouth four (4) times daily as needed for Diarrhea. Max Daily Amount: 4 Tabs. 12 Tab 0    psyllium husk-aspartame (METAMUCIL FIBER SINGLES) 3.4 gram pwpk packet Take 1 Packet by mouth daily. 30 Packet 5    Lactobacillus Reuteri (BIOGAIA PROBIOTIC) Take 1 Each by mouth daily. 30 Each 0     No current facility-administered medications on file prior to visit. Past Medical History:   Diagnosis Date    Alcoholic ([facility]     Sober 3 months; Weekly [employer] meeting;  Had substance abuse counseling;     Bipolar disorder ([facility]     Cirrhosis ([facility]     Past not current issue per patient    ETOH abuse     GERD (gastroesophageal reflux disease)     Head injury     Marijuana abuse     Phobia     [facility]    Post partum depression     Pregnancy  Psychiatric disorder     Psychotic disorder ([facility]     Depression/  Joshua Ovalle NP; Bipolar disorder, mood swings.  Schizophrenia ([facility]     Stroke [facility]) 2011       Social History     Socioeconomic History    Marital status: LEGALLY      Spouse name: Not on file    Number of children: 2    Years of education: Not on file    Highest education level: Not on file   Social Needs    Financial resource strain: Not on file    Food insecurity - worry: Not on file    Food insecurity - inability: Not on file   [employer] needs - medical: Not on file   [employer] needs - non-medical: Not on file   Occupational History    Occupation: disabled   Tobacco Use    Smoking status: Current Every Day Smoker     Packs/day: 0.50    Smokeless tobacco: Former User   Substance and Sexual Activity    Alcohol use: No     Comment: Sober for x 3 months    Drug use: Yes     Types: Marijuana, Other     Comment: Alcohol    Sexual activity: No   Other Topics Concern    Not on file   Social History Narrative    ** Merged History Encounter **         ;  2 children; unemployed    Disabled related to mental issues/ 2017    2 boys live with her mom. Currently in [address] [place] [address] Road History   Family history unknown: Yes       O:  Visit Vitals  /78 (BP 1 Location: Right arm, BP Patient Position: Sitting)   Pulse (!) 106   Temp 97.6 °F (36.4 °C) (Oral)   Resp 16   Ht 5' 6\" (1.676 m)   Wt 206 lb (93.4 kg)   SpO2 98%   BMI 33.25 kg/m²     NAD, comfortable  Obese  No thyromegaly  No LAD  RRR, no murmurs  CTABL, no wheezing/ronchi/rales  abd soft ND NT normoactive BS  No flank or suprapubic TTP  Trace non-pitting edema bilateral LE    29 y.o. female      ICD-10-CM ICD-9-CM    1. Fatigue, unspecified type R53.83 780.79 TSH 3RD GENERATION      METABOLIC PANEL, COMPREHENSIVE      REFERRAL TO SLEEP STUDIES   2.  Gastroesophageal reflux disease, esophagitis presence not specified K21.9 530.81 raNITIdine (ZANTAC) 150 mg tablet      DISCONTINUED: raNITIdine (ZANTAC) 150 mg tablet   3. Iron deficiency anemia, unspecified iron deficiency anemia type D50.9 280.9 ferrous sulfate (IRON) 325 mg (65 mg iron) EC tablet      DISCONTINUED: ferrous sulfate (IRON) 325 mg (65 mg iron) EC tablet   4. Urinary frequency R35.0 788.41 AMB POC URINALYSIS DIP STICK AUTO W/O MICRO      CULTURE, URINE      CULTURE, URINE   5. Snoring R06.83 786.09 REFERRAL TO SLEEP STUDIES   6.  History of DVT of lower extremity Z86.718 V12.51 DUPLEX LOWER EXT VENOUS BILAT     Follow up in 4 weeks

## 2019-02-20 NOTE — PROGRESS NOTES
ROOM # 10    Marvin Clinton presents today for   Chief Complaint   Patient presents with    Leg Swelling    Establish Care    Urinary Frequency     few weeks     Fatigue       Marvin Clinton preferred language for health care discussion is english/other. Is someone accompanying this pt? Yes Mitch Avila      Is the patient using any DME equipment during [address]? Depression Screening:  3 most recent PHQ Screens 2/20/2019 4/2/2018   Little interest or pleasure in doing things Not at all Not at all   Feeling down, depressed, irritable, or hopeless Not at all Not at all   Total Score PHQ 2 0 0       Learning Assessment:  Learning Assessment 2/20/2019 4/2/2018   PRIMARY LEARNER Patient Patient   HIGHEST LEVEL OF EDUCATION - PRIMARY LEARNER  [address] [facility] LEARNER NONE NONE   PRIMARY LANGUAGE ENGLISH ENGLISH   LEARNER PREFERENCE PRIMARY DEMONSTRATION VIDEOS   ANSWERED BY patient Patient   RELATIONSHIP SELF SELF       Abuse Screening:  Abuse Screening Questionnaire 4/2/2018   Do you ever feel afraid of your partner? N   Are you in a relationship with someone who physically or mentally threatens you? N   Is it safe for you to go home? Y       Fall Risk  No flowsheet data found. Health Maintenance reviewed and discussed per provider. Yes    Marvin Clinton is due for   Health Maintenance Due   Topic Date Due    Influenza Age 5 to Adult  08/01/2018     Please order/place referral if appropriate. Advance Directive:  1. Do you have an advance directive in place? Patient Reply: yes    2. If not, would you like material regarding how to put one in place? Patient Reply: no    Coordination of Care:  1. Have you been to the ER, urgent care clinic since your last visit? Hospitalized since your last visit? no    2. Have you seen or consulted any other health care providers outside of the [address] [place] [address] since your last visit?  Include any pap smears or colon screening.  no

## 2019-02-22 LAB
ALBUMIN SERPL-MCNC: 4.4 G/DL (ref 3.5–5.5)
ALBUMIN/GLOB SERPL: 1.6 {RATIO} (ref 1.2–2.2)
ALP SERPL-CCNC: 83 IU/L (ref 39–117)
ALT SERPL-CCNC: 10 IU/L (ref 0–32)
AST SERPL-CCNC: 13 IU/L (ref 0–40)
BACTERIA UR CULT: NORMAL
BILIRUB SERPL-MCNC: <0.2 MG/DL (ref 0–1.2)
BUN SERPL-MCNC: 6 MG/DL (ref 6–20)
BUN/CREAT SERPL: 9 (ref 9–23)
CALCIUM SERPL-MCNC: 9.4 MG/DL (ref 8.7–10.2)
CHLORIDE SERPL-SCNC: 102 MMOL/L (ref 96–106)
CO2 SERPL-SCNC: 23 MMOL/L (ref 20–29)
CREAT SERPL-MCNC: 0.64 MG/DL (ref 0.57–1)
GLOBULIN SER CALC-MCNC: 2.8 G/DL (ref 1.5–4.5)
GLUCOSE SERPL-MCNC: 109 MG/DL (ref 65–99)
POTASSIUM SERPL-SCNC: 4 MMOL/L (ref 3.5–5.2)
PROT SERPL-MCNC: 7.2 G/DL (ref 6–8.5)
SODIUM SERPL-SCNC: 141 MMOL/L (ref 134–144)
TSH SERPL DL<=0.005 MIU/L-ACNC: 1.26 UIU/ML (ref 0.45–4.5)

## 2019-02-28 ENCOUNTER — HOSPITAL ENCOUNTER (OUTPATIENT)
Dept: VASCULAR SURGERY | Age: 29
Discharge: HOME | End: 2019-02-28
Attending: FAMILY MEDICINE
Payer: MEDICAID

## 2019-02-28 DIAGNOSIS — Z86.718 HISTORY OF DVT OF LOWER EXTREMITY: ICD-10-CM

## 2019-02-28 PROCEDURE — 93970 EXTREMITY STUDY: CPT

## 2019-03-01 NOTE — PROGRESS NOTES
Please call this patient. Inform them that her ultrasound did not demonstrate any blood clots.      Thank you     Dr. Verna De La Garza

## 2019-03-04 NOTE — PROGRESS NOTES
Attempted to contact pt at  number, no answer. Lvm for pt to return call to office at [phone]. Will continue to try to contact pt.

## 2019-05-08 ENCOUNTER — OFFICE VISIT (OUTPATIENT)
Dept: INTERNAL MEDICINE CLINIC | Age: 29
End: 2019-05-08

## 2019-05-08 VITALS
WEIGHT: 213 LBS | DIASTOLIC BLOOD PRESSURE: 77 MMHG | OXYGEN SATURATION: 97 % | HEART RATE: 101 BPM | HEIGHT: 66 IN | SYSTOLIC BLOOD PRESSURE: 111 MMHG | TEMPERATURE: 99 F | RESPIRATION RATE: 14 BRPM | BODY MASS INDEX: 34.23 KG/M2

## 2019-05-08 DIAGNOSIS — R63.5 WEIGHT GAIN: ICD-10-CM

## 2019-05-08 DIAGNOSIS — J06.9 ACUTE URI: Primary | ICD-10-CM

## 2019-05-08 DIAGNOSIS — R20.2 PARESTHESIA: ICD-10-CM

## 2019-05-08 DIAGNOSIS — F41.9 ANXIETY: ICD-10-CM

## 2019-05-08 DIAGNOSIS — R60.0 BILATERAL LEG EDEMA: ICD-10-CM

## 2019-05-08 RX ORDER — HYDROXYZINE PAMOATE 25 MG/1
25 CAPSULE ORAL
Qty: 30 CAP | Refills: 0 | Status: SHIPPED | OUTPATIENT
Start: 2019-05-08 | End: 2019-05-08 | Stop reason: SDUPTHER

## 2019-05-08 RX ORDER — BENZTROPINE MESYLATE 0.5 MG/1
0.5 TABLET ORAL 2 TIMES DAILY
COMMUNITY
End: 2020-01-24

## 2019-05-08 RX ORDER — HYDROXYZINE PAMOATE 25 MG/1
25 CAPSULE ORAL
Qty: 30 CAP | Refills: 0 | Status: SHIPPED | OUTPATIENT
Start: 2019-05-08 | End: 2020-03-12

## 2019-05-08 RX ORDER — BENZONATATE 200 MG/1
200 CAPSULE ORAL
Qty: 21 CAP | Refills: 0 | Status: SHIPPED | OUTPATIENT
Start: 2019-05-08 | End: 2019-05-15

## 2019-05-08 NOTE — PROGRESS NOTES
HISTORY OF PRESENT ILLNESS  Tip Guerrier is a 34 y.o. female. HPI  Ms. Jeremy Nation presents for cough and bilateral leg swelling. 1) She c/o cough x 1 week. C/o associated wheezing. Cough is infrequently productive. No OTC therapies tried. Cough is worsening.   - No hx asthma. She admits to smoking 1/2 ppd.     2) Leg edema - c/o significant weight gain since her pregnancy (birth 18 months ago). She attributes this to a change of her depakote. C/o bilateral leg swelling since she has gained weight. She used compression stockings (to the knee) while pregnant but has not been wearing these as of late. 3) She will be out of Vistaril soon. She takes it BID prn. This was last prescribed by her former provider when she lived around [place]. She does have an upcoming psychiatry appt with [employer] in a few weeks. She has 3 capsules remaining. Review of Systems   Constitutional: Negative for weight loss. Neurological: Positive for tingling (occasional). Visit Vitals  /77 (BP 1 Location: Left arm, BP Patient Position: Sitting)   Pulse (!) 101   Temp 99 °F (37.2 °C) (Oral)   Resp 14   Ht 5' 6\" (1.676 m)   Wt 213 lb (96.6 kg)   LMP 04/24/2019 (Approximate)   SpO2 97%   BMI 34.38 kg/m²       Physical Exam   Constitutional: She is oriented to person, place, and time. She appears well-developed and well-nourished. No distress. HENT:   Head: Normocephalic and atraumatic. Right Ear: Tympanic membrane, external ear and ear canal normal.   Left Ear: Tympanic membrane, external ear and ear canal normal.   Nose: Nose normal.   Mouth/Throat: Uvula is midline, oropharynx is clear and moist and mucous membranes are normal. No oropharyngeal exudate, posterior oropharyngeal edema, posterior oropharyngeal erythema or tonsillar abscesses. Eyes: Pupils are equal, round, and reactive to light. Conjunctivae are normal. No scleral icterus. Neck: Neck supple.    Cardiovascular: Normal rate, regular rhythm and normal heart sounds. Exam reveals no gallop. No murmur heard. Pulses:       Dorsalis pedis pulses are 2+ on the right side, and 2+ on the left side. Posterior tibial pulses are 2+ on the right side, and 2+ on the left side. No pretibial or pitting edema. Pulmonary/Chest: Effort normal and breath sounds normal. No respiratory distress. She has no decreased breath sounds. She has no wheezes. She has no rhonchi. She has no rales. Lymphadenopathy:        Head (right side): No submandibular and no tonsillar adenopathy present. Head (left side): No submandibular and no tonsillar adenopathy present. She has no cervical adenopathy. Right: No supraclavicular adenopathy present. Left: No supraclavicular adenopathy present. Neurological: She is alert and oriented to person, place, and time. Skin: Skin is warm and dry. Psychiatric: She has a normal mood and affect. Her speech is normal.       ASSESSMENT and PLAN  Diagnoses and all orders for this visit:    1. Acute URI  -     benzonatate (TESSALON) 200 mg capsule; Take 1 Cap by mouth three (3) times daily as needed for Cough for up to 7 days. 2. Weight gain  -     METABOLIC PANEL, COMPREHENSIVE; Future  -     HEMOGLOBIN A1C W/O EAG; Future  -     TSH 3RD GENERATION; Future  -     T4, FREE; Future  - She associates weight gain with changes of her psychiatric medications. Advised she discuss this at her upcoming psychiatric consultation. 3. Bilateral leg edema  - Advised of compression sock use to improve symptoms. Goal of weight loss discussed. 4. Paresthesia  -     VITAMIN B12; Future    5. Anxiety  -     hydrOXYzine pamoate (VISTARIL) 25 mg capsule; Take 1 Cap by mouth two (2) times daily as needed for Anxiety. - Refill given. Advised future refills to come from psych. She agrees. Follow-up and Dispositions    · Return in about 2 weeks (around 5/22/2019) for follow-up.

## 2019-05-08 NOTE — PROGRESS NOTES
Patient presents to the clinic for bilateral leg swelling that began 4-5 months ago. Patient would also like to discuss a cough that began 1 month ago. Patient complains of shortness of breath.

## 2019-05-09 LAB
ALBUMIN SERPL-MCNC: 4.4 G/DL (ref 3.5–5.5)
ALBUMIN/GLOB SERPL: 1.5 {RATIO} (ref 1.2–2.2)
ALP SERPL-CCNC: 88 IU/L (ref 39–117)
ALT SERPL-CCNC: 12 IU/L (ref 0–32)
AST SERPL-CCNC: 13 IU/L (ref 0–40)
BILIRUB SERPL-MCNC: <0.2 MG/DL (ref 0–1.2)
BUN SERPL-MCNC: 7 MG/DL (ref 6–20)
BUN/CREAT SERPL: 9 (ref 9–23)
CALCIUM SERPL-MCNC: 9.6 MG/DL (ref 8.7–10.2)
CHLORIDE SERPL-SCNC: 103 MMOL/L (ref 96–106)
CO2 SERPL-SCNC: 22 MMOL/L (ref 20–29)
CREAT SERPL-MCNC: 0.75 MG/DL (ref 0.57–1)
GLOBULIN SER CALC-MCNC: 2.9 G/DL (ref 1.5–4.5)
GLUCOSE SERPL-MCNC: 96 MG/DL (ref 65–99)
HBA1C MFR BLD: 5.2 % (ref 4.8–5.6)
POTASSIUM SERPL-SCNC: 4.3 MMOL/L (ref 3.5–5.2)
PROT SERPL-MCNC: 7.3 G/DL (ref 6–8.5)
SODIUM SERPL-SCNC: 142 MMOL/L (ref 134–144)
T4 FREE SERPL-MCNC: 0.99 NG/DL (ref 0.82–1.77)
TSH SERPL DL<=0.005 MIU/L-ACNC: 1.24 UIU/ML (ref 0.45–4.5)
VIT B12 SERPL-MCNC: 685 PG/ML (ref 232–1245)

## 2019-05-20 ENCOUNTER — OFFICE VISIT (OUTPATIENT)
Dept: PULMONOLOGY | Age: 29
End: 2019-05-20

## 2019-05-20 VITALS
OXYGEN SATURATION: 98 % | RESPIRATION RATE: 18 BRPM | HEIGHT: 66 IN | WEIGHT: 214.6 LBS | DIASTOLIC BLOOD PRESSURE: 86 MMHG | TEMPERATURE: 97.9 F | BODY MASS INDEX: 34.49 KG/M2 | SYSTOLIC BLOOD PRESSURE: 138 MMHG | HEART RATE: 105 BPM

## 2019-05-20 DIAGNOSIS — F17.210 CIGARETTE NICOTINE DEPENDENCE WITHOUT COMPLICATION: ICD-10-CM

## 2019-05-20 DIAGNOSIS — K21.9 GASTROESOPHAGEAL REFLUX DISEASE, ESOPHAGITIS PRESENCE NOT SPECIFIED: ICD-10-CM

## 2019-05-20 DIAGNOSIS — R06.83 SNORING: Primary | ICD-10-CM

## 2019-05-20 DIAGNOSIS — F10.11 HISTORY OF ALCOHOL ABUSE: ICD-10-CM

## 2019-05-20 DIAGNOSIS — R00.0 TACHYCARDIA: ICD-10-CM

## 2019-05-20 DIAGNOSIS — F32.A DEPRESSION, UNSPECIFIED DEPRESSION TYPE: ICD-10-CM

## 2019-05-20 DIAGNOSIS — G47.19 EXCESSIVE DAYTIME SLEEPINESS: ICD-10-CM

## 2019-05-20 DIAGNOSIS — F20.9 SCHIZOPHRENIA, UNSPECIFIED TYPE (HCC): ICD-10-CM

## 2019-05-20 DIAGNOSIS — E66.9 OBESITY (BMI 30-39.9): ICD-10-CM

## 2019-05-20 RX ORDER — BENZONATATE 200 MG/1
200 CAPSULE ORAL
COMMUNITY
End: 2019-07-01 | Stop reason: ALTCHOICE

## 2019-05-20 NOTE — PROGRESS NOTES
Manuel Tobar presents today for   Chief Complaint   Patient presents with    Snoring       Is someone accompanying this pt?  Harsha Blanco    Is the patient using any DME equipment during OV? No     -DME Company n/a     Depression Screening:  3 most recent PHQ Screens 2/20/2019   Little interest or pleasure in doing things Not at all   Feeling down, depressed, irritable, or hopeless Not at all   Total Score PHQ 2 0       Learning Assessment:  Learning Assessment 2/20/2019   PRIMARY LEARNER Patient   HIGHEST LEVEL OF EDUCATION - PRIMARY LEARNER  SOME COLLEGE   BARRIERS PRIMARY LEARNER NONE   PRIMARY LANGUAGE ENGLISH   LEARNER PREFERENCE PRIMARY DEMONSTRATION   ANSWERED BY patient   RELATIONSHIP SELF       Abuse Screening:  Abuse Screening Questionnaire 4/2/2018   Do you ever feel afraid of your partner? N   Are you in a relationship with someone who physically or mentally threatens you? N   Is it safe for you to go home? Y       Fall Risk  No flowsheet data found. Coordination of Care:  1. Have you been to the ER, urgent care clinic since your last visit? Hospitalized since your last visit? No    2. Have you seen or consulted any other health care providers outside of the [address] [place] [address] since your last visit? Include any pap smears or colon screening.  No

## 2019-05-20 NOTE — PROGRESS NOTES
[facility]  Pulmonary, Critical Care, and Sleep Medicine    Office Progress Note- Initial Evaluation      Primary Care Physician: Dennise Austin MD     Reason for Visit:  Evaluation for possible sleep breathing disorder    Assessment:  1. Snoring: .  2. Excessive Daytime Sleepiness (EDS)-  More than likely multifactorial etiology. She has symptoms that can be related to a sleep breathing disorder. She also reports sleep paralysis, and some hallucinations that sound hypnagogic and other that are not. This can be difficult to discern due to report of schizophrenia. She does not describe symptoms suggestive of cataplexy. She also takes some medications that can be contributing to EDS. Given severity of symptoms and comorbidities additional in-lab sleep testing is indicated. I have discussed PAP therapy with the patient and shown her various types of masks in our clinic today. She states she would be willing to at least try PAP therapy if it were indicated. 3. Schizophrenia- reports taking prescribed medication and goes to biweekly counseling  4. Depression  5. Tachycardia- heart rate elevated today in clinic  6. Cigarette smoking/nicotine dependence  7. GERD: improved but not controlled with Zantac  8. Obesity: Body mass index is 34.64 kg/m². 9. H/O Alcohol abuse- denies current use      Plan:  ·   · Schedule patient for split sleep study for further evaluation-  can help arrange for ride to sleep lab  · Potential consequences of untreated sleep apnea, and/or excessive daytime sleepiness were discussed with the patient. · Educational materials provided. Discussed that she review sleep hygiene with her therapist  · Smoking cessation is encouraged  · Treatment options including CPAP, dental appliance, weight reduction measures, positional therapy, surgeries etc were discussed. · Healthy lifestyle changes to include weight loss and exercise discussed.   · Healthy sleep habits were reviewed and encouraged. · Continue to follow up with her mental health team  · Follow up with Primary Care Provider (PCP) as directed and for routine health care maintenance. · Follow-up after sleep testing, sooner should new symptoms or problems arise. History of Present Illness: Ms César Dietrich is a 34 y.o. female patient who presents for evaluation of EDS. The history was provided by the patient. Her  was also present for this evaluation. Occupation:  Not working                   Driving:  no   .   Snoring: This is a Chronic problem which has been ongoing for years. She wakes herself up snoring. Witnessed apneas are reported by patient's sister when the patient was sleeping on her back. Fatigue: This is a Chronic problem which has been ongoing for years- at least 8 years. Dental: Teeth clenching or grindingis not reported. Naps: are reported on a daily basis and tends to be spontaneous. Leg Symptoms/Pain: She does not have unpleasant or crawling sensation in legs or strong urge to move when inactive. However she does feel restless at night. No leg cramps. GERD: is reported. She takes Zantac which improves her symptoms but does not controlled it. Mood: Has a diagnosis of mood disorder and schizophrenia. History of depression. She sees a therapist every other week. Sleep-Wake History:     Estimates sleeping approximately 8 hours per night/day. Reports sleeping in a Bed with 2 pillows. She gets into bed at approximately [id number]. Once in bed,she readily falls asleep. Reports waking up to use the bathroom 0-1 times nightly. Pain, typically does not disturb their sleep. Vivid dreams are reported both with naps and when sleeping. She normally awakens with and without an alarm to start their day at [address] She  typically gets out of bed at and goes to the couch and falls back asleep again .       Waking up with a morning headache is not reported. Awakening with a dry mouth is reported. Symptom(s) suggestive of cataplexy is not  reported. Sleep paralysis is reported several times weekly- especially in the morning. Possible Hypnagogic and/or hypnopompic hallucinations are.- Reports seeing a shadow figure\" Sushing her\",  Has seen both vivid and shadow figure    Sleep walking is not  reported. Sleep talking is report. Other unusual and/or parasomnia behaviors are not reported. Family Sleep History: not known    Stop Geroge Pleasure 5/20/2019   Does the patient snore loudly (louder than talking or loud enough to be heard through closed doors)? 0   Does the patient often feel tired, fatigued, or sleepy during the daytime, even after a \"good\" night's sleep? 1   Has anyone ever observed the patient stop breathing during their sleep? 1   Does the patient have or are they being treated for high blood pressure? 0   Is the patient's BMI greater than 35? 0   Is your neck circumference greater than 17 inches (Male) or 16 inches (Female)? 0   Is the patient older than 48? 0   Is the patient male? 0   CANDI Score 2       3 most recent PHQ Screens 2/20/2019   Little interest or pleasure in doing things Not at all   Feeling down, depressed, irritable, or hopeless Not at all   Total Score PHQ 2 0       [place] Scale 5/20/2019   Sitting and Reading 0   Watching TV 0   Sitting, inactive in a public place (e.g. a movie theater or meeting) 0   As a passenger in a car for an hour, without a break 0   Lying down to rest in the afternoon, when circumstances permit 3   Sitting and talking to someone 0   Sitting quietly after lunch without alcohol 2   In a car, while stopped for a few minutes in traffic 0   [place] Sleepiness Score 5        Neck circ. in \"inches\": 13         Past Medical History:  Past Medical History:   Diagnosis Date    Alcoholic ([address]     Sober 3 months; Weekly AAA meeting;  Had substance abuse counseling;     Anemia     [facility]     [facility]     Past not current issue per patient    ETOH abuse     GERD (gastroesophageal reflux disease)     Head injury     Marijuana abuse     Phobia     [place] [employer]    Post partum depression     Pregnancy     Psychiatric disorder     Psychotic disorder ([facility]     Depression/  Keren Fonseca, NP; Bipolar disorder, mood swings.  Schizophrenia ([facility]     Stroke [facility])        Past Surgical History:  Past Surgical History:   Procedure Laterality Date    HX  SECTION      HX  SECTION      C section x 2;   &     HX RHINOPLASTY      HX TUBAL LIGATION  2017       Family History:  Family History   Family history unknown: Yes       Social History:    Caffeine Amount Time of last Intake Comments   Coffee 3 Cups/day Rare at     Soda Rare     Tea None     Energy Drinks 2/day  Unknown name   Over- the - counter stimulant pills None     Other Substances      Alcohol None  H/O Abuse reports sobriety   Tobacco 10-15 cigarettes/day x 15 years     Drugs Deniens         Medications:  Current Outpatient Medications on File Prior to Visit   Medication Sig Dispense Refill    benztropine (COGENTIN) 0.5 mg tablet Take 0.5 mg by mouth two (2) times a day.  hydrOXYzine pamoate (VISTARIL) 25 mg capsule Take 1 Cap by mouth two (2) times daily as needed for Anxiety. 30 Cap 0    raNITIdine (ZANTAC) 150 mg tablet Take 1 Tab by mouth two (2) times a day. 180 Tab 2    ferrous sulfate (IRON) 325 mg (65 mg iron) EC tablet Take 1 Tab by mouth Daily (before breakfast). 90 Tab 1    cloZAPine (CLOZARIL) 25 mg tablet Take 50 mg by mouth two (2) times a day. Take 3 tablets in the morning and 3 tablets in the evening.  desvenlafaxine succinate (PRISTIQ) 50 mg ER tablet Take 50 mg by mouth daily.  divalproex DR (DEPAKOTE) 250 mg tablet Take 500 mg by mouth two (2) times a day. No current facility-administered medications on file prior to visit. Allergy:  Allergies   Allergen Reactions    Robitussin [Guaifenesin] Nausea and Vomiting       Review of Systems  General ROS: positive for  - fatigue, sleep disturbance and weight gain  negative for - chills, fever, hot flashes, malaise, night sweats or weight loss  ENT ROS: positive for - headaches and nasal discharge  negative for - epistaxis, hearing change, nasal polyps, oral lesions, sinus pain, sneezing, sore throat, tinnitus, vertigo, visual changes or vocal changes  Hematological and Lymphatic ROS: negative for - bleeding problems, blood clots, bruising, jaundice, pallor or swollen lymph nodes  Endocrine ROS: negative for - polydipsia/polyuria, skin changes, temperature intolerance or unexpected weight changes  Respiratory ROS: no cough, shortness of breath, or wheezing  Cardiovascular ROS: no chest pain or dyspnea on exertion  Gastrointestinal ROS: no abdominal pain, change in bowel habits, or black or bloody stools  Genito-Urinary ROS: no dysuria, trouble voiding, or hematuria  Musculoskeletal ROS: asa yuliet  Neurological ROS: no TIA or stroke symptoms  Dermatological ROS: negative for - pruritus, rash or skin lesion changes   Psychological ROS: as above   Otherwise negative. Physical Exam:  Blood pressure 138/86, pulse (!) 105, temperature 97.9 °F (36.6 °C), temperature source Oral, resp. rate 18, height 5' 6\" (1.676 m), weight 97.3 kg (214 lb 9.6 oz), last menstrual period 04/24/2019, SpO2 98 %. on RA, Body mass index is 34.64 kg/m². General: No distress, acyanotic, appears stated age, cooperative, pleasant  HEENT: PERRL, EOMI, throat without erythema or exudate, Tongue- dental indention on tongue, Mallampati's score 3+, Uvula- midline. Neck: Supple,  no abnormally enlarged lymph nodes, thyroid is not enlarged, non-tender, no JVD, No Carotid bruit  Chest: Normal.  Lungs:  Moderate air entry, clear to auscultation bilaterally,   Heart: Regular rate and rhythm, S1S2 present, without murmur. Abdomen: Protuberant, bowel sounds normoactive, abdomen is soft without significant tenderness, or guarding. Extremity: Negative for cyanosis, edema, or clubbing. Skin: Skin color, texture, turgor normal. No rashes or lesions. Neurological: CN 2-12 grossly intact, normal muscle tone. Data Reviewed:  CBC:   Lab Results   Component Value Date/Time    WBC 7.8 01/11/2019 10:22 AM    HGB 11.3 (L) 01/11/2019 10:22 AM    HCT 36.1 01/11/2019 10:22 AM    PLATELET 522 84/79/4987 10:22 AM    MCV 79.5 01/11/2019 10:22 AM       BMP:   Lab Results   Component Value Date/Time    Sodium 142 05/08/2019 02:33 AM    Potassium 4.3 05/08/2019 02:33 AM    Chloride 103 05/08/2019 02:33 AM    CO2 22 05/08/2019 02:33 AM    Anion gap 12 07/30/2017 04:11 PM    Glucose 96 05/08/2019 02:33 AM    BUN 7 05/08/2019 02:33 AM    Creatinine 0.75 05/08/2019 02:33 AM    BUN/Creatinine ratio 9 05/08/2019 02:33 AM    GFR est  05/08/2019 02:33 AM    GFR est non- 05/08/2019 02:33 AM    Calcium 9.6 05/08/2019 02:33 AM        TSH:  Lab Results   Component Value Date/Time    TSH 1.240 05/08/2019 02:33 AM    TSH 1.260 02/20/2019 03:05 AM    TSH 0.81 08/13/2011 10:20 AM       Imaging:  [x]I have personally reviewed the patients radiographs section   No results found for this or any previous visit. No results found for this or any previous visit.      Cardiac Echo:                Historical Sleep Testing Data:        Mario Martinez DO, [facility]  Pulmonary, Sleep and Critical Care Medicine

## 2019-05-30 ENCOUNTER — OFFICE VISIT (OUTPATIENT)
Dept: INTERNAL MEDICINE CLINIC | Age: 29
End: 2019-05-30

## 2019-05-30 VITALS
RESPIRATION RATE: 18 BRPM | OXYGEN SATURATION: 96 % | BODY MASS INDEX: 34.68 KG/M2 | HEIGHT: 66 IN | TEMPERATURE: 97.7 F | HEART RATE: 87 BPM | SYSTOLIC BLOOD PRESSURE: 115 MMHG | DIASTOLIC BLOOD PRESSURE: 78 MMHG | WEIGHT: 215.8 LBS

## 2019-05-30 DIAGNOSIS — D50.9 IRON DEFICIENCY ANEMIA, UNSPECIFIED IRON DEFICIENCY ANEMIA TYPE: ICD-10-CM

## 2019-05-30 DIAGNOSIS — K21.9 GASTROESOPHAGEAL REFLUX DISEASE, ESOPHAGITIS PRESENCE NOT SPECIFIED: ICD-10-CM

## 2019-05-30 DIAGNOSIS — R60.9 EDEMA, UNSPECIFIED TYPE: Primary | ICD-10-CM

## 2019-05-30 RX ORDER — DIVALPROEX SODIUM 500 MG/1
500 TABLET, DELAYED RELEASE ORAL 2 TIMES DAILY
COMMUNITY
End: 2020-02-21

## 2019-05-30 RX ORDER — RANITIDINE 150 MG/1
150 TABLET, FILM COATED ORAL 2 TIMES DAILY
Qty: 180 TAB | Refills: 1 | Status: SHIPPED | OUTPATIENT
Start: 2019-05-30 | End: 2019-12-04

## 2019-05-30 RX ORDER — FUROSEMIDE 20 MG/1
TABLET ORAL
Qty: 30 TAB | Refills: 0 | Status: SHIPPED | OUTPATIENT
Start: 2019-05-30 | End: 2019-07-01 | Stop reason: SDUPTHER

## 2019-05-30 RX ORDER — FERROUS SULFATE 325(65) MG
325 TABLET, DELAYED RELEASE (ENTERIC COATED) ORAL
Qty: 90 TAB | Refills: 1 | Status: SHIPPED | OUTPATIENT
Start: 2019-05-30 | End: 2020-02-21

## 2019-05-30 NOTE — PROGRESS NOTES
Chief Complaint   Patient presents with    Cough    Leg Swelling     2 wk fu        HPI:     Rowan Daugherty is a 34 y.o.  female with history of bipolar disorder and GERD  here for the above complaint. She started having lower extremity swelling 22 months during pregnancy and got worse. She has not been on a diuretic. She denies any chest pain, shortness of breath, abdominal pain, headaches or dizziness. No orthopnea or PND. She had lab work and labs were normal. She uses compression stockings and not as often as she should. She said the swelling improves in the morning, but by the end of the day it is worse. She has gained a lot of weight and in reviewing her psych medications. Clozapine can cause weight gain. Depakote can cause swelling and weight changes. Cogentin causes edema. She said her weight gain has gotten worse since being on the psychiatry medications. She said the swelling is worse on left than right, but no pain behind the calf area. Cough: it is better and she said the medication she got helped her. Past Medical History:   Diagnosis Date    Alcoholic ([address]     Sober 3 months; Weekly AAA meeting; Had substance abuse counseling;     Anemia     [facility]     [facility]osis ([facility]     Past not current issue per patient    ETOH abuse     GERD (gastroesophageal reflux disease)     Head injury     Marijuana abuse     Phobia     [place] [employer]    Post partum depression     Pregnancy     Psychiatric disorder     Psychotic disorder ([facility]     Depression/  Jerrell Kauffman NP; Bipolar disorder, mood swings.     Schizophrenia ([facility]     Stroke [facility])      Past Surgical History:   Procedure Laterality Date    HX  SECTION      HX  SECTION      C section x 2;   &     HX RHINOPLASTY      HX TUBAL LIGATION  2017     Current Outpatient Medications   Medication Sig    ferrous sulfate (IRON) 325 mg (65 mg iron) EC tablet Take 1 Tab by mouth Daily (before breakfast).  raNITIdine (ZANTAC) 150 mg tablet Take 1 Tab by mouth two (2) times a day.  divalproex DR (DEPAKOTE) 500 mg tablet Take 500 mg by mouth two (2) times a day.  furosemide (LASIX) 20 mg tablet Take one po daily as needed.  benzonatate (TESSALON) 200 mg capsule Take 200 mg by mouth three (3) times daily as needed for Cough.  benztropine (COGENTIN) 0.5 mg tablet Take 0.5 mg by mouth two (2) times a day.  hydrOXYzine pamoate (VISTARIL) 25 mg capsule Take 1 Cap by mouth two (2) times daily as needed for Anxiety.  cloZAPine (CLOZARIL) 25 mg tablet Take 50 mg by mouth two (2) times a day. Take 3 tablets in the morning and 3 tablets in the evening.  desvenlafaxine succinate (PRISTIQ) 50 mg ER tablet Take 50 mg by mouth daily. No current facility-administered medications for this visit. Health Maintenance   Topic Date Due    Pneumococcal 0-64 years (1 of 1 - PPSV23) 03/13/1996    Influenza Age 5 to Adult  08/01/2019    PAP AKA CERVICAL CYTOLOGY  04/02/2021    DTaP/Tdap/Td series (2 - Td) 04/02/2028     Immunization History   Administered Date(s) Administered    TB Skin Test (PPD) Intradermal 08/07/2018     No LMP recorded. Allergies and Intolerances: Allergies   Allergen Reactions    Robitussin [Guaifenesin] Nausea and Vomiting       Family History:   Family History   Family history unknown: Yes       Social History:   She  reports that she has been smoking. She has a 7.50 pack-year smoking history. She has quit using smokeless tobacco.  She  reports that she does not drink alcohol. ·     OBJECTIVE:   Physical exam:   Visit Vitals  /78 (BP 1 Location: Right arm, BP Patient Position: Sitting)   Pulse 87   Temp 97.7 °F (36.5 °C) (Oral)   Resp 18   Ht 5' 6\" (1.676 m)   Wt 215 lb 12.8 oz (97.9 kg)   SpO2 96%   BMI 34.83 kg/m²        Generally: Pleasant female in no acute distress  Cardiac Exam: regular, rate, and rhythm.  Normal S1 and S2. No murmurs, gallops, or rubs  Pulmonary exam: Clear to auscultation bilaterally  Abdominal exam: Positive bowel sounds in all four quadrants, soft, nondistended, diffuse abdominal tenderness. Pt currently on her period. Most likely menstrual cramps. Extremities: 2+ dorsalis pedis pulses bilaterally. 1+ pedal edema    bilaterally, but really swollen up to knees in both legs and left greater than right. LABS/RADIOLOGICAL TESTS:  Component      Latest Ref Rng & Units 5/8/2019 5/8/2019 5/8/2019 5/8/2019           2:33 AM  2:33 AM  2:33 AM  2:33 AM   Glucose      65 - 99 mg/dL       BUN      6 - 20 mg/dL       Creatinine      0.57 - 1.00 mg/dL       GFR est non-AA      >59 mL/min/1.73       GFR est AA      >59 mL/min/1.73       BUN/Creatinine ratio      9 - 23       Sodium      134 - 144 mmol/L       Potassium      3.5 - 5.2 mmol/L       Chloride      96 - 106 mmol/L       CO2      20 - 29 mmol/L       Calcium      8.7 - 10.2 mg/dL       Protein, total      6.0 - 8.5 g/dL       Albumin      3.5 - 5.5 g/dL       GLOBULIN, TOTAL      1.5 - 4.5 g/dL       A-G Ratio      1.2 - 2.2       Bilirubin, total      0.0 - 1.2 mg/dL       Alk.  phosphatase      39 - 117 IU/L       AST      0 - 40 IU/L       ALT (SGPT)      0 - 32 IU/L       TSH      0.450 - 4.500 uIU/mL    1.240   Hemoglobin A1c, (calculated)      4.8 - 5.6 %   5.2    T4, Free      0.82 - 1.77 ng/dL  0.99     Vitamin B12      232 - 1,245 pg/mL 685        Component      Latest Ref Rng & Units 5/8/2019           2:33 AM   Glucose      65 - 99 mg/dL 96   BUN      6 - 20 mg/dL 7   Creatinine      0.57 - 1.00 mg/dL 0.75   GFR est non-AA      >59 mL/min/1.73 108   GFR est AA      >59 mL/min/1.73 125   BUN/Creatinine ratio      9 - 23 9   Sodium      134 - 144 mmol/L 142   Potassium      3.5 - 5.2 mmol/L 4.3   Chloride      96 - 106 mmol/L 103   CO2      20 - 29 mmol/L 22   Calcium      8.7 - 10.2 mg/dL 9.6   Protein, total      6.0 - 8.5 g/dL 7.3   Albumin 3.5 - 5.5 g/dL 4.4   GLOBULIN, TOTAL      1.5 - 4.5 g/dL 2.9   A-G Ratio      1.2 - 2.2 1.5   Bilirubin, total      0.0 - 1.2 mg/dL <0.2   Alk. phosphatase      39 - 117 IU/L 88   AST      0 - 40 IU/L 13   ALT (SGPT)      0 - 32 IU/L 12   TSH      0.450 - 4.500 uIU/mL    Hemoglobin A1c, (calculated)      4.8 - 5.6 %    T4, Free      0.82 - 1.77 ng/dL    Vitamin B12      232 - 1,245 pg/mL      All lab results were reviewed and discussed with the patient. ASSESSMENT/PLAN:    1. Edema, unspecified type: will try lasix  And she will increase high potassium foods while on the lasix. If she will be taking for 1 week, she will let us know and will check her BMP. She will talk to her psychiatrist regarding possible changes to her medications because I think some of this is due to her medications. This maybe also residual from her pregnancy in terms of causing venous insufficiency. -     furosemide (LASIX) 20 mg tablet; Take one po daily as needed. 2. Iron deficiency anemia, unspecified iron deficiency anemia type  -     ferrous sulfate (IRON) 325 mg (65 mg iron) EC tablet; Take 1 Tab by mouth Daily (before breakfast). 3. Gastroesophageal reflux disease, esophagitis presence not specified  -     raNITIdine (ZANTAC) 150 mg tablet; Take 1 Tab by mouth two (2) times a day. 4. Pt accompanied by . 5.   Requested Prescriptions     Signed Prescriptions Disp Refills    ferrous sulfate (IRON) 325 mg (65 mg iron) EC tablet 90 Tab 1     Sig: Take 1 Tab by mouth Daily (before breakfast).  raNITIdine (ZANTAC) 150 mg tablet 180 Tab 1     Sig: Take 1 Tab by mouth two (2) times a day.  furosemide (LASIX) 20 mg tablet 30 Tab 0     Sig: Take one po daily as needed. 6. Patient verbalized understanding and agreement with the plan. 7. Patient was given an after-visit summary.     8.   Follow-up and Dispositions    · Return in about 1 month (around 6/30/2019) for f/u swelling with PATRICK Nunez  or sooner if worsening symptoms.                Sia Harvey M.D.

## 2019-05-30 NOTE — PROGRESS NOTES
ROOM # 2  Identified pt with two pt identifiers(name and ). Reviewed record in preparation for visit and have obtained necessary documentation. Chief Complaint   Patient presents with    Cough    Leg Swelling     2 wk fu       Requested Prescriptions     Pending Prescriptions Disp Refills    ferrous sulfate (IRON) 325 mg (65 mg iron) EC tablet 90 Tab 1     Sig: Take 1 Tab by mouth Daily (before breakfast).  raNITIdine (ZANTAC) 150 mg tablet 180 Tab 2     Sig: Take 1 Tab by mouth two (2) times a day.   ;magdaleno Leal preferred language for health care discussion is english/other. Is the patient using any DME equipment during OV? NO    Afsaneh Leal is due for:  Health Maintenance Due   Topic    Pneumococcal 0-64 years (1 of 1 - PPSV23)     Health Maintenance reviewed and discussed per provider  Please order/place referral if appropriate. Advance Directive:  1. Do you have an advance directive in place? Patient Reply: NO    2. If not, would you like material regarding how to put one in place? NO    Coordination of Care:  1. Have you been to the ER, urgent care clinic since your last visit? Hospitalized since your last visit? NO    2. Have you seen or consulted any other health care providers outside of the [address] [place] [address] since your last visit? Include any pap smears or colon screening. NO    Patient is accompanied by  I have received verbal consent from Afsaneh Leal to discuss any/all medical information while they are present in the room.     Learning Assessment:  Learning Assessment 2019   PRIMARY LEARNER Patient Patient   HIGHEST LEVEL OF EDUCATION - PRIMARY LEARNER  SOME COLLEGE SOME COLLEGE   BARRIERS PRIMARY LEARNER NONE NONE   PRIMARY LANGUAGE ENGLISH ENGLISH   LEARNER PREFERENCE PRIMARY DEMONSTRATION VIDEOS   ANSWERED BY patient Patient   RELATIONSHIP SELF SELF     Depression Screening:  3 most recent PHQ Screens 2019   Shira interest or pleasure in doing things Not at all Not at all   Feeling down, depressed, irritable, or hopeless Not at all Not at all   Total Score PHQ 2 0 0     Abuse Screening:  Abuse Screening Questionnaire 4/2/2018   Do you ever feel afraid of your partner? N   Are you in a relationship with someone who physically or mentally threatens you? N   Is it safe for you to go home?  Y     Fall Risk  n/i

## 2019-05-30 NOTE — PATIENT INSTRUCTIONS
1) Increase high potassium foods while on lasix. Bananas, strawberries, apricots, kiwis, oranges, sweet potatoes. 2) elevate legs    3) Use compression stockings. If sitting long periods of time, elevate legs. 4) Follow-up in 1 month or sooner if worsening symptoms. Potassium-Rich Diet: Care Instructions  Your Care Instructions    Potassium is a mineral. It helps keep the right mix of fluids in your body. It also helps your nerves and muscles work as they should. You'll find it in milk and meats. It's also in all fresh foods, including fruits and vegetables. Most adults need about 5 grams of potassium a day. The foods you eat should supply all that you need. Some health conditions can cause a loss of potassium. For example, kidney problems and stomach problems with vomiting and diarrhea can cause you to lose this mineral. Some medicines, such as water pills (diuretics), can cause low potassium. If you can't get enough potassium from what you eat, your doctor may advise you to take supplements. Follow-up care is a key part of your treatment and safety. Be sure to make and go to all appointments, and call your doctor if you are having problems. It's also a good idea to know your test results and keep a list of the medicines you take. How can you care for yourself at home? · Plan your diet around foods that are rich in potassium. Fresh, unprocessed whole foods have the most. These foods include:  ? Milk and other dairy products. ? Vegetables, especially broccoli, cooked dry beans, tomatoes, potatoes, artichokes, winter squash, and spinach. ? Fruits, especially citrus fruits, bananas, and apricots. Dried apricots contain more potassium than fresh apricots. ? Meat, poultry, and fish. · Ask your doctor about using a salt substitute or \"light\" salt. These often contain potassium. Where can you learn more? Go to http://devendra-earnest.info/.   Enter H315 in the search box to learn more about \"Potassium-Rich Diet: Care Instructions. \"  Current as of: March 28, 2018  Content Version: 11.9  © 4361-0618 [employer]. Care instructions adapted under license by [employer] (which disclaims liability or warranty for this information). If you have questions about a medical condition or this instruction, always ask your healthcare professional. [address] any warranty or liability for your use of this information.

## 2019-06-11 ENCOUNTER — HOSPITAL ENCOUNTER (OUTPATIENT)
Dept: SLEEP MEDICINE | Age: 29
Discharge: HOME | End: 2019-06-11
Attending: INTERNAL MEDICINE
Payer: MEDICAID

## 2019-06-11 DIAGNOSIS — F20.9 SCHIZOPHRENIA, UNSPECIFIED TYPE (HCC): ICD-10-CM

## 2019-06-11 DIAGNOSIS — F17.210 CIGARETTE NICOTINE DEPENDENCE WITHOUT COMPLICATION: ICD-10-CM

## 2019-06-11 DIAGNOSIS — F10.11 HISTORY OF ALCOHOL ABUSE: ICD-10-CM

## 2019-06-11 DIAGNOSIS — E66.9 OBESITY (BMI 30-39.9): ICD-10-CM

## 2019-06-11 DIAGNOSIS — R06.83 SNORING: ICD-10-CM

## 2019-06-11 DIAGNOSIS — F32.A DEPRESSION, UNSPECIFIED DEPRESSION TYPE: ICD-10-CM

## 2019-06-11 DIAGNOSIS — G47.19 EXCESSIVE DAYTIME SLEEPINESS: ICD-10-CM

## 2019-06-11 DIAGNOSIS — R00.0 TACHYCARDIA: ICD-10-CM

## 2019-06-11 DIAGNOSIS — K21.9 GASTROESOPHAGEAL REFLUX DISEASE, ESOPHAGITIS PRESENCE NOT SPECIFIED: ICD-10-CM

## 2019-06-11 PROCEDURE — 95810 POLYSOM 6/> YRS 4/> PARAM: CPT

## 2019-06-12 VITALS — DIASTOLIC BLOOD PRESSURE: 79 MMHG | HEART RATE: 93 BPM | SYSTOLIC BLOOD PRESSURE: 112 MMHG

## 2019-06-18 DIAGNOSIS — F17.210 CIGARETTE NICOTINE DEPENDENCE WITHOUT COMPLICATION: ICD-10-CM

## 2019-06-18 DIAGNOSIS — G47.34 NOCTURNAL HYPOXIA: Primary | ICD-10-CM

## 2019-06-18 NOTE — PROGRESS NOTES
The patient underwent sleep testing on 6/11/19. Recommendations listed below. Please refer to full report for specific details. Overall, no obstructive sleep apnea. No suggestion of a limb movement disorder. Poor sleep hygiene was reported. Mild nocturnal hypoxia was noted. DIAGNOSIS:  1) Poor Sleep Hygiene  2) Sinus tachycardia- mild and intermittent. 3) Tobacco use  4) Nocturnal hypoxia- The time with oxygen saturation below 88% was 32 minutes. RECOMMENDATIONS:      Recommend starting patient supplemental oxygen for sleep at 1LPM.   Other non-invasive treatment options are recommended were applicable and include the following: weight reduction, smoking cessation, body position training, and modification of alcohol ingestion and/or sedating agents.  Healthy sleep habit education and reinforcement will be reviewed with the patient.  Individuals are encouraged to obtain 7-9 hours of sleep per day.   safety is encouraged. Drowsy and/or inattentive driving should be avoided.  Follow up with provider as directed .     Dinorah Alvarado DO, FCCP    [facility]  Pulmonary, Critical Care, and Sleep Medicine

## 2019-06-19 ENCOUNTER — TELEPHONE (OUTPATIENT)
Dept: PULMONOLOGY | Age: 29
End: 2019-06-19

## 2019-07-01 ENCOUNTER — OFFICE VISIT (OUTPATIENT)
Dept: INTERNAL MEDICINE CLINIC | Age: 29
End: 2019-07-01

## 2019-07-01 ENCOUNTER — HOSPITAL ENCOUNTER (OUTPATIENT)
Dept: LAB | Age: 29
Discharge: HOME | End: 2019-07-01
Payer: MEDICAID

## 2019-07-01 VITALS
TEMPERATURE: 97.6 F | RESPIRATION RATE: 18 BRPM | OXYGEN SATURATION: 97 % | DIASTOLIC BLOOD PRESSURE: 70 MMHG | WEIGHT: 215 LBS | HEIGHT: 66 IN | HEART RATE: 97 BPM | BODY MASS INDEX: 34.55 KG/M2 | SYSTOLIC BLOOD PRESSURE: 103 MMHG

## 2019-07-01 DIAGNOSIS — R60.9 EDEMA, UNSPECIFIED TYPE: ICD-10-CM

## 2019-07-01 LAB
ANION GAP SERPL CALC-SCNC: 11 MMOL/L (ref 3–18)
BUN SERPL-MCNC: 10 MG/DL (ref 7–18)
BUN/CREAT SERPL: 13 (ref 12–20)
CALCIUM SERPL-MCNC: 8.9 MG/DL (ref 8.5–10.1)
CHLORIDE SERPL-SCNC: 102 MMOL/L (ref 100–108)
CO2 SERPL-SCNC: 26 MMOL/L (ref 21–32)
CREAT SERPL-MCNC: 0.75 MG/DL (ref 0.6–1.3)
GLUCOSE SERPL-MCNC: 83 MG/DL (ref 74–99)
POTASSIUM SERPL-SCNC: 3.9 MMOL/L (ref 3.5–5.5)
SODIUM SERPL-SCNC: 139 MMOL/L (ref 136–145)

## 2019-07-01 PROCEDURE — 36415 COLL VENOUS BLD VENIPUNCTURE: CPT

## 2019-07-01 PROCEDURE — 80048 BASIC METABOLIC PNL TOTAL CA: CPT

## 2019-07-01 RX ORDER — FUROSEMIDE 20 MG/1
TABLET ORAL
Qty: 90 TAB | Refills: 1 | OUTPATIENT
Start: 2019-07-01 | End: 2019-12-04

## 2019-07-01 NOTE — PROGRESS NOTES
HISTORY OF PRESENT ILLNESS  Cait Kauffman is a 34 y.o. female. HPI  Ms. Britany Light presents with her  for follow-up. She is using the Lasix most days and reports significant improvement. Her  reports Clozapine was specifically discussed with patient's psychiatrist and that it is likely contributing to edema and weight gain. Unfortunately, a change from this medication was ill-advised as patient is doing extremely well on it and has a hx of volatility off medication. Ms. Britany Light has worked on some increase of activity. Review of Systems   Constitutional: Negative for weight loss. Cardiovascular: Positive for leg swelling (improved). Visit Vitals  /70 (BP 1 Location: Left arm, BP Patient Position: Sitting)   Pulse 97   Temp 97.6 °F (36.4 °C) (Oral)   Resp 18   Ht 5' 6\" (1.676 m)   Wt 215 lb (97.5 kg)   SpO2 97%   BMI 34.70 kg/m²     Wt Readings from Last 3 Encounters:   07/01/19 215 lb (97.5 kg)   05/30/19 215 lb 12.8 oz (97.9 kg)   05/20/19 214 lb 9.6 oz (97.3 kg)         Physical Exam   Constitutional: She is oriented to person, place, and time. She appears well-developed and well-nourished. No distress. HENT:   Head: Normocephalic and atraumatic. Right Ear: Tympanic membrane, external ear and ear canal normal.   Left Ear: Tympanic membrane, external ear and ear canal normal.   Nose: Nose normal.   Mouth/Throat: Uvula is midline, oropharynx is clear and moist and mucous membranes are normal. No oropharyngeal exudate, posterior oropharyngeal edema, posterior oropharyngeal erythema or tonsillar abscesses. Eyes: Pupils are equal, round, and reactive to light. Conjunctivae are normal. No scleral icterus. Neck: Neck supple. Cardiovascular: Normal rate, regular rhythm and normal heart sounds. Exam reveals no gallop. No murmur heard. Pulses:       Dorsalis pedis pulses are 2+ on the right side, and 2+ on the left side.         Posterior tibial pulses are 2+ on the right side, and 2+ on the left side. Mild nonpitting edu LE edema. Pulmonary/Chest: Effort normal and breath sounds normal. No respiratory distress. She has no decreased breath sounds. She has no wheezes. She has no rhonchi. She has no rales. Lymphadenopathy:        Head (right side): No submandibular and no tonsillar adenopathy present. Head (left side): No submandibular and no tonsillar adenopathy present. She has no cervical adenopathy. Right: No supraclavicular adenopathy present. Left: No supraclavicular adenopathy present. Neurological: She is alert and oriented to person, place, and time. Skin: Skin is warm and dry. Psychiatric: She has a normal mood and affect. Her speech is normal.       ASSESSMENT and PLAN  Diagnoses and all orders for this visit:    1. Edema, unspecified type  -     furosemide (LASIX) 20 mg tablet; Take one po daily as needed. -     METABOLIC PANEL, BASIC; Future  - Advised to avoid high-sodium foods. Advised of increased cardiac exercise - 4-5 days per week. Avoid high-calorie beverages. - Continue to increase K-containing foods. Patient Instructions   · Potassium-containing foods: fresh fruits and vegetables, nuts, beans, and milk. Follow-up and Dispositions    · Return if symptoms worsen or fail to improve.

## 2019-07-01 NOTE — PROGRESS NOTES
ROOM # 12    Anna Urena presents today for   Chief Complaint   Patient presents with   [address] preferred language for health care discussion is english/other. Is someone accompanying this pt?     Is the patient using any DME equipment during OV? no    Depression Screening:  3 most recent PHQ Screens 2/20/2019 4/2/2018   Little interest or pleasure in doing things Not at all Not at all   Feeling down, depressed, irritable, or hopeless Not at all Not at all   Total Score PHQ 2 0 0       Learning Assessment:  Learning Assessment 2/20/2019 4/2/2018   PRIMARY LEARNER Patient Patient   HIGHEST LEVEL OF EDUCATION - PRIMARY LEARNER  [address] LEARNER NONE NONE   PRIMARY LANGUAGE ENGLISH ENGLISH   LEARNER PREFERENCE PRIMARY DEMONSTRATION VIDEOS   ANSWERED BY patient Patient   RELATIONSHIP SELF SELF       Abuse Screening:  Abuse Screening Questionnaire 4/2/2018   Do you ever feel afraid of your partner? N   Are you in a relationship with someone who physically or mentally threatens you? N   Is it safe for you to go home? Y       Fall Risk  No flowsheet data found. Health Maintenance reviewed and discussed per provider. Yes    Anna Urena is due for   Health Maintenance Due   Topic Date Due    Pneumococcal 0-64 years (1 of 1 - PPSV23) 03/13/1996     Please order/place referral if appropriate. Advance Directive:  1. Do you have an advance directive in place? Patient Reply: yes    2. If not, would you like material regarding how to put one in place? Patient Reply: no    Coordination of Care:  1. Have you been to the ER, urgent care clinic since your last visit? Hospitalized since your last visit? no    2. Have you seen or consulted any other health care providers outside of the [facility] since your last visit? Include any pap smears or colon screening.   Psychiatrist

## 2019-07-05 ENCOUNTER — TELEPHONE (OUTPATIENT)
Dept: PULMONOLOGY | Age: 29
End: 2019-07-05

## 2019-07-05 DIAGNOSIS — G47.34 NOCTURNAL HYPOXIA: Primary | ICD-10-CM

## 2019-07-05 NOTE — TELEPHONE ENCOUNTER
Victorino Miranda from [facility] states she received an order for o2 for pt, but order cannot be processed until pt completes a Pulse oximetry. Please advise [phone].

## 2019-07-05 NOTE — TELEPHONE ENCOUNTER
The pt. needs an office visit and Overnight Oximetry on Room Air to qualify for O2 @ 1 liter per Jessica Bulmarok @  [facility]. The Sleep Study being negative is not acceptable by [facility] for O2 qualification.

## 2019-07-15 NOTE — TELEPHONE ENCOUNTER
Case working [employer] re: oxygen order that was placed for 1 L.   [facility] had called back 1 1/2 weeks ago stating the sleep study did not qualify and pt would need overnight with office visit to try to qualify.

## 2019-07-17 NOTE — TELEPHONE ENCOUNTER
Spoke with [employer] She reviewed the case and with patients insurance [employer] medicaid they will not use a sleep study for oxygen qualification. She state you can send a order for an overnight study and she will have [facility] do the needed study.

## 2019-07-18 NOTE — TELEPHONE ENCOUNTER
Order for overnight testing sent to [facility] to qualify pt for the order they have for nighttime oxygen.

## 2019-08-14 ENCOUNTER — OFFICE VISIT (OUTPATIENT)
Dept: INTERNAL MEDICINE CLINIC | Age: 29
End: 2019-08-14

## 2019-08-14 VITALS
OXYGEN SATURATION: 99 % | TEMPERATURE: 95.6 F | HEART RATE: 107 BPM | DIASTOLIC BLOOD PRESSURE: 71 MMHG | RESPIRATION RATE: 17 BRPM | HEIGHT: 66 IN | WEIGHT: 214 LBS | BODY MASS INDEX: 34.39 KG/M2 | SYSTOLIC BLOOD PRESSURE: 114 MMHG

## 2019-08-14 DIAGNOSIS — M25.50 ARTHRALGIA, UNSPECIFIED JOINT: Primary | ICD-10-CM

## 2019-08-14 NOTE — PROGRESS NOTES
Chief Complaint   Patient presents with    Leg Pain     edu x 1 wk        HPI:     Yahir Castle is a 34 y.o.  female with history of bipolar disorder and drug abuse  here for the above complaint. For 1 month she says her skin hurts and joint hurts. Sometimes she has a soreness in center of back. She has pain in both knees, ankles, feets, but not shoulder or hips. Constant pain. Pain scale: 5-6/10. Sharp pain and burning sensation. She has morning stiffness and it lasts for 3-4 minutes. She denies Chest pain, shortness of breath, abdominal pain, hedaches or dizziness. She was recently admitted at [facility]  19-19. She said they found meth in UDS. Pt is accompanied by caregiver who affirms this and unable to find in care everywhere. There is also concerned that she recently took crystal meth and maybe she might be withdrawing from the medication. She is taking tylenol as needed for pain. Past Medical History:   Diagnosis Date    Alcoholic ([address]     Sober 3 months; Weekly AAA meeting; Had substance abuse counseling;     Anemia     Bipolar disorder ([address]     Cirrhosis ([address]     Past not current issue per patient    ETOH abuse     GERD (gastroesophageal reflux disease)     Head injury     Marijuana abuse     Phobia     [place] [employer]    Post partum depression     Pregnancy     Psychiatric disorder     Psychotic disorder ([address]     Depression/  Carie Bee, NP; Bipolar disorder, mood swings.  [facility]     Stroke [facility])      Past Surgical History:   Procedure Laterality Date    HX  SECTION      HX  SECTION      C section x 2;   &     HX RHINOPLASTY      HX TUBAL LIGATION  2017     Current Outpatient Medications   Medication Sig    furosemide (LASIX) 20 mg tablet Take one po daily as needed.  ferrous sulfate (IRON) 325 mg (65 mg iron) EC tablet Take 1 Tab by mouth Daily (before breakfast).     raNITIdine (ZANTAC) 150 mg tablet Take 1 Tab by mouth two (2) times a day.  divalproex DR (DEPAKOTE) 500 mg tablet Take 500 mg by mouth two (2) times a day.  benztropine (COGENTIN) 0.5 mg tablet Take 0.5 mg by mouth two (2) times a day.  hydrOXYzine pamoate (VISTARIL) 25 mg capsule Take 1 Cap by mouth two (2) times daily as needed for Anxiety.  cloZAPine (CLOZARIL) 25 mg tablet Take 50 mg by mouth two (2) times a day. Take 3 tablets in the morning and 3 tablets in the evening.  desvenlafaxine succinate (PRISTIQ) 50 mg ER tablet Take 50 mg by mouth daily. No current facility-administered medications for this visit. Health Maintenance   Topic Date Due    Pneumococcal 0-64 years (1 of 1 - PPSV23) 03/13/1996    Influenza Age 5 to Adult  08/01/2019    PAP AKA CERVICAL CYTOLOGY  04/02/2021    DTaP/Tdap/Td series (2 - Td) 04/02/2028     Immunization History   Administered Date(s) Administered    TB Skin Test (PPD) Intradermal 08/07/2018     No LMP recorded. Allergies and Intolerances: Allergies   Allergen Reactions    Robitussin [Guaifenesin] Nausea and Vomiting       Family History:   Family History   Family history unknown: Yes       Social History:   She  reports that she has been smoking. She has a 7.50 pack-year smoking history. She has quit using smokeless tobacco.  She  reports that she does not drink alcohol. ·     OBJECTIVE:   Physical exam:   Visit Vitals  /71 (BP 1 Location: Left arm, BP Patient Position: Sitting)   Pulse (!) 107   Temp 95.6 °F (35.3 °C) (Oral)   Resp 17   Ht 5' 6\" (1.676 m)   Wt 214 lb (97.1 kg)   SpO2 99%   BMI 34.54 kg/m²        Generally: Pleasant female in no acute distress  Cardiac Exam: regular, rate, and rhythm. Normal S1 and S2. No murmurs, gallops, or rubs  Pulmonary exam: Clear to auscultation bilaterally  Abdominal exam: Positive bowel sounds in all four quadrants, soft, nondistended, nontender  Extremities: 2+ dorsalis pedis pulses bilaterally.  2+ pedal edema bilaterally  Knee exam: good ROM bilaterally with flexion and extension. No TTP. Musculoskeletal exam: 5/5 in upper extremities bilaterally. Reflexes: 2/2 in lower extremities bilaterally. Feet bilaterally: good ROM with flexion and extension and no TTP. She recently stopped lasix because she thought it was making her dizzy and lightheaded, but she also was drinking what sounds like energy drinks. LABS/RADIOLOGICAL TESTS:  Lab Results   Component Value Date/Time    WBC 7.8 01/11/2019 10:22 AM    HGB 11.3 (L) 01/11/2019 10:22 AM    HCT 36.1 01/11/2019 10:22 AM    PLATELET 664 89/66/9537 10:22 AM     Lab Results   Component Value Date/Time    Sodium 139 07/01/2019 08:45 AM    Potassium 3.9 07/01/2019 08:45 AM    Chloride 102 07/01/2019 08:45 AM    CO2 26 07/01/2019 08:45 AM    Glucose 83 07/01/2019 08:45 AM    BUN 10 07/01/2019 08:45 AM    Creatinine 0.75 07/01/2019 08:45 AM     Lab Results   Component Value Date/Time    Cholesterol, total 225 (H) 08/01/2017 05:58 AM    HDL Cholesterol 90 (H) 08/01/2017 05:58 AM    LDL, calculated 111.2 (H) 08/01/2017 05:58 AM    Triglyceride 119 08/01/2017 05:58 AM     No results found for: GPT    Previous labs    ASSESSMENT/PLAN:    1. Arthralgia, unspecified joint: use heating pad, icy/hot, tiger balm for pain. -     VITAMIN D, 25 HYDROXY; Future  -     ISABELA COMPREHENSIVE PANEL; Future  -     RHEUMASSURE; Future      2. Patient verbalized understanding and agreement with the plan. 3. Patient was given an after-visit summary. 4.   Follow-up and Dispositions    · Return if symptoms worsen or fail to improve  or sooner if worsening symptoms.                Young Miller M.D.

## 2019-08-14 NOTE — PROGRESS NOTES
ROOM # 3  Identified pt with two pt identifiers(name and ). Reviewed record in preparation for visit and have obtained necessary documentation. Chief Complaint   Patient presents with    Leg Pain     edu x 1 wk       Josee Poole preferred language for health care discussion is english/other. Is the patient using any DME equipment during OV? NO    Josee Poole is due for:  Health Maintenance Due   Topic    Pneumococcal 0-64 years (1 of 1 - PPSV23)    Influenza Age 5 to Adult      Health Maintenance reviewed and discussed per provider  Please order/place referral if appropriate. Advance Directive:  1. Do you have an advance directive in place? Patient Reply: NO    2. If not, would you like material regarding how to put one in place? NO    Coordination of Care:  1. Have you been to the ER, urgent care clinic since your last visit? Hospitalized since your last visit? NO    2. Have you seen or consulted any other health care providers outside of the [address] [place] [address] since your last visit? Include any pap smears or colon screening. NO    Patient is accompanied by   I have received verbal consent from Josee Poole to discuss any/all medical information while they are present in the room. Learning Assessment:  Learning Assessment 2019   PRIMARY LEARNER Patient Patient   HIGHEST LEVEL OF EDUCATION - PRIMARY LEARNER  SOME COLLEGE SOME COLLEGE   BARRIERS PRIMARY LEARNER NONE NONE   PRIMARY LANGUAGE ENGLISH ENGLISH   LEARNER PREFERENCE PRIMARY DEMONSTRATION VIDEOS   ANSWERED BY patient Patient   RELATIONSHIP SELF SELF     Depression Screening:  3 most recent PHQ Screens 2019   Little interest or pleasure in doing things Not at all Not at all   Feeling down, depressed, irritable, or hopeless Not at all Not at all   Total Score PHQ 2 0 0     Abuse Screening:  Abuse Screening Questionnaire 2018   Do you ever feel afraid of your partner?  N   Are you in a relationship with someone who physically or mentally threatens you? N   Is it safe for you to go home?  Y     Fall Risk  n/i

## 2019-08-14 NOTE — PATIENT INSTRUCTIONS
1) use heating pad, icy/hot, tiger balm for pain. 2) Follow-up as needed or sooner if worsening symptoms.

## 2019-08-23 LAB
14.3.3 ETA, RHEUM. ARTHRITIS: <0.2 NG/ML
25(OH)D3+25(OH)D2 SERPL-MCNC: 27 NG/ML (ref 30–100)
CCP IGA+IGG SERPL IA-ACNC: <20 UNITS
CENTROMERE B AB SER-ACNC: <0.2 AI (ref 0–0.9)
CHROMATIN AB SERPL-ACNC: 0.2 AI (ref 0–0.9)
DSDNA AB SER-ACNC: <1 IU/ML (ref 0–9)
ENA JO1 AB SER-ACNC: <0.2 AI (ref 0–0.9)
ENA RNP AB SER-ACNC: <0.2 AI (ref 0–0.9)
ENA SCL70 AB SER-ACNC: <0.2 AI (ref 0–0.9)
ENA SM AB SER-ACNC: <0.2 AI (ref 0–0.9)
ENA SS-A AB SER-ACNC: <0.2 AI (ref 0–0.9)
ENA SS-B AB SER-ACNC: <0.2 AI (ref 0–0.9)
RHEUMATOID FACT SERPL-ACNC: <14 UNITS/ML
SEE BELOW, 164869: NORMAL

## 2019-08-28 NOTE — BH NOTES
PT was closely monitored  by staff for health and safety,   PT attended group today she ate all meals, Pt  Showered and changed her gown, while in the day room she was cursing and exposing herself she was redirected by staff  To go to her room , she eventually calmed down after taking her medication, staff will continue to monitor for health and safety, none

## 2019-09-24 PROBLEM — Z00.8 ENCOUNTER FOR OTHER GENERAL EXAMINATION (CODE): Status: RESOLVED | Noted: 2018-02-04 | Resolved: 2019-09-24

## 2019-09-25 ENCOUNTER — TELEPHONE (OUTPATIENT)
Dept: INTERNAL MEDICINE CLINIC | Age: 29
End: 2019-09-25

## 2019-09-25 NOTE — TELEPHONE ENCOUNTER
Called Dr. Bryn Trevino and left message on VM that do not see metoprolol on pt's medication list. However, if she is really taking this medication then it is ok for her to switch to propranolol.

## 2019-09-25 NOTE — TELEPHONE ENCOUNTER
Dr. Harjinder Howell  Is calling in stating she called on Monday 9/23 and left a message for you to call, but I do not see this message in here. States she  would like to change the  Patient's BP Medication from Metoprolol to Propanolol 10mg bid. States from her symptoms she is experiencing acastasia and would like to change her medication. Wanted to run this by you first.  Please call and leave a message on her voicemail if you feel this is OK.     Dr. Harjinder Howell  [phone]

## 2019-10-22 ENCOUNTER — HOSPITAL ENCOUNTER (EMERGENCY)
Age: 29
Discharge: HOME | End: 2019-10-22
Attending: EMERGENCY MEDICINE | Admitting: EMERGENCY MEDICINE
Payer: MEDICAID

## 2019-10-22 VITALS
OXYGEN SATURATION: 97 % | HEIGHT: 66 IN | WEIGHT: 186 LBS | DIASTOLIC BLOOD PRESSURE: 77 MMHG | TEMPERATURE: 97.9 F | RESPIRATION RATE: 18 BRPM | BODY MASS INDEX: 29.89 KG/M2 | SYSTOLIC BLOOD PRESSURE: 131 MMHG | HEART RATE: 97 BPM

## 2019-10-22 DIAGNOSIS — F41.1 ANXIETY STATE: Primary | ICD-10-CM

## 2019-10-22 PROCEDURE — 99281 EMR DPT VST MAYX REQ PHY/QHP: CPT

## 2019-10-23 NOTE — ED NOTES
Pt discharged by provider Pt states last medication was 6am this morning, Motrin  Pt states no trauma recently to head or neck  Pt states electric heat at home  Headache for 3 days  Pt states similar episode prior in Summer       Urban Riggs RN  01/31/19 [id number]

## 2019-10-23 NOTE — ED PROVIDER NOTES
[facility] EMERGENCY DEPT    Date: 10/22/2019  Patient Name: Bri Graham    History of Presenting Illness     Chief Complaint   Patient presents with   [address] Problem       34 y.o. female with noted past medical history including Anxiety and Schizophrenia who presents to the emergency department c/o worsening anxiety this evening. Patient states that she was feeling increasingly anxious this evening at her house. States she called the police, because may make her feel safer. She states that she was having a panic attack, with dizziness. Patient states she open the window, took deep breaths and breathe in the fresh air, then had resolution of her symptoms. States that she had completely forgotten that she has antianxiety medications at home, in which she had just taken them instead of calling the police. She states  this is that coming here usually since her back with her recovery. She has a psychiatrist that [facility], has follow-up with them tomorrow. Denies fever, chills, SOB, CP, SI, HI, or other symptoms at this time. Patient denies any other associated signs or symptoms. Patient denies any other complaints. Nursing notes regarding the HPI and triage nursing notes were reviewed. Prior medical records were reviewed. Current Outpatient Medications   Medication Sig Dispense Refill    furosemide (LASIX) 20 mg tablet Take one po daily as needed. 90 Tab 1    ferrous sulfate (IRON) 325 mg (65 mg iron) EC tablet Take 1 Tab by mouth Daily (before breakfast). 90 Tab 1    raNITIdine (ZANTAC) 150 mg tablet Take 1 Tab by mouth two (2) times a day. 180 Tab 1    divalproex DR (DEPAKOTE) 500 mg tablet Take 500 mg by mouth two (2) times a day.  benztropine (COGENTIN) 0.5 mg tablet Take 0.5 mg by mouth two (2) times a day.  hydrOXYzine pamoate (VISTARIL) 25 mg capsule Take 1 Cap by mouth two (2) times daily as needed for Anxiety.  30 Cap 0    cloZAPine (CLOZARIL) 25 mg tablet Take 50 mg by mouth two (2) times a day. Take 3 tablets in the morning and 3 tablets in the evening.  desvenlafaxine succinate (PRISTIQ) 50 mg ER tablet Take 50 mg by mouth daily. Past History     Past Medical History:  Past Medical History:   Diagnosis Date    Al[facility] ([facility]     Sober 3 months; Weekly AAA meeting; Had substance abuse counseling;     Anemia     [facility]     Cirrhosis ([facility]     Past not current issue per patient    ETOH abuse     GERD (gastroesophageal reflux disease)     Head injury     Marijuana abuse     Phobia     [place] [employer]    Post partum depression     Pregnancy     Psychiatric disorder     Psychotic disorder ([facility]     Depression/  [place] Jose M NP; Bipolar disorder, mood swings.  Schizophrenia ([facility]     Stroke [facility])        Past Surgical History:  Past Surgical History:   Procedure Laterality Date    HX  SECTION      HX  SECTION      C section x 2;   &     HX RHINOPLASTY      HX TUBAL LIGATION  2017       Family History:  Family History   Family history unknown: Yes       Social History:  Social History     Tobacco Use    Smoking status: Current Every Day Smoker     Packs/day: 0.50     Years: 15.00     Pack years: 7.50    Smokeless tobacco: Former User   Substance Use Topics    Alcohol use: No     Comment: Sober for x 3 months    Drug use: Not Currently     Types: Other     Comment: Alcohol       Allergies: Allergies   Allergen Reactions    Robitussin [Guaifenesin] Nausea and Vomiting       Patient's primary care provider (as noted in EPIC):  [facility]    Review of Systems   Constitutional:  Denies malaise, fever, chills. Respiratory:  Denies cough, wheezing, difficulty breathing, shortness of breath. GI/ABD:  Denies injury, pain, distention, nausea, vomiting, diarrhea. :  Denies injury, pain, dysuria or urgency. Back:  Denies injury or pain. Pelvis:  Denies injury or pain. Extremity/MS:  Denies injury or pain. Neuro: + dizziness, resolved. Denies headache, LOC, neurologic symptoms/deficits/paresthesias. Skin: Denies injury, rash, itching or skin changes. Psych: + anxiety attack. Denies SI, HI. All other systems negative as reviewed. Visit Vitals  /77 (BP Patient Position: At rest)   Pulse 97   Temp 97.9 °F (36.6 °C)   Resp 18   Ht 5' 6\" (1.676 m)   Wt 84.4 kg (186 lb)   SpO2 97%   BMI 30.02 kg/m²       PHYSICAL EXAM:    CONSTITUTIONAL:  Alert, in no apparent distress;  well developed;  well nourished. HEAD:  Normocephalic, atraumatic. EYES:  EOMI. Non-icteric sclera. Normal conjunctiva. ENTM:  Nose:  no rhinorrhea. Throat:  no erythema or exudate, mucous membranes moist.  NECK:  Supple  RESPIRATORY:  Chest clear, equal breath sounds, good air movement. Without wheezes, rhonchi or rales. CARDIOVASCULAR:  Regular rate and rhythm. No murmurs, rubs, or gallops. NEURO:  Moves all four extremities, and grossly normal motor exam.  SKIN:  No rashes;  Normal for age. PSYCH:  Alert and normal affect. Denies SI, HI.     DIFFERENTIAL DIAGNOSES/ MEDICAL DECISION MAKING:   Anxiety, depression, vs other etiologies. ED COURSE:      IMPRESSION AND MEDICAL DECISION MAKING:  Patient states that she had increased anxiety at home, was having a panic attack, but she open the window, took deep breaths and is feeling much better. She states that she is no longer dizzy, or feeling anxious. She states she just forgot to take her meds, but because she has memory loss, she did not remember to take it at the time. She is requesting to go home now, take her medication, and follow-up with her psychiatrist tomorrow as scheduled. Patient is not having any suicidal or homicidal ideation, will let her go home at this time. Diagnosis:   1.  Anxiety state      Disposition: Discharge    Follow-up Information     Follow up With Specialties Details Why Contact Info    [employer] In 3 days  [address] [id number]  [phone]    [facility] EMERGENCY DEPT Emergency Medicine  If symptoms worsen [address] [facility] [address]. [phone]          Patient's Medications   Start Taking    No medications on file   Continue Taking    BENZTROPINE (COGENTIN) 0.5 MG TABLET    Take 0.5 mg by mouth two (2) times a day. CLOZAPINE (CLOZARIL) 25 MG TABLET    Take 50 mg by mouth two (2) times a day. Take 3 tablets in the morning and 3 tablets in the evening. DESVENLAFAXINE SUCCINATE (PRISTIQ) 50 MG ER TABLET    Take 50 mg by mouth daily. DIVALPROEX DR (DEPAKOTE) 500 MG TABLET    Take 500 mg by mouth two (2) times a day. FERROUS SULFATE (IRON) 325 MG (65 MG IRON) EC TABLET    Take 1 Tab by mouth Daily (before breakfast). FUROSEMIDE (LASIX) 20 MG TABLET    Take one po daily as needed. HYDROXYZINE PAMOATE (VISTARIL) 25 MG CAPSULE    Take 1 Cap by mouth two (2) times daily as needed for Anxiety. RANITIDINE (ZANTAC) 150 MG TABLET    Take 1 Tab by mouth two (2) times a day.    These Medications have changed    No medications on file   Stop Taking    No medications on file     PA[facility] Alvarado

## 2019-10-23 NOTE — ED NOTES
Pt reports \"I actually just forgot to take my meds. I am not dizzy, I think I had a panic attack, I am ready to go home. \"

## 2019-10-23 NOTE — ED TRIAGE NOTES
Pt awake and alert and oriented, reports \"trapped and scared in my home, but safe with the  around. \"  PT denies SI and denies HI, Pt reports she has memory problems from hitting her head so many times and from a stroke.  Pt reports history of panic attacks and feels anxious

## 2019-10-23 NOTE — DISCHARGE INSTRUCTIONS

## 2019-12-04 ENCOUNTER — HOSPITAL ENCOUNTER (EMERGENCY)
Age: 29
Discharge: HOME | End: 2019-12-04
Attending: EMERGENCY MEDICINE | Admitting: EMERGENCY MEDICINE
Payer: MEDICAID

## 2019-12-04 VITALS
OXYGEN SATURATION: 100 % | TEMPERATURE: 98 F | RESPIRATION RATE: 16 BRPM | HEART RATE: 101 BPM | DIASTOLIC BLOOD PRESSURE: 78 MMHG | SYSTOLIC BLOOD PRESSURE: 122 MMHG | BODY MASS INDEX: 29.05 KG/M2 | WEIGHT: 180 LBS

## 2019-12-04 DIAGNOSIS — F20.0 SCHIZOPHRENIA, PARANOID, CHRONIC (HCC): ICD-10-CM

## 2019-12-04 DIAGNOSIS — F41.1 ANXIETY STATE: ICD-10-CM

## 2019-12-04 DIAGNOSIS — R11.2 NON-INTRACTABLE VOMITING WITH NAUSEA, UNSPECIFIED VOMITING TYPE: Primary | ICD-10-CM

## 2019-12-04 DIAGNOSIS — E86.0 DEHYDRATION: ICD-10-CM

## 2019-12-04 DIAGNOSIS — K21.9 GASTROESOPHAGEAL REFLUX DISEASE, ESOPHAGITIS PRESENCE NOT SPECIFIED: ICD-10-CM

## 2019-12-04 LAB
ALBUMIN SERPL-MCNC: 4.6 G/DL (ref 3.4–5)
ALBUMIN/GLOB SERPL: 1.3 {RATIO} (ref 0.8–1.7)
ALP SERPL-CCNC: 97 U/L (ref 45–117)
ALT SERPL-CCNC: 32 U/L (ref 13–56)
AMPHET UR QL SCN: NEGATIVE
ANION GAP SERPL CALC-SCNC: 7 MMOL/L (ref 3–18)
APPEARANCE UR: ABNORMAL
AST SERPL-CCNC: 14 U/L (ref 10–38)
BARBITURATES UR QL SCN: NEGATIVE
BASOPHILS # BLD: 0 K/UL (ref 0–0.1)
BASOPHILS NFR BLD: 0 % (ref 0–2)
BENZODIAZ UR QL: NEGATIVE
BILIRUB SERPL-MCNC: 0.4 MG/DL (ref 0.2–1)
BILIRUB UR QL: NEGATIVE
BUN SERPL-MCNC: 8 MG/DL (ref 7–18)
BUN/CREAT SERPL: 10 (ref 12–20)
CALCIUM SERPL-MCNC: 10.3 MG/DL (ref 8.5–10.1)
CANNABINOIDS UR QL SCN: NEGATIVE
CHLORIDE SERPL-SCNC: 94 MMOL/L (ref 100–111)
CO2 SERPL-SCNC: 29 MMOL/L (ref 21–32)
COCAINE UR QL SCN: NEGATIVE
COLOR UR: YELLOW
CREAT SERPL-MCNC: 0.84 MG/DL (ref 0.6–1.3)
DIFFERENTIAL METHOD BLD: ABNORMAL
EOSINOPHIL # BLD: 0 K/UL (ref 0–0.4)
EOSINOPHIL NFR BLD: 0 % (ref 0–5)
ERYTHROCYTE [DISTWIDTH] IN BLOOD BY AUTOMATED COUNT: 13.5 % (ref 11.6–14.5)
ETHANOL SERPL-MCNC: <3 MG/DL (ref 0–3)
GLOBULIN SER CALC-MCNC: 3.5 G/DL (ref 2–4)
GLUCOSE SERPL-MCNC: 119 MG/DL (ref 74–99)
GLUCOSE UR STRIP.AUTO-MCNC: NEGATIVE MG/DL
HCG SERPL QL: NEGATIVE
HCT VFR BLD AUTO: 47.8 % (ref 35–45)
HDSCOM,HDSCOM: NORMAL
HGB BLD-MCNC: 17.3 G/DL (ref 12–16)
HGB UR QL STRIP: NEGATIVE
KETONES UR QL STRIP.AUTO: 15 MG/DL
LEUKOCYTE ESTERASE UR QL STRIP.AUTO: NEGATIVE
LYMPHOCYTES # BLD: 0.9 K/UL (ref 0.9–3.6)
LYMPHOCYTES NFR BLD: 13 % (ref 21–52)
MAGNESIUM SERPL-MCNC: 2.5 MG/DL (ref 1.6–2.6)
MCH RBC QN AUTO: 31 PG (ref 24–34)
MCHC RBC AUTO-ENTMCNC: 36.2 G/DL (ref 31–37)
MCV RBC AUTO: 85.7 FL (ref 74–97)
METHADONE UR QL: NEGATIVE
MONOCYTES # BLD: 0.7 K/UL (ref 0.05–1.2)
MONOCYTES NFR BLD: 11 % (ref 3–10)
NEUTS SEG # BLD: 5.3 K/UL (ref 1.8–8)
NEUTS SEG NFR BLD: 76 % (ref 40–73)
NITRITE UR QL STRIP.AUTO: NEGATIVE
OPIATES UR QL: NEGATIVE
PCP UR QL: NEGATIVE
PH UR STRIP: 8.5 [PH] (ref 5–8)
PLATELET # BLD AUTO: 332 K/UL (ref 135–420)
PMV BLD AUTO: 9.8 FL (ref 9.2–11.8)
POTASSIUM SERPL-SCNC: 4.4 MMOL/L (ref 3.5–5.5)
PROT SERPL-MCNC: 8.1 G/DL (ref 6.4–8.2)
PROT UR STRIP-MCNC: NEGATIVE MG/DL
RBC # BLD AUTO: 5.58 M/UL (ref 4.2–5.3)
SODIUM SERPL-SCNC: 130 MMOL/L (ref 136–145)
SP GR UR REFRACTOMETRY: 1.01 (ref 1–1.03)
UROBILINOGEN UR QL STRIP.AUTO: 0.2 EU/DL (ref 0.2–1)
WBC # BLD AUTO: 7 K/UL (ref 4.6–13.2)

## 2019-12-04 PROCEDURE — 84703 CHORIONIC GONADOTROPIN ASSAY: CPT

## 2019-12-04 PROCEDURE — 81003 URINALYSIS AUTO W/O SCOPE: CPT

## 2019-12-04 PROCEDURE — 83735 ASSAY OF MAGNESIUM: CPT

## 2019-12-04 PROCEDURE — 99284 EMERGENCY DEPT VISIT MOD MDM: CPT

## 2019-12-04 PROCEDURE — 96365 THER/PROPH/DIAG IV INF INIT: CPT

## 2019-12-04 PROCEDURE — 85025 COMPLETE CBC W/AUTO DIFF WBC: CPT

## 2019-12-04 PROCEDURE — 80307 DRUG TEST PRSMV CHEM ANLYZR: CPT

## 2019-12-04 PROCEDURE — 96361 HYDRATE IV INFUSION ADD-ON: CPT

## 2019-12-04 PROCEDURE — 74011250636 HC RX REV CODE- 250/636: Performed by: EMERGENCY MEDICINE

## 2019-12-04 PROCEDURE — 80053 COMPREHEN METABOLIC PANEL: CPT

## 2019-12-04 PROCEDURE — 74011000258 HC RX REV CODE- 258: Performed by: EMERGENCY MEDICINE

## 2019-12-04 PROCEDURE — 74011000250 HC RX REV CODE- 250: Performed by: EMERGENCY MEDICINE

## 2019-12-04 PROCEDURE — 96375 TX/PRO/DX INJ NEW DRUG ADDON: CPT

## 2019-12-04 RX ORDER — ONDANSETRON 2 MG/ML
4 INJECTION INTRAMUSCULAR; INTRAVENOUS
Status: COMPLETED | OUTPATIENT
Start: 2019-12-04 | End: 2019-12-04

## 2019-12-04 RX ORDER — RANITIDINE 150 MG/1
150 TABLET, FILM COATED ORAL 2 TIMES DAILY
Qty: 180 TAB | Refills: 1 | Status: SHIPPED | OUTPATIENT
Start: 2019-12-04 | End: 2020-01-24

## 2019-12-04 RX ORDER — PROMETHAZINE HYDROCHLORIDE 25 MG/1
25 SUPPOSITORY RECTAL
Qty: 12 SUPPOSITORY | Refills: 1 | Status: SHIPPED | OUTPATIENT
Start: 2019-12-04 | End: 2019-12-11

## 2019-12-04 RX ORDER — ONDANSETRON 4 MG/1
4-8 TABLET, ORALLY DISINTEGRATING ORAL
Qty: 20 TAB | Refills: 0 | Status: SHIPPED | OUTPATIENT
Start: 2019-12-04 | End: 2020-01-24

## 2019-12-04 RX ORDER — LORAZEPAM 2 MG/ML
0.5 INJECTION INTRAMUSCULAR
Status: COMPLETED | OUTPATIENT
Start: 2019-12-04 | End: 2019-12-04

## 2019-12-04 RX ORDER — FAMOTIDINE 10 MG/ML
20 INJECTION INTRAVENOUS
Status: COMPLETED | OUTPATIENT
Start: 2019-12-04 | End: 2019-12-04

## 2019-12-04 RX ADMIN — LORAZEPAM 0.5 MG: 2 INJECTION, SOLUTION INTRAMUSCULAR; INTRAVENOUS at 03:35

## 2019-12-04 RX ADMIN — ONDANSETRON 4 MG: 2 INJECTION INTRAMUSCULAR; INTRAVENOUS at 03:35

## 2019-12-04 RX ADMIN — FOLIC ACID: 5 INJECTION, SOLUTION INTRAMUSCULAR; INTRAVENOUS; SUBCUTANEOUS at 04:25

## 2019-12-04 RX ADMIN — SODIUM CHLORIDE 1000 ML: 900 INJECTION, SOLUTION INTRAVENOUS at 03:35

## 2019-12-04 RX ADMIN — SODIUM CHLORIDE 1000 ML: 900 INJECTION, SOLUTION INTRAVENOUS at 04:30

## 2019-12-04 RX ADMIN — FAMOTIDINE 20 MG: 10 INJECTION, SOLUTION INTRAVENOUS at 03:35

## 2019-12-04 NOTE — DISCHARGE INSTRUCTIONS
Patient Education        Dehydration: Care Instructions  Your Care Instructions  Dehydration happens when your body loses too much fluid. This might happen when you do not drink enough water or you lose large amounts of fluids from your body because of diarrhea, vomiting, or sweating. Severe dehydration can be life-threatening. Water and minerals called electrolytes help put your body fluids back in balance. Learn the early signs of fluid loss, and drink more fluids to prevent dehydration. Follow-up care is a key part of your treatment and safety. Be sure to make and go to all appointments, and call your doctor if you are having problems. It's also a good idea to know your test results and keep a list of the medicines you take. How can you care for yourself at home? · To prevent dehydration, drink plenty of fluids, enough so that your urine is light yellow or clear like water. Choose water and other caffeine-free clear liquids until you feel better. If you have kidney, heart, or liver disease and have to limit fluids, talk with your doctor before you increase the amount of fluids you drink. · If you do not feel like eating or drinking, try taking small sips of water, sports drinks, or other rehydration drinks. · Get plenty of rest.  To prevent dehydration  · Add more fluids to your diet and daily routine, unless your doctor has told you not to. · During hot weather, drink more fluids. Drink even more fluids if you exercise a lot. Stay away from drinks with alcohol or caffeine. · Watch for the symptoms of dehydration. These include:  ? A dry, sticky mouth. ? Dark yellow urine, and not much of it. ? Dry and sunken eyes. ? Feeling very tired. · Learn what problems can lead to dehydration. These include:  ? Diarrhea, fever, and vomiting. ? Any illness with a fever, such as pneumonia or the flu. ?  Activities that cause heavy sweating, such as endurance races and heavy outdoor work in hot or humid weather. ? Alcohol or drug use or problems caused by quitting their use (withdrawal). ? Certain medicines, such as cold and allergy pills (antihistamines), diet pills (diuretics), and laxatives. ? Certain diseases, such as diabetes, cancer, and heart or kidney disease. When should you call for help? Call 911 anytime you think you may need emergency care. For example, call if:    · You passed out (lost consciousness).    Call your doctor now or seek immediate medical care if:    · You are confused and cannot think clearly.     · You are dizzy or lightheaded, or you feel like you may faint.     · You have signs of needing more fluids. You have sunken eyes and a dry mouth, and you pass only a little dark urine.     · You cannot keep fluids down.    Watch closely for changes in your health, and be sure to contact your doctor if:    · You are not making tears.     · Your skin is very dry and sags slowly back into place after you pinch it.     · Your mouth and eyes are very dry. Where can you learn more? Go to http://devendra-earnest.info/. Enter F747 in the search box to learn more about \"Dehydration: Care Instructions. \"  Current as of: June 26, 2019  Content Version: 12.2  © 2696-3578 [employer]. Care instructions adapted under license by [employer] (which disclaims liability or warranty for this information). If you have questions about a medical condition or this instruction, always ask your healthcare professional. [place] [address] any warranty or liability for your use of this information. Patient Education        Gastroesophageal Reflux Disease (GERD): Care Instructions  Your Care Instructions    Gastroesophageal reflux disease (GERD) is the backward flow of stomach acid into the esophagus. The esophagus is the tube that leads from your throat to your stomach. A one-way valve prevents the stomach acid from moving up into this tube.  When you have GERD, this valve does not close tightly enough. If you have mild GERD symptoms including heartburn, you may be able to control the problem with antacids or over-the-counter medicine. Changing your diet, losing weight, and making other lifestyle changes can also help reduce symptoms. Follow-up care is a key part of your treatment and safety. Be sure to make and go to all appointments, and call your doctor if you are having problems. It's also a good idea to know your test results and keep a list of the medicines you take. How can you care for yourself at home? · Take your medicines exactly as prescribed. Call your doctor if you think you are having a problem with your medicine. · Your doctor may recommend over-the-counter medicine. For mild or occasional indigestion, antacids, such as Tums, Gaviscon, Mylanta, or Maalox, may help. Your doctor also may recommend over-the-counter acid reducers, such as Pepcid AC, Tagamet HB, Zantac 75, or Prilosec. Read and follow all instructions on the label. If you use these medicines often, talk with your doctor. · Change your eating habits. ? It's best to eat several small meals instead of two or three large meals. ? After you eat, wait 2 to 3 hours before you lie down. ? Chocolate, mint, and alcohol can make GERD worse. ? Spicy foods, foods that have a lot of acid (like tomatoes and oranges), and coffee can make GERD symptoms worse in some people. If your symptoms are worse after you eat a certain food, you may want to stop eating that food to see if your symptoms get better. · Do not smoke or chew tobacco. Smoking can make GERD worse. If you need help quitting, talk to your doctor about stop-smoking programs and medicines. These can increase your chances of quitting for good. · If you have GERD symptoms at night, raise the head of your bed 6 to 8 inches by putting the frame on blocks or placing a foam wedge under the head of your mattress.  (Adding extra pillows does not work.)  · Do not wear tight clothing around your middle. · Lose weight if you need to. Losing just 5 to 10 pounds can help. When should you call for help? Call your doctor now or seek immediate medical care if:    · You have new or different belly pain.     · Your stools are black and tarlike or have streaks of blood.    Watch closely for changes in your health, and be sure to contact your doctor if:    · Your symptoms have not improved after 2 days.     · Food seems to catch in your throat or chest.   Where can you learn more? Go to http://devendra-earnest.info/. Enter P555 in the search box to learn more about \"Gastroesophageal Reflux Disease (GERD): Care Instructions. \"  Current as of: November 7, 2018  Content Version: 12.2  © 3705-1875 [employer]. Care instructions adapted under license by [employer] (which disclaims liability or warranty for this information). If you have questions about a medical condition or this instruction, always ask your healthcare professional. [place] [address] any warranty or liability for your use of this information. Patient Education          Patient Education        Learning About Generalized Anxiety Disorder  What is generalized anxiety disorder? We all worry. It's a normal part of life. But when you have generalized anxiety disorder, you worry about lots of things and have a hard time stopping your worry. This worry or anxiety interferes with your relationships, work, and life. What causes it? The cause is not known. But it may be passed down through families. What are the symptoms? You may feel anxious or worry most days about things like work, relationships, health, or money. You may worry about things that are unlikely to happen. You find it hard to stop or control the worry. Because you worry a lot and try hard to stop worrying, you may feel restless, tired, tense, or cranky.  You may also find it hard to think or sleep. And you may have headaches or an upset stomach. How is it diagnosed? Your doctor will ask about your health and how often you worry or feel anxious. He or she may ask about other symptoms, like whether you:  · Feel restless. · Feel tired. · Have a hard time thinking or feel that your mind goes blank. · Feel cranky. · Have tense muscles. · Have sleep problems. A physical exam and tests can help make sure that your symptoms aren't caused by a different condition, such as a thyroid problem. How is it treated? Counseling and medicine can both work to treat anxiety. The two are often used along with lifestyle changes. Cognitive-behavioral therapy (CBT) is a type of counseling that's used to help treat anxiety. In CBT, you learn how to notice and replace thoughts that make you feel worried. It also can help you learn how to relax when you worry. Medicines can help. These medicines are often also used for depression. Selective serotonin reuptake inhibitors (SSRIs) are often tried first. But there are other medicines that your doctor may use. You may need to try a few medicines to find one that works well. Many people feel better by getting regular exercise, eating healthy meals, and getting good sleep. Mindfulness--focusing on things that happen in the present moment--also can help reduce your anxiety. What can you expect when you have it? Having anxiety can be upsetting. Some people might feel less worried and stressed after a couple of months of treatment. But for other people, it might take longer to feel better. Reaching out to people for help is important. Try not to isolate yourself. Let your family and friends help you. Find someone you can trust and confide in. Talk to that person. When you know what anxiety is--and how you can get help for it--you can start to learn new ways of thinking. This can help you cope and work through your anxiety.   Follow-up care is a key part of your treatment and safety. Be sure to make and go to all appointments, and call your doctor if you are having problems. It's also a good idea to know your test results and keep a list of the medicines you take. Where can you learn more? Go to http://devendra-earnest.info/. Enter G110 in the search box to learn more about \"Learning About Generalized Anxiety Disorder. \"  Current as of: May 28, 2019  Content Version: 12.2  © 5396-5622 [employer]. Care instructions adapted under license by [employer] (which disclaims liability or warranty for this information). If you have questions about a medical condition or this instruction, always ask your healthcare professional. [place] [address] any warranty or liability for your use of this information. Patient Education        Nausea and Vomiting: Care Instructions  Your Care Instructions    When you are nauseated, you may feel weak and sweaty and notice a lot of saliva in your mouth. Nausea often leads to vomiting. Most of the time you do not need to worry about nausea and vomiting, but they can be signs of other illnesses. Two common causes of nausea and vomiting are stomach flu and food poisoning. Nausea and vomiting from viral stomach flu will usually start to improve within 24 hours. Nausea and vomiting from food poisoning may last from 12 to 48 hours. The doctor has checked you carefully, but problems can develop later. If you notice any problems or new symptoms, get medical treatment right away. Follow-up care is a key part of your treatment and safety. Be sure to make and go to all appointments, and call your doctor if you are having problems. It's also a good idea to know your test results and keep a list of the medicines you take. How can you care for yourself at home? · To prevent dehydration, drink plenty of fluids, enough so that your urine is light yellow or clear like water.  Choose water and other caffeine-free clear liquids until you feel better. If you have kidney, heart, or liver disease and have to limit fluids, talk with your doctor before you increase the amount of fluids you drink. · Rest in bed until you feel better. · When you are able to eat, try clear soups, mild foods, and liquids until all symptoms are gone for 12 to 48 hours. Other good choices include dry toast, crackers, cooked cereal, and gelatin dessert, such as Jell-O. When should you call for help? Call 911 anytime you think you may need emergency care. For example, call if:    · You passed out (lost consciousness).    Call your doctor now or seek immediate medical care if:    · You have symptoms of dehydration, such as:  ? Dry eyes and a dry mouth. ? Passing only a little dark urine. ? Feeling thirstier than usual.     · You have new or worsening belly pain.     · You have a new or higher fever.     · You vomit blood or what looks like coffee grounds.    Watch closely for changes in your health, and be sure to contact your doctor if:    · You have ongoing nausea and vomiting.     · Your vomiting is getting worse.     · Your vomiting lasts longer than 2 days.     · You are not getting better as expected. Where can you learn more? Go to http://devendra-earnest.info/. Enter 25 [id number] in the search box to learn more about \"Nausea and Vomiting: Care Instructions. \"  Current as of: June 26, 2019  Content Version: 12.2  © 9458-7358 [employer]. Care instructions adapted under license by [employer] (which disclaims liability or warranty for this information). If you have questions about a medical condition or this instruction, always ask your healthcare professional. [place] [address] any warranty or liability for your use of this information.

## 2019-12-04 NOTE — ED NOTES
I have reviewed discharge instructions with the patient. The patient verbalized understanding. Patient armband removed and shredded. Pt d/cd to home awake, alert and in NAD. All questions answered.

## 2019-12-04 NOTE — ED TRIAGE NOTES
Presents via medic c/o anxiety d/t not being able to keep \"my psych meds down\"  Pt reports she last took her latuda among other psych meds 3 days ago.   C/o increased anxiety

## 2019-12-04 NOTE — ED PROVIDER NOTES
Alejandro Laughlin is a 34 y.o. female with history of schizophrenia and anxiety with complaints of increased anxiety secondary to her not able to keep medications down for the last couple days due to nausea patient is also not sleeping well. She denies any diarrhea. She has had decreased urinary output. Patient thinks her some discoloration with her stool and voices told her that it would probably just go away. Patient was seen in late October for something similar and felt better but the vomiting returned the last couple days. She denies any suicidal ideation. She has no current abdominal pain. The history is provided by the patient and medical records. Past Medical History:   Diagnosis Date    Alcoholic ([address]     Sober 3 months; Weekly AAA meeting; Had substance abuse counseling;     Anemia     [facility] disorder ([facility]     Cirrhosis ([facility]     Past not current issue per patient    ETOH abuse     GERD (gastroesophageal reflux disease)     Head injury     Marijuana abuse     Phobia     [place] [facility]    Post partum depression     Pregnancy     Psychiatric disorder     Psychotic disorder ([facility]     Depression/  Luisito Clark NP; Bipolar disorder, mood swings.     Schizophrenia ([facility]     Stroke [facility])        Past Surgical History:   Procedure Laterality Date    HX  SECTION      HX  SECTION      C section x 2;   &     HX RHINOPLASTY      HX TUBAL LIGATION  2017         Family History:   Family history unknown: Yes       Social History     Socioeconomic History    Marital status: LEGALLY      Spouse name: Not on file    Number of children: 2    Years of education: Not on file    Highest education level: Not on file   Occupational History    Occupation: disabled   Social Needs    Financial resource strain: Not on file    Food insecurity:     Worry: Not on file     Inability: Not on file    Transportation needs:     Medical: Not on file Non-medical: Not on file   Tobacco Use    Smoking status: Current Every Day Smoker     Packs/day: 0.50     Years: 15.00     Pack years: 7.50    Smokeless tobacco: Former User   Substance and Sexual Activity    Alcohol use: No     Comment: Sober for x 3 months    Drug use: Not Currently     Types: Other     Comment: Alcohol    Sexual activity: Never   Lifestyle    Physical activity:     Days per week: Not on file     Minutes per session: Not on file    Stress: Not on file   Relationships    Social connections:     Talks on phone: Not on file     Gets together: Not on file     Attends Buddhist service: Not on file     Active member of club or organization: Not on file     Attends meetings of clubs or organizations: Not on file     Relationship status: Not on file    Intimate partner violence:     Fear of current or ex partner: Not on file     Emotionally abused: Not on file     Physically abused: Not on file     Forced sexual activity: Not on file   Other Topics Concern    Not on file   Social History Narrative    ** Merged History Encounter **         ;  2 children; unemployed    Disabled related to mental issues/ 2017    2 boys live with her mom. Currently in [address]: [place]    Review of Systems   Constitutional: Negative for fever. HENT: Negative for sore throat and trouble swallowing. Eyes: Negative for visual disturbance. Respiratory: Negative for shortness of breath. Cardiovascular: Negative for chest pain. Gastrointestinal: Negative for abdominal pain. Genitourinary: Negative for difficulty urinating. Musculoskeletal: Negative for gait problem. Skin: Negative for rash. Allergic/Immunologic: Negative for immunocompromised state. Neurological: Positive for light-headedness. Psychiatric/Behavioral: Positive for hallucinations and sleep disturbance. The patient is nervous/anxious.         Vitals:    12/04/19 0310 12/04/19 0315 12/04/19 0330   BP: 130/78 120/76 122/78   Pulse: (!) 103 98 (!) 101   Resp: 16     Temp: 98 °F (36.7 °C)     SpO2: 100% 100% 100%   Weight: 81.6 kg (180 lb)              Physical Exam  Vitals signs and nursing note reviewed. Constitutional:       General: She is in acute distress. Appearance: She is well-developed. She is not ill-appearing, toxic-appearing or diaphoretic. HENT:      Head: Normocephalic and atraumatic. Right Ear: External ear normal.      Left Ear: External ear normal.      Nose: Nose normal.      Mouth/Throat:      Pharynx: Uvula midline. Eyes:      General: No scleral icterus. Conjunctiva/sclera: Conjunctivae normal.   Neck:      Musculoskeletal: Neck supple. Cardiovascular:      Rate and Rhythm: Normal rate and regular rhythm. Heart sounds: Normal heart sounds. Pulmonary:      Effort: Pulmonary effort is normal.      Breath sounds: Normal breath sounds. Abdominal:      Palpations: Abdomen is soft. Tenderness: There is no tenderness. Skin:     General: Skin is warm and dry. Neurological:      Mental Status: She is alert and oriented to person, place, and time. Gait: Gait normal.   Psychiatric:         Attention and Perception: Attention normal.         Mood and Affect: Mood is anxious. Affect is flat. Speech: Speech normal.         Behavior: Behavior normal.         Thought Content: Thought content does not include homicidal or suicidal ideation.           MDM       Procedures  Vitals:  Patient Vitals for the past 12 hrs:   Temp Pulse Resp BP SpO2   12/04/19 0330 -- (!) 101 -- 122/78 100 %   12/04/19 0315 -- 98 -- 120/76 100 %   12/04/19 0310 98 °F (36.7 °C) (!) 103 16 130/78 100 %         Medications ordered:   Medications   sodium chloride 0.9 % bolus infusion 1,000 mL (has no administration in time range)   sodium chloride 0.9 % bolus infusion 1,000 mL (1,000 mL IntraVENous New Bag 12/4/19 0335)   ondansetron (ZOFRAN) injection 4 mg (4 mg IntraVENous Given 12/4/19 0335)   famotidine (PF) (PEPCID) injection 20 mg (20 mg IntraVENous Given 12/4/19 0335)   LORazepam (ATIVAN) injection 0.5 mg (0.5 mg IntraVENous Given 12/4/19 0335)   dextrose 5% and 0.9% NaCl 1,000 mL with mvi, adult no. 4 with vit K 10 mL, thiamine 845 mg, folic acid 1 mg infusion ( IntraVENous New Bag 12/4/19 0425)         Lab findings:  Recent Results (from the past 12 hour(s))   CBC WITH AUTOMATED DIFF    Collection Time: 12/04/19  3:25 AM   Result Value Ref Range    WBC 7.0 4.6 - 13.2 K/uL    RBC 5.58 (H) 4.20 - 5.30 M/uL    HGB 17.3 (H) 12.0 - 16.0 g/dL    HCT 47.8 (H) 35.0 - 45.0 %    MCV 85.7 74.0 - 97.0 FL    MCH 31.0 24.0 - 34.0 PG    MCHC 36.2 31.0 - 37.0 g/dL    RDW 13.5 11.6 - 14.5 %    PLATELET 060 861 - 107 K/uL    MPV 9.8 9.2 - 11.8 FL    NEUTROPHILS 76 (H) 40 - 73 %    LYMPHOCYTES 13 (L) 21 - 52 %    MONOCYTES 11 (H) 3 - 10 %    EOSINOPHILS 0 0 - 5 %    BASOPHILS 0 0 - 2 %    ABS. NEUTROPHILS 5.3 1.8 - 8.0 K/UL    ABS. LYMPHOCYTES 0.9 0.9 - 3.6 K/UL    ABS. MONOCYTES 0.7 0.05 - 1.2 K/UL    ABS. EOSINOPHILS 0.0 0.0 - 0.4 K/UL    ABS. BASOPHILS 0.0 0.0 - 0.1 K/UL    DF AUTOMATED     METABOLIC PANEL, COMPREHENSIVE    Collection Time: 12/04/19  3:25 AM   Result Value Ref Range    Sodium 130 (L) 136 - 145 mmol/L    Potassium 4.4 3.5 - 5.5 mmol/L    Chloride 94 (L) 100 - 111 mmol/L    CO2 29 21 - 32 mmol/L    Anion gap 7 3.0 - 18 mmol/L    Glucose 119 (H) 74 - 99 mg/dL    BUN 8 7.0 - 18 MG/DL    Creatinine 0.84 0.6 - 1.3 MG/DL    BUN/Creatinine ratio 10 (L) 12 - 20      GFR est AA >60 >60 ml/min/1.73m2    GFR est non-AA >60 >60 ml/min/1.73m2    Calcium 10.3 (H) 8.5 - 10.1 MG/DL    Bilirubin, total 0.4 0.2 - 1.0 MG/DL    ALT (SGPT) 32 13 - 56 U/L    AST (SGOT) 14 10 - 38 U/L    Alk.  phosphatase 97 45 - 117 U/L    Protein, total 8.1 6.4 - 8.2 g/dL    Albumin 4.6 3.4 - 5.0 g/dL    Globulin 3.5 2.0 - 4.0 g/dL    A-G Ratio 1.3 0.8 - 1.7     ETHYL ALCOHOL    Collection Time: 12/04/19 3:25 AM   Result Value Ref Range    ALCOHOL(ETHYL),SERUM <3 0 - 3 MG/DL   HCG QL SERUM    Collection Time: 12/04/19  3:25 AM   Result Value Ref Range    HCG, Ql. NEGATIVE  NEG     MAGNESIUM    Collection Time: 12/04/19  3:25 AM   Result Value Ref Range    Magnesium 2.5 1.6 - 2.6 mg/dL   URINALYSIS W/ RFLX MICROSCOPIC    Collection Time: 12/04/19  4:35 AM   Result Value Ref Range    Color YELLOW      Appearance CLOUDY      Specific gravity 1.009 1.005 - 1.030      pH (UA) 8.5 (H) 5.0 - 8.0      Protein NEGATIVE  NEG mg/dL    Glucose NEGATIVE  NEG mg/dL    Ketone 15 (A) NEG mg/dL    Bilirubin NEGATIVE  NEG      Blood NEGATIVE  NEG      Urobilinogen 0.2 0.2 - 1.0 EU/dL    Nitrites NEGATIVE  NEG      Leukocyte Esterase NEGATIVE  NEG     DRUG SCREEN, URINE    Collection Time: 12/04/19  4:35 AM   Result Value Ref Range    BENZODIAZEPINES NEGATIVE  NEG      BARBITURATES NEGATIVE  NEG      THC (TH-CANNABINOL) NEGATIVE  NEG      OPIATES NEGATIVE  NEG      PCP(PHENCYCLIDINE) NEGATIVE  NEG      COCAINE NEGATIVE  NEG      AMPHETAMINES NEGATIVE  NEG      METHADONE NEGATIVE  NEG      HDSCOM (NOTE)        EKG interpretation by ED Physician:      X-Ray, CT or other radiology findings or impressions:  No orders to display       Progress notes, Consult notes or additional Procedure notes:   No further vomiting here. Patient with moderate dehydration. No evidence of infection or need for imaging at this time. Placed on antiemetics for home. Do not feel patient requires admission or other work-up here    I have discussed with patient and/or family/sig other the results, interpretation of any imaging if performed, suspected diagnosis and treatment plan to include instructions regarding the diagnoses listed to which understanding was expressed with all questions answered      Reevaluation of patient:   stable    Disposition:  Diagnosis:   1. Non-intractable vomiting with nausea, unspecified vomiting type    2. Anxiety state    3. Schizophrenia, paranoid, chronic ([facility]    4. Dehydration    5. Gastroesophageal reflux disease, esophagitis presence not specified        Disposition: home      Follow-up Information     Follow up With Specialties Details Why Contact Info    [facility] Schedule an appointment as soon as possible for a visit  [id number] [facility]              Patient's Medications   Start Taking    ONDANSETRON (ZOFRAN ODT) 4 MG DISINTEGRATING TABLET    Take 1-2 Tabs by mouth every eight (8) hours as needed for Nausea. PROMETHAZINE (PHENERGAN) 25 MG SUPPOSITORY    Insert 1 Suppository into rectum every six (6) hours as needed for Nausea for up to 7 days. Continue Taking    BENZTROPINE (COGENTIN) 0.5 MG TABLET    Take 0.5 mg by mouth two (2) times a day. CLOZAPINE (CLOZARIL) 25 MG TABLET    Take 50 mg by mouth two (2) times a day. Take 3 tablets in the morning and 3 tablets in the evening. DESVENLAFAXINE SUCCINATE (PRISTIQ) 50 MG ER TABLET    Take 50 mg by mouth daily. DIVALPROEX DR (DEPAKOTE) 500 MG TABLET    Take 500 mg by mouth two (2) times a day. FERROUS SULFATE (IRON) 325 MG (65 MG IRON) EC TABLET    Take 1 Tab by mouth Daily (before breakfast). HYDROXYZINE PAMOATE (VISTARIL) 25 MG CAPSULE    Take 1 Cap by mouth two (2) times daily as needed for Anxiety. These Medications have changed    Modified Medication Previous Medication    RANITIDINE (ZANTAC) 150 MG TABLET raNITIdine (ZANTAC) 150 mg tablet       Take 1 Tab by mouth two (2) times a day. Take 1 Tab by mouth two (2) times a day. Stop Taking    FUROSEMIDE (LASIX) 20 MG TABLET    Take one po daily as needed.

## 2020-01-23 ENCOUNTER — HOSPITAL ENCOUNTER (EMERGENCY)
Age: 30
Discharge: HOME | End: 2020-01-24
Attending: EMERGENCY MEDICINE
Payer: MEDICAID

## 2020-01-23 DIAGNOSIS — R44.0 AUDITORY HALLUCINATION: ICD-10-CM

## 2020-01-23 DIAGNOSIS — F32.A DEPRESSION WITH SUICIDAL IDEATION: Primary | ICD-10-CM

## 2020-01-23 DIAGNOSIS — R45.851 DEPRESSION WITH SUICIDAL IDEATION: Primary | ICD-10-CM

## 2020-01-23 LAB
ALBUMIN SERPL-MCNC: 3.8 G/DL (ref 3.4–5)
ALBUMIN/GLOB SERPL: 1.1 {RATIO} (ref 0.8–1.7)
ALP SERPL-CCNC: 75 U/L (ref 45–117)
ALT SERPL-CCNC: 16 U/L (ref 13–56)
AMPHET UR QL SCN: NEGATIVE
ANION GAP SERPL CALC-SCNC: 10 MMOL/L (ref 3–18)
APAP SERPL-MCNC: <2 UG/ML (ref 10–30)
AST SERPL-CCNC: 11 U/L (ref 10–38)
BARBITURATES UR QL SCN: NEGATIVE
BASOPHILS # BLD: 0 K/UL (ref 0–0.1)
BASOPHILS NFR BLD: 0 % (ref 0–2)
BENZODIAZ UR QL: NEGATIVE
BILIRUB SERPL-MCNC: 0.6 MG/DL (ref 0.2–1)
BUN SERPL-MCNC: 11 MG/DL (ref 7–18)
BUN/CREAT SERPL: 17 (ref 12–20)
CALCIUM SERPL-MCNC: 9.2 MG/DL (ref 8.5–10.1)
CANNABINOIDS UR QL SCN: NEGATIVE
CHLORIDE SERPL-SCNC: 106 MMOL/L (ref 100–111)
CO2 SERPL-SCNC: 24 MMOL/L (ref 21–32)
COCAINE UR QL SCN: NEGATIVE
CREAT SERPL-MCNC: 0.65 MG/DL (ref 0.6–1.3)
DIFFERENTIAL METHOD BLD: ABNORMAL
EOSINOPHIL # BLD: 0 K/UL (ref 0–0.4)
EOSINOPHIL NFR BLD: 0 % (ref 0–5)
ERYTHROCYTE [DISTWIDTH] IN BLOOD BY AUTOMATED COUNT: 14.6 % (ref 11.6–14.5)
ETHANOL SERPL-MCNC: <3 MG/DL (ref 0–3)
GLOBULIN SER CALC-MCNC: 3.6 G/DL (ref 2–4)
GLUCOSE SERPL-MCNC: 89 MG/DL (ref 74–99)
HCG UR QL: NEGATIVE
HCT VFR BLD AUTO: 41.6 % (ref 35–45)
HDSCOM,HDSCOM: NORMAL
HGB BLD-MCNC: 14.1 G/DL (ref 12–16)
LYMPHOCYTES # BLD: 1.5 K/UL (ref 0.9–3.6)
LYMPHOCYTES NFR BLD: 18 % (ref 21–52)
MCH RBC QN AUTO: 31.1 PG (ref 24–34)
MCHC RBC AUTO-ENTMCNC: 33.9 G/DL (ref 31–37)
MCV RBC AUTO: 91.8 FL (ref 74–97)
METHADONE UR QL: NEGATIVE
MONOCYTES # BLD: 1.1 K/UL (ref 0.05–1.2)
MONOCYTES NFR BLD: 13 % (ref 3–10)
NEUTS SEG # BLD: 5.7 K/UL (ref 1.8–8)
NEUTS SEG NFR BLD: 69 % (ref 40–73)
OPIATES UR QL: NEGATIVE
PCP UR QL: NEGATIVE
PLATELET # BLD AUTO: 196 K/UL (ref 135–420)
PMV BLD AUTO: 10.4 FL (ref 9.2–11.8)
POTASSIUM SERPL-SCNC: 4 MMOL/L (ref 3.5–5.5)
PROT SERPL-MCNC: 7.4 G/DL (ref 6.4–8.2)
RBC # BLD AUTO: 4.53 M/UL (ref 4.2–5.3)
SALICYLATES SERPL-MCNC: 2.6 MG/DL (ref 2.8–20)
SODIUM SERPL-SCNC: 140 MMOL/L (ref 136–145)
WBC # BLD AUTO: 8.3 K/UL (ref 4.6–13.2)

## 2020-01-23 PROCEDURE — 80307 DRUG TEST PRSMV CHEM ANLYZR: CPT

## 2020-01-23 PROCEDURE — 99285 EMERGENCY DEPT VISIT HI MDM: CPT

## 2020-01-23 PROCEDURE — 85025 COMPLETE CBC W/AUTO DIFF WBC: CPT

## 2020-01-23 PROCEDURE — 80053 COMPREHEN METABOLIC PANEL: CPT

## 2020-01-23 PROCEDURE — 81025 URINE PREGNANCY TEST: CPT

## 2020-01-23 NOTE — ED NOTES
Pt states she is suicidal. Does not have a plan. Poor historian. Stating she does not have a name. Also states \"I'm a prostitute or something, but I don't get paid and I don't have sex. \" Denies any alcohol or drug use except \"prescriptions\". Pt very drowsy and drifting off during conversation.
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
## 2020-01-23 NOTE — ED NOTES
1600  Assumed care from Dr. Nona Avery. Plan: pt awaiting psych evaluation. 2122  Spoke with Dr. Cha Harris, consult for psych. He evaluated pt and determined that she meets criteria for admission. He states she is actively hallucinating, with SI bizarre thoughts. He states she is voluntary for admission at this time. 0500  Transfer of care to Dr. Emani Chavira. Plan: Pt awaiting bed placement.

## 2020-01-23 NOTE — ED TRIAGE NOTES
Pt arrived with police. Police were called by her . Pt talking about [facility] voices and confused as to why she is here. Requested per police to come to depaul. Says she is hurting self now. CSB aware pt is here.  Recent admit to [facility]

## 2020-01-24 ENCOUNTER — HOSPITAL ENCOUNTER (INPATIENT)
Age: 30
LOS: 28 days | Discharge: NURSING, REHAB OR LONG-TERM CARE | DRG: 750 | End: 2020-02-21
Attending: PSYCHIATRY & NEUROLOGY | Admitting: PSYCHIATRY & NEUROLOGY
Payer: MEDICAID

## 2020-01-24 VITALS
OXYGEN SATURATION: 97 % | BODY MASS INDEX: 25.18 KG/M2 | RESPIRATION RATE: 16 BRPM | SYSTOLIC BLOOD PRESSURE: 103 MMHG | WEIGHT: 156 LBS | HEART RATE: 77 BPM | DIASTOLIC BLOOD PRESSURE: 64 MMHG | TEMPERATURE: 98 F

## 2020-01-24 LAB — VALPROATE SERPL-MCNC: 20 UG/ML (ref 50–100)

## 2020-01-24 PROCEDURE — 74011250637 HC RX REV CODE- 250/637: Performed by: PSYCHIATRY & NEUROLOGY

## 2020-01-24 PROCEDURE — 65220000003 HC RM SEMIPRIVATE PSYCH

## 2020-01-24 PROCEDURE — 80164 ASSAY DIPROPYLACETIC ACD TOT: CPT

## 2020-01-24 RX ORDER — TRAZODONE HYDROCHLORIDE 50 MG/1
50 TABLET ORAL
Status: DISCONTINUED | OUTPATIENT
Start: 2020-01-24 | End: 2020-02-21 | Stop reason: HOSPADM

## 2020-01-24 RX ORDER — LANOLIN ALCOHOL/MO/W.PET/CERES
100 CREAM (GRAM) TOPICAL DAILY
Status: DISCONTINUED | OUTPATIENT
Start: 2020-01-25 | End: 2020-01-31

## 2020-01-24 RX ORDER — HALOPERIDOL 5 MG/ML
5 INJECTION INTRAMUSCULAR
Status: DISCONTINUED | OUTPATIENT
Start: 2020-01-24 | End: 2020-02-21 | Stop reason: HOSPADM

## 2020-01-24 RX ORDER — HYDROXYZINE 50 MG/1
25 TABLET, FILM COATED ORAL
COMMUNITY
End: 2020-02-21

## 2020-01-24 RX ORDER — THERA TABS 400 MCG
1 TAB ORAL DAILY
Status: DISCONTINUED | OUTPATIENT
Start: 2020-01-25 | End: 2020-02-21 | Stop reason: HOSPADM

## 2020-01-24 RX ORDER — LORAZEPAM 2 MG/ML
1-2 INJECTION INTRAMUSCULAR
Status: DISCONTINUED | OUTPATIENT
Start: 2020-01-24 | End: 2020-02-21 | Stop reason: HOSPADM

## 2020-01-24 RX ORDER — GLUCOSAMINE SULFATE 1500 MG
1000 POWDER IN PACKET (EA) ORAL DAILY
COMMUNITY
End: 2020-02-21

## 2020-01-24 RX ORDER — IBUPROFEN 400 MG/1
400 TABLET ORAL
Status: DISCONTINUED | OUTPATIENT
Start: 2020-01-24 | End: 2020-02-21 | Stop reason: HOSPADM

## 2020-01-24 RX ORDER — FAMOTIDINE 20 MG/1
20 TABLET, FILM COATED ORAL 2 TIMES DAILY
Status: ON HOLD | COMMUNITY
End: 2020-02-21 | Stop reason: SDUPTHER

## 2020-01-24 RX ORDER — LANOLIN ALCOHOL/MO/W.PET/CERES
1 CREAM (GRAM) TOPICAL
Status: DISCONTINUED | OUTPATIENT
Start: 2020-01-25 | End: 2020-02-10

## 2020-01-24 RX ORDER — METOPROLOL TARTRATE 25 MG/1
25 TABLET, FILM COATED ORAL 2 TIMES DAILY
COMMUNITY
End: 2020-02-21

## 2020-01-24 RX ORDER — ASCORBIC ACID 500 MG
500 TABLET ORAL DAILY
Status: ON HOLD | COMMUNITY
End: 2020-02-21 | Stop reason: SDUPTHER

## 2020-01-24 RX ORDER — METOPROLOL TARTRATE 25 MG/1
25 TABLET, FILM COATED ORAL 2 TIMES DAILY
Status: DISCONTINUED | OUTPATIENT
Start: 2020-01-24 | End: 2020-01-26

## 2020-01-24 RX ORDER — CHOLECALCIFEROL (VITAMIN D3) 125 MCG
5 CAPSULE ORAL
COMMUNITY
End: 2020-02-21

## 2020-01-24 RX ORDER — HALOPERIDOL 5 MG/1
5 TABLET ORAL
Status: DISCONTINUED | OUTPATIENT
Start: 2020-01-24 | End: 2020-02-21 | Stop reason: HOSPADM

## 2020-01-24 RX ORDER — HYDROXYZINE PAMOATE 50 MG/1
50 CAPSULE ORAL
Status: DISCONTINUED | OUTPATIENT
Start: 2020-01-24 | End: 2020-02-21 | Stop reason: HOSPADM

## 2020-01-24 RX ORDER — LANOLIN ALCOHOL/MO/W.PET/CERES
100 CREAM (GRAM) TOPICAL DAILY
COMMUNITY
End: 2020-02-21

## 2020-01-24 RX ORDER — DIVALPROEX SODIUM 250 MG/1
500 TABLET, DELAYED RELEASE ORAL 2 TIMES DAILY
Status: DISCONTINUED | OUTPATIENT
Start: 2020-01-24 | End: 2020-02-04

## 2020-01-24 RX ORDER — BISMUTH SUBSALICYLATE 262 MG
1 TABLET,CHEWABLE ORAL DAILY
COMMUNITY
End: 2020-02-21

## 2020-01-24 RX ORDER — FAMOTIDINE 20 MG/1
20 TABLET, FILM COATED ORAL 2 TIMES DAILY
Status: DISCONTINUED | OUTPATIENT
Start: 2020-01-24 | End: 2020-02-21 | Stop reason: HOSPADM

## 2020-01-24 RX ORDER — ASCORBIC ACID 250 MG
500 TABLET ORAL DAILY
Status: DISCONTINUED | OUTPATIENT
Start: 2020-01-25 | End: 2020-02-21 | Stop reason: HOSPADM

## 2020-01-24 RX ADMIN — DIVALPROEX SODIUM 500 MG: 250 TABLET, DELAYED RELEASE ORAL at 20:24

## 2020-01-24 RX ADMIN — FAMOTIDINE 20 MG: 20 TABLET ORAL at 20:24

## 2020-01-24 RX ADMIN — TRAZODONE HYDROCHLORIDE 50 MG: 50 TABLET ORAL at 20:25

## 2020-01-24 NOTE — PROGRESS NOTES
Spoke with charge nurse, Sheree and discussed the request for a Depakote level. Informed that the [facility] team has faxed over a list of the patient's medications.            Denise Peguero, MSN, RN, ACM-RN   ED Outcomes Manager  [phone] (phone)

## 2020-01-24 NOTE — ED NOTES
History: Patient is a psychiatric individual who has suicidal ideation. No hallucinations  Patient signed out at 6 AM today on Friday, 1/24/2020. I reviewed psychiatric note by Dr. Rossy Lynn    Patient is 66-year-old female suicidal ideation with plan limited social support actively hallucinating noncompliant with medication voluntary admission  There was no specific medications ordered in addition. Vitals:  Patient Vitals for the past 12 hrs:   Temp Pulse Resp BP SpO2   01/24/20 1258 98 °F (36.7 °C) 77 16 103/64 97 %   01/24/20 0734 98.1 °F (36.7 °C) 83 16 111/67 95 %       Medications ordered:   Medications - No data to display      Progress notes, Consult notes or Re-evaluation:   Patient remained stable in the emergency department is in gait is normal  Awake alert oriented  Rest instructions  Patient's transfer forms have been filled out. Patient has no complaints in the emergency department understands instructions  Patient is valproic acid has been checked as per the receiving facility  Patient is very comfortable with the disposition and plan to be transferred. Recent Results (from the past 24 hour(s))   VALPROIC ACID    Collection Time: 01/24/20 12:11 PM   Result Value Ref Range    Valproic acid 20 (L) 50 - 100 ug/ml       Diagnostic Study Results     Labs -     Recent Results (from the past 12 hour(s))   VALPROIC ACID    Collection Time: 01/24/20 12:11 PM   Result Value Ref Range    Valproic acid 20 (L) 50 - 100 ug/ml       Radiologic Studies -   No orders to display     CT Results  (Last 48 hours)    None        CXR Results  (Last 48 hours)    None          Discharge     Clinical Impression:   1. Depression with suicidal ideation    2.  Auditory hallucination      Disposition:  Transfer to [facility].     It should be noted that I will be the provider of record for this patient        Follow-up Information    None

## 2020-01-24 NOTE — H&P
History and Physical    Patient: Dorinda Garcia MRN: [id number]  SSN: [id number]    YOB: 1990  Age: 34 y.o. Sex: female      Subjective:      Dorinda Garcia is a 34 y.o.  female who admitted experiencing delusional thinking and suicidal ideation. Her thoughts/answers were delayed. Past Medical History:   Diagnosis Date    Alcoholic ([facility]     Sober 3 months; Weekly AAA meeting; Had substance abuse counseling;     Anemia     B[facility]     Cirrhosis ([facility]     Past not current issue per patient    ETOH abuse     GERD (gastroesophageal reflux disease)     Head injury     Marijuana abuse     Phobia     [place] [employer]    Post partum depression     Pregnancy     Psychiatric disorder     Psychotic disorder ([facility]     Depression/  Emilie Stafford NP; Bipolar disorder, mood swings.  Schizophrenia ([facility]     Stroke [facility])      Past Surgical History:   Procedure Laterality Date    HX  SECTION      HX  SECTION      C section x 2;   &     HX RHINOPLASTY      HX TUBAL LIGATION  2017      Family History   Family history unknown: Yes     Social History     Tobacco Use    Smoking status: Current Every Day Smoker     Packs/day: 0.50     Years: 15.00     Pack years: 7.50    Smokeless tobacco: Former User   Substance Use Topics    Alcohol use: No     Comment: Sober for x 3 months      Prior to Admission medications    Medication Sig Start Date End Date Taking? Authorizing Provider   hydroxyzine HCL (ATARAX) 50 mg tablet Take 25 mg by mouth every six (6) hours as needed for Anxiety. Jm Mendez MD   famotidine (PEPCID) 20 mg tablet Take 20 mg by mouth two (2) times a day. Jm Mendez MD   metoprolol tartrate (LOPRESSOR) 25 mg tablet Take 25 mg by mouth two (2) times a day. Jm Mendez MD   ascorbic acid, vitamin C, (VITAMIN C) 500 mg tablet Take 500 mg by mouth daily.     Jm Mendez MD   thiamine HCL (B-1) 100 mg tablet Take 100 mg by mouth daily. Jm Mendez MD   melatonin 5 mg tablet Take 5 mg by mouth nightly. Jm Mendez MD   cholecalciferol (VITAMIN D3) 25 mcg (1,000 unit) cap Take 1,000 Units by mouth daily. Jm Mendez MD   paliperidone palmitate (INVEGA SUSTENNA) 234 mg/1.5 mL injection 234 mg by IntraMUSCular route every thirty (30) days. Jm Mendez MD   multivitamin (ONE A DAY) tablet Take 1 Tab by mouth daily. Jm Mendez MD   ferrous sulfate (IRON) 325 mg (65 mg iron) EC tablet Take 1 Tab by mouth Daily (before breakfast). 5/30/19   Talha Wiggins MD   divalproex DR (DEPAKOTE) 500 mg tablet Take 500 mg by mouth two (2) times a day. Magdy Riggins   hydrOXYzine pamoate (VISTARIL) 25 mg capsule Take 1 Cap by mouth two (2) times daily as needed for Anxiety. 5/8/19   PAU [place]        Allergies   Allergen Reactions    Robitussin [Guaifenesin] Nausea and Vomiting       Review of Systems:  A comprehensive review of systems was negative except for that written in the History of Present Illness. Objective:     Vitals:    01/24/20 1650   BP: 97/65   Pulse: 81   Resp: 16   Temp: 97.8 °F (36.6 °C)        Physical Exam:  General:  Alert, cooperative, no distress, appears stated age. Eyes:  Conjunctivae/corneas clear. PERRL, EOMs intact. Fundi benign   Ears:  Normal TMs and external ear canals both ears. Nose: Nares normal. Septum midline. Mucosa normal. No drainage or sinus tenderness. Mouth/Throat: Lips, mucosa, and tongue normal. Teeth and gums normal.   Neck: Supple, symmetrical, trachea midline, no adenopathy, thyroid: no enlargment/tenderness/nodules, no carotid bruit and no JVD. Back:   Symmetric, no curvature. ROM normal. No CVA tenderness. Lungs:   Clear to auscultation bilaterally. Heart:  Regular rate and rhythm, S1, S2 normal, no murmur, click, rub or gallop. Abdomen:   Soft, non-tender. Bowel sounds normal. No masses,  No organomegaly.    Extremities: Extremities normal, atraumatic, no cyanosis or edema. Pulses: 2+ and symmetric all extremities. Skin: Skin color, texture, turgor normal. No rashes or lesions   Lymph nodes: Cervical, supraclavicular, and axillary nodes normal.   Neurologic: CNII-XII intact. Normal strength, sensation and reflexes throughout. Assessment:     Hospital Problems  Date Reviewed: 7/1/2019          Codes Class Noted POA    Schizophrenia ([facility] ICD-10-CM: F20.9  ICD-9-CM: 295.90  4/18/2017 Unknown              Plan:     Patient will attend groups and participate in unit activities.  She will take medications as prescribed and follow physician's orders  in and out patient    Signed By: Neil Pineda NP     January 24, 2020

## 2020-01-24 NOTE — PROGRESS NOTES
Patient accepted at [facility] by Dr. Carlee Marshall . Patient is going to room 102, bed 2. Nursing to call report to [phone]. Information was given to , Arabella; charge nurse, Sheree; and Dr. Louise Luo.            Willie Mandujano, MSN, RN, ACM-RN   ED Outcomes Manager  [phone] (phone)

## 2020-01-24 NOTE — PROGRESS NOTES
Met with pt at the bedside of ED, bed 16. Asked pt what medications she is currently taking. Patient stated \"I don't know my medications very well. \" Pt states that she knows that she takes Depakote for bipolar, and Invega for OCD. Patient is unsure of her doses. Discussed with pt her willingness to go to [facility] in the event that a bed is available there and pt nodded in agreement that she is willing to go to [facility].            Nevaeh Pérez MSN, RN, ACM-RN   ED Outcomes Manager  [phone] (phone)

## 2020-01-24 NOTE — ED NOTES
Assumed care of pt. Pt currently sitting in chair. Pt reports that she is here for SI and HI. States that she feels ok right now. Pt agrees to contract for her safety while in the ED. Pt's thought process seems clear. Sitter at the bedside. Will continue to monitor for pt safety.

## 2020-01-24 NOTE — CONSULTS
This evaluation was conducted via telepsychiatry with the assistance of onsite staff. NAME: Dayami Fong  : 1990  DATE: [id number]  TIME:   Location of Patient: [facility] ED  Location of Physician: [facility]    Chief Complaint: Pt presented to ED via police for suicidal with plan. History of Present Illness:  Pt states is a poor historian, at times confused and appears to be responding to internal stimuli including conversationally responsive. Pt states she has been getting worse over the past month with increasing thoughts of self-harm and increasing voices. Voices are degrading including telling her she is a racist while also encouraging her to shout profanity and slurs. Feels like this is the wrath of god. Says she had not been suicidal in quite some time and when questioned about what may have triggered her SI she stated love and heartache - when questioned further about possible romantic loss she stated that she had someone who \"called her baby a lot, maybe a clone, now they're gone so I started huffing exhaust and tried to have fire ants bite my head off\"  Pt states she is unsure about depression or other psychiatric symptoms - answer was a rambling non-specific answer. Sources of Info: Chart Review, discussion with ED staff, and pt interview  SI/Self-harm: suicidal thoughts with plan - endorses multiple plans  HI/Violence: denies current HI  Access to weapons: denies  Legal: no major legal issues  Psychiatric Hx: Schizophrenia and PTSD (per pt). States she sees a psychiatrist every once in a while but doesn't like her. Numerous admissions. Per pt was previously in a long term institution and wondered aloud whether she would be better off in such a place long term. Drugs/Alcohol Hx: denies current/recent drug or alcohol use. 1/2 ppd smoker. History of substance use disorder.   Tox/drug screen negative. Medical Hx:  Denies any current medical problems  Medications: unsure of medications - thinks on invega shots and previously on depakote - not taking meds last few days, but states was taking prior to last few days. Most up to date med list found was from recent visit to [facility] and is included below. Medication Sig Dispensed Refills Start Date End Date   ascorbic acid, vitamin C, (C-500) 500 mg PO TABS   Take 1 Tab by Mouth Once a Day for 90 days. 30 Tab   2 12/02/2019 03/01/2020   famotidine (PEPCID) 20 mg PO TABS   Take 1 Tab by Mouth Twice Daily for 90 days. 60 Tab   2 12/02/2019 03/01/2020   ferrous sulfate (FEOSOL) 325 mg (65 mg Iron) PO TABS   Take 1 Tab by Mouth Once a Day. 30 Tab   1 12/18/2019     metoprolol (LOPRESSOR) 25 mg PO TABS   Take 1 Tab by Mouth Twice Daily. 60 Tab   1 12/17/2019     thiamine 100 mg PO TABS   Take 1 Tab by Mouth Once a Day for 90 doses. 30 Tab   2 12/02/2019 03/01/2020   lurasidone 120 mg PO TABS   Take 120 mg by Mouth Daily With Dinner. 30 Tab   1 12/17/2019 01/21/2020   OXcarbazepine (TRILEPTAL) 600 mg PO TABS   Take 1 Tab by Mouth Twice Daily. 60 Tab   1 12/17/2019 01/21/2020     Allergies: Robitussin (per chart review)  Family Psych Hx/History of suicides: endorses but unsure of details  Social Hx:      Employment: unemployed. Receiving SSI. Living Situation: unable to specify     Access to healthcare: unsure of current healthcare coverage  Mental Status Exam:    Appearance/Attire: distressed appearing F in hospital attire in hospital bed    Attitude/Behavior: suspicious and withdrawn. Cooperative. Limited eye contact. Speech: soft spoken often with long and awkwardly placed pauses in speech.     Mood: \"I enjoy things\"    Affect: restricted affect    Thought Process/Perceptions/Associations: quite tangential, endorses hearing voices (one voice with command directed commentary), responding to internal stimuli    Thought Content: endorses suicidal ideation with plan    Memory/Orientation:  Grossly intact but frequently confused    Insight/judgment: poor    Impression/Risk Assessment: 28y/o F with schizophrenia and ptsd who presents with suicidal ideation with plan, who has limited social support, is actively hallucinating, is treatment non-compliant, with possible recent relationship loss who is voluntary for admission. Plan - admit to psychiatry    Diagnosis: schizophrenia; ptsd    Treatment Recommendations:   1) Admit to psych  2) Restart outpatient meds  3) Continue 1:1 sitter awaiting placement    Level of care: Inpatient - currently voluntary    Pt is currently voluntary for admission but if patient rescinds voluntary status prior to admission please reconsult psychiatry prior to dispo as patient is a candidate for involuntary hospitalization.

## 2020-01-24 NOTE — ED NOTES
Assumed care of patient at this time. Patient resting on stretcher with sitter at bedside. Patient in no acute distress, aware of current plan of care and has no questions or concerns at this time.

## 2020-01-24 NOTE — BH NOTES
Patient arrived on unit via wheel chair, alert and oriented. Patient he was admitted for psychosis and suicidal ideation. Patient endorses intermittent suicidal ideation with contract for safety. Patient endorses auditory hallucination \"it's like noises and mumbling in my ears kind of like a transmission\". Patient denies HI or visual hallucinations. Patient stated she is in need of long term treatment and is tired of feeling off balance like she is constantly in and out of hospitals. \"I have had more than 15, no maybe 20 hospitalizations since I was like 20\". Patient has been anxious and disorganized and at times rambled on with no relevance to topic. Patient was engaged briefly and stated she wanted to rest. Patient has been cooperative with completion of nursing assessment. Patient allergies: Robitussin. Patient denies medical history. Patient denies substance abuse history. Tobacco use: Non Smoker. Patient's belongings checked in and documented. Patient rights given and he acknowledged with understanding. Patient admission paper work signed with unit tour completed. Patient provided with and encouraged use of non slip footwear for ambulation. RN will initiate, develop, implement, review or revise treatment plan upon admission and throughout treatment regimen. Will monitor for safety with support as needed throughout treatment regimen.

## 2020-01-25 PROBLEM — O46.90 VAGINAL BLEEDING IN PREGNANCY: Status: RESOLVED | Noted: 2017-07-30 | Resolved: 2020-01-25

## 2020-01-25 PROBLEM — O34.219 PREVIOUS CESAREAN DELIVERY AFFECTING PREGNANCY, ANTEPARTUM: Status: RESOLVED | Noted: 2017-09-08 | Resolved: 2020-01-25

## 2020-01-25 PROBLEM — F20.0 SCHIZOPHRENIA, PARANOID, CHRONIC WITH ACUTE EXACERBATION (HCC): Status: RESOLVED | Noted: 2018-02-05 | Resolved: 2020-01-25

## 2020-01-25 LAB
CHOLEST SERPL-MCNC: 160 MG/DL
HBA1C MFR BLD: 5 % (ref 4.2–5.6)
HDLC SERPL-MCNC: 45 MG/DL (ref 40–60)
HDLC SERPL: 3.6 {RATIO} (ref 0–5)
LDLC SERPL CALC-MCNC: 95.8 MG/DL (ref 0–100)
LIPID PROFILE,FLP: NORMAL
TRIGL SERPL-MCNC: 96 MG/DL (ref ?–150)
TSH SERPL DL<=0.05 MIU/L-ACNC: 0.81 UIU/ML (ref 0.36–3.74)
VLDLC SERPL CALC-MCNC: 19.2 MG/DL

## 2020-01-25 PROCEDURE — 74011250637 HC RX REV CODE- 250/637: Performed by: PSYCHIATRY & NEUROLOGY

## 2020-01-25 PROCEDURE — 83036 HEMOGLOBIN GLYCOSYLATED A1C: CPT

## 2020-01-25 PROCEDURE — 80061 LIPID PANEL: CPT

## 2020-01-25 PROCEDURE — 84443 ASSAY THYROID STIM HORMONE: CPT

## 2020-01-25 PROCEDURE — 65220000003 HC RM SEMIPRIVATE PSYCH

## 2020-01-25 PROCEDURE — 36415 COLL VENOUS BLD VENIPUNCTURE: CPT

## 2020-01-25 RX ORDER — PALIPERIDONE 3 MG/1
6 TABLET, EXTENDED RELEASE ORAL
Status: DISCONTINUED | OUTPATIENT
Start: 2020-01-25 | End: 2020-01-31

## 2020-01-25 RX ORDER — ACETAMINOPHEN 500 MG
2 TABLET ORAL
Status: DISCONTINUED | OUTPATIENT
Start: 2020-01-25 | End: 2020-02-21 | Stop reason: HOSPADM

## 2020-01-25 RX ADMIN — THERA TABS 1 TABLET: TAB at 08:12

## 2020-01-25 RX ADMIN — METOPROLOL TARTRATE 25 MG: 25 TABLET ORAL at 08:10

## 2020-01-25 RX ADMIN — FAMOTIDINE 20 MG: 20 TABLET ORAL at 08:10

## 2020-01-25 RX ADMIN — DIVALPROEX SODIUM 500 MG: 250 TABLET, DELAYED RELEASE ORAL at 08:10

## 2020-01-25 RX ADMIN — DIVALPROEX SODIUM 500 MG: 250 TABLET, DELAYED RELEASE ORAL at 20:29

## 2020-01-25 RX ADMIN — FAMOTIDINE 20 MG: 20 TABLET ORAL at 20:29

## 2020-01-25 RX ADMIN — FERROUS SULFATE TAB 325 MG (65 MG ELEMENTAL FE) 325 MG: 325 (65 FE) TAB at 08:10

## 2020-01-25 RX ADMIN — METOPROLOL TARTRATE 25 MG: 25 TABLET ORAL at 20:29

## 2020-01-25 RX ADMIN — Medication 500 MG: at 08:12

## 2020-01-25 RX ADMIN — PALIPERIDONE 6 MG: 3 TABLET, EXTENDED RELEASE ORAL at 20:28

## 2020-01-25 RX ADMIN — Medication 100 MG: at 08:12

## 2020-01-25 NOTE — H&P
[address] [facility] HISTORY AND PHYSICAL    Name:  Naresh Silver  MR#:   [id number]  :  1990  ACCOUNT #:  [de-identified]  ADMIT DATE:  2020    IDENTIFYING DATA:  The patient presents as a 51-year-old  white female, resident of [place], [place], who is covered by [facility] plan. BASIS FOR ADMISSION:  The patient is admitted after presenting to [facility] quite bizarre in a psychotic condition. She was saying \"my brain is empty. There is nothing there. \"  She thought someone had shot hand  into her brain. She was screened by the telepsychiatrist who described auditory hallucinations telling her she is a racist and encouraging her to shout profanity and racial slurs, feels she is suffering the wrath of God. She was having a variety of bizarre thoughts and rambling nonspecific answers. She was known to have been on clozapine, Pristiq, Depakote in the past.  The patient has been known to me from three admissions to [facility] in 2018 for her paranoid schizophrenia. She has had multiple admissions to facilities throughout [facility] [facility] and [place]. The patient may be a client of the PACT team, but she is such a poor historian that it is impossible to tell this. The record shows that she has been hospitalized on 2019 at [facility], though we have no idea what they did there. She was known to be on a variety of medications in 2018 and it appeared that she continued to have the addition of antipsychotic medicines because she could not be stabilized as an outpatient. At the [facility], she had been on Latuda and chlorpromazine. She had received injections of [place].   The patient now says that her doctor is Dr. Brian Lane at the [facility] and she did not wish to go back there because she felt that they and her neighbors were making her more unstable and attempting to steal her identity. She said that she would lay on the couch in the [employer] like Delilah max passed out on a cross. \"  She was saying she had fixed that place and did not want to return. She said that she has stresses that she does not want to go to her home because it has fungus and mold in it. She thought that her neighbors were making her more confused and talking through the walls to tell her what to do and the local [employer] was controlling her brain. She does think that she had been placed on Invega Sustenna two shots during her stay at [facility] and did know that she had stayed on her Depakote. Those are the only things that she believes she was taking, though later confirmed that she may be on blood pressure medicines and might be on medicines for her stomach. MEDICAL HISTORY:  The patient looks to have a history of hypertension and was supposed to be on metoprolol 25 mg b.i.d. She has had reflux esophagitis and was on Pepcid 20 mg b.i.d. and had history of bilateral tubal ligation two and half years ago. ALLERGIES:  SHE WAS ALLERGIC TO ROBITUSSIN, YET SHE PROCEEDED TO SAY THAT SHE WAS TAKING ROBITUSSIN, SO THIS IS QUITE DIFFICULT TO UNDERSTAND. LABORATORY DATA:  Laboratory testing done in the emergency room included a normal CBC, dilute urine, normal comprehensive metabolic panel, normal lipid panel, negative hCG, negative acetaminophen level and a subtherapeutic salicylate level 2.6 mg/dL. Valproic acid level was subtherapeutic at 20 mcg/mL. Alcohol level was zero and urine drug screen was negative. TSH was normal at 0.81 mIU/mL. SUBSTANCE ABUSE HISTORY:  She said that she quit smoking 3 days ago, though she did not say how much she smokes and she wanted access to Nicorette gum. She initially said she drinks, then said that she had actually swallowed ingredients to make beer in her stomach. She says that she drinks beer rarely.   She denied illegal drug use. SOCIAL AND FAMILY HISTORY:  She could not give family history of psychiatric illness. She says that she is  from her  and has no contact with him. She had two children that were adopted to other people who she does not see. She denied having any support mechanism. There is a question that she might be a PACT team member and that would be reasonable. MENTAL STATUS EXAMINATION:  Revealed her to be an alert white female, who was very easily confused. Speech was very soft, at times difficult to understand. Thought processing was loose and disorganized. She endorsed paranoid and persecutory ideas, bizarre delusions and auditory hallucinations. She denied suicidal ideas. She denied homicidal ideas, but said that she felt there were people who were out to harm her and she wanted to know if she had to protect herself. IQ was estimated in the low normal range. Insight and judgment were nil. ASSESSMENT:  AXIS I:  Schizophrenia. Nicotine use disorder, moderate. AXIS II:  None. AXIS III:  Gastroesophageal reflux disease. Hypertension. TREATMENT PLAN:  This patient was admitted, apparently the decision had been made by the psychiatrist at [facility] that the patient was capable of signing into the hospital voluntarily. Should she wish to leave, it would be necessary to get a temporary correction order. She is a very poor historian and we would need to try to get information from the [facility], especially if she is a member of the PACT team.  She thinks that she was taking [place] injections, so for now we will place her on Invega 6 mg tablets at bedtime. We will continue the Depakote 500 mg twice a day. She might have been on Trileptal, but the most recent information shows that it has no effect at all in regards to psychiatric symptoms. We will continue with her Lopressor and Pepcid.   We will continue with individual, group and milieu therapies, art and recreation therapy, case management services, social work services, physical examination. She appears to have a very chronic long-term psychosis that has been quite difficult for people to stabilize. ESTIMATED LENGTH OF STAY:  Two weeks. ANTICIPATED DISPOSITION:  Follow up with Luc[facility].       Tam Esparza MD      [id number]  D:  01/25/2020 13:27  T:  01/25/2020 15:50  JOB #:  [id number]

## 2020-01-25 NOTE — PROGRESS NOTES
Problem: Falls - Risk of  Goal: *Absence of Falls  Description  Patient will remain free of falls every shift throughout hospital stay. Patient will verbalize understanding regarding safety and fall prevention measures to be utilized every shift throughout hospital stay. Document Leonel Linn Fall Risk and appropriate interventions in the flowsheet. Outcome: Progressing Towards Goal  Note: Fall Risk Interventions:            Medication Interventions: Teach patient to arise slowly                   Problem: Patient Education: Go to Patient Education Activity  Goal: Patient/Family Education  Outcome: Progressing Towards Goal     Problem: Psychosis  Goal: *STG: Remains safe in hospital  Description  Patient will remain free of harm to self and others every shift throughout hospitalization. Outcome: Progressing Towards Goal  Goal: *STG/LTG: Complies with medication therapy  Description  Patient will take medication as prescribed every shift throughout hospital stay. Outcome: Progressing Towards Goal  Goal: Interventions  Outcome: Progressing Towards Goal     Problem: Suicide  Goal: *STG: Remains safe in hospital  Description  Patient will remain free of harm to self and others every shift throughout hospitalization. Outcome: Progressing Towards Goal  Goal: *STG: Attends activities and groups  Description  Patient will attend at least 50% or 2 of 4 groups daily throughout hospital stay. Outcome: Progressing Towards Goal  Goal: *STG:  Verbalizes alternative ways of dealing with maladaptive feelings/behaviors  Description  Patient will identify at least 2 strategies daily to utilize when presented with increased stress or feelings of agitation throughout hospital stay. Outcome: Progressing Towards Goal  Goal: *STG/LTG: Complies with medication therapy  Description  Patient will take medication as prescribed every shift throughout hospital stay.     Outcome: Progressing Towards Goal  Goal: *STG/LTG: No longer expresses self destructive or suicidal thoughts  Description  Patient will verbalize denial of suicidal/ideation every shift throughout hospital stay. Outcome: Progressing Towards Goal  Goal: Interventions  Outcome: Progressing Towards Goal   Pt is preoccupied with slow speech and a flat affect. She denies hearing voices but states she saw her [de-identified] year old son in a dream putting her and himself in a plastic container. She is compliant with medication. Pt denies any thoughts of harming self or others. Pt stated in one to one that she always does the wrong. Pt makes random odd statements unrelated to topic. In group she stated she had a problem with heavy material. Then she stated talking about being in hospitals so much she hasn't been about to wear her own clothes. Pt had been given clothes but topic was positive affirmations in group.

## 2020-01-25 NOTE — PROGRESS NOTES
01/25/20 1100   Group Therapy   Group Nursing  (positive affirmations)   Attendance Attended   Number of participants 4   Time in 1030   Time out 1050   Total Time 20   Interventions/techniques Informed   Follows directions Followed directions   Interactions Disorganized interaction   Mental Status Flat   Behavior/appearance Cooperative;Needed prompting

## 2020-01-26 PROCEDURE — 74011250637 HC RX REV CODE- 250/637: Performed by: PSYCHIATRY & NEUROLOGY

## 2020-01-26 PROCEDURE — 65220000003 HC RM SEMIPRIVATE PSYCH

## 2020-01-26 RX ADMIN — METOPROLOL TARTRATE 25 MG: 25 TABLET ORAL at 07:57

## 2020-01-26 RX ADMIN — FERROUS SULFATE TAB 325 MG (65 MG ELEMENTAL FE) 325 MG: 325 (65 FE) TAB at 07:57

## 2020-01-26 RX ADMIN — Medication 500 MG: at 08:05

## 2020-01-26 RX ADMIN — DIVALPROEX SODIUM 500 MG: 250 TABLET, DELAYED RELEASE ORAL at 22:43

## 2020-01-26 RX ADMIN — Medication 100 MG: at 08:05

## 2020-01-26 RX ADMIN — THERA TABS 1 TABLET: TAB at 08:05

## 2020-01-26 RX ADMIN — TRAZODONE HYDROCHLORIDE 50 MG: 50 TABLET ORAL at 22:43

## 2020-01-26 RX ADMIN — FAMOTIDINE 20 MG: 20 TABLET ORAL at 07:57

## 2020-01-26 RX ADMIN — PALIPERIDONE 6 MG: 3 TABLET, EXTENDED RELEASE ORAL at 23:09

## 2020-01-26 RX ADMIN — FAMOTIDINE 20 MG: 20 TABLET ORAL at 23:09

## 2020-01-26 RX ADMIN — DIVALPROEX SODIUM 500 MG: 250 TABLET, DELAYED RELEASE ORAL at 07:57

## 2020-01-26 NOTE — PROGRESS NOTES
Behavioral Health Progress Note    Admit Date: 1/24/2020  Hospital day 2    Vitals :   Patient Vitals for the past 8 hrs:   BP Pulse   01/26/20 0757 103/78 (!) 110     Labs:  No results found for this or any previous visit (from the past 24 hour(s)).   Meds:   Current Facility-Administered Medications   Medication Dose Route Frequency    paliperidone (INVEGA) SR tablet 6 mg  6 mg Oral QHS    nicotine (NICORETTE) gum 2 mg  2 mg Oral Q2H PRN    hydrOXYzine pamoate (VISTARIL) capsule 50 mg  50 mg Oral Q4H PRN    haloperidoL (HALDOL) tablet 5 mg  5 mg Oral Q4H PRN    haloperidol lactate (HALDOL) injection 5 mg  5 mg IntraMUSCular Q4H PRN    LORazepam (ATIVAN) injection 1-2 mg  1-2 mg IntraMUSCular Q4H PRN    traZODone (DESYREL) tablet 50 mg  50 mg Oral QHS PRN    ibuprofen (MOTRIN) tablet 400 mg  400 mg Oral Q4H PRN    divalproex DR (DEPAKOTE) tablet 500 mg  500 mg Oral BID    therapeutic multivitamin (THERAGRAN) tablet 1 Tab  1 Tab Oral DAILY    thiamine HCL (B-1) tablet 100 mg  100 mg Oral DAILY    ascorbic acid (vitamin C) (VITAMIN C) tablet 500 mg  500 mg Oral DAILY    metoprolol tartrate (LOPRESSOR) tablet 25 mg  25 mg Oral BID    famotidine (PEPCID) tablet 20 mg  20 mg Oral BID    ferrous sulfate tablet 325 mg  1 Tab Oral DAILY WITH BREAKFAST    influenza vaccine 2019-20 (6 mos+)(PF) (FLUARIX/FLULAVAL/FLUZONE QUAD) injection 0.5 mL  0.5 mL IntraMUSCular PRIOR TO DISCHARGE      Hospital Problems: Principal Problem:    Schizophrenia ([facility] [address] (4/18/2017)    Active Problems:    Cigarette nicotine dependence without complication (6/1/3878)        Subjective:   Medication side effects: none  tired    Mental Status Exam  Sensorium: alert  Orientation: only aware of  place and person  Relations: guarded and passive  Eye Contact: poor  Appearance: is unkempt  Thought Process: slow rate of thoughts and poor abstract reasoning/computation   Thought Content: delusions, paranoia and halluc, loose associations Suicidal: ideas and halluc to hurt self   Homicidal: denies   Mood: is anxious and unhappy   Affect: flat, odd  Memory: is impaired, is recent and is remote     Concentration: distractable  Abstraction: concrete  Insight: The patient shows little insight    OR Poor  Judgement: is psychologically impaired OR  Poor    Assessment/Plan:   not changed    Continue close observation,       Nurses report that she has to be watched because she has had a romantic relationship with 1 of the other patients on the unit. She would not be able to be moved over to the adult unit because of the level of her psychosis and this other man cannot be moved over there because he has had stalking events with several of the female staff over there. Staff do need to watch to make sure that they are kept apart. The patient continues to be psychotic with loose bizarre statements though she did ask \"do I need long-term care? \"  She may benefit from this but would have no access to it since she cannot get to the [facility].  She says that she feels as though she is living backwards like she came from the future and is reliving horrible things that happened in the past.  She is hearing voices in her head sound like a echo or a radio signal sounding like a sound. She denies tics or twitches. She had some muscle tightening of her left calf but was not having it at the current time. We will have to watch to make sure that she does not develop drug-induced extraparametal symptoms. She endorses both depression and suicidal ideas at this time. She had not been eating much and staff reports that she consumed very little of her breakfast or lunch. She did agree and that she would drink Ensure so we will write for this as a diet supplement for her.

## 2020-01-26 NOTE — BH NOTES
Patient spent earlier part of shift resting in bed. Patient has since been in day area. It was reported by staff that she and another patient on this unit were \"lovers. \"  Supervisors were made aware on previous shift and no changes in room assignment at current. Will continue to monitor for inappropriate behaviors. None noted at current. She has been tolerating medication without difficulty and complying with rules of unit. Patient denies distress at present. Denied HI/SI or AVH at current. Will continue to monitor and support as needed.

## 2020-01-26 NOTE — GROUP NOTE
DARLING  GROUP DOCUMENTATION INDIVIDUAL Group Therapy Note Date: 1/26/2020 Group Start Time: 1300 Group End Time: 5524 Group Topic: Nursing [facility] SPECIAL TRTMT 1 CODY Cook  GROUP DOCUMENTATION GROUP Group Therapy Note Attendees: 4 Attendance: Attended Interventions/techniques: Challenged and Informed Follows Directions: Unable to follow directions Interactions: Disorganized interaction Mental Status: Blunted Behavior/appearance: Disheveled Goals Achieved: Able to listen to others Swetha Allen RN

## 2020-01-26 NOTE — PROGRESS NOTES
Problem: Psychosis  Goal: *STG: Remains safe in hospital  Description  Patient will remain free of harm to self and others every shift throughout hospitalization. Outcome: Progressing Towards Goal  Goal: *STG/LTG: Complies with medication therapy  Description  Patient will take medication as prescribed every shift throughout hospital stay. Outcome: Progressing Towards Goal     Problem: Suicide  Goal: *STG/LTG: No longer expresses self destructive or suicidal thoughts  Description  Patient will verbalize denial of suicidal/ideation every shift throughout hospital stay. Outcome: Progressing Towards Goal    Pt isolates to self in room. Pt displays thought blocking on assessment AEB length of time for response to questions. Pt denies current SI. Pt continues to endorse feelings of anxiety but is unable to identify trigger for anxiety or what she needs to do to assist with feelings of anxiety. Pt resistive to medication compliance at first but with encouragement took all medications. Rounds maintained Q 15 mins.  Staff will continue to offer a safe and supportive environment

## 2020-01-27 LAB — VALPROATE SERPL-MCNC: 63 UG/ML (ref 50–100)

## 2020-01-27 PROCEDURE — 80164 ASSAY DIPROPYLACETIC ACD TOT: CPT

## 2020-01-27 PROCEDURE — 74011250637 HC RX REV CODE- 250/637: Performed by: PSYCHIATRY & NEUROLOGY

## 2020-01-27 PROCEDURE — 36415 COLL VENOUS BLD VENIPUNCTURE: CPT

## 2020-01-27 PROCEDURE — 65220000003 HC RM SEMIPRIVATE PSYCH

## 2020-01-27 RX ORDER — DOCUSATE SODIUM 100 MG/1
100 CAPSULE, LIQUID FILLED ORAL DAILY
Status: DISCONTINUED | OUTPATIENT
Start: 2020-01-27 | End: 2020-02-21 | Stop reason: HOSPADM

## 2020-01-27 RX ADMIN — DIVALPROEX SODIUM 500 MG: 250 TABLET, DELAYED RELEASE ORAL at 20:40

## 2020-01-27 RX ADMIN — PALIPERIDONE 6 MG: 3 TABLET, EXTENDED RELEASE ORAL at 20:40

## 2020-01-27 RX ADMIN — DOCUSATE SODIUM 100 MG: 100 CAPSULE, LIQUID FILLED ORAL at 14:17

## 2020-01-27 RX ADMIN — FAMOTIDINE 20 MG: 20 TABLET ORAL at 08:13

## 2020-01-27 RX ADMIN — THERA TABS 1 TABLET: TAB at 08:13

## 2020-01-27 RX ADMIN — FAMOTIDINE 20 MG: 20 TABLET ORAL at 20:39

## 2020-01-27 NOTE — BSMART NOTE
ART THERAPY GROUP PROGRESS NOTE PATIENT SCHEDULED FOR GROUP AT: 10:30 
 
ATTENDANCE: 3/4 PARTICIPATION LEVEL: Participated in the art process and needed encouragement. ATTENTION LEVEL: Needs occasional re-direction. FOCUS: Cogintive SYMBOLIC & THEMATIC CONTENT AS NOTED IN IMAGERY: Lizet mood and affect were incongruent, her mood began as euthymic, then almost became paranoid when directives were given and then when she returned back to group she shifted to calm and her affect remained blunted. When the writer gave the first directive of \"this is a brain exercise\" she initially refused group and stated \" I don't have a brain so I can't participate,\" then got up and went to her room and closed the door. Her SW was able to encourage her to come back to group and upon her return she followed the directives. Most of her responses were loose and tangential and she had difficulty answering open ended questions. In response to being asked \"how did that process make you feel,\" Dong Dominguez stated  \"it felt like something other than smoking exhaust,\"  \" I only know labels, nit wit, crazy, schizophrenic, whore. I don't like the neighborhood I grew up in. \" Some associations appeared to be clang associations with the \"um\" sound. She stated that \"sometime I can be fanatical with Jewish. \" She was able to make relevant associations at times like \"wanting to find purpose and love for [her] life,\" however the majority of her associations were loose and hard to follow. This note/group was written/conducted by Art Therapy Intern Jennifer Benavides.
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
## 2020-01-27 NOTE — BH NOTES
MHT Note: Patient interacts with staff and peers in guarded manner. Pt reports she no longer wants to die. Pt contracts for safety on unit and denies SI/HI at this time. Staff will continue to monitor pt for safety, behavior and location.

## 2020-01-27 NOTE — BH NOTES
Patient began to \"argue\" with this nurse about scheduled medications stating \"they're supposed to be taking me off the Depakote because I'm on Invega. \"  Patient also voiced not remembering \"how\" to use the bathroom. Patient shown by this nurse how to flush toilet. After using bathroom patient came out and voiced she felt \"sick because all of the liquid left my body. \"  Patient was offered water and/or juice and declined. Vital signs taking and charted.

## 2020-01-27 NOTE — GROUP NOTE
IP  GROUP DOCUMENTATION INDIVIDUAL Group Therapy Note Date: 1/26/2020 Group Start Time: 2015 Group End Time: 2030 Group Topic: Nursing [facility] ADULT CHEM DEP Gloria Paredes, RN 
 
IP  GROUP DOCUMENTATION GROUP Group Therapy Note Attendees: 3 Attendance: Attended Interventions/techniques: Informed Follows Directions: Followed directions Interactions: Interacted appropriately Mental Status: Calm Behavior/appearance: Attentive Goals Achieved: Able to listen to others Narciso Huff.  Siobhan Stringer

## 2020-01-27 NOTE — BH NOTES
Patient visible on unit. Cooperative and pleasant. Appears suspicious and guarded. Reports hearing voices asking her \"to play\". Denies +SI and states she feels safe on the unit. Will continue to monitor and provide a safe environment.
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
## 2020-01-27 NOTE — GROUP NOTE
DARLING  GROUP DOCUMENTATION INDIVIDUAL Group Therapy Note Date: 1/27/2020 Group Start Time: 46 Group End Time: 0724 Group Topic: Nursing [facility] SPECIAL TRTMT 2 CODY Samaniego  GROUP DOCUMENTATION GROUP Group Therapy Note Attendees: 5 Attendance: Attended Interventions/techniques: Supported Follows Directions: Did not follow directions Interactions: Disorganized interaction Mental Status: Anxious, Depressed and Worried Behavior/appearance: Disheveled, Poor eye contact and Withdrawn/quiet Additional Notes: patient was not disruptive but was not interactive Caroline Fillers  Margorie Spruce

## 2020-01-27 NOTE — BSMART NOTE
Pt.is a 34year old female with history of Schizophrenia paranoid type  and past history of Cocaine. Pt. was admitted to this facility for facility for delusional thinking and ideations  to harm self. Pt.s case was discussed in treatment team this a.m 
   
 
ARUN Contact:  SW met with pt to discuss dc planning. Pt. Informed SW she Is a [facility] PACT client. Pt expressed she no longer lives in [place]. Pt. reports she is interested in moving to [address] [place] [address]. Pt. Expressed it has been some talk with her relocating to  [place]. Pt. Admits to abusing cocaine but is unsure the last time she used. Pt also admits to being non compliant with medications. Pt. Expressed to having hallucinations and seeing things moving around her apartment. Pt. Expressed she is currently not seeing anything at this time. Pt. Appears disorganized, impaired insight, loose and paranoid. SW provided positive encouragement, discussed coping skills and safety plan. Pt.  denies ideations to harm self at this time. SW will provide pt with reality orientation. Sw will assist the pt  with dc planning.  ARUN will contact [facility] for a collateral.

## 2020-01-28 PROCEDURE — 74011250637 HC RX REV CODE- 250/637: Performed by: PSYCHIATRY & NEUROLOGY

## 2020-01-28 PROCEDURE — 65220000003 HC RM SEMIPRIVATE PSYCH

## 2020-01-28 RX ADMIN — FAMOTIDINE 20 MG: 20 TABLET ORAL at 20:10

## 2020-01-28 RX ADMIN — PALIPERIDONE 6 MG: 3 TABLET, EXTENDED RELEASE ORAL at 20:10

## 2020-01-28 RX ADMIN — DIVALPROEX SODIUM 500 MG: 250 TABLET, DELAYED RELEASE ORAL at 20:10
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
## 2020-01-28 NOTE — GROUP NOTE
DARLING  GROUP DOCUMENTATION INDIVIDUAL Group Therapy Note Date: 1/27/2020 Group Start Time: 2030 Group End Time: 2045 Group Topic: Nursing [facility] Group Therapy Note Attendees:11 Attendance: Attended Patient's Goal: Interventions/techniques: Informed Follows Directions: Followed directions Interactions: Interacted appropriately Mental Status: Calm Behavior/appearance: Attentive Goals Achieved: Able to listen to others Additional Notes:   
 
 
Katherine Powell

## 2020-01-28 NOTE — BSMART NOTE
OCCUPATIONAL THERAPY PROGRESS NOTE Group Time:  7657 Attendance: The patient attended full group. [place] Participation: The patient participated with moderate elaboration in the activity. Attention: The patient needed redirection to activity at least once. Interaction: The patient occasionally  interacts with others. Responded to many questions and task with \"I can't remember\" or \"I don't know\", but if these statements minimized and patient re-asked, she would often answer appropriately. Frequently states she is confused.

## 2020-01-28 NOTE — BSMART NOTE
SOCIAL WORK GROUP THERAPY PROGRESS NOTE Group Time:  11am 
 
Group Topic:  Coping Skills Group Participation: Pt was deemed inappropriate for session

## 2020-01-28 NOTE — PROGRESS NOTES
Behavioral Health Progress Note    Admit Date: 1/24/2020  Hospital day 3    Vitals :   Patient Vitals for the past 8 hrs:   BP Temp Pulse Resp   01/28/20 0813 108/69 98 °F (36.7 °C) 76 16     Labs:  No results found for this or any previous visit (from the past 24 hour(s)).   Meds:   Current Facility-Administered Medications   Medication Dose Route Frequency    docusate sodium (COLACE) capsule 100 mg  100 mg Oral DAILY    paliperidone (INVEGA) SR tablet 6 mg  6 mg Oral QHS    nicotine (NICORETTE) gum 2 mg  2 mg Oral Q2H PRN    hydrOXYzine pamoate (VISTARIL) capsule 50 mg  50 mg Oral Q4H PRN    haloperidoL (HALDOL) tablet 5 mg  5 mg Oral Q4H PRN    haloperidol lactate (HALDOL) injection 5 mg  5 mg IntraMUSCular Q4H PRN    LORazepam (ATIVAN) injection 1-2 mg  1-2 mg IntraMUSCular Q4H PRN    traZODone (DESYREL) tablet 50 mg  50 mg Oral QHS PRN    ibuprofen (MOTRIN) tablet 400 mg  400 mg Oral Q4H PRN    divalproex DR (DEPAKOTE) tablet 500 mg  500 mg Oral BID    therapeutic multivitamin (THERAGRAN) tablet 1 Tab  1 Tab Oral DAILY    thiamine HCL (B-1) tablet 100 mg  100 mg Oral DAILY    ascorbic acid (vitamin C) (VITAMIN C) tablet 500 mg  500 mg Oral DAILY    famotidine (PEPCID) tablet 20 mg  20 mg Oral BID    ferrous sulfate tablet 325 mg  1 Tab Oral DAILY WITH BREAKFAST    influenza vaccine 2019-20 (6 mos+)(PF) (FLUARIX/FLULAVAL/FLUZONE QUAD) injection 0.5 mL  0.5 mL IntraMUSCular PRIOR TO DISCHARGE      Hospital Problems: Principal Problem:    Schizophrenia ([address] (4/18/2017)    Active Problems:    Cigarette nicotine dependence without complication (1/1/7307)        Subjective:   Medication side effects: none  none    Mental Status Exam  Sensorium: alert  Orientation: only aware of  time, place and person  Relations: guarded  Eye Contact: intense  Appearance: is unkempt and is tense  Thought Process: normal rate of thoughts and poor abstract reasoning/computation   Thought Content: paranoia   Suicidal: denies   Homicidal: none   Mood: is anxious, is withdrawn, is frightened and unhappy   Affect: odd, blunted  Memory: is impaired, is recent and is remote     Concentration: distractable  Abstraction: concrete  Insight: The patient shows little insight    OR Fair  Judgement: is psychologically impaired OR  Fair    Assessment/Plan:   not changed      Continue close observation,    Unfortunately, the patient has been uncooperative with oral medications yesterday and today. She has been extremely paranoid and refusing medications. She was quite bizarre this morning with strange movements and dancing about. She does have to be  from another male who seems to want to be with her. The other individual who she was having relationship with on the streets had been discharged yesterday. She says that she is worried as to where she will live saying that her apartment is too dirty to live there and that \"my neighbors loathe me. \"  She apparently moved into this apartment after being in and out of the [facility] for 1 year. It is unknown as to how long she had been living there. It is unknown as to whether anyone has been checking up on her. At one time she was a member of the pact team of [place] but does not sound to have had anybody coming to her home since she got out of the [facility].  She now lives in [place] and would appear to need [employer]. She endorsed continued bizarre statements and hallucinations with confusion. She had said that she did not want to take medicines because she had been feeling paranoid. I am having her seen for a temporary senior living order consideration so that she can have a involuntary commitment and judicial authorization for medications. I would want to place her on the [place] injection to make sure that she is actually getting the antipsychotic medication which also helps with mood stabilization.   She has needed injections in the past.  She is not reliable to take her medications when she goes home.

## 2020-01-28 NOTE — BSMART NOTE
Dr. Niels Albert has asked that the patient be evaluated for a TDO as she is refusing medications and is not able to make her own decisions. If she get detained Dr. Niels Albert will also do court ordered meds on patient in the AM.

## 2020-01-28 NOTE — BSMART NOTE
Pt.is a 34year old female with history of Schizophrenia paranoid type  and past history of Cocaine and DID . Pt. was admitted to this facility for facility for delusional thinking and ideations  to harm self. 
  
Pt. s case was discussed in treatment team this a.m 
   
ARUN Contact:  SW met with pt. \" I feel so confused\" . \" The medications did not look the same , that is why I did not take them\". SW discussed the  importance of medication compliance. Pt. Expressed to wanting to take medications. \" I can't think clear\". Pt. Endorsed Racing thoughts . Pt. acknowledged being afraid of going back to her apartment. SW provided reassurance and support. SW discussed coping skills and reality orientation with pt. .  Pt. contniue's to be unstable, has impaired insight and paranoid. SW will contniue to assist the pt with the dc process. SW will [facility] pt[facility] .

## 2020-01-28 NOTE — BSMART NOTE
ART THERAPY GROUP PROGRESS NOTE PATIENT SCHEDULED FOR GROUP AT: 3400 ATTENDANCE: Full PARTICIPATION LEVEL:Needs only minimal encouragement ATTENTION LEVEL:  Needs occasional re-direction FOCUS: Grounding SYMBOLIC & THEMATIC CONTENT AS NOTED IN IMAGERY: She was initially apprehensive and appeared a bit anxious when approached for group. She was responsive to encouragement and after some time was able to focus on the task and mood appeared to become calmer. She presented slightly more organized as noted in approach and associations as compared to yesterday's art therapy group. At times she appeared slightly distracted, however was responsive to re-direction. She claimed that the exercise helped reduce her anxiety, which she \"isn't use to. \"

## 2020-01-28 NOTE — GROUP NOTE
IP  GROUP DOCUMENTATION INDIVIDUAL Group Therapy Note Date: 1/28/2020 Group Start Time: 0830 Group End Time: 0900 Group Topic: Nursing [facility] 1 SPECIAL TRTMT 1 Alexi Manning RN 
 
[facility] GROUP DOCUMENTATION GROUP Group Therapy Note Attendees: 6 Attendance: Did not attend Garry Reardon RN

## 2020-01-28 NOTE — PROGRESS NOTES
Problem: Falls - Risk of  Goal: *Absence of Falls  Description  Patient will remain free of falls every shift throughout hospital stay. Patient will verbalize understanding regarding safety and fall prevention measures to be utilized every shift throughout hospital stay. Document Surinder Turk Fall Risk and appropriate interventions in the flowsheet. Outcome: Progressing Towards Goal  Note: Fall Risk Interventions:            Medication Interventions: Teach patient to arise slowly                   Problem: Psychosis  Goal: *STG: Decreased hallucinations  Description  Patient will verbalize denial of auditory/visual hallucinations every shift throughout hospital stay. Outcome: Progressing Towards Goal  Goal: *STG: Decreased delusional thinking  Description  Patient will show a decrease or be free of delusional statements assessed every shift throughout hospital stay. Outcome: Not Progressing Towards Goal  Goal: *STG: Remains safe in hospital  Description  Patient will remain free of harm to self and others every shift throughout hospitalization. Outcome: Progressing Towards Goal  Goal: *STG/LTG: Complies with medication therapy  Description  Patient will take medication as prescribed every shift throughout hospital stay. Outcome: Not Progressing Towards Goal   Patient approached nurse's station , stated \" Why do I have to take this medicine\". \"Its not right \". She start to argue with this nurse . Attempted to educate patient on why she need to take medication. She refused and went into her room.

## 2020-01-28 NOTE — BSMART NOTE
COUNSELING GROUP PROGRESS NOTE PATIENT SCHEDULED FOR GROUP AT: 9:30 
 
ATTENDANCE: Full PARTICIPATION LEVEL: Participates fully in the group process. ATTENTION LEVEL: Able to focus on task. FOCUS: Mindfulness THEMATIC CONTENT AS NOTED IN REFLECTION: She presented with a calm mood and blunted affect and her thought processes were loose. She was engaged in the group therapy directive and her approach to task was purposeful. She was quiet and kept to herself for much of group. She displayed several instances of thought blocking during conversation. Thematic content was somewhat on task and had a disjointed loose quality. She identified \"enviornment\" and \"eat sleep pray\" as mindfulness based activities she would like to engage in the future. She was tearful at times and described often feeling chaotic and needing increased mindfulness in ehr daily routine.

## 2020-01-28 NOTE — BSMART NOTE
SOCIAL WORK GROUP THERAPY PROGRESS NOTE Group Time:  10:15am  1:15pm 
 
Group Topic:  Coping Skills    C D Issues Group Participation:   
 
Pt moderately involved during group discussion but remained attentive. Blunted affect with less mood shifting observed. Looked at the \"Process of setting Goals\", the value of a \"daily\" /  \"weekly\" schedule as tool to build self esteem, sharpen decision making skills and help define one's reality. Her \"personal emergency plan\" included building support network, asking for help and need for \"daily\" schedule.

## 2020-01-29 PROCEDURE — 74011250637 HC RX REV CODE- 250/637: Performed by: PSYCHIATRY & NEUROLOGY

## 2020-01-29 PROCEDURE — 65220000003 HC RM SEMIPRIVATE PSYCH

## 2020-01-29 RX ADMIN — FAMOTIDINE 20 MG: 20 TABLET ORAL at 08:16

## 2020-01-29 RX ADMIN — Medication 100 MG: at 08:16

## 2020-01-29 RX ADMIN — THERA TABS 1 TABLET: TAB at 08:16

## 2020-01-29 RX ADMIN — DIVALPROEX SODIUM 500 MG: 250 TABLET, DELAYED RELEASE ORAL at 08:16

## 2020-01-29 RX ADMIN — DOCUSATE SODIUM 100 MG: 100 CAPSULE, LIQUID FILLED ORAL at 08:16

## 2020-01-29 RX ADMIN — DIVALPROEX SODIUM 500 MG: 250 TABLET, DELAYED RELEASE ORAL at 20:11

## 2020-01-29 RX ADMIN — FERROUS SULFATE TAB 325 MG (65 MG ELEMENTAL FE) 325 MG: 325 (65 FE) TAB at 08:16

## 2020-01-29 RX ADMIN — PALIPERIDONE 6 MG: 3 TABLET, EXTENDED RELEASE ORAL at 20:12

## 2020-01-29 RX ADMIN — Medication 500 MG: at 08:16

## 2020-01-29 RX ADMIN — FAMOTIDINE 20 MG: 20 TABLET ORAL at 20:12

## 2020-01-29 NOTE — BH NOTES
Treatment team met -     Medical Director:                                __x___present        Psychiatrist:                                        __x___present      Charge nurse:                                     __x___present           MSW:x                                                _____present      :                      __x___present      Nurse Manager:                                  _____present      Student RNs:                                      _____present      Medical Students:                               x_____present      Art Therapist:                                      x_____present   Clinical Coordinator:                           x_____present   Internal :                      x_____present        Plan of care discussed and updated as appropriate. Court ordered medications ordered today.  Continues to have A/H.

## 2020-01-29 NOTE — BH NOTES
Patient was quiet and calm throughout the shift. She expressed she was tired, and wanted to lay down. She did come out and engage with her peers after dinner in the milieu. Patient is very social, but speaks very softly when talking. During most rounds, patient often was concerned something was wrong as to why staff was checking on her. Staff reassured her that we check on every patient periodically. No behavior issues or concerns to report. Staff will continue to monitor patient for safety.

## 2020-01-29 NOTE — GROUP NOTE
[facility] GROUP DOCUMENTATION INDIVIDUAL Group Therapy Note Date: 1/29/2020 Group Start Time: 4681 Group End Time: 7342 Group Topic: Nursing [address] 1 SPECIAL TRTMT 1 Amy Charles RN 
 
[facility] GROUP DOCUMENTATION GROUP Group Therapy Note Attendees: 4 Attendance: Did not attend Carl Samuel RN

## 2020-01-29 NOTE — PROGRESS NOTES
Problem: Psychosis  Goal: *STG: Decreased hallucinations  Description  Patient will verbalize denial of auditory/visual hallucinations every shift throughout hospital stay. Outcome: Progressing Towards Goal  Note:   Patient will have a decrease in hallucinations while hospitalized    Goal: *STG: Remains safe in hospital  Description  Patient will remain free of harm to self and others every shift throughout hospitalization. Outcome: Progressing Towards Goal  Note:   Patient will remain safe in the hospital daily  Goal: *STG/LTG: Complies with medication therapy  Description  Patient will take medication as prescribed every shift throughout hospital stay. Outcome: Progressing Towards Goal  Note:   Patient will comply with medication therapy daily    Patient has been committed with court ordered meds today. She has been minimally visible on the unit, mainly isolating in her room. However, she has been adherent to medications this am. Appears confused, and thoughts remain altered. Will continue to monitor for safety and support.
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
## 2020-01-29 NOTE — GROUP NOTE
[facility]  GROUP DOCUMENTATION INDIVIDUAL Group Therapy Note Date: 1/28/2020 Group Start Time: 26 Group End Time: 1930 Group Topic: Nursing [facility] 1 SPECIAL TRTMT 1 CODY Patel  GROUP DOCUMENTATION GROUP Group Therapy Note Attendees: 6 Attendance: Attended Interventions/techniques: Informed Follows Directions: Followed directions Interactions: Interacted appropriately Mental Status: Calm Behavior/appearance: Cooperative Goals Achieved: Able to engage in interactions, Able to listen to others and Able to self-disclose Arlene Watson RN

## 2020-01-29 NOTE — BSMART NOTE
Pt.is a 34year old female with history of Schizophrenia paranoid type  and past history of Cocaine and DID .  Pt. was admitted to this facility for facility for delusional thinking and ideations  to harm self. 
  
Pt. s case was discussed in treatment team this a.m. pt is managed  and followed by [facility] PACT team/  Pt has bene in and out of the hospital per [facility] staff , since August 2019. Pt. [facility] in [facility]. Pt can return upon dc. Pt. Has not been complete with medications in the community. Pt. Was at [facility] a transitional program for several months when she left [facility]; however, started to decompensate in the community. The pt. Has DID dx and has 3 voices 9 demon, child and [place] accent per PACT staff. SIVA Contact:  Siva met with pt to discuss dc planning. \" I eel so confused\". Pt. Could not articulate why she was confused. \" I would like to know if PACT is going to let me move\". Siva informed pt she can return to her apartment but can not go to [facility]. Pt. Expressed she feels anxious about retuning to her home. Sw discussed positive coping skills, SW will ask PACT staff to discuss options with pt. SIVA had pt to reflect on medication compliance. SIVA explained to pt she has been JA to take medications. Pt states she will take medications. Pt. continues to appear confused has impaired judgement and no insight. Pt. Denies ideations  and hallucination but has some thought blocking. Siva will continue to assist the pt with dc planning.

## 2020-01-29 NOTE — BSMART NOTE
OCCUPATIONAL THERAPY PROGRESS NOTE Group Time:  6381 Attendance: The patient attended 3/4 of group. (Called out). Participation:. The patient participated with minimal elaboration in the activity. Attention: The patient was able to focus on the activity. Not  Know if able to focus on what others said, able to keep up with \"Social Bingo\" activity without help. Interaction: The patient acknowledges others or responds to questions,  with no spontaneous interaction. Again,  Would say \"I can't remember\" when asked a question in activity like \"what is a childhood memory? \" and when asked to try to think of something, immediately has appropriate answer \"Susanna to the [facility]" and had small details. Appears preoccupied with no spontaneous interaction or elaboration.

## 2020-01-30 PROCEDURE — 65220000003 HC RM SEMIPRIVATE PSYCH

## 2020-01-30 PROCEDURE — 74011250637 HC RX REV CODE- 250/637: Performed by: PSYCHIATRY & NEUROLOGY

## 2020-01-30 RX ORDER — BISACODYL 5 MG
10 TABLET, DELAYED RELEASE (ENTERIC COATED) ORAL DAILY PRN
Status: DISCONTINUED | OUTPATIENT
Start: 2020-01-31 | End: 2020-02-08

## 2020-01-30 RX ORDER — DESVENLAFAXINE SUCCINATE 50 MG/1
50 TABLET, EXTENDED RELEASE ORAL DAILY
Status: DISCONTINUED | OUTPATIENT
Start: 2020-01-31 | End: 2020-02-21 | Stop reason: HOSPADM

## 2020-01-30 RX ADMIN — PALIPERIDONE 6 MG: 3 TABLET, EXTENDED RELEASE ORAL at 20:25

## 2020-01-30 RX ADMIN — Medication 1 PACKET: at 21:54

## 2020-01-30 RX ADMIN — FAMOTIDINE 20 MG: 20 TABLET ORAL at 08:07

## 2020-01-30 RX ADMIN — THERA TABS 1 TABLET: TAB at 08:07

## 2020-01-30 RX ADMIN — Medication 500 MG: at 08:07

## 2020-01-30 RX ADMIN — DOCUSATE SODIUM 100 MG: 100 CAPSULE, LIQUID FILLED ORAL at 08:07

## 2020-01-30 RX ADMIN — Medication 100 MG: at 08:06

## 2020-01-30 RX ADMIN — FERROUS SULFATE TAB 325 MG (65 MG ELEMENTAL FE) 325 MG: 325 (65 FE) TAB at 08:07

## 2020-01-30 RX ADMIN — DIVALPROEX SODIUM 500 MG: 250 TABLET, DELAYED RELEASE ORAL at 08:07

## 2020-01-30 RX ADMIN — DIVALPROEX SODIUM 500 MG: 250 TABLET, DELAYED RELEASE ORAL at 20:25

## 2020-01-30 RX ADMIN — FAMOTIDINE 20 MG: 20 TABLET ORAL at 20:25

## 2020-01-30 NOTE — BSMART NOTE
ART THERAPY GROUP PROGRESS NOTE PATIENT SCHEDULED FOR GROUP AT: 12:00 
 
ATTENDANCE: Full PARTICIPATION LEVEL:Participates fully in the art process. ATTENTION LEVEL:  Needs occasional re-direction. FOCUS: Safe Space/place SYMBOLIC & THEMATIC CONTENT AS NOTED IN IMAGERY: Elyse's mood and affect appeared blunted and congruent. Carmen Wallace started off stating that she wanted to change her name to [facility] spelling] \"[id number]---[id number], but to you, ERLY get it? \"  Carmen Wallace struggled to identify any safe places or people. Carmen Wallace engaged in the art process but it was unclear if she understood what and why she was choosing image wise and she chose not to share and turned her paper over because \"its a mistake. \"

## 2020-01-30 NOTE — BH NOTES
During this shift (3pm-11pm) pt has been in the milieu for the majority of the shift. Pt is very guarded and reserved to self while in the milieu. Pt only speaks to other pts when spoken to first. Pt is observed in the milieu smiling to self inappropriately at times. When asked about potential D/C date or plans pt responds, \"I really don't know about that. Other than being here I'm confused about a lot. \" Pt presents a flat affect while pacing the unit and states, \"Today has not been a good day. \" When asked why by this writer pt did not want to respond. Pt participated in both groups held by staff during this period. Pt was able to interact with others and complete designated task provided by staff. Staff will continue rounds monitoring for changes in pt behavior, location & safety.

## 2020-01-30 NOTE — BSMART NOTE
SOCIAL WORK GROUP THERAPY PROGRESS NOTE Group Time:  10:15am   
 
Group Topic:  Coping Skills    C D Issues Group Participation:   
 
Pt moderately involved during group discussion but remained attentive. Affect blunted & somewhat lethargic from outset. Still seems to have some thought blocking & struggles to verbalize what she's thinking. Discussion included the process of making \"Change\" by answering questions on handout with an emphasis on strengths & weaknesses to support improving one's self esteem. Struggled identifying strengths & saw negatives as \"my life\" but not specific. \"Tired\" from prior session, asked to leave after x15 minutes.  Support offered & went to room to wait for her

## 2020-01-30 NOTE — BSMART NOTE
COUNSELING GROUP PROGRESS NOTE PATIENT SCHEDULED FOR GROUP AT: 9:30 
 
ATTENDANCE: 3/4 PARTICIPATION LEVEL: Participates fully in the group process. ATTENTION LEVEL: Needs occasional re-direction. FOCUS: Mindfulness THEMATIC CONTENT AS NOTED IN REFLECTION: She presented with a euthymic mood and blunted affect and her thought processes were more organized than previous groups however still contained instances of bizarre and loose thinking. She was actively engaged in the group therapy directive and her approach to task was purposeful. She initially refused to complete the directive however with redirection was able to complete the task. She got up and left group briefly to go to her room then returned and participated in reflective discussion with prompting. She made bizarre statements such as stating that she prefers when \"the four walls\" make choices for her about what to wear and when to eat. She was tearful towards the end of group and stated she was upset because of \"the rumors about my house. \" Thematic content in her directive was appropriate and she identified beauty, peace, love, and variety as values that are important to her.

## 2020-01-30 NOTE — BSMART NOTE
OCCUPATIONAL THERAPY PROGRESS NOTE Group Time:  1324 Attendance: The patient attended full group. [place] Participation:. The patient participated with minimal elaboration in the activity. . 
Attention: The patient needed redirection to activity at least once. Interaction: The patient acknowledges others or responds to questions,  with no spontaneous interaction. Thought blocking/hesitancy in responses improved. Able to respond to concrete questions, more difficulty with open ended or insight oriented activity. Facial expressions more evident and not as mask-like or flat. Appeared more attentive.

## 2020-01-30 NOTE — PROGRESS NOTES
Behavioral Health Progress Note    Admit Date: 1/24/2020  Hospital day 6    Vitals : 24-Hr Vitals/Weight (last day)     Date/Time Temp Pulse BP MAP (Calculated) BP 1 Location BP Patient Position Resp SpO2 O2 Device O2 Flow Rate (L/min) Level of Consciousness MEWS Score Weight   01/30/20 0745 97.3 °F (36.3 °C) 100 110/70 83   16    Alert 1    01/29/20 2031 97.1 °F (36.2 °C) 66 100/64 76 Right arm Sitting 16    Alert 2    01/29/20 0735 97.3 °F (36.3 °C)  115/73 87 Right arm Sitting 16    Alert       Labs:  No results found for this or any previous visit (from the past 24 hour(s)).   Meds:      Medication Administration Report   Attending Provider: Ale Servin MD    Allergies: Robitussin [Guaifenesin]    Isolation: None   Infection: None   Code Status: Prior   Advance Care Planning Activity    Service: PSY    Ht: --   Wt: --   Admission Wt: --    Admission Dx: Schizophrenia ([facility]   Principal Problem: Schizophrenia ([facility] [F20.9]     BMI: --   BSA: --         Medication Administration Report   for Alf Krishnamurthy as of 01/30/20 1847     1 Day 3 Days 7 Days 10 Days <  Today >     Legend:                          Discontinued    Completed    Future    MAR Hold     Linked           Medications 01/28/20 01/29/20 01/30/20   ascorbic acid (vitamin C) (VITAMIN C) tablet 500 mg   Dose: 500 mg  Freq: DAILY Route: PO  Start: 01/25/20 0900   Order ID: [id number]    (08)          08 (500 mg)          08 (500 mg)            divalproex DR (DEPAKOTE) tablet 500 mg   Dose: 500 mg  Freq: 2 TIMES DAILY Route: PO  Start: 01/24/20 2100   Order ID: [id number]    (08)   20 (500 mg)        08 (500 mg)   20 (500 mg)        08 (500 mg)   21          docusate sodium (COLACE) capsule 100 mg   Dose: 100 mg  Freq: DAILY Route: PO  Indications of Use: constipation  Start: 01/27/20 1400   Order ID: [id number]    (08)          08 (100 mg)          08 (100 mg)            famotidine (PEPCID) tablet 20 mg   Dose: 20 mg  Freq: 2 TIMES DAILY Route: PO  Start: 01/24/20 2100    Order specific questions:   H2RA INDICATION Symptomatic GERD   Order ID: [id number]    (08)   20 (20 mg)        08 (20 mg)   20 (20 mg)        08 (20 mg)   21          ferrous sulfate tablet 325 mg   Dose: 1 Tab  Freq: DAILY WITH BREAKFAST Route: PO  Start: 01/25/20 0800   Order ID: [id number]    (08)          08 (325 mg)          08 (325 mg)            haloperidoL (HALDOL) tablet 5 mg   Dose: 5 mg  Freq: EVERY 4 HOURS AS NEEDED Route: PO  PRN Reasons: Psychosis,Agitation  Start: 01/24/20 1729   Order ID: [id number]         haloperidol lactate (HALDOL) injection 5 mg   Dose: 5 mg  Freq: EVERY 4 HOURS AS NEEDED Route: IM  PRN Reasons: Psychosis,Other  PRN Comment: severe agitation  Start: 01/24/20 1729   Order ID: [id number]         hydrOXYzine pamoate (VISTARIL) capsule 50 mg   Dose: 50 mg  Freq: EVERY 4 HOURS AS NEEDED Route: PO  PRN Reason: Anxiety  Start: 01/24/20 1729   Order ID: [id number]         ibuprofen (MOTRIN) tablet 400 mg   Dose: 400 mg  Freq: EVERY 4 HOURS AS NEEDED Route: PO  PRN Reasons: Mild Pain,Headache  Start: 01/24/20 1729   Order ID: [id number]         influenza vaccine 2019-20 (6 mos+)(PF) (FLUARIX/FLULAVAL/FLUZONE QUAD) injection 0.5 mL   Dose: 0.5 mL  Freq: PRIOR TO DISCHARGE Route: IM  Start: 01/24/20 1704    Admin Instructions:   Shake well prior to administration.    Order ID: [id number]         LORazepam (ATIVAN) injection 1-2 mg   Dose: 1-2 mg  Freq: EVERY 4 HOURS AS NEEDED Route: IM  PRN Reason: Other  PRN Comment: severe agitation  Start: 01/24/20 1729    Admin Instructions:   Severe agitation unrelieved by Haldol   Order ID: [id number]         nicotine (NICORETTE) gum 2 mg   Dose: 2 mg  Freq: EVERY 2 HOURS AS NEEDED Route: PO  PRN Reason: Nicotine Withdrawal  Indications of Use: smoking cessation  Start: 01/25/20 1303   Order ID: [id number]         paliperidone (INVEGA) SR tablet 6 mg   Dose: 6 mg  Freq: EVERY BEDTIME Route: PO  Start: 01/25/20 2100 Admin Instructions:   Do not crush, break or chew.  Swallow whole. Order ID: [id number]    94 (6 mg)          20 (6 mg)          21            therapeutic multivitamin (THERAGRAN) tablet 1 Tab   Dose: 1 Tab  Freq: DAILY Route: PO  Start: 01/25/20 0900   Order ID: [id number]    (08)          08 (1 Tab)          08 (1 Tab)            thiamine HCL (B-1) tablet 100 mg   Dose: 100 mg  Freq: DAILY Route: PO  Start: 01/25/20 0900   Order ID: [id number]    (08)          08 (100 mg)          08 (100 mg)            traZODone (DESYREL) tablet 50 mg   Dose: 50 mg  Freq: BEDTIME PRN Route: PO  PRN Reason: Sleep  Start: 01/24/20 1729   Order ID: [id number]         Future Medications   Medications 01/28/20 01/29/20 01/30/20   bisacodyL (DULCOLAX) tablet 10 mg   Dose: 10 mg  Freq: DAILY PRN Route: PO  PRN Reason: Constipation  Indications of Use: constipation  Start: 01/31/20 0800    Admin Instructions:   Do not crush, break or chew.  Swallow whole. Order ID: [id number]         psyllium husk-aspartame (METAMUCIL FIBER) packet 1 Packet   Dose: 1 Packet  Freq: 2 TIMES DAILY Route: PO  Indications of Use: constipation  Start: 01/30/20 2100    Admin Instructions:   Mix in 8 oz of water, administer 2 hrs before or after other medications. Order ID: [id number]      70            Discontinued Medications   Medications 01/28/20 01/29/20 01/30/20   metoprolol tartrate (LOPRESSOR) tablet 25 mg   Dose: 25 mg  Freq: 2 TIMES DAILY Route: PO  Start: 01/24/20 2100 End: 01/26/20 2102    Admin Instructions:   .     Order ID: [id number]               Hospital Problems: Principal Problem:    Schizophrenia ([address] (4/18/2017)    Active Problems:    Cigarette nicotine dependence without complication (9/7/9094)        Subjective:   Medication side effects: fatigue/weakness  none    Mental Status Exam  Sensorium: alert  Orientation: only aware of  place and person  Relations: passive and vague  Eye Contact: intense  Appearance: is unkempt and is bizarre  Thought Process: slow rate of thoughts and poor abstract reasoning/computation   Thought Content: paranoia and obsessions    Suicidal: denies   Homicidal: none   Mood: is anxious and is sad   Affect: anxious, sad and constricted, odd  Memory: is impaired, is recent and is remote     Concentration: distractable  Abstraction: concrete  Insight: The patient shows little insight    OR Poor  Judgement: is psychologically impaired OR  Poor    Assessment/Plan:   not changed  Patient continues to have episodes of thought blocking and delusions. She has been suspicious then says she is not even though she acts this way. She has bizarre comments such as \"I am worried that my stuffed animals are messed up. There were electrical wires in them. \"  She had apparently placed electric wires either into the animals or with them during the holidays. She was worried that \"[employer] uses subliminal mind control. \"  She was complaining of constipation and has not had a bowel move for several days though she is on Colace. I have written for Dulcolax tomorrow morning as well as Metamucil twice a day and she already has orders for the Colace. She is able to urinate. In the past she says she had felt less depressed by using the antidepressant Pristiq and I will write for this in the morning. She does look somewhat depressed and says that she feels this way. She is denying homicidal or suicidal ideas.     Continue close observation,

## 2020-01-30 NOTE — BSMART NOTE
Pt.is a 34year old female with history of Schizophrenia paranoid type  and past history of Cocaine and DID .  Pt. was admitted to this facility for facility for delusional thinking and ideations  to harm self. ARUN Contact:  SW met with pt to discuss dc planning. SW discussed continued medication and treatment compliance. Pt. Expressed goal . Pt informed SW she would like to return to her apartment. Pt. Expressed she plans to take medications. Pt. Still has some thought blocking , a little organized today and calm. ARUN will contact PACT staff to ask for pt.  to be assess pt for baseline. SW will continue to assist the pt with dc planning.

## 2020-01-30 NOTE — PROGRESS NOTES
Problem: Psychosis  Goal: *STG: Decreased hallucinations  Description  Patient will verbalize denial of auditory/visual hallucinations every shift throughout hospital stay. Outcome: Progressing Towards Goal  Note:   Patient has decreased hallucinations daily  Goal: *STG/LTG: Complies with medication therapy  Description  Patient will take medication as prescribed every shift throughout hospital stay. Outcome: Progressing Towards Goal  Note:   Patient will comply with medication therapy daily     Problem: Suicide  Goal: *STG: Remains safe in hospital  Description  Patient will remain free of harm to self and others every shift throughout hospitalization. Outcome: Progressing Towards Goal  Note:   Patient remain safe in the hospital daily    Patient has been visible and active on the unit. She has been medication adherent, and attending groups. She interacts with peers quietly, and is seen sometimes just sitting staring off. Continues to have illogical thoughts, and is guarded with a suspicious affect.   Will continue to monitor for safety and support

## 2020-01-30 NOTE — GROUP NOTE
[facility]  GROUP DOCUMENTATION INDIVIDUAL Group Therapy Note Date: 1/30/2020 Group Start Time: 5342 Group End Time: 5283 Group Topic: Topic Group [facility] SPECIAL TRT 1 Amy Charles, RN 
 
[facility] GROUP DOCUMENTATION GROUP Group Therapy Note Attendees: 2 Attendance: Attended Patient's Goal:  Coping and reflection Interventions/techniques: Challenged and Informed Follows Directions: Followed directions Interactions: Interacted appropriately Mental Status: Calm, Flat and Preoccupied Behavior/appearance: Cooperative and Withdrawn/quiet Goals Achieved: Able to listen to others Additional Notes:   
 
Dinesh Arellano RN

## 2020-01-31 PROCEDURE — 74011250637 HC RX REV CODE- 250/637: Performed by: PSYCHIATRY & NEUROLOGY

## 2020-01-31 PROCEDURE — 65220000003 HC RM SEMIPRIVATE PSYCH

## 2020-01-31 RX ORDER — CHLORPROMAZINE HYDROCHLORIDE 100 MG/1
100 TABLET, FILM COATED ORAL
Status: DISCONTINUED | OUTPATIENT
Start: 2020-01-31 | End: 2020-02-20

## 2020-01-31 RX ADMIN — THERA TABS 1 TABLET: TAB at 08:13

## 2020-01-31 RX ADMIN — CHLORPROMAZINE HYDROCHLORIDE 100 MG: 100 TABLET, SUGAR COATED ORAL at 20:51

## 2020-01-31 RX ADMIN — Medication 500 MG: at 08:12

## 2020-01-31 RX ADMIN — FAMOTIDINE 20 MG: 20 TABLET ORAL at 20:51

## 2020-01-31 RX ADMIN — FERROUS SULFATE TAB 325 MG (65 MG ELEMENTAL FE) 325 MG: 325 (65 FE) TAB at 08:13

## 2020-01-31 RX ADMIN — Medication 100 MG: at 08:13

## 2020-01-31 RX ADMIN — DOCUSATE SODIUM 100 MG: 100 CAPSULE, LIQUID FILLED ORAL at 08:12

## 2020-01-31 RX ADMIN — IBUPROFEN 400 MG: 400 TABLET, FILM COATED ORAL at 17:49

## 2020-01-31 RX ADMIN — FAMOTIDINE 20 MG: 20 TABLET ORAL at 08:13

## 2020-01-31 RX ADMIN — DESVENLAFAXINE SUCCINATE 50 MG: 50 TABLET, FILM COATED, EXTENDED RELEASE ORAL at 08:15

## 2020-01-31 RX ADMIN — DIVALPROEX SODIUM 500 MG: 250 TABLET, DELAYED RELEASE ORAL at 20:51

## 2020-01-31 RX ADMIN — DIVALPROEX SODIUM 500 MG: 250 TABLET, DELAYED RELEASE ORAL at 08:13

## 2020-01-31 NOTE — PROGRESS NOTES
Problem: Psychosis  Goal: *STG: Decreased delusional thinking  Description  Patient will show a decrease or be free of delusional statements assessed every shift throughout hospital stay. Outcome: Progressing Towards Goal  Note:   Patient will work on decreasing delusional thinking daily  Goal: *STG: Remains safe in hospital  Description  Patient will remain free of harm to self and others every shift throughout hospitalization. Outcome: Progressing Towards Goal  Note:   Patient will remain safe in the hospital daily  Goal: *STG/LTG: Complies with medication therapy  Description  Patient will take medication as prescribed every shift throughout hospital stay. Outcome: Progressing Towards Goal  Note:   Patient will comply with medication therapy daily    Patient has been visible and active in the day area during this shift. She has been attending groups and has taken her medication. She continues to state \"I cant think\". Affect appears suspicious and guarded. Quiet and although attending groups, very quiet. Continued altered thought processes.

## 2020-01-31 NOTE — BSMART NOTE
ART THERAPY GROUP PROGRESS NOTE PATIENT SCHEDULED FOR GROUP AT: 12:30 
 
ATTENDANCE: Full PARTICIPATION LEVEL: Participates fully in the art process. ATTENTION LEVEL: Able to focus on task. FOCUS: Integration SYMBOLIC & THEMATIC CONTENT AS NOTED IN IMAGERY: She presented with a calm mood and blunted affect and her thought processes were somewhat loose and bizarre. She was actively engaged in the art therapy directive and her approach to task was intentional. She was quiet and withdrawn and only engaged in discussion when directly prompted. Thematic content was predominantly appropriate with occasional instances of looseness.

## 2020-01-31 NOTE — BSMART NOTE
OCCUPATIONAL THERAPY PROGRESS NOTE Group Time:  0275 Attendance: The patient attended full group. [place] Participation:. The patient participated with minimal elaboration in the activity. Attention: The patient needed redirection to activity at least once. Interaction: The patient acknowledges others or responds to questions,  with no spontaneous interaction. Participated as asked, responses often concrete, but generally on subject. Occasionally shows affect and not as flat.

## 2020-01-31 NOTE — BH NOTES
Patient displayed some isolative behaviors during this shift. Patient laid in her bed for majority of this shift, despite being encouraged to come into the milieu by staff. Patient stated 'I just want to lay down.' She appeared to have a flat affect when staff or her peers engaged with her. Patient did come out to eat her dinner and attended part of the community group, then retreated back to her room. Patient was compliant with staff's directives and unit rules. Staff will continue to monitor patient for safety.

## 2020-01-31 NOTE — BSMART NOTE
SOCIAL WORK GROUP THERAPY PROGRESS NOTE Group Time:  10am 
 
Group Topic:  Coping Skills Group Participation: Pt was unavailable for group due to thought blocking & seemingly more disorganized today than yesterday

## 2020-01-31 NOTE — GROUP NOTE
[facility] GROUP DOCUMENTATION INDIVIDUAL Group Therapy Note Date: 1/30/2020 Group Start Time: 2000 Group End Time: 2015 Group Topic: Nursing [address] SPECIAL TRTMT 1 Patric HASTINGS  GROUP DOCUMENTATION GROUP Group Therapy Note Attendees: 6 Attendance: Did not attend Kaylyn Nicole

## 2020-01-31 NOTE — PROGRESS NOTES
Behavioral Health Progress Note    Admit Date: 1/24/2020  Hospital day 7    Vitals :   Patient Vitals for the past 8 hrs:   BP Temp Pulse Resp   01/31/20 0737 115/78 97.8 °F (36.6 °C) 95 16     Labs:  No results found for this or any previous visit (from the past 24 hour(s)).   Meds:   Current Facility-Administered Medications   Medication Dose Route Frequency    chlorproMAZINE (THORAZINE) tablet 100 mg  100 mg Oral QHS    bisacodyL (DULCOLAX) tablet 10 mg  10 mg Oral DAILY PRN    psyllium husk-aspartame (METAMUCIL FIBER) packet 1 Packet  1 Packet Oral BID    desvenlafaxine succinate (PRISTIQ) ER tablet 50 mg  50 mg Oral DAILY    docusate sodium (COLACE) capsule 100 mg  100 mg Oral DAILY    nicotine (NICORETTE) gum 2 mg  2 mg Oral Q2H PRN    hydrOXYzine pamoate (VISTARIL) capsule 50 mg  50 mg Oral Q4H PRN    haloperidoL (HALDOL) tablet 5 mg  5 mg Oral Q4H PRN    haloperidol lactate (HALDOL) injection 5 mg  5 mg IntraMUSCular Q4H PRN    LORazepam (ATIVAN) injection 1-2 mg  1-2 mg IntraMUSCular Q4H PRN    traZODone (DESYREL) tablet 50 mg  50 mg Oral QHS PRN    ibuprofen (MOTRIN) tablet 400 mg  400 mg Oral Q4H PRN    divalproex DR (DEPAKOTE) tablet 500 mg  500 mg Oral BID    therapeutic multivitamin (THERAGRAN) tablet 1 Tab  1 Tab Oral DAILY    ascorbic acid (vitamin C) (VITAMIN C) tablet 500 mg  500 mg Oral DAILY    famotidine (PEPCID) tablet 20 mg  20 mg Oral BID    ferrous sulfate tablet 325 mg  1 Tab Oral DAILY WITH BREAKFAST    influenza vaccine 2019-20 (6 mos+)(PF) (FLUARIX/FLULAVAL/FLUZONE QUAD) injection 0.5 mL  0.5 mL IntraMUSCular PRIOR TO DISCHARGE      Hospital Problems: Principal Problem:    Schizophrenia ([address] (4/18/2017)    Active Problems:    Cigarette nicotine dependence without complication (7/2/1237)        Subjective:   Medication side effects: none  none    Mental Status Exam  Sensorium: alert  Orientation: only aware of  time, place and person  Relations: guarded and passive  Eye Contact: intense  Appearance: is bizarre and is tense  Thought Process: slow rate of thoughts and poor abstract reasoning/computation   Thought Content: delusions and paranoia   Suicidal: denies   Homicidal: none   Mood: is anxious   Affect: anxious and odd  Memory: is impaired, is recent and is remote     Concentration: distractable  Abstraction: concrete  Insight: The patient shows little insight    OR Poor  Judgement: is psychologically impaired OR  Poor    Assessment/Plan:   not changed      Continue close observation,   We did get the information as to when the patient had received her dosings of [place]. She is due for the next injection on February 18. We will not be able to give her another dosing until that time. Looking at the old record she has required 2 different antipsychotics in 2 different classes in the past in order to try to get her stable. She says that she has taken Haldol and Thorazine at various times in addition to taking her other antipsychotic medications. She said she did the best on Thorazine and did not cause significant side effects. She is continuing to have loose associations with strange comments though less than she did before. She is not having any tics or twitches. She still does not eat much but she is eating enough to get by and is drinking plenty of fluids. She says she has poor memory but this is not new for her. She does say that it has been a problem after having had a stroke while she was detoxifying and alcohol 2010. We will write for Thorazine 100 mg bedtime and discontinue the Invega 6 mg. We will continue with supportive care.   We will continue with reality orientation

## 2020-01-31 NOTE — BSMART NOTE
Pt.is a 34year old female with history of Schizophrenia paranoid type  and past history of Cocaine and DID .  Pt. was admitted to this facility for facility for delusional thinking and ideations  to harm. Pt.'s case was discussed in treatment team. Emily Ospina was ask to find out last time pt. received the [place] shot. ARUN will also ask [facility] PACT CM about referring pt. To [facility] to [facility] committee on next week. ARUN Collateral: ARUN contacted  [facility] PACT team.  Pt. received the [place] on 1/21/20 and the next shot is due on 2/18/20. [facility] PACT will fax a med list. ARUN informed unit nurse. to find out the last time pt received [place] shot. ARUN Contact: ARUN met with pt. \" I think I need help with my apartment\". \" I think I did not complete a lot of want I needed to do. \"  ARUN encouraged the pt to explained what she was talking about. Pt . Could not really explained . Pt. Appears disorganized, loose and has impaired insight. Pt. denies ideations and hallucinations. ARUN will continue to assist the pt with dc planning. ARUN Collateral:  ARUN contacted Matty Gilbert pt.'s CM with [facility] PACT team @ [phone]. ARUN provided CM with an update. ARUN ask for pt.'s case to be presented as a transfer to [facility].

## 2020-02-01 PROCEDURE — 74011250637 HC RX REV CODE- 250/637: Performed by: PSYCHIATRY & NEUROLOGY

## 2020-02-01 PROCEDURE — 65220000003 HC RM SEMIPRIVATE PSYCH

## 2020-02-01 RX ADMIN — Medication 500 MG: at 08:29

## 2020-02-01 RX ADMIN — THERA TABS 1 TABLET: TAB at 08:29

## 2020-02-01 RX ADMIN — FAMOTIDINE 20 MG: 20 TABLET ORAL at 08:30

## 2020-02-01 RX ADMIN — DIVALPROEX SODIUM 500 MG: 250 TABLET, DELAYED RELEASE ORAL at 08:29

## 2020-02-01 RX ADMIN — CHLORPROMAZINE HYDROCHLORIDE 100 MG: 100 TABLET, SUGAR COATED ORAL at 20:13

## 2020-02-01 RX ADMIN — DOCUSATE SODIUM 100 MG: 100 CAPSULE, LIQUID FILLED ORAL at 08:30

## 2020-02-01 RX ADMIN — FAMOTIDINE 20 MG: 20 TABLET ORAL at 20:13

## 2020-02-01 RX ADMIN — FERROUS SULFATE TAB 325 MG (65 MG ELEMENTAL FE) 325 MG: 325 (65 FE) TAB at 08:29

## 2020-02-01 RX ADMIN — DESVENLAFAXINE SUCCINATE 50 MG: 50 TABLET, FILM COATED, EXTENDED RELEASE ORAL at 08:29

## 2020-02-01 RX ADMIN — DIVALPROEX SODIUM 500 MG: 250 TABLET, DELAYED RELEASE ORAL at 20:14

## 2020-02-01 NOTE — BSMART NOTE
ART THERAPY GROUP PROGRESS NOTE Group time: 12:30 The patient refused group despite encouragement, she came to the day area and saw the directive then stated \"I can't do that\" and went back to her room.

## 2020-02-01 NOTE — BH NOTES
During this shift (3pm-11pm) pt has been in her room isolated choosing not to interact with peers for the majority of the evening. When out in the milieu pt selectively socializes briefly with peers. When observed in the milieu pt is guarded and reserved to self. Pt states, \"I feel confused all the time. \" This writer asked her if she thinks the medication is helping and she says, \"It helps a little bit. \" Pt had no visitors nor made any outgoing phone calls this period. Pt has not had a behavioral outburst during this shift and has not required redirection from staff at any time this evening. Staff will continue rounds monitoring for changes in pt behavior, location & safety.

## 2020-02-01 NOTE — GROUP NOTE
[facility] GROUP DOCUMENTATION INDIVIDUAL Group Therapy Note Date: 2/1/2020 Group Start Time: 4439 Group End Time: 6497 Group Topic: Nursing [address] SPECIAL TRTMT 1 Lobo Goncalves RN 
 
[facility] GROUP DOCUMENTATION GROUP Group Therapy Note Attendees: 3 Attendance: Attended Patient's Goal:  Self Esteem group Interventions/techniques: Informed Follows Directions: Followed directions Interactions: Interacted appropriately Mental Status: Calm Behavior/appearance: Cooperative Goals Achieved: Able to listen to others Additional Notes: Worksheet on \" I love me because. .. Tammi Yang RN

## 2020-02-01 NOTE — BH NOTES
Patient had sad demeanor throughout the shift , she was encouraged by staff, she got up ate and returned to bed, she stayed in bed for the remainder of the shift, staff will continue to monitor patient for health and safety.

## 2020-02-01 NOTE — PROGRESS NOTES
[address]  Inpatient Progress Note     Date of Service: 20  Hospital Day: 8     Subjective/Interval History   20    Treatment Team Notes:  Notes reviewed and/or discussed and report that Maryjo Browning is 80-year-old  white female with h/o Schizophrenia, admitted for psychosis and resultant bizarre behavior. Patient interview: Maryjo Browning was interviewed by this writer today. Patient presented with disorganized thought patterns, processing difficulties, and paranoia. Objective     Visit Vitals  /68 (BP 1 Location: Right arm, BP Patient Position: Sitting)   Pulse 100   Temp 98.2 °F (36.8 °C)   Resp 16   Breastfeeding No       No results found for this or any previous visit (from the past 24 hour(s)). Mental Status Examination     Appearance/Hygiene 34 y.o.  WHITE OR  female  Hygiene: Mildly disheveled   Behavior/Social Relatedness Disorganized thought process   Musculoskeletal Gait/Station: appropriate  Tone (flaccid, cogwheeling, spastic): not assessed  Psychomotor (hyperkinetic, hypokinetic): calm   Involuntary movements (tics, dyskinesias, akathisa, stereotypies): none   Speech   Rate, rhythm, volume, fluency and articulation are appropriate   Mood   depressed   Affect    Blunted   Thought Process Disorganized and loose   Thought Content and Perceptual Disturbances Denies self-injurious behavior (SIB), suicidal ideation (SI), aggressive behavior or homicidal ideation (HI)    Denies auditory and visual hallucinations   Sensorium and Cognition  Grossly intact   Insight  poor   Judgment poor        Assessment/Plan      Psychiatric Diagnoses:   Patient Active Problem List   Diagnosis Code    Cigarette nicotine dependence without complication E48.592    Recurrent depression (HCC) F33.9    Schizophrenia (HCC) F20.9    Episodic mood disorder ([facility] F39    Single delivery by  O82       Psychosocial and contextual factors: Chronic and severe mental health illness, Unemployed, poor social support    Level of impairment/disability: Severe Oneta Gable is a 34 y.o. who is currently requiring inpatient treatment due to active psychosis. 1.  Continue current medication regimen unchanged as Thorazine started yesterday. 2.  Reviewed instructions, risks, benefits and side effects of medications  3.   Disposition/Discharge Date: self-care/home, 5-6 days    MD [facility]. [facility]  [facility]

## 2020-02-02 PROCEDURE — 74011250637 HC RX REV CODE- 250/637: Performed by: PSYCHIATRY & NEUROLOGY

## 2020-02-02 PROCEDURE — 65220000003 HC RM SEMIPRIVATE PSYCH

## 2020-02-02 RX ADMIN — DIVALPROEX SODIUM 500 MG: 250 TABLET, DELAYED RELEASE ORAL at 20:03

## 2020-02-02 RX ADMIN — DOCUSATE SODIUM 100 MG: 100 CAPSULE, LIQUID FILLED ORAL at 08:01

## 2020-02-02 RX ADMIN — DESVENLAFAXINE SUCCINATE 50 MG: 50 TABLET, FILM COATED, EXTENDED RELEASE ORAL at 08:01

## 2020-02-02 RX ADMIN — FAMOTIDINE 20 MG: 20 TABLET ORAL at 20:03

## 2020-02-02 RX ADMIN — DIVALPROEX SODIUM 500 MG: 250 TABLET, DELAYED RELEASE ORAL at 08:01

## 2020-02-02 RX ADMIN — FAMOTIDINE 20 MG: 20 TABLET ORAL at 08:01

## 2020-02-02 RX ADMIN — FERROUS SULFATE TAB 325 MG (65 MG ELEMENTAL FE) 325 MG: 325 (65 FE) TAB at 08:01

## 2020-02-02 RX ADMIN — CHLORPROMAZINE HYDROCHLORIDE 100 MG: 100 TABLET, SUGAR COATED ORAL at 20:03

## 2020-02-02 RX ADMIN — Medication 1 PACKET: at 08:00

## 2020-02-02 RX ADMIN — Medication 500 MG: at 08:01

## 2020-02-02 RX ADMIN — THERA TABS 1 TABLET: TAB at 08:01

## 2020-02-02 NOTE — BSMART NOTE
ART THERAPY GROUP PROGRESS NOTE PATIENT SCHEDULED FOR GROUP AT: 12:30 
 
ATTENDANCE: Full PARTICIPATION LEVEL: Participates fully in the art process. ATTENTION LEVEL: Able to focus on task. FOCUS: Boundaries SYMBOLIC & THEMATIC CONTENT AS NOTED IN IMAGERY: She presented with a euthymic mood and blunted affect and her thought processes were more organized than previous groups. She was actively engaged in the art therapy directive and her approach to task was purposeful. She was quiet and only spoke when directly prompted. She often was observed staring blankly and when prompted would continue working on the directive. She stated that thinking about boundaries was \"scary\" for her when it comes to her family, she was unable to elaborate on this. When asked if she lives with her family she stated \"I live here now. \"

## 2020-02-02 NOTE — PROGRESS NOTES
[address]  Inpatient Progress Note     Date of Service: 02/02/20  Hospital Day: 9     Subjective/Interval History   02/02/20    Treatment Team Notes:  Notes reviewed and/or discussed and report that Ana Maria Tello is a 51-year-old  white female with h/o Schizophrenia, admitted for psychosis and resultant bizarre behavior. Patient interview: Ana Maria Tello was interviewed by this writer today. Patient continues to present as disorganized and with bizarre thought process. She stated, \"Voices are still scary; I am hallucinating really bad. \"  Patient feels Depakote is not helpful at all. Objective     Visit Vitals  BP 96/60 (BP 1 Location: Right arm, BP Patient Position: Sitting)   Pulse 100   Temp 97.3 °F (36.3 °C)   Resp 14   Breastfeeding No       No results found for this or any previous visit (from the past 24 hour(s)). Mental Status Examination     Appearance/Hygiene 34 y.o.  WHITE OR  female  Hygiene: Mildly disheveled   Behavior/Social Relatedness Appropriate   Musculoskeletal Gait/Station: appropriate  Tone (flaccid, cogwheeling, spastic): not assessed  Psychomotor (hyperkinetic, hypokinetic): calm   Involuntary movements (tics, dyskinesias, akathisa, stereotypies): none   Speech   Rate, rhythm, volume, fluency and articulation are appropriate   Mood   I am hallucinating really bad   Affect    Blunted to flat   Thought Process Disorganized and loose   Thought Content and Perceptual Disturbances Denies self-injurious behavior (SIB), suicidal ideation (SI), aggressive behavior or homicidal ideation (HI)    Reports auditory and visual hallucinations   Sensorium and Cognition  Grossly intact   Insight  poor   Judgment poor        Assessment/Plan      Psychiatric Diagnoses:   Patient Active Problem List   Diagnosis Code    Cigarette nicotine dependence without complication V04.199    Recurrent depression (HCC) F33.9    Schizophrenia ([facility] [address] F20.9    Episodic mood disorder ([facility] [address] F39    Single delivery by  O82       Psychosocial and contextual factors: Chronic and severe mental health illness, Unemployed, poor social support    Level of impairment/disability: Severe    Sunny November is a 34 y.o. who is currently requiring inpatient treatment due to active psychosis. 1.  Continue current medication regimen unchanged to give it more time to work and also as Thorazine started day before yesterday. 2.  Disposition/Discharge Date: 5-6 days.     Vee Rao MD DR. [facility]  Psychiatry

## 2020-02-02 NOTE — PROGRESS NOTES
Problem: Falls - Risk of  Goal: *Absence of Falls  Description  Patient will remain free of falls every shift throughout hospital stay. Patient will verbalize understanding regarding safety and fall prevention measures to be utilized every shift throughout hospital stay. Document Sharmila Menezes Fall Risk and appropriate interventions in the flowsheet. Outcome: Progressing Towards Goal  Note: Fall Risk Interventions:            Medication Interventions: Teach patient to arise slowly                   Problem: Psychosis  Goal: *STG: Remains safe in hospital  Description  Patient will remain free of harm to self and others every shift throughout hospitalization. Outcome: Progressing Towards Goal     Problem: Psychosis  Goal: *STG/LTG: Complies with medication therapy  Description  Patient will take medication as prescribed every shift throughout hospital stay. Outcome: Progressing Towards Goal     Problem: Psychosis  Goal: *STG/LTG: Complies with medication therapy  Description  Patient will take medication as prescribed every shift throughout hospital stay. Outcome: Progressing Towards Goal     Problem: Psychosis  Goal: Interventions  Outcome: Progressing Towards Goal     Problem: Suicide  Goal: *STG: Remains safe in hospital  Description  Patient will remain free of harm to self and others every shift throughout hospitalization. Outcome: Progressing Towards Goal     Problem: Suicide  Goal: Interventions  Outcome: Progressing Towards Goal      Patient continues to be paranoid. She is  isolative to room except for meals. She denied suicidal/homicidal ideations and AV hallucinations. Writer asked patient about mood. She stated \"I don't know. I don't feel anything. \" She did not participate in group despite encouragement. She didn't have visitors or phone calls. Nursing will continue to provide safety and support.

## 2020-02-02 NOTE — GROUP NOTE
[facility]  GROUP DOCUMENTATION INDIVIDUAL Group Therapy Note Date: 2/1/2020 Group Start Time: 46 Group End Time: 2025 Group Topic: Nursing [facility] 1 SPECIAL TRTMT 1 CODY Andrew  GROUP DOCUMENTATION GROUP Group Therapy Note Attendees: 4 Attendance: Did not attend Geovanna Umanzor RN

## 2020-02-02 NOTE — PROGRESS NOTES
Problem: Suicide  Goal: *STG: Remains safe in hospital  Description  Patient will remain free of harm to self and others every shift throughout hospitalization. Outcome: Progressing Towards Goal  Note:   Patient will remain safe for duration of hospital stay  Goal: *STG: Attends activities and groups  Description  Patient will attend at least 50% or 2 of 4 groups daily throughout hospital stay. Outcome: Not Progressing Towards Goal  Note:   Patient will attend at least one group daily  Goal: *STG/LTG: Complies with medication therapy  Description  Patient will take medication as prescribed every shift throughout hospital stay. Outcome: Progressing Towards Goal  Note:   Patient will comply with medication therapy daily     Patient continue to be paranoid, when not in her room she has been pacing around the unit. Patient stated that she feels frustrated because she shouldn't be court ordered to be here and the doctors are not managing her medications the right way. Patient denied any SI/HI/AVh with this writer at this time. Will continue to monitor and provide therapeutic interventions as needed.

## 2020-02-02 NOTE — GROUP NOTE
[facility]  GROUP DOCUMENTATION INDIVIDUAL Group Therapy Note Date: 2/2/2020 Group Start Time: 200 Group End Time: 1800 Group Topic: Nursing [facility] 1 SPECIAL TRT 1 Euel Lennox, RN IP  GROUP DOCUMENTATION GROUP Group Therapy Note Attendees: 4 Attendance: Attended Interventions/techniques: Informed Follows Directions: Followed directions Interactions: Interacted appropriately Mental Status: Blunted Behavior/appearance: Cooperative Goals Achieved: Able to engage in interactions and Able to listen to others Dean Anton RN

## 2020-02-02 NOTE — GROUP NOTE
[facility] GROUP DOCUMENTATION INDIVIDUAL Group Therapy Note Date: 2/2/2020 Group Start Time: 3831 Group End Time: 2065 Group Topic: Medication [address] 1 SPECIAL TRTMT 1 Emmanuelle Funez RN 
 
[facility] GROUP DOCUMENTATION GROUP Group Therapy Note Attendees: 4 Attendance: Did not attend Additional Notes: Will continue to provide and promote group activties Tobias Nuñez RN

## 2020-02-03 PROCEDURE — 65220000003 HC RM SEMIPRIVATE PSYCH

## 2020-02-03 PROCEDURE — 74011250637 HC RX REV CODE- 250/637: Performed by: PSYCHIATRY & NEUROLOGY

## 2020-02-03 RX ADMIN — CHLORPROMAZINE HYDROCHLORIDE 100 MG: 100 TABLET, SUGAR COATED ORAL at 20:30

## 2020-02-03 RX ADMIN — FERROUS SULFATE TAB 325 MG (65 MG ELEMENTAL FE) 325 MG: 325 (65 FE) TAB at 07:58

## 2020-02-03 RX ADMIN — DIVALPROEX SODIUM 500 MG: 250 TABLET, DELAYED RELEASE ORAL at 07:57

## 2020-02-03 RX ADMIN — FAMOTIDINE 20 MG: 20 TABLET ORAL at 07:58

## 2020-02-03 RX ADMIN — DOCUSATE SODIUM 100 MG: 100 CAPSULE, LIQUID FILLED ORAL at 08:01

## 2020-02-03 RX ADMIN — THERA TABS 1 TABLET: TAB at 08:01

## 2020-02-03 RX ADMIN — Medication 500 MG: at 08:01

## 2020-02-03 RX ADMIN — FAMOTIDINE 20 MG: 20 TABLET ORAL at 20:30

## 2020-02-03 RX ADMIN — DIVALPROEX SODIUM 500 MG: 250 TABLET, DELAYED RELEASE ORAL at 20:30

## 2020-02-03 RX ADMIN — DESVENLAFAXINE SUCCINATE 50 MG: 50 TABLET, FILM COATED, EXTENDED RELEASE ORAL at 08:02

## 2020-02-03 RX ADMIN — Medication 1 PACKET: at 20:31

## 2020-02-03 NOTE — PROGRESS NOTES
Behavioral Health Progress Note    Admit Date: 1/24/2020  Hospital day 10    Vitals :   Patient Vitals for the past 8 hrs:   BP Temp Pulse Resp   02/03/20 0730 107/67 97 °F (36.1 °C) 100 14     Labs:  No results found for this or any previous visit (from the past 24 hour(s)).   Meds:   Current Facility-Administered Medications   Medication Dose Route Frequency    chlorproMAZINE (THORAZINE) tablet 100 mg  100 mg Oral QHS    bisacodyL (DULCOLAX) tablet 10 mg  10 mg Oral DAILY PRN    psyllium husk-aspartame (METAMUCIL FIBER) packet 1 Packet  1 Packet Oral BID    desvenlafaxine succinate (PRISTIQ) ER tablet 50 mg  50 mg Oral DAILY    docusate sodium (COLACE) capsule 100 mg  100 mg Oral DAILY    nicotine (NICORETTE) gum 2 mg  2 mg Oral Q2H PRN    hydrOXYzine pamoate (VISTARIL) capsule 50 mg  50 mg Oral Q4H PRN    haloperidoL (HALDOL) tablet 5 mg  5 mg Oral Q4H PRN    haloperidol lactate (HALDOL) injection 5 mg  5 mg IntraMUSCular Q4H PRN    LORazepam (ATIVAN) injection 1-2 mg  1-2 mg IntraMUSCular Q4H PRN    traZODone (DESYREL) tablet 50 mg  50 mg Oral QHS PRN    ibuprofen (MOTRIN) tablet 400 mg  400 mg Oral Q4H PRN    divalproex DR (DEPAKOTE) tablet 500 mg  500 mg Oral BID    therapeutic multivitamin (THERAGRAN) tablet 1 Tab  1 Tab Oral DAILY    ascorbic acid (vitamin C) (VITAMIN C) tablet 500 mg  500 mg Oral DAILY    famotidine (PEPCID) tablet 20 mg  20 mg Oral BID    ferrous sulfate tablet 325 mg  1 Tab Oral DAILY WITH BREAKFAST    influenza vaccine 2019-20 (6 mos+)(PF) (FLUARIX/FLULAVAL/FLUZONE QUAD) injection 0.5 mL  0.5 mL IntraMUSCular PRIOR TO DISCHARGE      Hospital Problems: Principal Problem:    Schizophrenia ([address] (4/18/2017)    Active Problems:    Cigarette nicotine dependence without complication (3/1/3682)        Subjective:   Medication side effects: dry mouth  none    Mental Status Exam  Sensorium: alert  Orientation: only aware of  place and person  Relations: uncooperative and unreliable  Eye Contact: intense  Appearance: is bizarre and is tense  Thought Process: slow rate of thoughts and poor abstract reasoning/computation   Thought Content: delusions, paranoia and halluc   Suicidal: denies   Homicidal: none   Mood: is anxious and is irritable   Affect: odd. constricted  Memory: is impaired and is remote     Concentration: distractable and hypervigilance  Abstraction: concrete  Insight: The patient shows no insight    OR Poor  Judgement: is psychologically impaired OR  Poor    Assessment/Plan:   not changed      Continue close observation,   Nurses report that the patient continues to voice bizarre comments. Patient tells me that she should come off of her medicines because the medicines were the thing that was causing her to hallucinate. She cannot account for the fact that she was hallucinating before she ever went on it but said that the Depakote must be the cause of her hearing the voice of her 5year-old son as a spirit that is \"stuck in my house. 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  She said that she did quite well because she was a monitor in her house though then acknowledges that doing horribly at her own home. She says the voices tell her that she should not bathe and has not been doing her own hygiene as a result of this. Staff needs to encourage her to bathe. She was asking what she can do to leave the hospital and I did let her know that taking care of her hygiene is certainly 1 of those things that she would need to do. I am not going to discontinue her medications. Unfortunately, she is not due for her next Invega injection for several weeks. We did add in the 1 careful in watching how much antipsychotic she takes because of the risk of an anticholinergic delirium.   She does not at all appear delirious at this point. I did discuss the case with social work who has presented the case to the PACT team.  They will review and see whether they wish to present the case to the [facility] committee for transfer to the [facility] for long-term care.

## 2020-02-03 NOTE — PROGRESS NOTES
02/03/20 1245   Group Therapy   Group Nursing  (Self compliments)   Attendance Did not attend   Encouraged to attend but did not

## 2020-02-03 NOTE — BH NOTES
MHT NOTE: The pt spent her time equally between being out in the milieu keeping to herself , to being quiet up in her room. The pt attended dinner and groups. She did however display a very paranoid response to her dinner tray. The pt was observed barely wishing to touch her food on her tray, and was only observed having a small bite of her dessert before she disposed of it. The pt has complied with abiding by the unit rules and utilizing the non-skid footwear that was provided for her safety. She did not experience any falls. The pt did not voice thoughts of SI or HI. She will continue to be monitored for behavior, location and safety for the remaining duration of the shift.

## 2020-02-03 NOTE — BSMART NOTE
ART THERAPY GROUP PROGRESS NOTE PATIENT SCHEDULED FOR GROUP AT: 12:30 
 
ATTENDANCE: Full PARTICIPATION LEVEL: Participates fully in the art process and needed encouragement. ATTENTION LEVEL: Able to focus on task but did Need occasional re-direction. FOCUS: Coping SYMBOLIC & THEMATIC CONTENT AS NOTED IN IMAGERY: Elyse's mood and affect appeared flat, and she had paranoid ideations. Edilson Hernandezsbury needed some guidance to begin the intervention and she worked quietly. She then got up from the table stating \"I'm not doing it right. \" She took redirection and sat back down and continued to work with encouragement. Edilson Beltran stated that she liked the colors she chose for her work but that the process \"made me feel stressed because the leaves were talking to me, don't touch me. \"

## 2020-02-03 NOTE — PROGRESS NOTES
NUTRITION    Nutrition Screen     RECOMMENDATIONS / PLAN:     - Continue nutritional supplements to optimize nutrition.  - Monitor and encourage po supplement intake. - Continue RD inpatient monitoring and evaluation. NUTRITION DIAGNOSIS & INTERVENTIONS:     - Meals/snacks: general healthy diet  - Medical food supplement therapy: Ensure Enlive, TID  - Collaboration and referral of nutrition care: discussed meal intake with staff    Nutrition Diagnosis: Inadequate energy intake related to appetite and altered mentation as evidenced by pt with variable; overall fair meal intake since admission, mainly consuming supplements      ASSESSMENT:     2/3: Altered mentation noted, pt states she is unable to eat solid foods due to the pepcid medication she is taking, unable to provide further details. Only consuming supplements. 1/31: Admitted experiencing delusional thinking and SI. Pt with noted confusion as only drank some juice this am, did not eat breakfast or consume supplements. Per staff pt with fair intake of meals since admission, overall variable intake. Tolerating diet. Appears well nourished. Nutritional intake adequate to meet patients estimated nutritional needs:  No    Diet: DIET REGULAR  DIET NUTRITIONAL SUPPLEMENTS All Meals; ENSURE ENLIVE    Food Allergies:NKFA  Current Appetite: Fair; altered mentation  Appetite/meal intake prior to admission: Poor per pt; reports inadequate intake x months; pt with noted confusion during discussion  Feeding Limitations:  [] Swallowing Difficulty       [] Chewing Difficulty       [] Other   Current Meal Intake: No data found.   Gastrointestinal Issues:  [x] Yes: c/o constipation; bowel regimen ordered   Skin Integrity:  WDL    Pertinent Medications:  Reviewed: ascorbic acid, dulcolax prn, colace, famotidine, ferrous sulfate, metamucil fiber, MVI  Labs:  Reviewed     Anthropometrics:  Ht Readings from Last 1 Encounters:   10/22/19 5' 6\" (1.676 m)       There were no vitals filed for this visit. There is no height or weight on file to calculate BMI.  25.1 kg/(m^2) using previously documented wt from 1/23/20    Weight History: Pt reports stable wt hx PTA   Weight Metrics 1/23/2020 12/4/2019 10/22/2019 8/14/2019 7/1/2019 5/30/2019 5/20/2019   Weight 156 lb 180 lb 186 lb 214 lb 215 lb 215 lb 12.8 oz 214 lb 9.6 oz   BMI 25.18 kg/m2 29.05 kg/m2 30.02 kg/m2 34.54 kg/m2 34.7 kg/m2 34.83 kg/m2 34.64 kg/m2       Admitting Diagnosis: Schizophrenia ([facility] [address] [F20.9]  Past Medical History:   Diagnosis Date    Alcoholic ([address]     Sober 3 months; Weekly AAA meeting; Had substance abuse counseling;     Anemia     Bipolar disorder ([address]     Cirrhosis ([facility] [address]     Past not current issue per patient    ETOH abuse     GERD (gastroesophageal reflux disease)     Head injury     Marijuana abuse     Phobia     [place] [employer]    Post partum depression     Pregnancy     Psychiatric disorder     Psychotic disorder ([address]     Depression/  Octavio Laird NP; Bipolar disorder, mood swings.  Schizophrenia ([facility] [address]     Stroke [facility]) 2011        Education Needs:        [x] None identified  [] Identified - Not appropriate at this time  []  Identified and addressed - refer to education log  Learning Limitations:   [] None identified  [x] Identified: thought blocking; some confusion noted   Cultural, Methodist & ethnic food preferences identified:  [x] None    [] Yes      ESTIMATED NUTRITION NEEDS:     1742-1887kcal (MSJx1.2-1.3), 57-71 gm protein (0.8-1 gm/kg), 1 mL/kcal  Based on: 71 kg       [x] Actual BW- using wt documented 1/23/20       MONITORING & EVALUATION:     Nutrition Goal(s):   - PO nutrition intake will meet >75% of patient estimated nutritional needs within the next 7 days.    Outcome: Progressing towards goal     Monitor: [x] Food and nutrient intake   [x] Food and nutrient administration  [x] Comparative standards   [x] Nutrition-focused physical findings   [x] Anthropometric Measurements   [x] Treatment/therapy   [x] Biochemical data, medical tests, and procedures         Previous Recommendations (for follow-up assessments only): Implemented       Discharge Planning: No nutritional discharge needs at this time.    [x]  Participated in care planning, discharge planning, & interdisciplinary rounds as appropriate      Kari Mae RD   Pager: [phone]

## 2020-02-03 NOTE — BSMART NOTE
Pt.is a 34year old female with history of Schizophrenia paranoid type  and past history of Cocaine and DID .  Pt. was admitted to this facility for facility for delusional thinking and ideations  to harm. Pt.'s case was discussed in treatment team. SIVA referred pt. To AVINASH King with [facility]  To refer pt To [facility] to [facility] . The [facility] team is suppose to review pt.'s case with their supervisor and the [facility] hospital liaison. SIVA Collateral: SIVA contacted  [facility] to follow-up with the above. The provider was not available. SIVA left a message. SIVA Contact: SIVA met with pt. I guess I am well to be expected. Pt ask about dc. SIVA explained the above to pt. Pt. Admits she hears voices but not currently. SIVA discussed continued coping strategies and safety plan. Pt. Appears anxious and has impaired insight. Pt. denies ideations and hallucinations. Siva will continue to assist with dc planning and provide support.

## 2020-02-03 NOTE — PROGRESS NOTES
Problem: Falls - Risk of  Goal: *Absence of Falls  Description  Patient will remain free of falls every shift throughout hospital stay. Patient will verbalize understanding regarding safety and fall prevention measures to be utilized every shift throughout hospital stay. Document Edgar Brunner Fall Risk and appropriate interventions in the flowsheet. Outcome: Progressing Towards Goal  Note: Fall Risk Interventions:            Medication Interventions: Teach patient to arise slowly                   Problem: Patient Education: Go to Patient Education Activity  Goal: Patient/Family Education  Description  Patient will be receptive to fall prevention teaching and verbalize at least one fact learned during hospital stay. Outcome: Progressing Towards Goal     Problem: Psychosis  Goal: *STG: Decreased hallucinations  Description  Patient will verbalize denial of auditory/visual hallucinations every shift throughout hospital stay. Outcome: Progressing Towards Goal  Goal: *STG: Decreased delusional thinking  Description  Patient will show a decrease or be free of delusional statements assessed every shift throughout hospital stay. Outcome: Progressing Towards Goal  Goal: *STG: Remains safe in hospital  Description  Patient will remain free of harm to self and others every shift throughout hospitalization. Outcome: Progressing Towards Goal  Goal: *STG/LTG: Complies with medication therapy  Description  Patient will take medication as prescribed every shift throughout hospital stay. Outcome: Progressing Towards Goal  Goal: Interventions  Outcome: Progressing Towards Goal     Problem: Suicide  Goal: *STG: Remains safe in hospital  Description  Patient will remain free of harm to self and others every shift throughout hospitalization. Outcome: Progressing Towards Goal  Goal: *STG: Attends activities and groups  Description  Patient will attend at least 50% or 2 of 4 groups daily throughout hospital stay. Outcome: Progressing Towards Goal  Goal: *STG:  Verbalizes alternative ways of dealing with maladaptive feelings/behaviors  Description  Patient will identify at least 2 strategies daily to utilize when presented with increased stress or feelings of agitation throughout hospital stay. Outcome: Progressing Towards Goal  Goal: *STG/LTG: Complies with medication therapy  Description  Patient will take medication as prescribed every shift throughout hospital stay. Outcome: Progressing Towards Goal  Goal: *STG/LTG: No longer expresses self destructive or suicidal thoughts  Description  Patient will verbalize denial of suicidal/ideation every shift throughout hospital stay. Outcome: Progressing Towards Goal  Goal: Interventions  Outcome: Progressing Towards Goal     Problem: Nutrition Deficit  Goal: *Optimize nutritional status  Description  Po intake of meals will meet >75% of patient estimated nutritional needs within the next 5-7 days. Outcome: Progressing Towards Goal   Pt continues to have a flat dull affect. Pt is very distracted and preoccupied with slow responses. She however states she is not hearing voices or seeing visions. Pt is somewhat guarded but states she is not feeling paranoid. She is compliant with all medications and participates in groups. Pt states her mood is fine and that she is not depressed.

## 2020-02-03 NOTE — BSMART NOTE
SOCIAL WORK GROUP THERAPY PROGRESS NOTE Group Time:  10am 
 
Group Topic:  Coping Skills Group Participation: Pt was unavailable for group due to wandering around unit, unable to sit in one place for more than five minutes at a time, going from nurses station, to room even sitting in day room. Pt affect blunted & mood dysphoric. She was non verbal  & at times seemed still responding to internal stimuli. Support was continued to be offered & handout for session was given.

## 2020-02-04 PROCEDURE — 74011250637 HC RX REV CODE- 250/637: Performed by: PSYCHIATRY & NEUROLOGY

## 2020-02-04 PROCEDURE — 65220000003 HC RM SEMIPRIVATE PSYCH

## 2020-02-04 RX ADMIN — DIVALPROEX SODIUM 500 MG: 250 TABLET, DELAYED RELEASE ORAL at 08:01

## 2020-02-04 RX ADMIN — FERROUS SULFATE TAB 325 MG (65 MG ELEMENTAL FE) 325 MG: 325 (65 FE) TAB at 08:02

## 2020-02-04 RX ADMIN — Medication 500 MG: at 08:01

## 2020-02-04 RX ADMIN — DOCUSATE SODIUM 100 MG: 100 CAPSULE, LIQUID FILLED ORAL at 08:02

## 2020-02-04 RX ADMIN — DESVENLAFAXINE SUCCINATE 50 MG: 50 TABLET, FILM COATED, EXTENDED RELEASE ORAL at 08:01

## 2020-02-04 RX ADMIN — THERA TABS 1 TABLET: TAB at 08:02

## 2020-02-04 RX ADMIN — CHLORPROMAZINE HYDROCHLORIDE 100 MG: 100 TABLET, SUGAR COATED ORAL at 20:29

## 2020-02-04 RX ADMIN — Medication 1 PACKET: at 20:31

## 2020-02-04 RX ADMIN — FAMOTIDINE 20 MG: 20 TABLET ORAL at 08:02

## 2020-02-04 RX ADMIN — FAMOTIDINE 20 MG: 20 TABLET ORAL at 20:29

## 2020-02-04 NOTE — PROGRESS NOTES
Problem: Falls - Risk of  Goal: *Absence of Falls  Description  Patient will remain free of falls every shift throughout hospital stay. Patient will verbalize understanding regarding safety and fall prevention measures to be utilized every shift throughout hospital stay. Document Tia Manus Fall Risk and appropriate interventions in the flowsheet. Outcome: Progressing Towards Goal  Note: Fall Risk Interventions:            Medication Interventions: Teach patient to arise slowly                   Problem: Psychosis  Goal: *STG: Decreased hallucinations  Description  Patient will verbalize denial of auditory/visual hallucinations every shift throughout hospital stay. Outcome: Progressing Towards Goal  Goal: *STG: Decreased delusional thinking  Description  Patient will show a decrease or be free of delusional statements assessed every shift throughout hospital stay. Outcome: Progressing Towards Goal  Goal: *STG/LTG: Complies with medication therapy  Description  Patient will take medication as prescribed every shift throughout hospital stay. Outcome: Progressing Towards Goal   Patient has a flat affect. She did not eat breakfast but she did drink some water and juice earlier. Encouraged her to eat. She is medication compliant. Denies A&V hallucinations.

## 2020-02-04 NOTE — BSMART NOTE
ART THERAPY GROUP PROGRESS NOTE PATIENT SCHEDULED FOR GROUP AT: 1043 ATTENDANCE: 1/2 PARTICIPATION LEVEL:  Needs only minimal encouragement ATTENTION LEVEL: Needs occasional re-direction FOCUS: Anxiety reduction SYMBOLIC & THEMATIC CONTENT AS NOTED IN IMAGERY: She was slightly distant and presented with a flat affect. She read along during group discussion however did not participate in group discussion. She left without warning about half way through and went to her room. She returned during the art making process and was compliant and invested. Her imagery was a bit loose and expansive, but contained. She claimed that she had difficulty focusing because she was [place] flash backs and hearing voices\" while working on [employer]. She did not specify what flashbacks she was having or what the voices were saying.

## 2020-02-04 NOTE — BSMART NOTE
SOCIAL WORK GROUP THERAPY PROGRESS NOTE Group Time:  10am 
 
Group Topic:  Coping Skills Group Participation: Pt was unavailable for group as she continues to wander around unit & politely \"not interested\" in attending session. Still blunted affect & probably responding to internal stimuli. Support given by all staff.

## 2020-02-04 NOTE — GROUP NOTE
[facility]  GROUP DOCUMENTATION INDIVIDUAL Group Therapy Note Date: 2/4/2020 Group Start Time: 1100 Group End Time: 2607 Group Topic: Nursing [facility] 1 SPECIAL TRTMT 1 CODY [place]  GROUP DOCUMENTATION GROUP Group Therapy Note Attendees: 6 Attendance: Attended Patient's Goal:  Anger [employer] Interventions/techniques: Informed Follows Directions: Followed directions Interactions: Interacted appropriately Mental Status: Calm Behavior/appearance: Cooperative Goals Achieved: Able to engage in interactions and Able to listen to others Veronica Cruz RN

## 2020-02-04 NOTE — GROUP NOTE
[facility]  GROUP DOCUMENTATION INDIVIDUAL Group Therapy Note Date: 2/3/2020 Group Start Time: 1900 Group End Time: 1930 Group Topic: Nursing [facility] 1 ADULT CHEM DEP Gricel Guillaume, CODY 
 
IP  GROUP DOCUMENTATION GROUP Group Therapy Note Attendees: 6 Attendance: Attended Interventions/techniques: Informed Follows Directions: Followed directions Interactions: Interacted appropriately Mental Status: Calm Behavior/appearance: Cooperative Goals Achieved: Able to listen to others John Salcido.  Buddy Loaiza

## 2020-02-04 NOTE — BSMART NOTE
Pt.is a 34year old female with history of Schizophrenia paranoid type and past history of Cocaine and DID. Pt. was admitted to this facility for facility for delusional thinking and ideations to harm. Pt.'s case was discussed in treatment team. Pt. was referred to [address] [place] [address] team regarding referring pt. to [facility]. ARUN Collateral: ARUN followed up on the above with Chichi AGUAYO CM. CM stated she discussed case briefly with her supervisor. PACT CM states they support the transfer. ARUN left a message with [facility] Discharge Planner, regarding the case. ARUN Contact: ARUN met with pt. Pt. expressed to ARUN she does not feel it will be a good idea for her to go to [facility].  Pt. expressed She feels she needs to go to a new apartment. Everything is messed up there. ARUN reassured pt. PACT CM and staff at [facility] program have secured her home. ARUN had pt.  to focus treatment on continued medication compliance. Pt. expressed she is taking medications. Pt. states she hears voices but is not sure what they are saying right now. Pt. continues to be loose, impaired insight and has some delayed responses. SW will continue to provide support and engage pt. with reality orientation.

## 2020-02-04 NOTE — BH NOTES
Patient participated in evening groups, Patient still displays a sad demeanor throughout the shift shift, she was encouraged by staff, staff will continue to monitor patient for safety.

## 2020-02-04 NOTE — BSMART NOTE
COUNSELING GROUP PROGRESS NOTE Group time: 9:30 The patient refused group despite encouragement, she was laying in her bed and said \"no thank you\" when invited to group. She later came to day area and did not join group.

## 2020-02-04 NOTE — PROGRESS NOTES
Behavioral Health Progress Note    Admit Date: 1/24/2020  Hospital day 11    Vitals :   Patient Vitals for the past 8 hrs:   BP Temp Pulse Resp   02/04/20 0745 107/74 97 °F (36.1 °C) 95 16     Labs:  No results found for this or any previous visit (from the past 24 hour(s)).   Meds:   Current Facility-Administered Medications   Medication Dose Route Frequency    chlorproMAZINE (THORAZINE) tablet 100 mg  100 mg Oral QHS    bisacodyL (DULCOLAX) tablet 10 mg  10 mg Oral DAILY PRN    psyllium husk-aspartame (METAMUCIL FIBER) packet 1 Packet  1 Packet Oral BID    desvenlafaxine succinate (PRISTIQ) ER tablet 50 mg  50 mg Oral DAILY    docusate sodium (COLACE) capsule 100 mg  100 mg Oral DAILY    nicotine (NICORETTE) gum 2 mg  2 mg Oral Q2H PRN    hydrOXYzine pamoate (VISTARIL) capsule 50 mg  50 mg Oral Q4H PRN    haloperidoL (HALDOL) tablet 5 mg  5 mg Oral Q4H PRN    haloperidol lactate (HALDOL) injection 5 mg  5 mg IntraMUSCular Q4H PRN    LORazepam (ATIVAN) injection 1-2 mg  1-2 mg IntraMUSCular Q4H PRN    traZODone (DESYREL) tablet 50 mg  50 mg Oral QHS PRN    ibuprofen (MOTRIN) tablet 400 mg  400 mg Oral Q4H PRN    therapeutic multivitamin (THERAGRAN) tablet 1 Tab  1 Tab Oral DAILY    ascorbic acid (vitamin C) (VITAMIN C) tablet 500 mg  500 mg Oral DAILY    famotidine (PEPCID) tablet 20 mg  20 mg Oral BID    ferrous sulfate tablet 325 mg  1 Tab Oral DAILY WITH BREAKFAST    influenza vaccine 2019-20 (6 mos+)(PF) (FLUARIX/FLULAVAL/FLUZONE QUAD) injection 0.5 mL  0.5 mL IntraMUSCular PRIOR TO DISCHARGE      Hospital Problems: Principal Problem:    Schizophrenia ([address] (4/18/2017)    Active Problems:    Cigarette nicotine dependence without complication (4/1/1248)        Subjective:   Medication side effects: none  none    Mental Status Exam  Sensorium: alert  Orientation: only aware of  time, place and person  Relations: passive  Eye Contact: poor  Appearance: is unkempt and is bizarre  Thought Process: normal rate of thoughts and poor abstract reasoning/computation   Thought Content: delusions, paranoia and halluc   Suicidal: denies     Homicidal: none   Mood: is anxious and is sad   Affect: odd,constricted  Memory: is impaired and is remote     Concentration: distractable  Abstraction: concrete  Insight: The patient shows little insight    OR Poor  Judgement: is psychologically impaired OR  Poor    Assessment/Plan:   not changed    Continue close observation,    Patient insists that she will not continue to take the Depakote. She says that she had been told at [facility] that she did not have to take it because it was not working and they were placing her on the [place] shot. She does not feel that it has helped in any way. She does not seem to be getting much better with that and we will discontinue it. She says that her head feels empty as if all thoughts of been removed. She occasionally feels lightheaded but feels better if she stands up slowly. She says that she feels that she has deranged thoughts and has no idea where they come from. She does occasionally have auditory hallucinations of a negative and nature. She slept fair. She does not feel that she would be able to take care of herself when she goes out and says that she had needed significant help for transportation to get around and does not know how how she would get to and from places in the community. We are going to continue her chlorpromazine increased to 150 mg at bedtime. We are continuing the [facility], [place] and she will be due for her next injection next week.

## 2020-02-04 NOTE — BSMART NOTE
OCCUPATIONAL THERAPY PROGRESS NOTE Group Time:  0169 Attendance: The patient attended full group. Vandana Mandel Participation: The patient participated with moderate elaboration in the activity. Attention: The patient was able to focus on the activity. Interaction: The patient occasionally  interacts with others. Responses appropriate, even insightful at times, although patient continues to say she can't think well. Affect more animated.

## 2020-02-05 PROCEDURE — 65220000003 HC RM SEMIPRIVATE PSYCH

## 2020-02-05 PROCEDURE — 74011250637 HC RX REV CODE- 250/637: Performed by: PSYCHIATRY & NEUROLOGY

## 2020-02-05 RX ADMIN — THERA TABS 1 TABLET: TAB at 08:40

## 2020-02-05 RX ADMIN — DOCUSATE SODIUM 100 MG: 100 CAPSULE, LIQUID FILLED ORAL at 08:40

## 2020-02-05 RX ADMIN — CHLORPROMAZINE HYDROCHLORIDE 100 MG: 100 TABLET, SUGAR COATED ORAL at 22:20

## 2020-02-05 RX ADMIN — FAMOTIDINE 20 MG: 20 TABLET ORAL at 08:40

## 2020-02-05 RX ADMIN — DESVENLAFAXINE SUCCINATE 50 MG: 50 TABLET, FILM COATED, EXTENDED RELEASE ORAL at 08:40

## 2020-02-05 RX ADMIN — Medication 1 PACKET: at 21:14

## 2020-02-05 RX ADMIN — FAMOTIDINE 20 MG: 20 TABLET ORAL at 21:14

## 2020-02-05 RX ADMIN — Medication 500 MG: at 08:41

## 2020-02-05 RX ADMIN — FERROUS SULFATE TAB 325 MG (65 MG ELEMENTAL FE) 325 MG: 325 (65 FE) TAB at 08:40

## 2020-02-05 NOTE — BSMART NOTE
COUNSELING GROUP PROGRESS NOTE PATIENT SCHEDULED FOR GROUP AT: 10:00 
 
ATTENDANCE: Full PARTICIPATION LEVEL: Participates fully in the group process. ATTENTION LEVEL: Able to focus on task. FOCUS: Stress THEMATIC CONTENT AS NOTED IN REFLECTION: She presented with a calm mood and blunted affect and her thought processes were more organized than previous groups. She was actively engaged in the group therapy directive and her approach to task was intentional. She had increased participation in group discussions and participated in sharing her work with the group. She was invested in the task at hand and thematic content was organized and appropriate. Towards the end of group she crumpled her paper and then told this writer she felt confused.

## 2020-02-05 NOTE — BH NOTES
The patient spent the shift interacting with her peers and staff. She talked about her past.  Her mood was flat but polite    She denied and auditory or visual hallucinations. She denied any thoughts of self harm.

## 2020-02-05 NOTE — GROUP NOTE
[facility] GROUP DOCUMENTATION INDIVIDUAL Group Therapy Note Date: 2/4/2020 Group Start Time: 0 Group End Time: 1945 Group Topic: Nursing [address] SPECIAL TRTMT 1 Isatu Boyer, CODY 
 
[facility] GROUP DOCUMENTATION GROUP Group Therapy Note Attendees: 6 Attendance: Attended Interventions/techniques: Challenged and Informed Follows Directions: Followed directions Interactions: Interacted appropriately Mental Status: Calm Behavior/appearance: Cooperative Goals Achieved: Able to engage in interactions and Able to listen to others Colletta Fern. Carlene Stalls

## 2020-02-05 NOTE — PROGRESS NOTES
Problem: Suicide  Goal: *STG: Attends activities and groups  Description  Patient will attend at least 50% or 2 of 4 groups daily throughout hospital stay. Outcome: Progressing Towards Goal    Problem: Suicide  Goal: *STG: Remains safe in hospital  Description  Patient will remain free of harm to self and others every shift throughout hospitalization. Outcome: Progressing Towards Goal     Patient has been in day area since this nurse arrived onto unit. Patient has performed ADL's without assistance. Patient has been compliant with medications this morning. Patient voiced mood as \"sleepy\" and continues to have flat affect. Patient has attended all groups and activities but only participated when prompted by staff. Patient was not disruptive towards others. Patient was cooperative with moving to a smaller unit and voiced preference to move due to \"it feels real crowded over there now. \"  Patient able to move own patient bags from old unit and has been in day room interacting with others since moving. Patient has not required PRN medications during this shift. Will continue to monitor and provide interventions as appropriate.

## 2020-02-05 NOTE — PROGRESS NOTES
Behavioral Health Progress Note    Admit Date: 1/24/2020  Hospital day 12    Vitals :   Patient Vitals for the past 8 hrs:   BP Temp Pulse Resp   02/05/20 0740 108/71 97.5 °F (36.4 °C) 100 18     Labs:  No results found for this or any previous visit (from the past 24 hour(s)).   Meds:   Current Facility-Administered Medications   Medication Dose Route Frequency    chlorproMAZINE (THORAZINE) tablet 100 mg  100 mg Oral QHS    bisacodyL (DULCOLAX) tablet 10 mg  10 mg Oral DAILY PRN    psyllium husk-aspartame (METAMUCIL FIBER) packet 1 Packet  1 Packet Oral BID    desvenlafaxine succinate (PRISTIQ) ER tablet 50 mg  50 mg Oral DAILY    docusate sodium (COLACE) capsule 100 mg  100 mg Oral DAILY    nicotine (NICORETTE) gum 2 mg  2 mg Oral Q2H PRN    hydrOXYzine pamoate (VISTARIL) capsule 50 mg  50 mg Oral Q4H PRN    haloperidoL (HALDOL) tablet 5 mg  5 mg Oral Q4H PRN    haloperidol lactate (HALDOL) injection 5 mg  5 mg IntraMUSCular Q4H PRN    LORazepam (ATIVAN) injection 1-2 mg  1-2 mg IntraMUSCular Q4H PRN    traZODone (DESYREL) tablet 50 mg  50 mg Oral QHS PRN    ibuprofen (MOTRIN) tablet 400 mg  400 mg Oral Q4H PRN    therapeutic multivitamin (THERAGRAN) tablet 1 Tab  1 Tab Oral DAILY    ascorbic acid (vitamin C) (VITAMIN C) tablet 500 mg  500 mg Oral DAILY    famotidine (PEPCID) tablet 20 mg  20 mg Oral BID    ferrous sulfate tablet 325 mg  1 Tab Oral DAILY WITH BREAKFAST    influenza vaccine 2019-20 (6 mos+)(PF) (FLUARIX/FLULAVAL/FLUZONE QUAD) injection 0.5 mL  0.5 mL IntraMUSCular PRIOR TO DISCHARGE      Hospital Problems: Principal Problem:    Schizophrenia ([address] (4/18/2017)    Active Problems:    Cigarette nicotine dependence without complication (3/2/2509)        Subjective:   Medication side effects: none  none    Mental Status Exam  Sensorium: alert  Orientation: only aware of  time, place and person  Relations: guarded and passive  Eye Contact: intense  Appearance: is tense  Thought Process: slow rate of thoughts and poor abstract reasoning/computation   Thought Content: delusions and paranoia   Suicidal: denies   Homicidal: none   Mood: is anxious and is irritable   Affect: odd  Memory: is impaired and is recent     Concentration: distractable  Abstraction: concrete  Insight: The patient shows little insight    OR Poor  Judgement: is psychologically impaired OR  Poor    Assessment/Plan:   not changed  The patient did do better with hygiene yesterday and this morning. She did take a shower. She said she is still having auditory hallucinations that tell her \"I think you are manic\" or \"you should not go into the shower because people will stare at you. \"  She still felt that she needed to take shower because we had discussed that she needed to improve her hygiene to be able to get out of the hospital.  She continues to have strange staring affect. She is eating better at this point. She is uncertain as to what she will do when she leaves the hospital since she has not had been doing well enough out in the community and she felt she did not have very much support, in spite of the fact that the pact team was actively involved in her care. She had been hospitalized repeatedly. Case was discussed in treatment team and social work reports that the pact team has endorsed a recommendation of transfer to the [facility].  They will present this to the rack committee next week. In the meantime, we will continue with reality orientation and medication management.   Continue close observation,

## 2020-02-05 NOTE — BSMART NOTE
ART THERAPY GROUP PROGRESS NOTE PATIENT SCHEDULED FOR GROUP AT: 14:00 
 
ATTENDANCE: Full PARTICIPATION LEVEL: Participates fully in the art process ATTENTION LEVEL: Able to focus on task FOCUS: Grounding SYMBOLIC & THEMATIC CONTENT AS NOTED IN IMAGERY: She appeared slightly more alert than noted in previous groups, however continues to present intermittently distracted. Her approach to task was a bit disorganized and associations were parroted.  She thanked this writer for group today upon dismissal.

## 2020-02-05 NOTE — PROGRESS NOTES
conducted an Spirituality Group for Amirah Flood, who is a 34 y.o.,female. Patient's Primary Language is: Bethel Sutton. According to the patient's EMR Scientologist Affiliation is: Michael Roca. The reason the Patient came to the hospital is:   Patient Active Problem List    Diagnosis Date Noted    Recurrent depression ([facility] 2018    Cigarette nicotine dependence without complication     Single delivery by  2017    Episodic mood disorder ([facility] 2017    Schizophrenia ([facility] 2017         conducted Spirituality Group for ________________ who was one of ____participants. The  provided the following Interventions:  Continued the relationship of care and support. Listened empathically. Offered prayer and assurance of continued prayer on patients behalf. Chart reviewed. The following outcomes were achieved:  Patient expressed gratitude for 's visit.  w  Assessment:  There are no further spiritual or Pentecostal issues which require Spiritual Care Services interventions at this time. Plan:  Chaplains will continue to follow and will provide pastoral care on an as needed/requested basis.  recommends bedside caregivers page  on duty if patient shows signs of acute spiritual or emotional distress.        [address]   [phone]

## 2020-02-05 NOTE — BH NOTES
Patient very cooperative with moving to smaller unit and when asked \"would you mind moving?' Patient voiced \"No, I would rather do that because I feel nervous and cramped over here. \"

## 2020-02-05 NOTE — BSMART NOTE
OCCUPATIONAL THERAPY PROGRESS NOTE Group Time:  1273 Attendance: The patient attended full group. Stephanie Slater Participation: The patient participated with moderate elaboration in the activity. Attention: The patient was able to focus on the activity. Interaction: The patient occasionally interacts with others. Affect much brighter, smiling and greeted recorder on entering unit, increase in spontaneous interaction.

## 2020-02-05 NOTE — BSMART NOTE
Pt.is a 34year old female with history of Schizophrenia paranoid type and past history of Cocaine and DID. Pt. was admitted to this facility for facility for delusional thinking and ideations to harm. Pt.'s case was discussed in treatment team. Pt. was referred to [address] [place] [address] team regarding referring pt. to [facility]. SW Contact: SW met with pt. Pt. informed SW she continues to hear voices. Pt. states this afternoon the voices were bad. Pt. states she is not too sure what they are saying. SW provided some positive coping strategies. Pt. expressed sometimes when she is engaged in activities such as group, the voices are less. Pt. had some thought blocking  Pt. continues to have thought blocking and some delayed responses. SW will continue to provide support and engage pt. with reality orientation.

## 2020-02-06 PROCEDURE — 74011250637 HC RX REV CODE- 250/637: Performed by: PSYCHIATRY & NEUROLOGY

## 2020-02-06 PROCEDURE — 65220000003 HC RM SEMIPRIVATE PSYCH

## 2020-02-06 RX ADMIN — FAMOTIDINE 20 MG: 20 TABLET ORAL at 20:21

## 2020-02-06 RX ADMIN — CHLORPROMAZINE HYDROCHLORIDE 100 MG: 100 TABLET, SUGAR COATED ORAL at 20:21

## 2020-02-06 RX ADMIN — Medication 1 PACKET: at 21:00

## 2020-02-06 RX ADMIN — FAMOTIDINE 20 MG: 20 TABLET ORAL at 08:51

## 2020-02-06 RX ADMIN — THERA TABS 1 TABLET: TAB at 08:51

## 2020-02-06 RX ADMIN — TRAZODONE HYDROCHLORIDE 50 MG: 50 TABLET ORAL at 22:34

## 2020-02-06 RX ADMIN — DOCUSATE SODIUM 100 MG: 100 CAPSULE, LIQUID FILLED ORAL at 08:51

## 2020-02-06 NOTE — BSMART NOTE
ART THERAPY GROUP PROGRESS NOTE PATIENT SCHEDULED FOR GROUP AT: 15 
 
ATTENDANCE: Full PARTICIPATION LEVEL: Needs extra prompting and re-direction. ATTENTION LEVEL:  Unable to attend to task at hand. FOCUS: Coping SYMBOLIC & THEMATIC CONTENT AS NOTED IN IMAGERY: Elyse's mood and affect were labile throughout session but she started cheerful and pleasant. Jluis Mcdermott at the beginning of session appeared to regress to a childlike state even her voice shifted. She could not follow the directives but was able to make her own successful interventions with the materials provided. At one point she spoke about how she was living in a \"daymare\" which made her \"feel surrounded by depression. \"

## 2020-02-06 NOTE — BH NOTES
Patient pleasant and cooperative. Appears preoccupied and noted responding while watching television. When this writer asked what she saw that humored her, she stated \"it was tv\". A few moments later she was noted gesturing to show this writer what she had seen that was funny. This was the first time this writer has witnessed patient with full range of affect for she is usually noted with slowed responses and dull affect. Patient compliant with medications and care. Will continue to monitor and provide for safety.

## 2020-02-06 NOTE — BH NOTES
Patient slept 7.5 hours this shift. Patient was up once this shift to get a drink of water. Will continue to monitor.

## 2020-02-06 NOTE — GROUP NOTE
[facility]  GROUP DOCUMENTATION INDIVIDUAL Group Therapy Note Date: 2/5/2020 Group Start Time: 8120 Group End Time: 0423 Group Topic: [employer] [facility] 1 SPECIAL TRTMT 2 Bob Mcclure, CODY HAST[place]  GROUP DOCUMENTATION GROUP Group Therapy Note Patient's and staff reviewed unit guidelines and goals for evening. Attendees: 3 Attendance: Attended Patient's Goal:  \"talk to people more\" Interventions/techniques: Informed and Supported Follows Directions: Followed directions Interactions: Interacted appropriately Mental Status: Congruent Behavior/appearance: Cooperative and Neatly groomed Goals Achieved: Able to engage in interactions and Identified feelings Additional Notes:  Patient was supported and encouraged to \"stay out here with us tonight. \"  Patient has been in day area and has interacted with peers and staff appropriately. Eric Landis

## 2020-02-06 NOTE — GROUP NOTE
[facility] GROUP DOCUMENTATION INDIVIDUAL Group Therapy Note Date: 2/5/2020 Group Start Time: 6103 Group End Time: 1800 Group Topic: Nursing [address] SPECIAL TRTMT 2 Roseann Tsai, CODY 
 
[facility] GROUP DOCUMENTATION GROUP Group Therapy Note Patient's were given individual dice with self affirmation questions. Patient's and this nurse took turns rolling own dice and reading, then answering questions to promote group discussion. Attendees: 4 Attendance: Attended Interventions/techniques: Follows Directions: Followed directions Interactions: Interacted appropriately Mental Status: Calm Behavior/appearance: Cooperative Goals Achieved: Able to manage/cope with feelings and Identified feelings Additional Notes:  Patient appeared to enjoy group and even smiled several times when speaking of playing soccer, swimming. Amber Gunter

## 2020-02-06 NOTE — GROUP NOTE
[facility]  GROUP DOCUMENTATION INDIVIDUAL Group Therapy Note Date: 2/6/2020 Group Start Time: 9277 Group End Time: 1000 Group Topic: Medication [facility] 1 SPECIAL TRTMT 1 CODY Chambers  GROUP DOCUMENTATION GROUP Group Therapy Note Attendees: 3 Attendance: Attended Interventions/techniques: Informed and Validated Follows Directions: Followed directions Interactions: Interacted appropriately Mental Status: Calm Behavior/appearance: Cooperative Goals Achieved: Identified relapse prevention strategies, Identified medication adherence strategies and Identified resources and support systems Teri Lawson RN

## 2020-02-06 NOTE — BH NOTES
Dull flat sad withdrawn. Stays to self in room. Isolative. Confused. Needs redirection. AH. Interacts very little with staff and peers. Depressed. Does not attend RN group. Will continue to monitor and support.

## 2020-02-06 NOTE — PROGRESS NOTES
Problem: Suicide  Goal: *STG: Remains safe in hospital  Description  Patient will remain free of harm to self and others every shift throughout hospitalization. Outcome: Progressing Towards Goal  Note:   Patient will remain safe for duration of hospital stay  Goal: *STG: Attends activities and groups  Description  Patient will attend at least 50% or 2 of 4 groups daily throughout hospital stay. Outcome: Progressing Towards Goal  Note:   Patient will attend at least 2 group activities daily  Goal: *STG/LTG: Complies with medication therapy  Description  Patient will take medication as prescribed every shift throughout hospital stay. Outcome: Progressing Towards Goal  Note:   Patient will comply with medication therapy daily     Patient has been active and visible in dayroom throughout shift. Patient stated that she is worried about what is going to happen to her when she is discharged she said that she is uncertain about everything. Patient is still thought blocking but the voices are \"fading\".   Patient is able to contract for safety, will continue to monitor and provide therapeutic interventions

## 2020-02-06 NOTE — BSMART NOTE
Pt.is a 34year old female with history of Schizophrenia paranoid type and past history of Cocaine and DID.  Pt. was admitted to this facility for facility for delusional thinking and ideations to harm. 
  
 Pt. was referred to [facility] Reinvestment team regarding referring pt. to [facility].   
 
SW  met with Sinclair Schaumann CM with [employer]. CM and [facility] team nurse we're providing updates regarding pt. .  CM confirmed pt.'s  case has been staff and discussed with Daniel Willoughby. CM and PACT nurse  met with pt. .   CM met with SW after her meeting with pt. CM informed SW, pt. Thinks she is a  \"shape shifter\". Pt. Also stated a voice tells her what to say. The voices tell her not to eat and  that she is fat. CM and PACT nurse feels as though pt.'s condition is not improving. Pt. continues to be  Paranoid, delusional and has poor insight. CM continues to be in  support  of pt transferring to [facility]. ARUN met with pt. Pt. Express of being sorry that she could not hold it together . Pt. Also stated she is sorry she did nit tell the CM and PACT nurse good bye. SW explained to pt she was observed telling both of the staff goodbye. Pt.  states maybe it would be best for her to go back to [facility]. Pt. continues to appear confused and loose at times. SW appears suspicious and has impaired insight. SW provided support and positive feedback . SW will continue to engage pt. with reality orientation.

## 2020-02-07 PROCEDURE — 74011250637 HC RX REV CODE- 250/637: Performed by: PSYCHIATRY & NEUROLOGY

## 2020-02-07 PROCEDURE — 65220000003 HC RM SEMIPRIVATE PSYCH

## 2020-02-07 RX ADMIN — FAMOTIDINE 20 MG: 20 TABLET ORAL at 20:43

## 2020-02-07 RX ADMIN — IBUPROFEN 400 MG: 400 TABLET, FILM COATED ORAL at 19:36

## 2020-02-07 RX ADMIN — CHLORPROMAZINE HYDROCHLORIDE 100 MG: 100 TABLET, SUGAR COATED ORAL at 20:43

## 2020-02-07 RX ADMIN — DOCUSATE SODIUM 100 MG: 100 CAPSULE, LIQUID FILLED ORAL at 08:13

## 2020-02-07 RX ADMIN — Medication 1 PACKET: at 21:00

## 2020-02-07 RX ADMIN — FAMOTIDINE 20 MG: 20 TABLET ORAL at 08:13

## 2020-02-07 RX ADMIN — THERA TABS 1 TABLET: TAB at 08:13

## 2020-02-07 NOTE — PROGRESS NOTES
NUTRITION    Nutrition Screen     RECOMMENDATIONS / PLAN:     - Continue nutritional supplements to optimize nutrition.  - Monitor and encourage po supplement intake. - Continue RD inpatient monitoring and evaluation. NUTRITION DIAGNOSIS & INTERVENTIONS:     - Meals/snacks: general healthy diet  - Medical food supplement therapy: Ensure Enlive, TID  - Collaboration and referral of nutrition care: discussed meal intake with staff    Nutrition Diagnosis: Inadequate energy intake related to appetite and altered mentation as evidenced by pt with variable; overall fair meal intake since admission, mainly consuming supplements      ASSESSMENT:     2/7: Intake remains fair, unknown supplement consumption. 2/3: Altered mentation noted, pt states she is unable to eat solid foods due to the pepcid medication she is taking, unable to provide further details. Only consuming supplements. 1/31: Admitted experiencing delusional thinking and SI. Pt with noted confusion as only drank some juice this am, did not eat breakfast or consume supplements. Per staff pt with fair intake of meals since admission, overall variable intake. Tolerating diet. Appears well nourished. Nutritional intake adequate to meet patients estimated nutritional needs:  Unable to determine at this time    Diet: DIET REGULAR  DIET NUTRITIONAL SUPPLEMENTS All Meals; ENSURE ENLIVE    Food Allergies:NKFA  Current Appetite: Fair; altered mentation  Appetite/meal intake prior to admission: Poor per pt; reports inadequate intake x months; pt with noted confusion during discussion  Feeding Limitations:  [] Swallowing Difficulty       [] Chewing Difficulty       [] Other   Current Meal Intake: No data found.   Gastrointestinal Issues:  [x] Yes: c/o constipation; bowel regimen ordered   Skin Integrity:  WDL    Pertinent Medications:  Reviewed: ascorbic acid, dulcolax prn, colace, famotidine, ferrous sulfate, metamucil fiber, MVI  Labs:  Reviewed Anthropometrics:  Ht Readings from Last 1 Encounters:   10/22/19 5' 6\" (1.676 m)       There were no vitals filed for this visit. There is no height or weight on file to calculate BMI.  25.1 kg/(m^2) using previously documented wt from 1/23/20    Weight History: Pt reports stable wt hx PTA   Weight Metrics 1/23/2020 12/4/2019 10/22/2019 8/14/2019 7/1/2019 5/30/2019 5/20/2019   Weight 156 lb 180 lb 186 lb 214 lb 215 lb 215 lb 12.8 oz 214 lb 9.6 oz   BMI 25.18 kg/m2 29.05 kg/m2 30.02 kg/m2 34.54 kg/m2 34.7 kg/m2 34.83 kg/m2 34.64 kg/m2       Admitting Diagnosis: Schizophrenia ([facility] [address] [F20.9]  Past Medical History:   Diagnosis Date    Alcoholic ([facility] [address]     Sober 3 months; Weekly AAA meeting; Had substance abuse counseling;     Anemia     Bipolar disorder ([address]     Cirrhosis ([address]     Past not current issue per patient    ETOH abuse     GERD (gastroesophageal reflux disease)     Head injury     Marijuana abuse     Phobia     [place] [employer]    Post partum depression     Pregnancy     Psychiatric disorder     Psychotic disorder ([address]     Depression/  Beny Sosa NP; Bipolar disorder, mood swings.  Schizophrenia ([facility] [address]     Stroke [facility]) 2011        Education Needs:        [x] None identified  [] Identified - Not appropriate at this time  []  Identified and addressed - refer to education log  Learning Limitations:   [] None identified  [x] Identified: thought blocking; some confusion noted   Cultural, Tenriism & ethnic food preferences identified:  [x] None    [] Yes      ESTIMATED NUTRITION NEEDS:     1742-1887kcal (MSJx1.2-1.3), 57-71 gm protein (0.8-1 gm/kg), 1 mL/kcal  Based on: 71 kg       [x] Actual BW- using wt documented 1/23/20       MONITORING & EVALUATION:     Nutrition Goal(s):   - PO nutrition intake will meet >75% of patient estimated nutritional needs within the next 7 days.    Outcome: Progressing towards goal     Monitor: [x] Food and nutrient intake   [x] Food and nutrient administration  [x] Comparative standards   [x] Nutrition-focused physical findings   [x] Anthropometric Measurements   [x] Treatment/therapy   [x] Biochemical data, medical tests, and procedures         Previous Recommendations (for follow-up assessments only): Implemented       Discharge Planning: Nutritional discharge needs unknown at this time.    [x]  Participated in care planning, discharge planning, & interdisciplinary rounds as appropriate      Rommel Acuna RD   Pager: [phone]

## 2020-02-07 NOTE — BSMART NOTE
OCCUPATIONAL THERAPY PROGRESS NOTE Group Time:  0824 Attendance: The patient attended full group. Luis Angel Bass Participation: The patient participated fully in the activity. Attention: The patient needed redirection to activity at least once. Interaction: The patient occasionally  interacts with others. Able to respond to questions she has previously said she couldn't think of an answer for. Does stare off occasionally and says she is \"listening\". One incident of strange verbalization that was too odd to understand easily. Discussed that she says she no longer has her apartment in the program and unclear of living situation after hospitalizations.

## 2020-02-07 NOTE — BH NOTES
MHT Note:  Patient has participated in unit activities this shift. Her mood is somewhat flat with congruent affect. She is well-groomed. Patient makes good eye contact. She is cooperative with staff and very polite with everyone. She told this writer she was not having thoughts of self harm except that she was wanting to drink some coffee and that was harmful. She stated she was feeling paranoid and I asked her if there was anything I could do to make her more comfortable and she replied, We are all connected anyway. Staff will continue to monitor patient for safety and location and will provide support and education as needed.

## 2020-02-07 NOTE — PROGRESS NOTES
Behavioral Health Progress Note    Admit Date: 1/24/2020  Hospital day 14    Vitals :   Patient Vitals for the past 8 hrs:   BP Temp Pulse Resp   02/07/20 0859 96/62 96.8 °F (36 °C) 98 16     Labs:  No results found for this or any previous visit (from the past 24 hour(s)).   Meds:   Current Facility-Administered Medications   Medication Dose Route Frequency    chlorproMAZINE (THORAZINE) tablet 100 mg  100 mg Oral QHS    bisacodyL (DULCOLAX) tablet 10 mg  10 mg Oral DAILY PRN    psyllium husk-aspartame (METAMUCIL FIBER) packet 1 Packet  1 Packet Oral BID    desvenlafaxine succinate (PRISTIQ) ER tablet 50 mg  50 mg Oral DAILY    docusate sodium (COLACE) capsule 100 mg  100 mg Oral DAILY    nicotine (NICORETTE) gum 2 mg  2 mg Oral Q2H PRN    hydrOXYzine pamoate (VISTARIL) capsule 50 mg  50 mg Oral Q4H PRN    haloperidoL (HALDOL) tablet 5 mg  5 mg Oral Q4H PRN    haloperidol lactate (HALDOL) injection 5 mg  5 mg IntraMUSCular Q4H PRN    LORazepam (ATIVAN) injection 1-2 mg  1-2 mg IntraMUSCular Q4H PRN    traZODone (DESYREL) tablet 50 mg  50 mg Oral QHS PRN    ibuprofen (MOTRIN) tablet 400 mg  400 mg Oral Q4H PRN    therapeutic multivitamin (THERAGRAN) tablet 1 Tab  1 Tab Oral DAILY    ascorbic acid (vitamin C) (VITAMIN C) tablet 500 mg  500 mg Oral DAILY    famotidine (PEPCID) tablet 20 mg  20 mg Oral BID    ferrous sulfate tablet 325 mg  1 Tab Oral DAILY WITH BREAKFAST    influenza vaccine 2019-20 (6 mos+)(PF) (FLUARIX/FLULAVAL/FLUZONE QUAD) injection 0.5 mL  0.5 mL IntraMUSCular PRIOR TO DISCHARGE      Hospital Problems: Principal Problem:    Schizophrenia ([address] (4/18/2017)    Active Problems:    Cigarette nicotine dependence without complication (7/4/9247)        Subjective:   Medication side effects: none  none    Mental Status Exam  Sensorium: alert  Orientation: only aware of  time, place and person  Relations: guarded and passive  Eye Contact: intense  Appearance: is bizarre  Thought Process: normal rate of thoughts and poor abstract reasoning/computation   Thought Content: delusions, ideas of reference, paranoia and auditory halluc   Suicidal: denies   Homicidal: none   Mood: is anxious   Affect: odd, blunted  Memory: is impaired and is remote     Concentration: distractable  Abstraction: concrete  Insight: The patient shows little insight    OR Poor  Judgement: is psychologically impaired OR  Poor    Assessment/Plan:   not changed    Continue close observation, patient reports that she still has been having auditory hallucinations. She was seen by the pact team yesterday and had told them that she is a shape shifter. She tells me that she is much more upset because \"I slept with someone and had a shape shifter baby 2 years ago. \"  This child was adopted by her mother and says that now the patient's mother is the baby's mother 2. She says that she needs to stay busy such as by helping people fix their technology. She continues to be quite strange and say that she probably would not be able to go back to her apartment because of the voices in her apartment.   She apparently had not been able to spend much time there over the past year having been repeatedly admitted to the hospital.  We will continue with antipsychotic medication as is and reality

## 2020-02-07 NOTE — PROGRESS NOTES
Behavioral Health Progress Note    Admit Date: 1/24/2020  Hospital day 13    Vitals : No data found. Labs:  No results found for this or any previous visit (from the past 24 hour(s)).   Meds:   Current Facility-Administered Medications   Medication Dose Route Frequency    chlorproMAZINE (THORAZINE) tablet 100 mg  100 mg Oral QHS    bisacodyL (DULCOLAX) tablet 10 mg  10 mg Oral DAILY PRN    psyllium husk-aspartame (METAMUCIL FIBER) packet 1 Packet  1 Packet Oral BID    desvenlafaxine succinate (PRISTIQ) ER tablet 50 mg  50 mg Oral DAILY    docusate sodium (COLACE) capsule 100 mg  100 mg Oral DAILY    nicotine (NICORETTE) gum 2 mg  2 mg Oral Q2H PRN    hydrOXYzine pamoate (VISTARIL) capsule 50 mg  50 mg Oral Q4H PRN    haloperidoL (HALDOL) tablet 5 mg  5 mg Oral Q4H PRN    haloperidol lactate (HALDOL) injection 5 mg  5 mg IntraMUSCular Q4H PRN    LORazepam (ATIVAN) injection 1-2 mg  1-2 mg IntraMUSCular Q4H PRN    traZODone (DESYREL) tablet 50 mg  50 mg Oral QHS PRN    ibuprofen (MOTRIN) tablet 400 mg  400 mg Oral Q4H PRN    therapeutic multivitamin (THERAGRAN) tablet 1 Tab  1 Tab Oral DAILY    ascorbic acid (vitamin C) (VITAMIN C) tablet 500 mg  500 mg Oral DAILY    famotidine (PEPCID) tablet 20 mg  20 mg Oral BID    ferrous sulfate tablet 325 mg  1 Tab Oral DAILY WITH BREAKFAST    influenza vaccine 2019-20 (6 mos+)(PF) (FLUARIX/FLULAVAL/FLUZONE QUAD) injection 0.5 mL  0.5 mL IntraMUSCular PRIOR TO DISCHARGE      Hospital Problems: Principal Problem:    Schizophrenia ([facility] [address] (4/18/2017)    Active Problems:    Cigarette nicotine dependence without complication (3/5/0235)        Subjective:   Medication side effects: fatigue/weakness  dry mouth    Mental Status Exam  Sensorium: alert  Orientation: only aware of  time, place and person  Relations: guarded  Eye Contact: intense  Appearance: is tense  Thought Process: normal rate of thoughts and poor abstract reasoning/computation   Thought Content: delusions, paranoia and auditory halluc   Suicidal: denies   Homicidal: none   Mood: is anxious and is withdrawn   Affect: constricted, strange  Memory: shows no evidence of impairment     Concentration: distractable  Abstraction: concrete  Insight: The patient shows little insight    OR Poor  Judgement: is psychologically impaired OR  Poor    Assessment/Plan:   not changed    Continue close observation, nurses report that she will often have a strange fixed stare as if she is responding to internal stimuli. She is taking her shower daily and is attempting to wear clean close. Patient reports that she continues to hear a voice from outside her head and she will look around to hear who is talking. She agrees that she needs to go to the [facility] because she has been unable to stop hallucinating over the past months. The pact team had said that they will present the case and we will see whether or not she will be accepted to go to the [facility] in any type of reasonable amount of time. She has been unable to get there during other hospitalizations. We are continue with reality orientation antipsychotics. She did look slightly better today and that she was able to carry on a conversation but still had a strange staring affect and appears to respond to internal stimuli.   She says she is quite sad at times from the things that she hears in her head but will not say what this is

## 2020-02-07 NOTE — GROUP NOTE
DARLING  GROUP DOCUMENTATION INDIVIDUAL Group Therapy Note Date: 2/7/2020 Group Start Time: 1000 Group End Time: 9603 Group Topic: Nursing [facility] 1 SPECIAL TRTMT 1 Tal Rhodes RN 
 
[facility] GROUP DOCUMENTATION GROUP Group Therapy Note Attendees: 4 Attendance: Attended Interventions/techniques: Informed and Promoted peer support Follows Directions: Followed directions Interactions: Interacted appropriately Mental Status: Calm Behavior/appearance: Cooperative Goals Achieved: Able to listen to others, Discussed discharge plans and Discussed safety plan Ana Luisa Tee RN

## 2020-02-08 PROCEDURE — 65220000003 HC RM SEMIPRIVATE PSYCH

## 2020-02-08 PROCEDURE — 74011250637 HC RX REV CODE- 250/637: Performed by: PSYCHIATRY & NEUROLOGY

## 2020-02-08 RX ORDER — BISACODYL 5 MG
5 TABLET, DELAYED RELEASE (ENTERIC COATED) ORAL DAILY PRN
Status: DISCONTINUED | OUTPATIENT
Start: 2020-02-09 | End: 2020-02-21 | Stop reason: HOSPADM

## 2020-02-08 RX ADMIN — DESVENLAFAXINE SUCCINATE 50 MG: 50 TABLET, FILM COATED, EXTENDED RELEASE ORAL at 09:00

## 2020-02-08 RX ADMIN — THERA TABS 1 TABLET: TAB at 08:14

## 2020-02-08 RX ADMIN — DOCUSATE SODIUM 100 MG: 100 CAPSULE, LIQUID FILLED ORAL at 08:14

## 2020-02-08 RX ADMIN — Medication 1 PACKET: at 08:15

## 2020-02-08 RX ADMIN — Medication 1 PACKET: at 20:39

## 2020-02-08 RX ADMIN — FAMOTIDINE 20 MG: 20 TABLET ORAL at 08:14

## 2020-02-08 RX ADMIN — CHLORPROMAZINE HYDROCHLORIDE 100 MG: 100 TABLET, SUGAR COATED ORAL at 20:38

## 2020-02-08 RX ADMIN — FAMOTIDINE 20 MG: 20 TABLET ORAL at 20:38

## 2020-02-08 RX ADMIN — IBUPROFEN 400 MG: 400 TABLET, FILM COATED ORAL at 17:32

## 2020-02-08 NOTE — BH NOTES
MHT Note:  Patient has participated in all unit activities this shift. She had some complaints this morning that she was \"leaking\" and asked for some Depends. She was given some adult diapers and disposable underwear. She told this writer that she had not gone to the bathroom (taken a bowel movement) in 2-3 weeks. She stated that she was leaking urine and had blood coming from her rectum. She told me she told the \"doctor\" this last night and \"she\" thought I had been having anal sex. I told the nurse and reminded the patient to tell her doctor. I also reminded her to drink extra fluids. Her mood has been anxious with a congruent affect. She has mentioned that her roommate's behavior (talking to her self, crying and laughing to self), has her \"off\". It has been observed that if she is in the room and the roommate gets loud, she will come out to the day area and pace around for a few minutes and then return to the room. She did not have any visitors this shift and was not observed to make any phone calls. Staff will continue to monitor for safety and location and will continue to provide support and education as needed.

## 2020-02-08 NOTE — BSMART NOTE
Pt.is a 34year old female with history of Schizophrenia paranoid type and past history of Cocaine and DID. Pt. was admitted to this facility for facility for delusional thinking and ideations to harm. Pt.s case was discussed in treatment tea. Pt. was referred to [facility] Reinvestment team regarding referring pt. to [facility]. Pt.s information will be reviewed. Sw Contact:  pt. expressed she had so much to tell me but is not sure if she could communicate all the information. Pt. appeared to have some thought blocking and could not remember what to say. Pt. Ask SW to contact he mother to tell mother , she has to move out of her apartment. SW informed pt ,. , she rojas snot have to move out of her apartment. Pt. Appears, anxious continues to have impaired insight. Pt. Denies ideations but admits she hears voices but not currently during the interview. SW will continue to engage pt with reality orientation.

## 2020-02-08 NOTE — PROGRESS NOTES
Behavioral Health Progress Note    Admit Date: 1/24/2020  Hospital day 14    Vitals : No data found. Labs:  No results found for this or any previous visit (from the past 24 hour(s)).   Meds:   Current Facility-Administered Medications   Medication Dose Route Frequency    chlorproMAZINE (THORAZINE) tablet 100 mg  100 mg Oral QHS    bisacodyL (DULCOLAX) tablet 10 mg  10 mg Oral DAILY PRN    psyllium husk-aspartame (METAMUCIL FIBER) packet 1 Packet  1 Packet Oral BID    desvenlafaxine succinate (PRISTIQ) ER tablet 50 mg  50 mg Oral DAILY    docusate sodium (COLACE) capsule 100 mg  100 mg Oral DAILY    nicotine (NICORETTE) gum 2 mg  2 mg Oral Q2H PRN    hydrOXYzine pamoate (VISTARIL) capsule 50 mg  50 mg Oral Q4H PRN    haloperidoL (HALDOL) tablet 5 mg  5 mg Oral Q4H PRN    haloperidol lactate (HALDOL) injection 5 mg  5 mg IntraMUSCular Q4H PRN    LORazepam (ATIVAN) injection 1-2 mg  1-2 mg IntraMUSCular Q4H PRN    traZODone (DESYREL) tablet 50 mg  50 mg Oral QHS PRN    ibuprofen (MOTRIN) tablet 400 mg  400 mg Oral Q4H PRN    therapeutic multivitamin (THERAGRAN) tablet 1 Tab  1 Tab Oral DAILY    ascorbic acid (vitamin C) (VITAMIN C) tablet 500 mg  500 mg Oral DAILY    famotidine (PEPCID) tablet 20 mg  20 mg Oral BID    ferrous sulfate tablet 325 mg  1 Tab Oral DAILY WITH BREAKFAST    influenza vaccine 2019-20 (6 mos+)(PF) (FLUARIX/FLULAVAL/FLUZONE QUAD) injection 0.5 mL  0.5 mL IntraMUSCular PRIOR TO DISCHARGE      Hospital Problems: Principal Problem:    Schizophrenia ([facility] [address] (4/18/2017)    Active Problems:    Cigarette nicotine dependence without complication (8/4/2543)        Subjective:   Medication side effects: none  none    Mental Status Exam  Sensorium: alert  Orientation: only aware of  time, place and person  Relations: guarded and vague  Eye Contact: intense  Appearance: is bizarre  Thought Process: slow rate of thoughts and poor abstract reasoning/computation   Thought Content: loose, delusional, paranoia   Suicidal: denies   Homicidal: none   Mood: is anxious, is frightened and is irritable   Affect: labile  Memory: is impaired, is recent and is remote     Concentration: distractable  Abstraction: concrete  Insight: The patient shows little insight    OR Poor  Judgement: is psychologically impaired OR  Poor    Assessment/Plan:   not changed      Continue close observation,      The patient is cooperative but she is still quite bizarre. She continues to talk of how she is a shape shifter and the mother of a shape shifter. She talks of how she would be unable to go back to her apartment because someone has come in and made the place a mess and no one ever straightened it up. She talks that someone else make him in there and make messes when she is not looking. She was saying that she thinks mother will come in and straighten all her belongings put packed them up and put them into storage. She does wish to go to the [facility] saying she has been unable to stay out of hospitals over the past year. She does get increasingly anxious that she talks about her belief that she is a shape shifter and that there is no cure for this. We will continue with antipsychotic medications and reality orientation. Complains of constipation so the patient was ordered Dulcolax in the morning time.

## 2020-02-08 NOTE — GROUP NOTE
DARL[facility]  GROUP DOCUMENTATION INDIVIDUAL Group Therapy Note Date: 2/8/2020 Group Start Time: 3776 Group End Time: 1400 Group Topic: Nursing [facility] 1 SPECIAL TRTMT 2 Mason Talavera RN 
 
[facility] GROUP DOCUMENTATION GROUP Group Therapy Note Attendees: 3 Attendance: Attended Interventions/techniques: Challenged, Informed, Validated and Provide feedback Follows Directions: Followed directions Interactions: Interacted appropriately Mental Status: Calm Behavior/appearance: Cooperative Goals Achieved: Able to engage in interactions, Able to listen to others and Able to give feedback to another Mahamed Reyes RN

## 2020-02-08 NOTE — PROGRESS NOTES
conducted a Follow up consultation and Spiritual Assessment for Junior Boateng, who is a 34 y.o.,female. The  provided the following Interventions:  Continued the relationship of care and support. Listened empathically. Offered prayer and assurance of continued prayer on patients behalf. Chart reviewed. The following outcomes were achieved:  Patient expressed gratitude for 's visit. Assessment:  There are no further spiritual or Druze issues which require Spiritual Care Services interventions at this time. Plan:  Chaplains will continue to follow and will provide pastoral care on an as needed/requested basis.  recommends bedside caregivers page  on duty if patient shows signs of acute spiritual or emotional distress.        [address] [facility].   [phone]

## 2020-02-08 NOTE — PROGRESS NOTES
Problem: Psychosis  Goal: *STG: Decreased delusional thinking  Description  Patient will show a decrease or be free of delusional statements assessed every shift throughout hospital stay. Outcome: Progressing Towards Goal  Goal: *STG: Remains safe in hospital  Description  Patient will remain free of harm to self and others every shift throughout hospitalization. Outcome: Progressing Towards Goal     Problem: Suicide  Goal: *STG: Remains safe in hospital  Description  Patient will remain free of harm to self and others every shift throughout hospitalization. Outcome: Progressing Towards Goal  Goal: *STG/LTG: Complies with medication therapy  Description  Patient will take medication as prescribed every shift throughout hospital stay. Outcome: Progressing Towards Goal     The patient has been in her room most of the day. She has been tearful at times. She said that she is pregnant. She said that she is 3-5 months pregnant. She claims that her water has broken. When checked by this nurse, she was not wet or leaking any water. She asked for a depends to wear tonight until she can get a referral tomorrow. The patient stated that she is  mely, and has been mely for three weeks. She stated that she feels the baby's head down in her vagina, and that the head has been coming out and going in for about three weeks also. Continuing to monitor and will continue to provide support.

## 2020-02-09 PROCEDURE — 74011250637 HC RX REV CODE- 250/637: Performed by: PSYCHIATRY & NEUROLOGY

## 2020-02-09 PROCEDURE — 65220000003 HC RM SEMIPRIVATE PSYCH

## 2020-02-09 RX ADMIN — DESVENLAFAXINE SUCCINATE 50 MG: 50 TABLET, FILM COATED, EXTENDED RELEASE ORAL at 11:42

## 2020-02-09 RX ADMIN — FERROUS SULFATE TAB 325 MG (65 MG ELEMENTAL FE) 325 MG: 325 (65 FE) TAB at 11:42

## 2020-02-09 RX ADMIN — FAMOTIDINE 20 MG: 20 TABLET ORAL at 08:34

## 2020-02-09 RX ADMIN — CHLORPROMAZINE HYDROCHLORIDE 100 MG: 100 TABLET, SUGAR COATED ORAL at 20:42

## 2020-02-09 RX ADMIN — FAMOTIDINE 20 MG: 20 TABLET ORAL at 20:42

## 2020-02-09 RX ADMIN — Medication 1 PACKET: at 20:43

## 2020-02-09 RX ADMIN — THERA TABS 1 TABLET: TAB at 08:33

## 2020-02-09 RX ADMIN — Medication 500 MG: at 11:41

## 2020-02-09 RX ADMIN — DOCUSATE SODIUM 100 MG: 100 CAPSULE, LIQUID FILLED ORAL at 08:34

## 2020-02-09 NOTE — GROUP NOTE
DARLING  GROUP DOCUMENTATION INDIVIDUAL Group Therapy Note Date: 2/9/2020 Group Start Time: 12 Group End Time: 2561 Group Topic: Nursing [facility] SPECIAL TRT 2 CODY Saucedo  GROUP DOCUMENTATION GROUP Group Therapy Note Attendees:  7 Attendance: Attended Interventions/techniques: Informed Follows Directions: Followed directions Interactions: Interacted appropriately Mental Status: Calm Behavior/appearance: Cooperative Goals Achieved: Able to engage in interactions Baldev Garcia RN

## 2020-02-09 NOTE — PROGRESS NOTES
Problem: Falls - Risk of  Goal: *Absence of Falls  Description  Patient will remain free of falls every shift throughout hospital stay. Patient will verbalize understanding regarding safety and fall prevention measures to be utilized every shift throughout hospital stay. Document Tia Manus Fall Risk and appropriate interventions in the flowsheet. Outcome: Progressing Towards Goal  Note: Fall Risk Interventions:            Medication Interventions: Teach patient to arise slowly                   Problem: Psychosis  Goal: *STG: Decreased hallucinations  Description  Patient will verbalize denial of auditory/visual hallucinations every shift throughout hospital stay. Outcome: Progressing Towards Goal     Problem: Psychosis  Goal: *STG: Remains safe in hospital  Description  Patient will remain free of harm to self and others every shift throughout hospitalization. Outcome: Progressing Towards Goal   Patient pleasant and cooperative. Appears to be improving in that she is less guarded and appears to have decreased thought blocking and delusions. Compliant with medications. Isolates in room at times.

## 2020-02-09 NOTE — GROUP NOTE
IP  GROUP DOCUMENTATION INDIVIDUAL Group Therapy Note Date: 2/8/2020 Group Start Time: 1900 Group End Time: 1915 Group Topic: Nursing [facility] 1 ADULT CHEM DEP Jim Valente, RN 
 
[facility] GROUP DOCUMENTATION GROUP Group Therapy Note Attendees: 4 Attendance: Attended Interventions/techniques: Informed Follows Directions: Followed directions Interactions: Interacted appropriately Mental Status: Calm Behavior/appearance: Attentive Goals Achieved: Able to listen to others Joann Pereira

## 2020-02-09 NOTE — BH NOTES
MHT Note: Patient interacts with staff and peers minimally and in a very timid manner. Pt continues to gravitate towards male peers on unit seeking attention and validation. Pt contracts for safety on unit and denies SI/HI at this time. Staff will continue to monitor pt for safety, behavior and location.

## 2020-02-09 NOTE — PROGRESS NOTES
Problem: Falls - Risk of  Goal: *Absence of Falls  Description  Patient will remain free of falls every shift throughout hospital stay. Patient will verbalize understanding regarding safety and fall prevention measures to be utilized every shift throughout hospital stay. Document Aurea Thakur Fall Risk and appropriate interventions in the flowsheet. Outcome: Progressing Towards Goal  Note: Fall Risk Interventions:            Medication Interventions: Teach patient to arise slowly                   Problem: Psychosis  Goal: *STG: Decreased hallucinations  Description  Patient will verbalize denial of auditory/visual hallucinations every shift throughout hospital stay. Outcome: Progressing Towards Goal  Goal: *STG: Decreased delusional thinking  Description  Patient will show a decrease or be free of delusional statements assessed every shift throughout hospital stay. Outcome: Progressing Towards Goal  Goal: *STG/LTG: Complies with medication therapy  Description  Patient will take medication as prescribed every shift throughout hospital stay. Outcome: Progressing Towards Goal     Problem: Suicide  Goal: *STG: Remains safe in hospital  Description  Patient will remain free of harm to self and others every shift throughout hospitalization. Outcome: Progressing Towards Goal  Goal: *STG: Attends activities and groups  Description  Patient will attend at least 50% or 2 of 4 groups daily throughout hospital stay. Outcome: Progressing Towards Goal  Goal: *STG/LTG: Complies with medication therapy  Description  Patient will take medication as prescribed every shift throughout hospital stay. Outcome: Progressing Towards Goal  Goal: *STG/LTG: No longer expresses self destructive or suicidal thoughts  Description  Patient will verbalize denial of suicidal/ideation every shift throughout hospital stay. Outcome: Progressing Towards Goal       The patient is up on the unit in and out of her room at will. She appears clearer today. She is interacting with peers more today. Today she is not focused on being pregnant. She stated that she thinks she was constipated recently. She denies that there are thoughts of self harm. She denies that there are thoughts of harming others. She attended group this morning. She has been compliant with her medication. Continuing to monitor and will continue to provide support.

## 2020-02-09 NOTE — PROGRESS NOTES
Behavioral Health Progress Note    Admit Date: 1/24/2020  Hospital day 15    Vitals :   Patient Vitals for the past 8 hrs:   BP Temp Pulse Resp   02/09/20 0754 100/64 97.1 °F (36.2 °C) 78 16     Labs:  No results found for this or any previous visit (from the past 24 hour(s)).   Meds:   Current Facility-Administered Medications   Medication Dose Route Frequency    bisacodyL (DULCOLAX) tablet 5 mg  5 mg Oral DAILY PRN    chlorproMAZINE (THORAZINE) tablet 100 mg  100 mg Oral QHS    psyllium husk-aspartame (METAMUCIL FIBER) packet 1 Packet  1 Packet Oral BID    desvenlafaxine succinate (PRISTIQ) ER tablet 50 mg  50 mg Oral DAILY    docusate sodium (COLACE) capsule 100 mg  100 mg Oral DAILY    nicotine (NICORETTE) gum 2 mg  2 mg Oral Q2H PRN    hydrOXYzine pamoate (VISTARIL) capsule 50 mg  50 mg Oral Q4H PRN    haloperidoL (HALDOL) tablet 5 mg  5 mg Oral Q4H PRN    haloperidol lactate (HALDOL) injection 5 mg  5 mg IntraMUSCular Q4H PRN    LORazepam (ATIVAN) injection 1-2 mg  1-2 mg IntraMUSCular Q4H PRN    traZODone (DESYREL) tablet 50 mg  50 mg Oral QHS PRN    ibuprofen (MOTRIN) tablet 400 mg  400 mg Oral Q4H PRN    therapeutic multivitamin (THERAGRAN) tablet 1 Tab  1 Tab Oral DAILY    ascorbic acid (vitamin C) (VITAMIN C) tablet 500 mg  500 mg Oral DAILY    famotidine (PEPCID) tablet 20 mg  20 mg Oral BID    ferrous sulfate tablet 325 mg  1 Tab Oral DAILY WITH BREAKFAST    influenza vaccine 2019-20 (6 mos+)(PF) (FLUARIX/FLULAVAL/FLUZONE QUAD) injection 0.5 mL  0.5 mL IntraMUSCular PRIOR TO DISCHARGE      Hospital Problems: Principal Problem:    Schizophrenia ([address] (4/18/2017)    Active Problems:    Cigarette nicotine dependence without complication (9/0/0154)        Subjective:   Medication side effects: constipation  none    Mental Status Exam  Sensorium: alert  Orientation: only aware of  place and person  Relations: cooperative and guarded  Eye Contact: poor  Appearance: is bizarre  Thought Process: normal rate of thoughts and poor abstract reasoning/computation   Thought Content: delusions, paranoia and halluc   Suicidal: denies   Homicidal: none   Mood: is euthymic   Affect: odd, constricted  Memory: is impaired, is recent and is remote     Concentration: distractable  Abstraction: concrete  Insight: The patient shows little insight    OR Poor  Judgement: is psychologically impaired OR  Poor    Assessment/Plan:   not changed    Continue close observation, patient confirms that she had thought she was pregnant because she felt she needed to push something out of her body. She said that she found out that she was actually having a bowel movement and today does not feel that she is pregnant though she continues to have the same bizarre delusions about being a shape shifter, having had a shape shifter baby and other strain. She had multiple papers out on her bed saying that she was trying to organize them thinking to build a laptop in her brain. She said this would not be a plastic laptop but it would be a paper laptop that she would be using. She had been complaining of the constipation as a side effect of medicine so unfortunately it appears we cannot be increasing her medication much more because she is getting side effects to it. She is cooperative but continues to be bizarre with delusions. We will continue with reality orientation.

## 2020-02-10 PROCEDURE — 65220000003 HC RM SEMIPRIVATE PSYCH

## 2020-02-10 PROCEDURE — 74011250637 HC RX REV CODE- 250/637: Performed by: PSYCHIATRY & NEUROLOGY

## 2020-02-10 RX ADMIN — DOCUSATE SODIUM 100 MG: 100 CAPSULE, LIQUID FILLED ORAL at 08:02

## 2020-02-10 RX ADMIN — THERA TABS 1 TABLET: TAB at 08:02

## 2020-02-10 RX ADMIN — CHLORPROMAZINE HYDROCHLORIDE 100 MG: 100 TABLET, SUGAR COATED ORAL at 20:59

## 2020-02-10 RX ADMIN — DESVENLAFAXINE SUCCINATE 50 MG: 50 TABLET, FILM COATED, EXTENDED RELEASE ORAL at 08:02

## 2020-02-10 RX ADMIN — FAMOTIDINE 20 MG: 20 TABLET ORAL at 20:59

## 2020-02-10 RX ADMIN — Medication 500 MG: at 08:02

## 2020-02-10 RX ADMIN — FERROUS SULFATE TAB 325 MG (65 MG ELEMENTAL FE) 325 MG: 325 (65 FE) TAB at 08:03

## 2020-02-10 RX ADMIN — FAMOTIDINE 20 MG: 20 TABLET ORAL at 08:02

## 2020-02-10 NOTE — GROUP NOTE
DARLING  GROUP DOCUMENTATION INDIVIDUAL Group Therapy Note Date: 2/10/2020 Group Start Time: 2579 Group End Time: 2010 Group Topic: Nursing [facility] 1 SPECIAL TRT 2 CODY [place]  GROUP DOCUMENTATION GROUP Group Therapy Note Attendees: 6 Attendance: Attended Interventions/techniques: Informed Follows Directions: Followed directions Interactions: Interacted appropriately Mental Status: Calm Behavior/appearance: Cooperative Goals Achieved: Able to engage in interactions Yelitza Riddle RN

## 2020-02-10 NOTE — BSMART NOTE
Pt.is a 34year old female with history of Schizophrenia paranoid type and past history of Cocaine and DID.  Pt. was admitted to this facility for facility for delusional thinking and ideations to harm. 
  
Pt.'s case was discussed in treatment team.  Pt.'s information will be reviewed for a referral to [facility] by [facility] discharge planner. SW completed [facility]  referral form and will fax to [facility]. Pt.'s case was discussed in treatment team this a.m . Pt. Is supposed to be presented as a transfer to [facility] this week. ARUN Contact: ARUN met with pt to discuss dc planning. Pt. Expressed she was feeling okay. Pt states she is not hearing voices nor seeing things at this time. Pt expressed sometimes she has a lot of things in her heard. Pt clarified the thoughts, as being voices. SW engaged pt with reality orientation. Pt appears anxious, suspicious but cooperative. SW will continue to provide pt with support and assist the pt with dc planning.

## 2020-02-10 NOTE — BH NOTES
Patient remains guarded and today appears more isolative to her room. Per report, her appetite has been poor today, not eating breakfast or lunch and refused dinner. Patient was informed of concern regarding her poor intake and with medication regimen. Poth given at [facility] and ate all of it with encouragement and barter of a small candy treat. Fluids offered and tolerated well. Compliant with medication. Will continue to monitor and provide for safety.

## 2020-02-10 NOTE — GROUP NOTE
DARLING  GROUP DOCUMENTATION INDIVIDUAL Group Therapy Note Date: 2/10/2020 Group Start Time: 3199 Group End Time: 1000 Group Topic: Nursing [facility] 1 SPECIAL TRT 1 Toshia James RN 
 
[facility] GROUP DOCUMENTATION GROUP Group Therapy Note Attendees: 5 Attendance: Attended Interventions/techniques: Challenged and Informed Follows Directions: Followed directions Interactions: Interacted appropriately Mental Status: Calm Behavior/appearance: Withdrawn/quiet Goals Achieved: Discussed coping and Identified feelings Bradley Harris RN

## 2020-02-10 NOTE — BSMART NOTE
OCCUPATIONAL THERAPY PROGRESS NOTE Group Time:  1349 Attendance: The patient attended 1/2 of group. Participation: The patient participated with minimal elaboration in the activity. . 
Attention: The patient was able to focus on the activity. Interaction: The patient occasionally  interacts with others. Responses appropriate, quiet today, affect range decreased today.

## 2020-02-10 NOTE — PROGRESS NOTES
Problem: Psychosis  Goal: *STG: Decreased delusional thinking  Description  Patient will show a decrease or be free of delusional statements assessed every shift throughout hospital stay. Outcome: Not Progressing Towards Goal  Note:   Patient will exhibit a decrease in delusional thinking by time of discharge     Problem: Psychosis  Goal: *STG: Remains safe in hospital  Description  Patient will remain free of harm to self and others every shift throughout hospitalization. Outcome: Progressing Towards Goal      Note:   Patient will remain safe for duration of hospital stay     Problem: Psychosis  Goal: *STG/LTG: Complies with medication therapy  Description  Patient will take medication as prescribed every shift throughout hospital stay. Outcome: Progressing Towards Goal  Note:   Patient will comply with medication therapy     Problem: Psychosis  Goal: *STG/LTG: Complies with medication therapy  Description  Patient will take medication as prescribed every shift throughout hospital stay. Outcome: Progressing Towards Goal  Note:   Patient will comply with medication therapy      Patient continues to be guarded, isolative and paranoid. Patient is attending groups and med compliant. Patient states she is frustrated because her doctor is only focusing on a few things and she wants to know the real plan for her since this is not a long term care facility. Patient does contract for safety, nursing will continue to monitor and provide therapeutic interventions as needed.

## 2020-02-10 NOTE — PROGRESS NOTES
Behavioral Health Progress Note    Admit Date: 1/24/2020  Hospital day 16    Vitals :   Patient Vitals for the past 8 hrs:   BP Temp Pulse Resp   02/10/20 0822 96/63 97.2 °F (36.2 °C) (!) 110 14     Labs:  No results found for this or any previous visit (from the past 24 hour(s)).   Meds:   Current Facility-Administered Medications   Medication Dose Route Frequency    bisacodyL (DULCOLAX) tablet 5 mg  5 mg Oral DAILY PRN    chlorproMAZINE (THORAZINE) tablet 100 mg  100 mg Oral QHS    psyllium husk-aspartame (METAMUCIL FIBER) packet 1 Packet  1 Packet Oral BID    desvenlafaxine succinate (PRISTIQ) ER tablet 50 mg  50 mg Oral DAILY    docusate sodium (COLACE) capsule 100 mg  100 mg Oral DAILY    nicotine (NICORETTE) gum 2 mg  2 mg Oral Q2H PRN    hydrOXYzine pamoate (VISTARIL) capsule 50 mg  50 mg Oral Q4H PRN    haloperidoL (HALDOL) tablet 5 mg  5 mg Oral Q4H PRN    haloperidol lactate (HALDOL) injection 5 mg  5 mg IntraMUSCular Q4H PRN    LORazepam (ATIVAN) injection 1-2 mg  1-2 mg IntraMUSCular Q4H PRN    traZODone (DESYREL) tablet 50 mg  50 mg Oral QHS PRN    ibuprofen (MOTRIN) tablet 400 mg  400 mg Oral Q4H PRN    therapeutic multivitamin (THERAGRAN) tablet 1 Tab  1 Tab Oral DAILY    ascorbic acid (vitamin C) (VITAMIN C) tablet 500 mg  500 mg Oral DAILY    famotidine (PEPCID) tablet 20 mg  20 mg Oral BID    ferrous sulfate tablet 325 mg  1 Tab Oral DAILY WITH BREAKFAST    influenza vaccine 2019-20 (6 mos+)(PF) (FLUARIX/FLULAVAL/FLUZONE QUAD) injection 0.5 mL  0.5 mL IntraMUSCular PRIOR TO DISCHARGE      Hospital Problems: Principal Problem:    Schizophrenia ([address] (4/18/2017)    Active Problems:    Cigarette nicotine dependence without complication (0/6/3678)        Subjective:   Medication side effects: dry mouth, constipation  somnolence    Mental Status Exam  Sensorium: alert  Orientation: only aware of  time, place and person  Relations: guarded  Eye Contact: intense  Appearance: shows poor hygiene  Thought Process: normal rate of thoughts and poor abstract reasoning/computation   Thought Content: delusions and paranoia   Suicidal: denies   Homicidal: none   Mood: is anxious and is irritable   Affect: anxious  Memory: is impaired, is recent and is remote     Concentration: distractable  Abstraction: concrete  Insight: The patient shows little insight    OR Poor  Judgement: is psychologically impaired OR  Poor    Assessment/Plan:   not changed       Continue close observation,   The patient was requesting to stop her iron pills saying that she is worried they may more constipated. We will discontinue this. She does continue to be guarded and be quite strange. She says she is having bowel movements now but though she had been having the constipation. I am concerned that we would have difficulty increasing the dose of antipsychotic medications because of the side effects that she is getting. She talks of how she needs to try not to remember her problems and she should \"forget the past.\"  She says that she still gets thoughts that she knows are not real and hears things in her head that she says she believes are real but does not know whether to believe them or not. She did not wish to tell what why she was hearing. She does appear to continue to respond to internal stimuli. She is not aggressive or agitated. We will continue with reality orientation and antipsychotic medication management.

## 2020-02-10 NOTE — GROUP NOTE
DARLING  GROUP DOCUMENTATION INDIVIDUAL Group Therapy Note Date: 2/9/2020 Group Start Time: 2100 Group End Time: 2115 Group Topic: Nursing [facility] 1 ADULT CHEM DEP Joe Cole., RN 
 
[facility] GROUP DOCUMENTATION GROUP Group Therapy Note Attendees: 6 Attendance: Did not attend. Daniel Pérez.  Thu Heck

## 2020-02-10 NOTE — BH NOTES
Patient ate dinner. Patient stay in her room most of the evening. Patient appear to be sad and have a flat affect. Patient still appears if she has problems comprehending what is being said to her. Patient hesitant when speaking. She looks around in space.

## 2020-02-10 NOTE — BSMART NOTE
ART THERAPY GROUP PROGRESS NOTE PATIENT SCHEDULED FOR GROUP AT: 10:30 
 
ATTENDANCE: Full PARTICIPATION LEVEL: Participates fully in the art process and needed only minimal encouragement. ATTENTION LEVEL: Able to focus on task. FOCUS: Grounding SYMBOLIC & THEMATIC CONTENT AS NOTED IN IMAGERY: Elyse's affect was flat and her mood was calm. Ilana Canchola attended to the intervention only needing encouragement once and was able to get the hang of it quickly. She would pause at moments and look up confused/distracted and then slowly begin to work again. Her responses were all coherent and relevant and she stated \"I'm ready to leave here. \"

## 2020-02-11 PROCEDURE — 74011250637 HC RX REV CODE- 250/637: Performed by: PSYCHIATRY & NEUROLOGY

## 2020-02-11 PROCEDURE — 65220000003 HC RM SEMIPRIVATE PSYCH

## 2020-02-11 RX ADMIN — FAMOTIDINE 20 MG: 20 TABLET ORAL at 21:43

## 2020-02-11 RX ADMIN — DESVENLAFAXINE SUCCINATE 50 MG: 50 TABLET, FILM COATED, EXTENDED RELEASE ORAL at 08:06

## 2020-02-11 RX ADMIN — THERA TABS 1 TABLET: TAB at 08:05

## 2020-02-11 RX ADMIN — CHLORPROMAZINE HYDROCHLORIDE 100 MG: 100 TABLET, SUGAR COATED ORAL at 21:42

## 2020-02-11 RX ADMIN — DOCUSATE SODIUM 100 MG: 100 CAPSULE, LIQUID FILLED ORAL at 08:05

## 2020-02-11 RX ADMIN — FAMOTIDINE 20 MG: 20 TABLET ORAL at 08:05

## 2020-02-11 RX ADMIN — Medication 500 MG: at 08:06

## 2020-02-11 NOTE — PROGRESS NOTES
Behavioral Health Progress Note    Admit Date: 1/24/2020  Hospital day 18    Vitals :   Patient Vitals for the past 8 hrs:   BP Temp Pulse Resp   02/11/20 0737 111/69 96.7 °F (35.9 °C) 99 16     Labs:  No results found for this or any previous visit (from the past 24 hour(s)).   Meds:   Current Facility-Administered Medications   Medication Dose Route Frequency    [START ON 2/17/2020] paliperidone palmitate (INVEGA SUSTENNA) injection 234 mg  234 mg IntraMUSCular [id number]    bisacodyL (DULCOLAX) tablet 5 mg  5 mg Oral DAILY PRN    chlorproMAZINE (THORAZINE) tablet 100 mg  100 mg Oral QHS    desvenlafaxine succinate (PRISTIQ) ER tablet 50 mg  50 mg Oral DAILY    docusate sodium (COLACE) capsule 100 mg  100 mg Oral DAILY    nicotine (NICORETTE) gum 2 mg  2 mg Oral Q2H PRN    hydrOXYzine pamoate (VISTARIL) capsule 50 mg  50 mg Oral Q4H PRN    haloperidoL (HALDOL) tablet 5 mg  5 mg Oral Q4H PRN    haloperidol lactate (HALDOL) injection 5 mg  5 mg IntraMUSCular Q4H PRN    LORazepam (ATIVAN) injection 1-2 mg  1-2 mg IntraMUSCular Q4H PRN    traZODone (DESYREL) tablet 50 mg  50 mg Oral QHS PRN    ibuprofen (MOTRIN) tablet 400 mg  400 mg Oral Q4H PRN    therapeutic multivitamin (THERAGRAN) tablet 1 Tab  1 Tab Oral DAILY    ascorbic acid (vitamin C) (VITAMIN C) tablet 500 mg  500 mg Oral DAILY    famotidine (PEPCID) tablet 20 mg  20 mg Oral BID    influenza vaccine 2019-20 (6 mos+)(PF) (FLUARIX/FLULAVAL/FLUZONE QUAD) injection 0.5 mL  0.5 mL IntraMUSCular PRIOR TO DISCHARGE      Hospital Problems: Principal Problem:    Schizophrenia ([address] (4/18/2017)    Active Problems:    Cigarette nicotine dependence without complication (8/2/3937)        Subjective:   Medication side effects: constipation  dry mouth    Mental Status Exam  Sensorium: alert  Orientation: only aware of  time, place and person  Relations: guarded and passive  Eye Contact: poor  Appearance: is bizarre  Thought Process: slow rate of thoughts and poor abstract reasoning/computation   Thought Content: delusions, ideas of reference, paranoia and aud halluc   Suicidal: denies   Homicidal: none   Mood: is anxious and unhappy   Affect: anxious and unhappy  Memory: is impaired and is remote     Concentration: distractable  Abstraction: concrete  Insight: The patient shows little insight    OR Poor  Judgement: is psychologically impaired OR  Poor    Assessment/Plan:   not changed       Continue close observation,   Nurses report that the patient did have some difficulties with a female peer. She had thrown the female peers coffee cup away and they ended up getting in an argument. She continues to be quite strange and to look sad at times. She says that she would hear her emotions in her head and when things would happen she would blame herself such as the lights going out and thinking it had something to do with her. She continued to feel quite paranoid and unhappy with her life situation. She is due for her next 4-week injection within several days of February 18. We will write for 234 mg IM to be given 1/17. She does say that she does not want the Metamucil and has not been taking it. She says last bowel movement was about 3 days ago and we will write for some Dulcolax again today to try to get her bowels moving routinely. We would have difficulty increasing the amount of the antipsychotic she is taking because she does have the side effects of constipation at this point. She is not feeling dizzy or lightheaded when she stands up. Vital signs are fine. We will continue with reality orientation and antipsychotic medication management.

## 2020-02-11 NOTE — PROGRESS NOTES
Problem: Psychosis  Goal: *STG: Decreased hallucinations  Description  Patient will verbalize denial of auditory/visual hallucinations every shift throughout hospital stay. Outcome: Progressing Towards Goal  Note:   Patient will exhibit a decrease in hallucinations by time of discharge  Goal: *STG: Decreased delusional thinking  Description  Patient will show a decrease or be free of delusional statements assessed every shift throughout hospital stay. Outcome: Not Progressing Towards Goal  Note:   Patient will exhibit a decrease in delusional thinking by time of discharge  Goal: *STG: Remains safe in hospital  Description  Patient will remain free of harm to self and others every shift throughout hospitalization. Outcome: Progressing Towards Goal  Note:   Patient will remain safe for duration of hospital stay     Patient continues to be guarded, isolative and paranoid. Patient is cooperative upon approach. Patient is hesitant when answering questions. Patient is compliant with medications and does attend select groups. Patient continues to hear voices, however, will not disclose what the voices are saying to her. Patient does contract for safety and denies any SI/HI. Nursing will continue to monitor for safety and provide therapeutic interventions.

## 2020-02-11 NOTE — BH NOTES
CHARIHFLOR Note: The above pt was transferred to room [facility] from [facility]- to ad/.

## 2020-02-11 NOTE — BH NOTES
Patient oriented to unit and new room. Patient introduced to roommate and opted to rest in room. Patient dressed in paper scrubs and non-skid footwear, ambulatory with no assistance. Will continue to monitor and provide encouragement and interventions as appropriate.

## 2020-02-11 NOTE — BSMART NOTE
COUNSELING GROUP PROGRESS NOTE Group time: 12:45 The patient was unavailable for group due to having just been transferred ans still orienting to unit.

## 2020-02-11 NOTE — BSMART NOTE
OCCUPATIONAL THERAPY PROGRESS NOTE Group Time:  9098 Attendance: The patient attended full group. Participation: The patient participated with minimal elaboration in the activity. Attention: The patient needed redirection to activity at least once. Interaction: The patient acknowledges others or responds to questions,  with no spontaneous interaction. Few comments, had recently been transferred.

## 2020-02-11 NOTE — BH NOTES
Pt very timid affect. Pt had verbal altercation with female peer on unit. Pt seeks attention from male patients.

## 2020-02-11 NOTE — BSMART NOTE
Pt.is a 34year old female with history of Schizophrenia paranoid type and past history of Cocaine and DID.  Pt. was admitted to this facility for facility for delusional thinking and ideations to harm. 
  
Pt.'s case was discussed in treatment team.  Pt.'s information will be reviewed for a referral to [facility] by [facility]  discharge planner. SW completed [facility]  referral form and [facility] SW Contact:  SW met with pt to discuss dc planning. Pt. Informed SW she is not hearing voices. Pt expressed she has flashbacks about her apartment, the past to include the last time she was at this hospital. Pt. Expressed the flashes are so focused. SW encouraged pt to utilize positive coping skills ( positive self talk, read a book, word puzzle) for distraction. Pt. Expressed she will focus on the future. SW Provided positive feedback. Pt appears anxious, orientated and still suspicious. SW engaged pt with reality orientation. SW will continue to provide pt with support and assist the pt with dc planning.

## 2020-02-11 NOTE — BSMART NOTE
COUNSELING GROUP PROGRESS NOTE PATIENT SCHEDULED FOR GROUP AT: 10:00 
 
ATTENDANCE: Full PARTICIPATION LEVEL: Participates fully in the group process. ATTENTION LEVEL: Able to focus on task. FOCUS: Communication THEMATIC CONTENT AS NOTED IN REFLECTION: She presented with a calm mood and congruent affect and her thought processes were often paranoid. She was actively engaged in the group therapy directive and her approach to task was intentional. She was engaged in group discussions and often appeared confused and paranoid. At one point she suddenly became highly alert and stated she had just realized someone was in her apartment taking all of her belongings and she needed to find a way to contact them. She also became paranoid and was accusatory towards other group member accusing him of indirectly speaking about her when giving examples in group. She at times required redirection and clarification to focus on task at hand over worrying about paranoid thought processes.

## 2020-02-12 PROCEDURE — 65220000003 HC RM SEMIPRIVATE PSYCH

## 2020-02-12 PROCEDURE — 74011250637 HC RX REV CODE- 250/637: Performed by: PSYCHIATRY & NEUROLOGY

## 2020-02-12 RX ADMIN — DESVENLAFAXINE SUCCINATE 50 MG: 50 TABLET, FILM COATED, EXTENDED RELEASE ORAL at 08:09

## 2020-02-12 RX ADMIN — HYDROXYZINE PAMOATE 50 MG: 50 CAPSULE ORAL at 08:11

## 2020-02-12 RX ADMIN — DOCUSATE SODIUM 100 MG: 100 CAPSULE, LIQUID FILLED ORAL at 08:09

## 2020-02-12 RX ADMIN — FAMOTIDINE 20 MG: 20 TABLET ORAL at 20:20

## 2020-02-12 RX ADMIN — FAMOTIDINE 20 MG: 20 TABLET ORAL at 08:09

## 2020-02-12 RX ADMIN — CHLORPROMAZINE HYDROCHLORIDE 100 MG: 100 TABLET, SUGAR COATED ORAL at 20:20

## 2020-02-12 RX ADMIN — THERA TABS 1 TABLET: TAB at 08:09

## 2020-02-12 RX ADMIN — Medication 500 MG: at 08:09

## 2020-02-12 NOTE — PROGRESS NOTES
Behavioral Health Progress Note    Admit Date: 1/24/2020  Hospital day 19    Vitals :   Patient Vitals for the past 8 hrs:   BP Temp Pulse Resp   02/12/20 0804 106/66 97.1 °F (36.2 °C) (!) 102 16     Labs:  No results found for this or any previous visit (from the past 24 hour(s)).   Meds:   Current Facility-Administered Medications   Medication Dose Route Frequency    [START ON 2/17/2020] paliperidone palmitate (INVEGA SUSTENNA) injection 234 mg  234 mg IntraMUSCular [id number]    bisacodyL (DULCOLAX) tablet 5 mg  5 mg Oral DAILY PRN    chlorproMAZINE (THORAZINE) tablet 100 mg  100 mg Oral QHS    desvenlafaxine succinate (PRISTIQ) ER tablet 50 mg  50 mg Oral DAILY    docusate sodium (COLACE) capsule 100 mg  100 mg Oral DAILY    nicotine (NICORETTE) gum 2 mg  2 mg Oral Q2H PRN    hydrOXYzine pamoate (VISTARIL) capsule 50 mg  50 mg Oral Q4H PRN    haloperidoL (HALDOL) tablet 5 mg  5 mg Oral Q4H PRN    haloperidol lactate (HALDOL) injection 5 mg  5 mg IntraMUSCular Q4H PRN    LORazepam (ATIVAN) injection 1-2 mg  1-2 mg IntraMUSCular Q4H PRN    traZODone (DESYREL) tablet 50 mg  50 mg Oral QHS PRN    ibuprofen (MOTRIN) tablet 400 mg  400 mg Oral Q4H PRN    therapeutic multivitamin (THERAGRAN) tablet 1 Tab  1 Tab Oral DAILY    ascorbic acid (vitamin C) (VITAMIN C) tablet 500 mg  500 mg Oral DAILY    famotidine (PEPCID) tablet 20 mg  20 mg Oral BID    influenza vaccine 2019-20 (6 mos+)(PF) (FLUARIX/FLULAVAL/FLUZONE QUAD) injection 0.5 mL  0.5 mL IntraMUSCular PRIOR TO DISCHARGE      Hospital Problems: Principal Problem:    Schizophrenia ([address] (4/18/2017)    Active Problems:    Cigarette nicotine dependence without complication (9/0/5092)        Subjective:   Medication side effects: none  none    Mental Status Exam  Sensorium: alert  Orientation: only aware of  time, place and person  Relations: cooperative  Eye Contact: intense  Appearance: is tense  Thought Process: normal rate of thoughts and poor abstract reasoning/computation   Thought Content: delusions, paranoia and halluc   Suicidal: denies   Homicidal: none   Mood: is anxious   Affect: odd, blunted  Memory: is impaired and is remote     Concentration: fair  Abstraction: concrete  Insight: The patient shows little insight    OR Fair  Judgement: is psychologically impaired OR  Fair    Assessment/Plan:   not changed      Continue close observation,   Patient was discussed in treatment team today.  reports that the PACT team never presented the case for the [address] [place] [address] so she would not even be presented until next week for possible transfer to the [facility].  They also say that she will only be put on a waiting list so the likelihood of getting to the [facility] is virtually zero. .  We will have to make alternate plans. Does appear that she was discharged from each of her prior several hospitals with some degree of delusions and hallucinations with the patient working to ignore these so that she could be managed as an outpatient. She does continue to be odd saying that she does get some strange thoughts and tries to ignore them. She says that she gets frustrated trying to understand me as I talk because she ends up getting confused by things. She says that her apartment was not actually not as dirty as she thought but rather she would obsess about dust that would fall in the apartment. I have spoken to the social work about trying to get her into an adult living facility and have someone to monitor her taking her medications. It does not appear that people were monitoring this before she came to this facility.

## 2020-02-12 NOTE — BH NOTES
Patient report (3-11). Patient attend group but was withdrawn and quiet. Patient spoke only when you ask her a question. Patient appear to keep to herself. Patient did not eat dinner but drink a Ensure. Patient stated that \" I  does not eat much\". Patient paced the unit for several minutes then she went in her room. Patient   Still appear to be lonely and sad. Patient will be monitored for safety and change in her behavior.

## 2020-02-12 NOTE — GROUP NOTE
IP  GROUP DOCUMENTATION INDIVIDUAL Group Therapy Note Date: 2/11/2020 Group Start Time: 2015 Group End Time: 2030 Group Topic: Nursing [facility] ADULT CHEM DEP Ary Valentin., RN 
 
IP  GROUP DOCUMENTATION GROUP Group Therapy Note Attendees: 8 Attendance: Patient did not attend. William Ford.  Gaudencio Thakkar

## 2020-02-12 NOTE — INTERDISCIPLINARY ROUNDS
Treatment team met - Medical Director: _x____present Psychiatrist: ___x__present Charge nurse: ___x__present MSW: __x___present : __x___present Nurse Manager: _x____present Student RNs: __n___present Medical Students: _n____present Art Therapist: x_____present Clinical Coordinator: _x____present Occupational Therapist: _x____present : _n______ present UR  ___x____ present Crisis Supervisor____x___present Plan of care discussed and updated as appropriate. Still symptomatic; Will be presented to [facility] next week.

## 2020-02-12 NOTE — BH NOTES
Pt reported she was having anxiety. Received vistaril 50 mg. Will continue to monitor for safety and support.

## 2020-02-12 NOTE — BSMART NOTE
OCCUPATIONAL THERAPY PROGRESS NOTE Group Time:  1138 Attendance: The patient attended full group. Participation: The patient participated with moderate elaboration in the activity. Attention: The patient was able to focus on the activity. Interaction: The patient acknowledges others or responds to questions,  with no spontaneous interaction. Answers appropriate with some insight. Said she told her SW she thought she needed an YOSELIN on discharge. Thoughts more organized, some affective expression.

## 2020-02-12 NOTE — BSMART NOTE
Pt.is a 34year old female with history of Schizophrenia paranoid type and past history of Cocaine and DID.  Pt. was admitted to this facility for facility for delusional thinking and ideations to harm. 
  
Pt.'s case was discussed in treatment team. Pt. Will be presented as a transfer to [facility] next. Week as a non project. SW was ask to explore referring pt to an  shelter . SW will discuss the referral with [facility] PACT CM. SW Contact:  SW met with pt. The pt. Expressed she was doing well pt denies ideations but occasionally has hallucinations. Pt. States she was feeling anxious about her apartment. Pt. Thins the apartment is a mess. Pt. Has supportive staff onsite. The staff assist the pt. with IADL'S. Pt. States she feels she needs an shelter. SW will discuss with pt.'s PACT CM. SW encouraged pt. to utilize coping skills. Pt. was provided positive praise for medication compliance. Pt. Appears anxious, cooperative and little insight . ARUN will continue to assist the pt with the dc process.

## 2020-02-12 NOTE — PROGRESS NOTES
conducted an Spirituality Group for Amirah Flood, who is a 34 y.o.,female. Patient's Primary Language is: [place]. According to the patient's EMR Druze Affiliation is: Autumn Méndez. The reason the Patient came to the hospital is:   Patient Active Problem List    Diagnosis Date Noted    Recurrent depression ([facility] 2018    Cigarette nicotine dependence without complication     Single delivery by  2017    Episodic mood disorder ([facility] 2017    Schizophrenia ([facility] 2017         conducted Spirituality Group \"Changes\" and the patient was one of the participants. The  provided the following Interventions:  Continued the relationship of care and support. Listened empathically. Offered prayer and assurance of continued prayer on patients behalf. Chart reviewed. The following outcomes were achieved:  Patient expressed gratitude for 's visit.  w  Assessment:  There are no further spiritual or Jewish issues which require Spiritual Care Services interventions at this time. Plan:  Chaplains will continue to follow and will provide pastoral care on an as needed/requested basis.  recommends bedside caregivers page  on duty if patient shows signs of acute spiritual or emotional distress. Chaplain Bronwyn MILLAN  [address] [facility]   [phone]

## 2020-02-12 NOTE — PROGRESS NOTES
NUTRITION    Nutrition Screen     RECOMMENDATIONS / PLAN:     - Monitor and encourage po supplement intake. - Continue RD inpatient monitoring and evaluation. NUTRITION DIAGNOSIS & INTERVENTIONS:     - Meals/snacks: general healthy diet  - Medical food supplement therapy: Ensure Enlive, TID- discontinued 2/8 per MD  - Collaboration and referral of nutrition care: discussed meal intake with staff    Nutrition Diagnosis: Predicted inadequate energy intake related to appetite and altered mentation as evidenced by pt with variable; overall fair meal intake since admission      ASSESSMENT:     2/12: Nursing reports pt coming out for meal however intake unknown. Supplements discontinued per MD on 2/8, unknown reasoning. Pt continues with altered mentation, bizarre with delusions. Bowel regimen for pt c/o constipation. 2/7: Intake remains fair, unknown supplement consumption. 2/3: Altered mentation noted, pt states she is unable to eat solid foods due to the pepcid medication she is taking, unable to provide further details. Only consuming supplements. 1/31: Admitted experiencing delusional thinking and SI. Pt with noted confusion as only drank some juice this am, did not eat breakfast or consume supplements. Per staff pt with fair intake of meals since admission, overall variable intake. Tolerating diet. Appears well nourished. Nutritional intake adequate to meet patients estimated nutritional needs:  Unable to determine at this time    Diet: DIET REGULAR    Food Allergies:NKFA  Current Appetite: Fair to good per nursing, altered mentation  Appetite/meal intake prior to admission: Poor per pt; reports inadequate intake x months; pt with noted confusion during discussion  Feeding Limitations:  [] Swallowing Difficulty       [] Chewing Difficulty       [] Other   Current Meal Intake: No data found.   Gastrointestinal Issues:  [x] Yes: c/o constipation; bowel regimen ordered   Skin Integrity:  WDL    Pertinent Medications:  Reviewed: ascorbic acid, dulcolax prn, colace, famotidine, ferrous sulfate, MVI  Labs:  Reviewed     Anthropometrics:  Ht Readings from Last 1 Encounters:   10/22/19 5' 6\" (1.676 m)       There were no vitals filed for this visit. There is no height or weight on file to calculate BMI.  25.1 kg/(m^2) using previously documented wt from 1/23/20    Weight History: Pt reports stable wt hx PTA   Weight Metrics 1/23/2020 12/4/2019 10/22/2019 8/14/2019 7/1/2019 5/30/2019 5/20/2019   Weight 156 lb 180 lb 186 lb 214 lb 215 lb 215 lb 12.8 oz 214 lb 9.6 oz   BMI 25.18 kg/m2 29.05 kg/m2 30.02 kg/m2 34.54 kg/m2 34.7 kg/m2 34.83 kg/m2 34.64 kg/m2       Admitting Diagnosis: Schizophrenia ([facility] [F20.9]  Past Medical History:   Diagnosis Date    Alcoholic ([facility]     Sober 3 months; Weekly AAA meeting; Had substance abuse counseling;     Anemia     Bipolar disorder ([facility]     Cirrhosis ([facility]     Past not current issue per patient    ETOH abuse     GERD (gastroesophageal reflux disease)     Head injury     Marijuana abuse     Phobia     [facility]    Post partum depression     Pregnancy     Psychiatric disorder     Psychotic disorder ([address]     Depression/  Chance Akins NP; Bipolar disorder, mood swings.  Schizophrenia ([facility]     Stroke [facility]) 2011        Education Needs:        [x] None identified  [] Identified - Not appropriate at this time  []  Identified and addressed - refer to education log  Learning Limitations:   [] None identified  [x] Identified: bizarre with delusions  Cultural, Jain & ethnic food preferences identified:  [x] None    [] Yes      ESTIMATED NUTRITION NEEDS:     1742-1887kcal (MSJx1.2-1.3), 57-71 gm protein (0.8-1 gm/kg), 1 mL/kcal  Based on: 71 kg       [x] Actual BW- using wt documented 1/23/20       MONITORING & EVALUATION:     Nutrition Goal(s):   - PO nutrition intake will meet >75% of patient estimated nutritional needs within the next 7 days.    Outcome: Progressing towards goal     Monitor: [x] Food and nutrient intake   [x] Food and nutrient administration  [x] Comparative standards   [x] Nutrition-focused physical findings   [x] Anthropometric Measurements   [x] Treatment/therapy   [x] Biochemical data, medical tests, and procedures         Previous Recommendations (for follow-up assessments only): Implemented       Discharge Planning: Nutritional discharge needs unknown at this time.    [x]  Participated in care planning, discharge planning, & interdisciplinary rounds as appropriate      Danna Ferrera RD   Pager: [phone]

## 2020-02-12 NOTE — BSMART NOTE
ART THERAPY GROUP PROGRESS NOTE PATIENT SCHEDULED FOR GROUP AT: 3502 ATTENDANCE: Full PARTICIPATION LEVEL: Participates fully in the art process ATTENTION LEVEL: Able to focus on task FOCUS: Grounding SYMBOLIC & THEMATIC CONTENT AS NOTED IN IMAGERY: She was calm and presented with a blunted affect. She did smile occasionally and appeared alert and invested in the task with intermittent periods of distraction. Her approach was planned-out, organized, and purposeful. She expressed themes of hope and motivation in her imagery and associations. Continues to present as somewhat confused, and will parrot occasionally. For example, when this writer said Skip Burn you for [employer]" she replied \"thank you for sharing. \"

## 2020-02-12 NOTE — PROGRESS NOTES
Problem: Psychosis  Goal: *STG: Decreased hallucinations  Description  Patient will verbalize denial of auditory/visual hallucinations every shift throughout hospital stay. Outcome: Progressing Towards Goal  Note:   Patient will work on decreased hallucinations daily  Goal: *STG: Remains safe in hospital  Description  Patient will remain free of harm to self and others every shift throughout hospitalization. Outcome: Progressing Towards Goal  Note:   Patient will remain safe in the hospital daily  Goal: *STG/LTG: Complies with medication therapy  Description  Patient will take medication as prescribed every shift throughout hospital stay. Outcome: Progressing Towards Goal  Note:   Patient will comply with medication therapy daily     Patient has been visible and active in the day area. She has been visible in the day area and has attended groups. She did participate actively. In the morning, patient unable to answer questions. She stated \" I dont know\". However, in group, she was actively answering questions. Continues to endorse A/H. Denies current thoughts of self harm. Will continue to monitor for safety and support.

## 2020-02-12 NOTE — BSMART NOTE
COUNSELING GROUP PROGRESS NOTE PATIENT SCHEDULED FOR GROUP AT: 13:50 
 
ATTENDANCE: 3/4 PARTICIPATION LEVEL: Participates fully in the group process. ATTENTION LEVEL: Able to focus on task. FOCUS: Communcation THEMATIC CONTENT AS NOTED IN REFLECTION: She presented with a calm mood and congruent affect and her thought processes were more organized. She was actively engaged in the group therapy directive and her approach to task was intentional. She was highly focused and participated in group discussions. She left group early and when she left she stated \"I hope this group helps me. \"

## 2020-02-13 PROCEDURE — 74011250637 HC RX REV CODE- 250/637: Performed by: PSYCHIATRY & NEUROLOGY

## 2020-02-13 PROCEDURE — 65220000003 HC RM SEMIPRIVATE PSYCH

## 2020-02-13 RX ADMIN — FAMOTIDINE 20 MG: 20 TABLET ORAL at 08:25

## 2020-02-13 RX ADMIN — Medication 500 MG: at 08:25

## 2020-02-13 RX ADMIN — THERA TABS 1 TABLET: TAB at 08:25

## 2020-02-13 RX ADMIN — CHLORPROMAZINE HYDROCHLORIDE 100 MG: 100 TABLET, SUGAR COATED ORAL at 20:23

## 2020-02-13 RX ADMIN — DOCUSATE SODIUM 100 MG: 100 CAPSULE, LIQUID FILLED ORAL at 08:25

## 2020-02-13 RX ADMIN — FAMOTIDINE 20 MG: 20 TABLET ORAL at 20:23

## 2020-02-13 RX ADMIN — DESVENLAFAXINE SUCCINATE 50 MG: 50 TABLET, FILM COATED, EXTENDED RELEASE ORAL at 08:25

## 2020-02-13 NOTE — BSMART NOTE
Pt.is a 34year old female with history of Schizophrenia paranoid type and past history of Cocaine and DID.  Pt. was admitted to this facility for facility for delusional thinking and ideations to harm. Pt.'s case was discussed with covering psychiatrist. Payal Harp provided an update. Pt. Will be presented as a transfer to [facility] next. SW was ask to explore referring pt to an  [facility] . SW will discussed case with Emilie Dubose with [facility] Discharge Planner. Pt 's information will be discussed with Medical Director at [address] [place] [address]. [facility] will present case next week if Medical Director agrees pt needs continued stabilization. SW Contact:  SW met with pt. To discuss dc planning. Pt expressed she is feeling fine. Pt has been medication compliant . SW discussed the above. Pt. Stated she will do what veer is recommended. Pt. Ask if she rojas snot go to [facility] , what will happen. Pt. Was informed she will return to her apartment with supports , while [facility] PACT CM assist her with exploring [facility] in the community or transition to [facility] for continued stabilization. Pt. was agreeable. SW encouraged continued coping skills. Pt. Appears anxious, cooperative and continues to have  little insight . SW will continue to assist the pt with the dc process.

## 2020-02-13 NOTE — BH NOTES
Pt was alert and active this shift. Pt was quiet and withdrawn. Pt spent some time in day area but did not socialize much. Pt ate all meals and was cooperative. Contracted for safety, staff will continue to monitor and support.

## 2020-02-13 NOTE — PROGRESS NOTES
[address]  Inpatient Progress Note     Date of Service: 02/13/20  Hospital Day: 20     Subjective/Interval History   02/13/20    Treatment Team Notes:  Notes reviewed and/or discussed and report that Leroy Mckinnon is a 33 y/o pt with a chronic psych hx, on a combination of multiple medications including Invega Sustenna, CPZ, and desvenlafaxine, which indicated is tolerating rather well. Patient interview: Leroy Mckinnon was interviewed by this writer today. Even though there is evidence of improvement, the pt remains still psychotic, with the presence of AH described. She is questioning her being able to stay by self, and brought up the possibility to her having to be referred to an YOSELIN. She appeared rather anxious today also. Objective     Visit Vitals  /66 (BP 1 Location: Right arm, BP Patient Position: Sitting)   Pulse (!) 102   Temp 97.1 °F (36.2 °C)   Resp 16   Breastfeeding No     Mild elevation of HR, anxiety related. No results found for this or any previous visit (from the past 24 hour(s)). Mental Status Examination     Appearance/Hygiene 34 y.o. WHITE OR  female  Hygiene: fair. Behavior/Social Relatedness Appropriate, attending [facility] sessions, just transferred from one of the [facility] programs. Musculoskeletal Gait/Station: appropriate  Tone (flaccid, cogwheeling, spastic): not assessed  Psychomotor (hyperkinetic, hypokinetic): anxious at times. Involuntary movements (tics, dyskinesias, akathisa, stereotypies): none   Speech   Rate, rhythm, volume, fluency and articulation are appropriate   Mood   Denies being depressed. Affect    Labile at times. Thought Process Linear and goal directed   Thought Content and Perceptual Disturbances AH present, anxious questioning her ability to take care of self. Sensorium and Cognition  Grossly intact   Insight  Limited. Judgment Improving.         Assessment/Plan      Psychiatric Diagnoses:   Patient Active Problem List   Diagnosis Code    Cigarette nicotine dependence without complication X02.737    Recurrent depression ([facility] F33.9    Schizophrenia ([facility] F20.9    Episodic mood disorder ([facility] F39    Single delivery by  O82       Medical Diagnoses: same. Psychosocial and contextual factors: same. Level of impairment/disability: high. 1.  Medications related tolerance is good, with positive improvement noticed by chart review. 2.  Reviewed instructions, risks, benefits and side effects of medications  3. Disposition/Discharge Date: to be determined. Saravanan Dc MD, LFAPA.   [facility] [place]  Psychiatry

## 2020-02-13 NOTE — BSMART NOTE
SOCIAL WORK GROUP THERAPY PROGRESS NOTE Group Time:  10:45am 
 
Group Topic:  Coping Skills    C D Issues Group Participation:   
 
Pt moderately involved during group discussion but remained attentive. Needed some redirection as was trying to pass notes to older male peer. Both were encouraged to stop, which they did. Looked at the \"Process of setting Goals\", the value of a \"daily\" /  \"weekly\" schedule as tool to build self esteem, sharpen decision making skills and help define one's reality. Pt uncertain how  / who will be part of her d/c plan.

## 2020-02-13 NOTE — GROUP NOTE
[facility]  GROUP DOCUMENTATION INDIVIDUAL Group Therapy Note Date: 2/13/2020 Group Start Time: 8219 Group End Time: 5858 Group Topic: Nursing [facility] ADULT CHEM Amy Abdul, RN 
 
[facility] GROUP DOCUMENTATION GROUP Group Therapy Note Attendees: 4 Attendance: Attended Patient's Goal:  Coping skills Interventions/techniques: Challenged, Informed and Provide feedback Follows Directions: Followed directions Interactions: Interacted appropriately Mental Status: Calm Behavior/appearance: Attentive and Cooperative Goals Achieved: Able to engage in interactions and Able to listen to others Additional Notes: Invested in group Gabriele Forward, RN

## 2020-02-13 NOTE — MED STUDENT NOTES
*ATTENTION:  This note has been created by a medical student for educational purposes only. Please do not refer to the content of this note for clinical decision-making, billing, or other purposes. Please see attending physicians note to obtain clinical information on this patient. * Progress Note Subjective: 
Ms. Wojciech Boateng is a 34year old female with a history of schizophrenia that presented 1/29/2020 to [facility] ED for psychosis. The patient stated she felt better today. She ate this morning and slept well. She is less anxious than she was yesterday. She said she felt fearful and nervous about being on this unit. She feels, Basilia Simeon is judging me,\" because \"people are judgmental.\" She also is eager to leave and go home. I gave the patient simple goal-orientation today to attend group and socialize if able. We will regroup tomorrow and talk about her progress. Per treatment team: Pt is quiet and reserved. She is eating her meals and staying out in the day area but with minimal social interaction. Objective: 
/66 (BP 1 Location: Right arm, BP Patient Position: Sitting)   Pulse (!) 102   Temp 97.1 °F (36.2 °C)   Resp 16   Breastfeeding No  
 
Recent Results (from the past 1008 hour(s)) HCG URINE, QL Collection Time: 01/23/20 11:30 AM  
Result Value Ref Range HCG urine, QL NEGATIVE  NEG    
DRUG SCREEN, URINE Collection Time: 01/23/20 11:30 AM  
Result Value Ref Range BENZODIAZEPINES NEGATIVE  NEG    
 BARBITURATES NEGATIVE  NEG    
 THC (TH-CANNABINOL) NEGATIVE  NEG    
 OPIATES NEGATIVE  NEG    
 PCP(PHENCYCLIDINE) NEGATIVE  NEG    
 COCAINE NEGATIVE  NEG    
 AMPHETAMINES NEGATIVE  NEG METHADONE NEGATIVE  NEG HDSCOM (NOTE) CBC WITH AUTOMATED DIFF Collection Time: 01/23/20 11:51 AM  
Result Value Ref Range WBC 8.3 4.6 - 13.2 K/uL  
 RBC 4.53 4.20 - 5.30 M/uL  
 HGB 14.1 12.0 - 16.0 g/dL HCT 41.6 35.0 - 45.0 %  MCV 91.8 74.0 - 97.0 FL  
 MCH 31.1 24.0 - 34.0 PG  
 MCHC 33.9 31.0 - 37.0 g/dL  
 RDW 14.6 (H) 11.6 - 14.5 % PLATELET 466 320 - 550 K/uL MPV 10.4 9.2 - 11.8 FL  
 NEUTROPHILS 69 40 - 73 % LYMPHOCYTES 18 (L) 21 - 52 % MONOCYTES 13 (H) 3 - 10 % EOSINOPHILS 0 0 - 5 % BASOPHILS 0 0 - 2 %  
 ABS. NEUTROPHILS 5.7 1.8 - 8.0 K/UL  
 ABS. LYMPHOCYTES 1.5 0.9 - 3.6 K/UL  
 ABS. MONOCYTES 1.1 0.05 - 1.2 K/UL  
 ABS. EOSINOPHILS 0.0 0.0 - 0.4 K/UL  
 ABS. BASOPHILS 0.0 0.0 - 0.1 K/UL  
 DF AUTOMATED METABOLIC PANEL, COMPREHENSIVE Collection Time: 01/23/20 11:51 AM  
Result Value Ref Range Sodium 140 136 - 145 mmol/L Potassium 4.0 3.5 - 5.5 mmol/L Chloride 106 100 - 111 mmol/L  
 CO2 24 21 - 32 mmol/L Anion gap 10 3.0 - 18 mmol/L Glucose 89 74 - 99 mg/dL BUN 11 7.0 - 18 MG/DL Creatinine 0.65 0.6 - 1.3 MG/DL  
 BUN/Creatinine ratio 17 12 - 20 GFR est AA >60 >60 ml/min/1.73m2 GFR est non-AA >60 >60 ml/min/1.73m2 Calcium 9.2 8.5 - 10.1 MG/DL Bilirubin, total 0.6 0.2 - 1.0 MG/DL  
 ALT (SGPT) 16 13 - 56 U/L  
 AST (SGOT) 11 10 - 38 U/L Alk. phosphatase 75 45 - 117 U/L Protein, total 7.4 6.4 - 8.2 g/dL Albumin 3.8 3.4 - 5.0 g/dL Globulin 3.6 2.0 - 4.0 g/dL A-G Ratio 1.1 0.8 - 1.7 ETHYL ALCOHOL Collection Time: 01/23/20 11:51 AM  
Result Value Ref Range ALCOHOL(ETHYL),SERUM <3 0 - 3 MG/DL  
SALICYLATE Collection Time: 01/23/20 11:51 AM  
Result Value Ref Range Salicylate level 2.6 (L) 2.8 - 20.0 MG/DL  
ACETAMINOPHEN Collection Time: 01/23/20 11:51 AM  
Result Value Ref Range Acetaminophen level <2 (L) 10.0 - 30.0 ug/mL VALPROIC ACID Collection Time: 01/24/20 12:11 PM  
Result Value Ref Range Valproic acid 20 (L) 50 - 100 ug/ml HEMOGLOBIN A1C W/O EAG Collection Time: 01/25/20  6:35 AM  
Result Value Ref Range Hemoglobin A1c 5.0 4.2 - 5.6 % TSH 3RD GENERATION Collection Time: 01/25/20  6:35 AM  
Result Value Ref Range TSH 0.81 0.36 - 3.74 uIU/mL  
LIPID PANEL Collection Time: 01/25/20  6:35 AM  
Result Value Ref Range LIPID PROFILE Cholesterol, total 160 <200 MG/DL Triglyceride 96 <150 MG/DL  
 HDL Cholesterol 45 40 - 60 MG/DL  
 LDL, calculated 95.8 0 - 100 MG/DL VLDL, calculated 19.2 MG/DL  
 CHOL/HDL Ratio 3.6 0 - 5.0 VALPROIC ACID Collection Time: 01/27/20 10:11 AM  
Result Value Ref Range Valproic acid 63 50 - 100 ug/ml Mental Status Exam: 
Appearance/Hygiene: 34year old  female; hygiene is good Behavior/Social-Relatedness: withdrawn and reclusive; good eye contact when addressed directly Musculoskeletal: 
 Gait/Station: slow walking Tone: not assessed Psychomotor: calm Involuntary movements: none Speech: rate is slow and rhythm, articulation, and tone are appropriate; volume is soft and low Mood: okay Affect: flat Thought process: tangential, thought-blocking Thought content and perceptual disturbances: no suicidal ideations, homicidal ideations or thoughts of self-harm; positive for auditory hallucinations, no visual hallucinations Insight: poor Judgment: poor Assessment: 
Active Hospital Problems Diagnosis Date Noted  Cigarette nicotine dependence without complication 40/61/7548  [facility] Plan: 1. Continue current treatment plan 2.  Continue working to find a facility to accommodate her and her needs for ADL's

## 2020-02-13 NOTE — PROGRESS NOTES
Problem: Psychosis  Goal: *STG: Decreased hallucinations  Description  Patient will verbalize denial of auditory/visual hallucinations every shift throughout hospital stay. Outcome: Progressing Towards Goal  Note:   Patient will have reduced hallucinations daily  Goal: *STG: Remains safe in hospital  Description  Patient will remain free of harm to self and others every shift throughout hospitalization. Outcome: Progressing Towards Goal  Note:   Patient will remain safe in the hospital daily  Goal: *STG/LTG: Complies with medication therapy  Description  Patient will take medication as prescribed every shift throughout hospital stay. Outcome: Progressing Towards Goal  Note:   Patient will comply with medication therapy daily    Patient has been visible and active in the day area. She has been compliant with Am medications and has been actively attending groups. She continues to pace at times with a flat affect. She reported this morning that if she had a list of feeling words then she would be able to communicate her feelings. However, she stated \" I feel undecided\". She is active when attending groups. Denies current thoughts of self hair. Continues to appear to be thought blocking at times. Will continue to monitor for safety and support.

## 2020-02-13 NOTE — BSMART NOTE
OCCUPATIONAL THERAPY PROGRESS NOTE Group Time:  1767 Attendance: The patient attended full group. once. Participation: The patient participated fully in the activity. Attention The patient needed redirection to activity Interaction: The patient acknowledges others or responds to questions,  with no spontaneous interaction. Initially appropriate, became somewhat loose and hard to follow at one point.

## 2020-02-13 NOTE — BSMART NOTE
ART THERAPY GROUP PROGRESS NOTE PATIENT SCHEDULED FOR GROUP AT: 9:45 ATTENDANCE: Full PARTICIPATION LEVEL: Participates fully in the art process. ATTENTION LEVEL: Able to focus on task. FOCUS: Goal Setting SYMBOLIC & THEMATIC CONTENT AS NOTED IN IMAGERY: Leathas mood and affect were euthymic and congruent. She attended to the task with logical thinking laying out step by step plans to achieve a goal for another member once she finished setting her goals. She identified colors in Thai as she worked and stated that \"it's exciting to see the big picture of long term goals. \" Her identified goals were to \"attend group counseling  today and socialize. Socializing is a lot. \" She however received support from two other members and was smiling at the end of group.

## 2020-02-13 NOTE — BSMART NOTE
COUNSELING GROUP PROGRESS NOTE PATIENT SCHEDULED FOR GROUP AT: 12:40 
 
ATTENDANCE: Full PARTICIPATION LEVEL: Participates fully in the group process. ATTENTION LEVEL: Able to focus on task. FOCUS: Communication THEMATIC CONTENT AS NOTED IN REFLECTION: She presented with a calm mood and congruent affect and her thought processes were more organized than previous groups. She was actively engaged with the group therapy directive and her approach to task was purposeful. She often volunteered to answer questions first and worked slowly in completing the directive. Thematic content was appropriate for the given directive and she identified emotions experienced including grateful, optimistic, and empowered.

## 2020-02-13 NOTE — GROUP NOTE
DARLING  GROUP DOCUMENTATION INDIVIDUAL Group Therapy Note Date: 2/12/2020 Group Start Time: 80 Group End Time: 2015 Group Topic: Nursing [address] ADULT CHEM DEP Nai Mutters., RN 
 
[facility] GROUP DOCUMENTATION GROUP Group Therapy Note Attendees: 8 Attendance: Did not attend. Kushal Santoyo.  Sebas Nuno

## 2020-02-14 PROCEDURE — 74011250637 HC RX REV CODE- 250/637: Performed by: PSYCHIATRY & NEUROLOGY

## 2020-02-14 PROCEDURE — 65220000003 HC RM SEMIPRIVATE PSYCH

## 2020-02-14 RX ADMIN — THERA TABS 1 TABLET: TAB at 08:21

## 2020-02-14 RX ADMIN — FAMOTIDINE 20 MG: 20 TABLET ORAL at 20:39

## 2020-02-14 RX ADMIN — DESVENLAFAXINE SUCCINATE 50 MG: 50 TABLET, FILM COATED, EXTENDED RELEASE ORAL at 08:21

## 2020-02-14 RX ADMIN — FAMOTIDINE 20 MG: 20 TABLET ORAL at 08:21

## 2020-02-14 RX ADMIN — CHLORPROMAZINE HYDROCHLORIDE 100 MG: 100 TABLET, SUGAR COATED ORAL at 20:39

## 2020-02-14 RX ADMIN — DOCUSATE SODIUM 100 MG: 100 CAPSULE, LIQUID FILLED ORAL at 08:21

## 2020-02-14 RX ADMIN — Medication 500 MG: at 08:21

## 2020-02-14 RX ADMIN — TRAZODONE HYDROCHLORIDE 50 MG: 50 TABLET ORAL at 20:40

## 2020-02-14 NOTE — BH NOTES
The patient has been up on the unit. She is quiet. She denies that there are thoughts of harming herself or others. She denies that she is having hallucinations. She was attentive in group earlier this evening. Will continue to monitor and will continue to provide support.

## 2020-02-14 NOTE — BSMART NOTE
OCCUPATIONAL THERAPY PROGRESS NOTE Group Time:  4784 Attendance: The patient attended full group. Scott Bailey Participation: The patient participated with minimal elaboration in the activity. . 
Attention: The patient needed redirection to activity at least once. Interaction: The patient acknowledges others or responds to questions,  with no spontaneous interaction. Appeared more depressed and withdrawn and preoccupied today.

## 2020-02-14 NOTE — GROUP NOTE
DARLING  GROUP DOCUMENTATION INDIVIDUAL Group Therapy Note Date: 2/14/2020 Group Start Time: 8285 Group End Time: 1100 Group Topic: Nursing [facility] 1 ADULT CHEM DEP Arvella Prom [facility] GROUP DOCUMENTATION GROUP Group Therapy Note Attendees: 8 Attendance: Attended Patient's Goal:  Medications Interventions/techniques: Challenged and Informed Follows Directions: Followed directions Interactions: Interacted appropriately Mental Status: Calm and Flat Behavior/appearance: Cooperative Goals Achieved: Able to engage in interactions, Able to listen to others, Able to give feedback to another and Able to manage/cope with feelings Fani Krishnamurthy

## 2020-02-14 NOTE — BSMART NOTE
ART THERAPY GROUP PROGRESS NOTE PATIENT SCHEDULED FOR GROUP AT: 9:45 ATTENDANCE: Full PARTICIPATION LEVEL: Participates fully in the art process. ATTENTION LEVEL: Able to focus on task. FOCUS: Coping SYMBOLIC & THEMATIC CONTENT AS NOTED IN IMAGERY: Elyse's mood and affect were euthymic and congruent. Her approach to task was organized, appropriate and planned. She was able to finish a full intervention and half of a new one and supported another group member complementing their work. She disclosed that she felt \"sad because I thought about my mother and her bad heart and how she's having to take care of my two boys. \"

## 2020-02-14 NOTE — PROGRESS NOTES
[address]  Inpatient Progress Note     Date of Service: 20  Hospital Day: 21     Subjective/Interval History   20    Treatment Team Notes:  Notes reviewed and/or discussed and report that Hailey Nance is a chronic schizophrenic pt, with a disabling thought disorder, being covered for Dr. Gonzalo Simons today. Patient interview: Hailey Nance was interviewed by this writer today. The pt remains psychotic, and even though improving, she is considered not to be able to take care of self, and requiring a transfer to [facility]. OP [facility] staff is working towards her referral.      Objective     Visit Vitals  /77   Pulse 100   Temp 97 °F (36.1 °C)   Resp 16   Breastfeeding No     Vitals are stable. No results found for this or any previous visit (from the past 24 hour(s)). Mental Status Examination     Appearance/Hygiene 34 y.o. WHITE OR  female  Hygiene: fair. Behavior/Social Relatedness Appropriate   Musculoskeletal Gait/Station: appropriate  Tone (flaccid, cogwheeling, spastic): not assessed  Psychomotor (hyperkinetic, hypokinetic): calm   Involuntary movements (tics, dyskinesias, akathisa, stereotypies): none   Speech   Rate, rhythm, volume, fluency and articulation are appropriate   Mood   Denies being depressed. Affect    Flat. Thought Process Linear and goal directed   Thought Content and Perceptual Disturbances Still hallucinating, somewhat concrete and with limited insight, the pt's judgment has been poor in the past.    Sensorium and Cognition  Grossly intact   Insight  Limited. Judgment Poor by hx. Assessment/Plan      Psychiatric Diagnoses:   Patient Active Problem List   Diagnosis Code    Cigarette nicotine dependence without complication H57.951    [facility] ([facility] F33.9    Schizophrenia ([facility] F20.9    Episodic mood disorder ([facility] F39    Single delivery by  O82       Medical Diagnoses: same.     Psychosocial and contextual factors: same. Level of impairment/disability: high. 1.  No evidence of meds related side effects noticed when seen this morning. Not clear if she is aware of her OP staff working towards a referral to [facility] or not. 2.  Reviewed instructions, risks, benefits and side effects of medications  3. Disposition/Discharge Date: to [facility] as indicated. Ariella Young MD, [facility]   DR. MORENO'S \[facility] [place] [facility]\""  Psychiatry

## 2020-02-14 NOTE — BSMART NOTE
ART THERAPY Individual PROGRESS NOTE PATIENT SCHEDULED FOR GROUP AT: 1:40 Elyse's mood and affect were depressed and she was tearful throughout the entire session. She spoke about feeling like she is \"falling apart,\" that she needs to work on her \"self-esteem\" and that \"I don't have a lot of character anymore, something happened. \" Inocencia Blackwell was struggling with what her life's purpose is and said that the word \"departed\" came to her mind today and that her \"life is over and I don't have a future, I'm stuck in limbo. \" She wants to make a plan for her life for after she gets out of the hospital but doesn't know where she is going. She spoke about struggling to focus and that she drinks an excess of coffee \"so that I can think clearly through a bunch of sedatives. \" She asked \"what can I do to get myself back? \" She had thought blocking during session and her lines of logic were tangential.

## 2020-02-14 NOTE — BSMART NOTE
SOCIAL WORK GROUP THERAPY PROGRESS NOTE Group Time:  10:30 Group Topic:  Coping Skills    C D Issues Group Participation:   
 
Pt struggled more today & was minimally involved during group discussion with flat affect. As group was taught \"Role of Humor\" and it's relationship to managing mood disorders & stress management pt struggled tracking content. She presented no self disclosure.

## 2020-02-14 NOTE — PROGRESS NOTES
Problem: Psychosis  Goal: *STG: Decreased hallucinations  Description  Patient will verbalize denial of auditory/visual hallucinations every shift throughout hospital stay. Outcome: Progressing Towards Goal  Goal: *STG: Remains safe in hospital  Description  Patient will remain free of harm to self and others every shift throughout hospitalization. Outcome: Progressing Towards Goal     Pt denies suicidal thoughts, ideations, or plan. Pt also denies hallucinations of all types. Pt still presents with thought blocking but has been much clearer today. Pt states, \"I don't think my [place] [employer] is working. It makes me feel like I can't focus in groups. \" Pt is asking about discharge stating she wants to be more independent and feels she is ready to go home. Pt is medication compliant and attentive in groups. Will continue to monitor.

## 2020-02-14 NOTE — GROUP NOTE
DARL[facility]  GROUP DOCUMENTATION INDIVIDUAL Group Therapy Note Date: 2/13/2020 Group Start Time: 2100 Group End Time: 2130 Group Topic: Nursing [facility] 1 ADULT CHEM DEP Andrew Florez RN 
 
[facility] GROUP DOCUMENTATION GROUP Group Therapy Note Attendees: 9 Attendance: Attended Interventions/techniques: Informed Follows Directions: Followed directions Interactions: Interacted appropriately Mental Status: Calm Behavior/appearance: Cooperative Goals Achieved: Able to engage in interactions Carmen Ivey RN

## 2020-02-14 NOTE — BSMART NOTE
Pt.is a 34year old female with history of Schizophrenia paranoid type and past history of Cocaine and DID.  Pt. was admitted to this facility for facility for delusional thinking and ideations to harm. 
  
Pt.'s case was discussed with covering psychiatrist. Carey Mccord provided an update. Pt. Will be presented as a transfer to [facility] next. ARUN will discussed case with Abigail Key with [facility] Discharge Planner. Pt 's information will be discussed with Medical Director at [address] [place] [address]. [facility] will present case next week if Medical Director agrees pt needs continued stabilization. SW Contact:  ARUN met with pt. Pt. Expressed she was feeling better but worried about having to go to [facility]. Pt. Informed SW she does not feel as though she needs to transition to the [facility] hospital.  The pt. States she thinks she will do better being back at her apartment. Pt states she feels as though she is going backwards, if she was to return to the state facility. Pt. states she is aware she needs to take her medications in order to stay out of the hospital.  ARUN provided pt with positive feedback. SW discussed positive coping skills, safety plan and positive goal setting. Pt appears anxious, cooperative and insight is starting to improve. SW will continue to assist the pt with dc planning.

## 2020-02-15 PROCEDURE — 65220000003 HC RM SEMIPRIVATE PSYCH

## 2020-02-15 PROCEDURE — 74011250637 HC RX REV CODE- 250/637: Performed by: PSYCHIATRY & NEUROLOGY

## 2020-02-15 RX ADMIN — DESVENLAFAXINE SUCCINATE 50 MG: 50 TABLET, FILM COATED, EXTENDED RELEASE ORAL at 08:12

## 2020-02-15 RX ADMIN — FAMOTIDINE 20 MG: 20 TABLET ORAL at 20:38

## 2020-02-15 RX ADMIN — THERA TABS 1 TABLET: TAB at 08:12

## 2020-02-15 RX ADMIN — FAMOTIDINE 20 MG: 20 TABLET ORAL at 08:12

## 2020-02-15 RX ADMIN — Medication 500 MG: at 08:12

## 2020-02-15 RX ADMIN — CHLORPROMAZINE HYDROCHLORIDE 100 MG: 100 TABLET, SUGAR COATED ORAL at 20:38

## 2020-02-15 RX ADMIN — DOCUSATE SODIUM 100 MG: 100 CAPSULE, LIQUID FILLED ORAL at 08:12

## 2020-02-15 NOTE — BH NOTES
IRINEO Note: The above pt has been alert and cooperative this shift. She has been social with staff and peers this shift. She has participated in all groups this shift. She verbally contract for safety and denies any self-harm at this time.

## 2020-02-15 NOTE — GROUP NOTE
[facility] GROUP DOCUMENTATION INDIVIDUAL Group Therapy Note Date: 2/14/2020 Group Start Time: 3004 Group End Time: 5900 Group Topic: Nursing [address] ADULT CHEM DEP Kimber Ann RN 
 
[facility] GROUP DOCUMENTATION GROUP Group Therapy Note Attendees: 11 Attendance: Attended Interventions/techniques: Informed Follows Directions: Followed directions Interactions: Interacted appropriately Mental Status: Calm Behavior/appearance: Cooperative Goals Achieved: Able to engage in interaction Patricia Nieves RN

## 2020-02-15 NOTE — PROGRESS NOTES
[address]  Inpatient Progress Note     Date of Service: 02/15/20  Hospital Day: 22     Subjective/Interval History   02/15/20    Treatment Team Notes:  Notes reviewed and/or discussed and report that Afsaneh Leal is a chronic schizophrenic pt, with a disabling thought disorder. Patient interview: Afsaneh Leal was interviewed by this writer today. Patient presents as disorganized, paranoid, poor insight, and loose associations. Objective     Visit Vitals  /69   Pulse (!) 115   Temp 97.1 °F (36.2 °C)   Resp 20   Breastfeeding No       No results found for this or any previous visit (from the past 24 hour(s)). Mental Status Examination     Appearance/Hygiene 34 y.o. WHITE OR  female  Hygiene: Disheveled   Behavior/Social Relatedness Bizarre   Musculoskeletal Gait/Station: appropriate  Tone (flaccid, cogwheeling, spastic): not assessed  Psychomotor (hyperkinetic, hypokinetic): calm   Involuntary movements (tics, dyskinesias, akathisa, stereotypies): none   Speech   Rate, rhythm, volume, fluency and articulation are appropriate   Mood   I am proactive   Affect    Flat   Thought Process Disorganized   Thought Content and Perceptual Disturbances Reports hallucinations but did not elaborate   Sensorium and Cognition  Grossly intact   Insight  poor   Judgment poor        Assessment/Plan      Psychiatric Diagnoses:   Patient Active Problem List   Diagnosis Code    Cigarette nicotine dependence without complication F16.751    Recurrent depression ([facility]) F33.9    Schizophrenia ([facility]) F20.9    [facility] F[facility]    Single delivery by  O82       Psychosocial and contextual factors: Chronic mental health illness    Level of impairment/disability: Severe    Afsaneh Leal is a 34 y.o. who is currently admitted to unit for management of Schizophrenia. 1.  Continue current medication regimen unchanged to give it more time to work.   2. Disposition/Discharge Date: Undetermined at this time.     Annamaria Corrales MD DR. [facility]  Psychiatry

## 2020-02-15 NOTE — BSMART NOTE
ART THERAPY GROUP PROGRESS NOTE 
  
PATIENT SCHEDULED FOR GROUP AT: 9:30 
  
ATTENDANCE: Full 
  
PARTICIPATION LEVEL: Participates fully in the art process. 
  
ATTENTION LEVEL: Able to focus on task. 
  
FOCUS: Self Affirmation 
  
SYMBOLIC & THEMATIC CONTENT AS NOTED IN IMAGERY: She presented with a calm mood and congruent affect and her thought processes were linear. She was actively engaged in the art therapy directive and her approach to task was intentional. She was invested in the task and kept to herself for most of group. She participated in sharing her work with the group and her thematic content was organized and appropriate. She identified positive self statements that were relative to her life and reflected on how she needs to not let fear control her actions.

## 2020-02-16 PROCEDURE — 65220000003 HC RM SEMIPRIVATE PSYCH

## 2020-02-16 PROCEDURE — 74011250637 HC RX REV CODE- 250/637: Performed by: PSYCHIATRY & NEUROLOGY

## 2020-02-16 RX ADMIN — CHLORPROMAZINE HYDROCHLORIDE 100 MG: 100 TABLET, SUGAR COATED ORAL at 20:20

## 2020-02-16 RX ADMIN — HYDROXYZINE PAMOATE 50 MG: 50 CAPSULE ORAL at 13:38

## 2020-02-16 RX ADMIN — Medication 500 MG: at 08:35

## 2020-02-16 RX ADMIN — DESVENLAFAXINE SUCCINATE 50 MG: 50 TABLET, FILM COATED, EXTENDED RELEASE ORAL at 08:34

## 2020-02-16 RX ADMIN — THERA TABS 1 TABLET: TAB at 08:34

## 2020-02-16 RX ADMIN — DOCUSATE SODIUM 100 MG: 100 CAPSULE, LIQUID FILLED ORAL at 08:35

## 2020-02-16 RX ADMIN — FAMOTIDINE 20 MG: 20 TABLET ORAL at 08:34

## 2020-02-16 RX ADMIN — FAMOTIDINE 20 MG: 20 TABLET ORAL at 20:20

## 2020-02-16 NOTE — GROUP NOTE
IP  GROUP DOCUMENTATION INDIVIDUAL Group Therapy Note Date: 2/15/2020 Group Start Time: 3167 Group End Time: 6502 Group Topic: Nursing [facility] 1 ADULT CHEM DEP Aretta Bence., RN 
 
[facility] GROUP DOCUMENTATION GROUP Group Therapy Note Patient's participated in using humor to deal with life on life's terms. Attendees: 6 Attendance: Attended Interventions/techniques: Informed, Validated and Supported Follows Directions: Followed directions Interactions: Interacted appropriately Mental Status: Calm and Flat Behavior/appearance: Neatly groomed Goals Achieved: Able to listen to others and Identified feelings Additional Notes:  Patient was quiet but responded appropriately when called upon. Marcelo Jc

## 2020-02-16 NOTE — PROGRESS NOTES
Problem: Psychosis  Goal: *STG: Decreased hallucinations  Description  Patient will verbalize denial of auditory/visual hallucinations every shift throughout hospital stay. Note:   Progressing towards goal. Pt denies auditory or visual hallucination during this shift. Progressing. Problem: Suicide  Goal: *STG: Remains safe in hospital  Description  Patient will remain free of harm to self and others every shift throughout hospitalization. Note:   Progressing towards goal. Pt remains safe in the hospital.  Goal: *STG: Attends activities and groups  Description  Patient will attend at least 50% or 2 of 4 groups daily throughout hospital stay. Note:   At attended art therapy group today, no other concern noted, progressing. Goal: *STG/LTG: Complies with medication therapy  Description  Patient will take medication as prescribed every shift throughout hospital stay. Note:   Pt remains medication compliant daily, no issues noted or reported, progressing. Pt spent majority of this shift in bed resting, staff called patient to come out for snack and medication,he was compliant. Pt is alert and oriented. Pt  appetite is good and having good sleep at night. She denies SI/HI AVH. Pt continue to utilize non skid foot ware. Will continue to monitor pt per facility protocol.

## 2020-02-16 NOTE — PROGRESS NOTES
[address]  Inpatient Progress Note     Date of Service: 02/16/20  Hospital Day: 21     Subjective/Interval History   02/16/20    Treatment Team Notes:  Notes reviewed and/or discussed and report that Mickey Nation is  a chronic schizophrenic pt, with a disabling thought disorder. Patient interview: Mickey Nation was interviewed by this writer today. Patient presents less disorganized, less paranoid, mild improvement in insight, and no loose associations. She reports of not having a place to go back to after DC. So far no reports of side effects. Objective     Visit Vitals  /69 (BP 1 Location: Right arm, BP Patient Position: Sitting)   Pulse (!) 104   Temp 96.6 °F (35.9 °C)   Resp 20   Breastfeeding No       No results found for this or any previous visit (from the past 24 hour(s)). Mental Status Examination     Appearance/Hygiene 34 y.o.  WHITE OR  female  Hygiene: improved grooming   Behavior/Social Relatedness Appropriate   Musculoskeletal Gait/Station: appropriate  Tone (flaccid, cogwheeling, spastic): not assessed  Psychomotor (hyperkinetic, hypokinetic): calm   Involuntary movements (tics, dyskinesias, akathisa, stereotypies): none   Speech   Rate, rhythm, volume, fluency and articulation are appropriate   Mood   I woke up motivated today   Affect    Flat but mildly improved range   Thought Process Much Less disorganized today   Thought Content and Perceptual Disturbances Denies self-injurious behavior (SIB), suicidal ideation (SI)  Reports hallucinations but did not elaborate     Sensorium and Cognition  Grossly intact   Insight  Mildly improved today   Judgment poor        Assessment/Plan      Psychiatric Diagnoses:   Patient Active Problem List   Diagnosis Code    Cigarette nicotine dependence without complication V70.169    Recurrent depression (HCC) F33.9    Schizophrenia (HCC) F20.9    Episodic mood disorder ([facility] [address] F39    Single delivery by  O82       Psychosocial and contextual factors: Chronic mental health illness    Level of impairment/disability: Severe    Anamika Garcia is a 34 y.o. who is currently admitted to unit for management of Schizophrenia. 1.  Continue current medication regimen given mild improvement in symptoms today as reported b patient.   2.  Disposition/Discharge Date: TBD    Danial Goodpasture, MD  [facility]

## 2020-02-16 NOTE — BSMART NOTE
ART THERAPY GROUP PROGRESS NOTE 
  
PATIENT SCHEDULED FOR GROUP AT: 9:30 
  
ATTENDANCE: Full 
  
PARTICIPATION LEVEL: Participates fully in the art process. 
  
ATTENTION LEVEL: Able to focus on task. 
  
FOCUS: Self Affirmation 
  
SYMBOLIC & THEMATIC CONTENT AS NOTED IN IMAGERY: She presented with a calm mood and congruent affect and her thought processes were more organized. She was actively engaged in the art therapy directive and her approach to task was intentional. She was engaged with other group members and appeared to be forming bonds with peers. After group she asked to speak with this art therapist and she stated she was concerned about going to [facility] or [facility] as \"I'm worried about the groups and that I won't be able to learn anything, I have problems with communication and don't understand sometimes. \" She responded well to support and highlighting her strengths of eagerness to learn and perseverance despite confusion.

## 2020-02-16 NOTE — PROGRESS NOTES
Problem: Suicide  Goal: *STG: Remains safe in hospital  Description  Patient will remain free of harm to self and others every shift throughout hospitalization. Outcome: Progressing Towards Goal  Note:   Patient will remain safe for duration of hospital stay  Goal: *STG: Attends activities and groups  Description  Patient will attend at least 50% or 2 of 4 groups daily throughout hospital stay. Outcome: Progressing Towards Goal  Note:   Patient will attend at least 2 group activities daily  Goal: *STG:  Verbalizes alternative ways of dealing with maladaptive feelings/behaviors  Description  Patient will identify at least 2 strategies daily to utilize when presented with increased stress or feelings of agitation throughout hospital stay. Outcome: Progressing Towards Goal  Note:   Patient will verbalize 2-3 alternative ways of dealing with maladaptive feelings or behaviors by time of discharge     Patient has been active and visible in dayroom throughout shift. Patient has been calm and cooperative upon approach. Patient has been interacting well with peers and staff and attending group activities. Patient states that she is having a hard time focusing on when she wants to talk to the doctor about especially concerning discharge planning. Patient encouraged to write down some things that she would like to talk to her doctor about and patient stated she would see if that would work better then trying to remember all of it. Patient denies any SI/HI or AVH.  Will continue to monitor and provide therapeutic itnerventions

## 2020-02-17 PROCEDURE — 65220000003 HC RM SEMIPRIVATE PSYCH

## 2020-02-17 PROCEDURE — 74011250636 HC RX REV CODE- 250/636: Performed by: PSYCHIATRY & NEUROLOGY

## 2020-02-17 PROCEDURE — 74011250637 HC RX REV CODE- 250/637: Performed by: PSYCHIATRY & NEUROLOGY

## 2020-02-17 RX ADMIN — PALIPERIDONE PALMITATE 234 MG: 234 INJECTION INTRAMUSCULAR at 09:00

## 2020-02-17 RX ADMIN — THERA TABS 1 TABLET: TAB at 08:09

## 2020-02-17 RX ADMIN — Medication 500 MG: at 08:09

## 2020-02-17 RX ADMIN — FAMOTIDINE 20 MG: 20 TABLET ORAL at 20:36

## 2020-02-17 RX ADMIN — DOCUSATE SODIUM 100 MG: 100 CAPSULE, LIQUID FILLED ORAL at 08:09

## 2020-02-17 RX ADMIN — CHLORPROMAZINE HYDROCHLORIDE 100 MG: 100 TABLET, SUGAR COATED ORAL at 20:36

## 2020-02-17 RX ADMIN — DESVENLAFAXINE SUCCINATE 50 MG: 50 TABLET, FILM COATED, EXTENDED RELEASE ORAL at 08:09

## 2020-02-17 RX ADMIN — FAMOTIDINE 20 MG: 20 TABLET ORAL at 08:09

## 2020-02-17 NOTE — PROGRESS NOTES
[address]  Inpatient Progress Note     Date of Service: 02/17/20  Hospital Day: 25     Subjective/Interval History   02/17/20    Treatment Team Notes:  Notes reviewed and/or discussed and report that Kay Jacome is chronically ill pt with a hx of Schizophrenia, admitted to the care of Dr. Vikas Hurt, for which the undersigned is covering today. Patient interview: Kay Jacome was interviewed by this writer today. During session, the pt indicated concerns about continuing her prescription for [place], however, she did take the prescribed 234 mg dose today. No evidence of adverse effects noticed during the session. Objective     Visit Vitals  /69 (BP 1 Location: Right arm, BP Patient Position: Sitting)   Pulse (!) 104   Temp 96.6 °F (35.9 °C)   Resp 20   Breastfeeding No     Vitals are stable. No results found for this or any previous visit (from the past 24 hour(s)). Mental Status Examination     Appearance/Hygiene 34 y.o. WHITE OR  female  Hygiene: fair. Behavior/Social Relatedness Appropriate   Musculoskeletal Gait/Station: appropriate  Tone (flaccid, cogwheeling, spastic): not assessed  Psychomotor (hyperkinetic, hypokinetic): calm   Involuntary movements (tics, dyskinesias, akathisa, stereotypies): none   Speech   Rate, rhythm, volume, fluency and articulation are appropriate   Mood   Denies being depressed. Affect    Flat. Thought Process Linear and goal directed   Thought Content and Perceptual Disturbances Denies self-injurious behavior (SIB), suicidal ideation (SI), aggressive behavior or homicidal ideation (HI). However still delusional and somatic. Strongly disabled, CSB has questioned her ability to take care of self. Sensorium and Cognition  Grossly intact   Insight  Limited. Judgment Fair.         Assessment/Plan      Psychiatric Diagnoses:   Patient Active Problem List   Diagnosis Code    Cigarette nicotine dependence without complication T80.258    Recurrent depression (HCC) F33.9    Schizophrenia ([facility] [address] F20.9    Episodic mood disorder ([facility] [address] F39    Single delivery by  O82       Medical Diagnoses: same. Psychosocial and contextual factors: same. Level of impairment/disability: high. 1.  Dr. Keara Weathers is back tomorrow. He will assume care again. 2.  Reviewed instructions, risks, benefits and side effects of medications  3. Disposition/Discharge Date: CSB is planning to refer to [facility] for IP care.     Magno Gibson MD, [address] [facility]  Psychiatry

## 2020-02-17 NOTE — BSMART NOTE
ART THERAPY GROUP PROGRESS NOTE PATIENT SCHEDULED FOR GROUP AT: 9:30 
 
ATTENDANCE: Full PARTICIPATION LEVEL:Participates fully in the art process. ATTENTION LEVEL: Able to focus on task. FOCUS: Frustration tolerance/Problem solving. SYMBOLIC & THEMATIC CONTENT AS NOTED IN IMAGERY: Laethas mood and affect were euthymic and congruent. She attended to the intervention which was to draw an image from the upside-down position. The image remained upside down however as she richie it, her mind flipped it right side up. She was successful in her attempts and it appeared that her brain mirrored the image right side up, which is not typically seen, ever. She stated the intervention was \"energizing, I felt like I was using my brain in a new way. \"

## 2020-02-17 NOTE — BSMART NOTE
Summary: Patient is a 34year old who was admitted to behavioral health for management of Schizophrenia. Assessment/Intervention:  met with patient in the absence of patient's current . Patient presented as aloof but cooperative. Patient stated that she has \"clarity\" today although she feels sedated. Patient did not go into detail with regards to her feelings of sedation. Additionally, patient was unable to explain what her feelings of clarity were in relation to. Patient was concerned about placement following discharge.  informed patient that her attending  has been working to assist with placement and will have more information following her return to work.  encouraged patient to attend the psycho-education groups available. Patient was able to contract for her safety at the time of assessment and denied any current suicidal/homicidal ideations and denied any current auditory/visual hallucinations.  
 
Plan:  will continue to monitor patient progress as needed.  
  
Sallie Fierro M.Ed., [facility]

## 2020-02-17 NOTE — BSMART NOTE
OCCUPATIONAL THERAPY PROGRESS NOTE Group Time:  8687 Attendance: The patient attended full group. once. Participation: The patient participated with minimal elaboration in the activity. Attention: The patient was able to focus on the activity. Interaction:. The patient acknowledges others or responds to questions,  with no spontaneous interaction. Responses appropriate but with little actual planning and detail given.

## 2020-02-17 NOTE — PROGRESS NOTES
Problem: Psychosis  Goal: *STG: Decreased hallucinations  Description  Patient will verbalize denial of auditory/visual hallucinations every shift throughout hospital stay. Outcome: Progressing Towards Goal  Note:   Patient will continue to have a decrease in hallucinations daily  Goal: *STG: Remains safe in hospital  Description  Patient will remain free of harm to self and others every shift throughout hospitalization. Outcome: Progressing Towards Goal  Note:   Pt will remain safe in the hospital daily  Goal: *STG/LTG: Complies with medication therapy  Description  Patient will take medication as prescribed every shift throughout hospital stay. Outcome: Progressing Towards Goal  Note:   Patient will comply with medication therapy daily   Patient has been visible and selectively active in groups today. Compliant with Am medications and also received 234 mg IM injection of invega sustenna. She reports that she continues to have a difficult time remembering at times and communicating, but is slowly improving. No current thoughts of self harm. Will continue to monitor for safety and support.

## 2020-02-17 NOTE — BSMART NOTE
SOCIAL WORK GROUP THERAPY PROGRESS NOTE Group Time:  10:45am 
 
Group Topic:  Coping Skills Group Participation: Pt was available for group but expressed not understanding content of  session despite staff support. Basic differences of assertive versus passive or aggressive she struggles with. No concrete examples or self disclosure.

## 2020-02-17 NOTE — BH NOTES
Patient pleasant and cooperative. Isolates in room except for meals, meds and groups. Denies +SI/HI/AH. Verbal responses and mobility improved and not as slow to responses. Compliant with medications. Will continue to monitor and provide for safety.

## 2020-02-17 NOTE — GROUP NOTE
[facility]AR[facility]  GROUP DOCUMENTATION INDIVIDUAL Group Therapy Note Date: 2/17/2020 Group Start Time: 6557 Group End Time: 1400 Group Topic: Nursing [facility] ADULT CHEM Amy Abdul RN 
 
DAR[facility]  GROUP DOCUMENTATION GROUP Group Therapy Note Attendees: 5 Attendance: Did not attend Azra Noriega RN

## 2020-02-17 NOTE — GROUP NOTE
DARL[facility]  GROUP DOCUMENTATION INDIVIDUAL Group Therapy Note Date: 2/16/2020 Group Start Time: 7939 Group End Time: 1698 Group T[facility]: [facility] AD[facility] DEP Melissa Clark, RN Attendance: Did not attend Mariaelena Green

## 2020-02-17 NOTE — BH NOTES
MHT Note: Patient interacts with staff and peers kindly. Pt isolative this shift. Pt continues having little appetite. Pt contracts for safety on unit and denies SI/HI at this time. Staff will continue to monitor pt for safety, behavior and location.

## 2020-02-18 PROCEDURE — 74011250637 HC RX REV CODE- 250/637: Performed by: PSYCHIATRY & NEUROLOGY

## 2020-02-18 PROCEDURE — 65220000003 HC RM SEMIPRIVATE PSYCH

## 2020-02-18 RX ADMIN — FAMOTIDINE 20 MG: 20 TABLET ORAL at 08:30

## 2020-02-18 RX ADMIN — Medication 500 MG: at 08:30

## 2020-02-18 RX ADMIN — DOCUSATE SODIUM 100 MG: 100 CAPSULE, LIQUID FILLED ORAL at 08:30

## 2020-02-18 RX ADMIN — FAMOTIDINE 20 MG: 20 TABLET ORAL at 20:25

## 2020-02-18 RX ADMIN — DESVENLAFAXINE SUCCINATE 50 MG: 50 TABLET, FILM COATED, EXTENDED RELEASE ORAL at 08:30

## 2020-02-18 RX ADMIN — THERA TABS 1 TABLET: TAB at 08:30

## 2020-02-18 RX ADMIN — CHLORPROMAZINE HYDROCHLORIDE 100 MG: 100 TABLET, SUGAR COATED ORAL at 20:25

## 2020-02-18 NOTE — PROGRESS NOTES
Problem: Psychosis  Goal: *STG: Remains safe in hospital  Description  Patient will remain free of harm to self and others every shift throughout hospitalization. Outcome: Progressing Towards Goal  Note:   Patient will remain safe for duration of hospital stay   Goal: *STG/LTG: Complies with medication therapy  Description  Patient will take medication as prescribed every shift throughout hospital stay. Outcome: Progressing Towards Goal  Note:   Patient will comply with medication therapy daily     Problem: Suicide  Goal: *STG: Attends activities and groups  Description  Patient will attend at least 50% or 2 of 4 groups daily throughout hospital stay. Outcome: Progressing Towards Goal  Note:   Patient will attend 1-2 group activities daily       Patient has been active and visible in dayroom throughout shift. Patient has been attending group activities and interacting well with peers and staff. Patient is pleasant and cooperative upon approach. Patient is able to contract for safety and denies SI/HI and A/VH. Will continue to monitor and provide therapeutic interventions as needed.

## 2020-02-18 NOTE — GROUP NOTE
IP  GROUP DOCUMENTATION INDIVIDUAL Group Therapy Note Date: 2/17/2020 Group Start Time: 80 Group End Time: 2000 Group Topic: Nursing [facility] ADULT CHEM DEP Vijay Graham, RN 
 
IP  GROUP DOCUMENTATION GROUP Group Therapy Note Attendees: 8 Attendance: Attended Interventions/techniques: Challenged and Informed Follows Directions: Followed directions Interactions: Interacted appropriately Mental Status: Calm Behavior/appearance: Attentive Goals Achieved: Able to engage in interactions Navid Grissom.  Rohan Childress

## 2020-02-18 NOTE — PROGRESS NOTES
Behavioral Health Progress Note    Admit Date: 1/24/2020  Hospital day 25    Vitals :   Patient Vitals for the past 8 hrs:   BP Temp Pulse Resp   02/18/20 0813 104/58 96.7 °F (35.9 °C) 100 20     Labs:  No results found for this or any previous visit (from the past 24 hour(s)).   Meds:   Current Facility-Administered Medications   Medication Dose Route Frequency    paliperidone palmitate (INVEGA SUSTENNA) injection 234 mg  234 mg IntraMUSCular [id number]    bisacodyL (DULCOLAX) tablet 5 mg  5 mg Oral DAILY PRN    chlorproMAZINE (THORAZINE) tablet 100 mg  100 mg Oral QHS    desvenlafaxine succinate (PRISTIQ) ER tablet 50 mg  50 mg Oral DAILY    docusate sodium (COLACE) capsule 100 mg  100 mg Oral DAILY    nicotine (NICORETTE) gum 2 mg  2 mg Oral Q2H PRN    hydrOXYzine pamoate (VISTARIL) capsule 50 mg  50 mg Oral Q4H PRN    haloperidoL (HALDOL) tablet 5 mg  5 mg Oral Q4H PRN    haloperidol lactate (HALDOL) injection 5 mg  5 mg IntraMUSCular Q4H PRN    LORazepam (ATIVAN) injection 1-2 mg  1-2 mg IntraMUSCular Q4H PRN    traZODone (DESYREL) tablet 50 mg  50 mg Oral QHS PRN    ibuprofen (MOTRIN) tablet 400 mg  400 mg Oral Q4H PRN    therapeutic multivitamin (THERAGRAN) tablet 1 Tab  1 Tab Oral DAILY    ascorbic acid (vitamin C) (VITAMIN C) tablet 500 mg  500 mg Oral DAILY    famotidine (PEPCID) tablet 20 mg  20 mg Oral BID    influenza vaccine 2019-20 (6 mos+)(PF) (FLUARIX/FLULAVAL/FLUZONE QUAD) injection 0.5 mL  0.5 mL IntraMUSCular PRIOR TO DISCHARGE      Hospital Problems: Principal Problem:    Schizophrenia ([address] (4/18/2017)    Active Problems:    Cigarette nicotine dependence without complication (7/3/4288)        Subjective:   Medication side effects: none  none    Mental Status Exam  Sensorium: alert  Orientation: only aware of  time, place and person  Relations: cooperative and passive  Eye Contact: poor  Appearance: slowed, odd  Thought Process: slow rate of thoughts and poor abstract reasoning/computation   Thought Content: aud halluc   Suicidal: denies   Homicidal: none   Mood: is anxious   Affect: constricted  Memory: is impaired and is remote     Concentration: distractable  Abstraction: concrete  Insight: The patient shows little insight    OR Fair  Judgement: is psychologically impaired OR  Fair    Assessment/Plan:   improved    The patient was to be present and by the CSB to the [address] [place] [address] team today. She will be considered for long-term placement at the [facility].  The patient is now saying that she feels somewhat better having had some improvement over the weekend. She says she is less worried about people watching her and has not been having the feelings that she is a shape shifter or that she had just had a shape shifter baby. She still gets auditory hallucinations and says that she has had these routinely over the past years even when she takes medications. She says there would be acceptable to go to the crisis stabilization unit or to an apartment afterwards but she will wait to hear what the rack committee had recommended. She denies homicidal or suicidal ideas today.   We will continue with medication as is  Continue close observation,

## 2020-02-18 NOTE — BSMART NOTE
ART THERAPY GROUP PROGRESS NOTE PATIENT SCHEDULED FOR GROUP AT: 940 ATTENDANCE: Full PARTICIPATION LEVEL: Participates fully in the art process ATTENTION LEVEL: Able to focus on task FOCUS: Stressors and finding balance SYMBOLIC & THEMATIC CONTENT AS NOTED IN IMAGERY: She was calm, compliant, and alert. She presented with a calm mood and blunted affect. Her thought process is concrete and approach to task was planned-out and purposeful.

## 2020-02-18 NOTE — BSMART NOTE
COUNSELING GROUP PROGRESS NOTE PATIENT SCHEDULED FOR GROUP AT: 12:30 
 
ATTENDANCE: Full PARTICIPATION LEVEL: Participates fully in the group process. ATTENTION LEVEL: Able to focus on task. FOCUS: Anxiety THEMATIC CONTENT AS NOTED IN REFLECTION: She presented with a calm mood and congruent affect and her thought processes were logical. She was actively engaged in the group therapy directive and her approach to task was purposeful. She was engaged in group discussions and required minor assistance to complete the directive. Thematic content was appropriate for the given directive and she displayed insight towards her experience describing her family as a source of anxiety and described history of behaviors of avoidance including self harm and drinking.

## 2020-02-18 NOTE — BSMART NOTE
Pt.is a 34year old female with history of Schizophrenia paranoid type and past history of Cocaine and DID.  Pt. was admitted to this facility for facility for delusional thinking and ideations to harm. 
  
Pt.'s case was discussed with covering psychiatrist. Jose Luis Laws informed the team pt. Is being presented as a transfer to [facility]. SW Contact:  SW met with pt to discuss dc planning. Pt informed SW she is feeling happy and peaceful. Pt states she is not hearing voices as much. Pt reports the voices are telling her positive things. Pt expressed feeling anxious about having to go to [facility]. The pt. States she would prefer to go home. Pt. Sates she feels as if she was going to a program, she will do better. The pt. Expressed to having goals. Pt expressed self -awareness. \" I realize that I have a tendency to act like a child , when I am not well. \"  \" I will approach people especially men  Ad walk off with them\". \" Something bad typically happens ( drugs, sex ,etc.)\". \" I end up getting hurt\". \"When I am not feeling well, I make bad decisions\". SW provided pt with positive feedback. SW discussed coping strategies, safety plan and medication compliance. Pt.'s mood is improving, has fair insight and is cooperative. SW also discussed the following dc plans:  1. Approve transfer to [facility] 2.       transition to [address] [place] [address] for continued stabilization or 3. Transition back to her apartment at the [facility] with [address] [facility] PA[facility] services  In addition to attending psychosocial day treatment. SW will continue to assist the pt with the dc process.

## 2020-02-18 NOTE — PROGRESS NOTES
NUTRITION    Nutrition Screen     RECOMMENDATIONS / PLAN:     - No further nutrition interventions at this time. - Continue RD inpatient monitoring and evaluation. NUTRITION DIAGNOSIS & INTERVENTIONS:     - Meals/snacks: general healthy diet  - Collaboration and referral of nutrition care: discussed meal intake with staff    Nutrition Diagnosis: No nutrition diagnosis at this time. ASSESSMENT:     2/18: Pt visiting with  during visit. Nursing reports pt with good meal intake and appetite. Tolerating diet. 2/12: Nursing reports pt coming out for meal however intake unknown. Supplements discontinued per MD on 2/8, unknown reasoning. Pt continues with altered mentation, bizarre with delusions. Bowel regimen for pt c/o constipation. 2/7: Intake remains fair, unknown supplement consumption. 2/3: Altered mentation noted, pt states she is unable to eat solid foods due to the pepcid medication she is taking, unable to provide further details. Only consuming supplements. 1/31: Admitted experiencing delusional thinking and SI. Pt with noted confusion as only drank some juice this am, did not eat breakfast or consume supplements. Per staff pt with fair intake of meals since admission, overall variable intake. Tolerating diet. Appears well nourished. Nutritional intake adequate to meet patients estimated nutritional needs:  Yes    Diet: DIET REGULAR    Food Allergies:NKFA  Current Appetite: Good per nursing  Appetite/meal intake prior to admission: Poor per pt; reports inadequate intake x months; pt with noted confusion during discussion  Feeding Limitations:  [] Swallowing Difficulty       [] Chewing Difficulty       [] Other   Current Meal Intake: No data found.   Gastrointestinal Issues:  [] Yes     [x] No: none known  Skin Integrity:  WDL    Pertinent Medications:  Reviewed: ascorbic acid, dulcolax prn, colace, famotidine, MVI  Labs:  Reviewed     Anthropometrics:  Ht Readings from Last 1 Encounters:   10/22/19 5' 6\" (1.676 m)       There were no vitals filed for this visit. There is no height or weight on file to calculate BMI.  25.1 kg/(m^2) using previously documented wt from 1/23/20    Weight History: Pt reports stable wt hx PTA   Weight Metrics 1/23/2020 12/4/2019 10/22/2019 8/14/2019 7/1/2019 5/30/2019 5/20/2019   Weight 156 lb 180 lb 186 lb 214 lb 215 lb 215 lb 12.8 oz 214 lb 9.6 oz   BMI 25.18 kg/m2 29.05 kg/m2 30.02 kg/m2 34.54 kg/m2 34.7 kg/m2 34.83 kg/m2 34.64 kg/m2       Admitting Diagnosis: Schizophrenia ([facility] [address] [F20.9]  Past Medical History:   Diagnosis Date    Alcoholic ([facility] [address]     Sober 3 months; Weekly AAA meeting; Had substance abuse counseling;     Anemia     Bipolar disorder ([address]     Cirrhosis ([address]     Past not current issue per patient    ETOH abuse     GERD (gastroesophageal reflux disease)     Head injury     Marijuana abuse     Phobia     [place] [employer]    Post partum depression     Pregnancy     Psychiatric disorder     Psychotic disorder ([address]     Depression/  Mack Keita NP; Bipolar disorder, mood swings.  Schizophrenia ([facility] [address]     Stroke [facility]) 2011        Education Needs:        [x] None identified  [] Identified - Not appropriate at this time  []  Identified and addressed - refer to education log  Learning Limitations:   [] None identified  [x] Identified: bizarre with delusions  Cultural, [employer]yb[employer]ian & ethnic food preferences identified:  [x] None    [] Yes      ESTIMATED NUTRITION NEEDS:     1742-1887kcal (MSJx1.2-1.3), 57-71 gm protein (0.8-1 gm/kg), 1 mL/kcal  Based on: 71 kg       [x] Actual BW- using wt documented 1/23/20       MONITORING & EVALUATION:     Nutrition Goal(s):   - PO nutrition intake will meet >75% of patient estimated nutritional needs within the next 7 days. Outcome: Met/Resolved   - PO nutrition intake will continue to meet >75% of patients estimated nutritional needs over the next 7 days.   Outcome: New/Initial goal Monitor: [x] Food and nutrient intake   [x] Food and nutrient administration  [x] Comparative standards   [x] Nutrition-focused physical findings   [x] Anthropometric Measurements   [x] Treatment/therapy   [x] Biochemical data, medical tests, and procedures         Previous Recommendations (for follow-up assessments only): Implemented       Discharge Planning: Nutritional discharge needs unknown at this time.    [x]  Participated in care planning, discharge planning, & interdisciplinary rounds as appropriate      Nik Robles RD   Pager: [phone]

## 2020-02-18 NOTE — BSMART NOTE
SOCIAL WORK GROUP THERAPY PROGRESS NOTE Group Time:  10:45am 
 
Group Topic:  Coping Skills    C D Issues Group Participation:   
 
Pt moderately involved during group discussion but remained attentive. Emphasis of session was presentation of basic \"CBT\" principles Reviewed strategies to keep a \"Journal\" for moods, cognitions, behavior & outcome.

## 2020-02-18 NOTE — GROUP NOTE
DARLING  GROUP DOCUMENTATION INDIVIDUAL Group Therapy Note Date: 2/18/2020 Group Start Time: 9990 Group End Time: 9934 Group Topic: Nursing [facility] 1 ADULT CHEM DEP Caroline HASTINGS  GROUP DOCUMENTATION GROUP Group Therapy Note Attendees: 6 Attendance: Attended Patient's Goal:  Do Something Positive Interventions/techniques: Challenged and Validated Follows Directions: Followed directions Interactions: Interacted appropriately Mental Status: Calm Behavior/appearance: Cooperative Goals Achieved: Able to engage in interactions and Able to listen to others Raj Arita

## 2020-02-18 NOTE — BSMART NOTE
OCCUPATIONAL THERAPY PROGRESS NOTE Group Time:  2306 Attendance: The patient attended full group. Participation: The patient participated with moderate elaboration in the activity. Attention: The patient was able to focus on the activity. Interaction: The patient acknowledges others or responds to questions,  with no spontaneous interaction. Affect blunted, interactions and responses appropriate.

## 2020-02-18 NOTE — BH NOTES
The patient has been up on the unit at will. She. She denies that she is having any thoughts of harming herself or others. She has been calm and appropriate this evening. She has been medication compliant. Will continue to monitor and will continue to provide support.

## 2020-02-19 PROCEDURE — 65220000003 HC RM SEMIPRIVATE PSYCH

## 2020-02-19 PROCEDURE — 74011250637 HC RX REV CODE- 250/637: Performed by: PSYCHIATRY & NEUROLOGY

## 2020-02-19 RX ADMIN — DOCUSATE SODIUM 100 MG: 100 CAPSULE, LIQUID FILLED ORAL at 08:04

## 2020-02-19 RX ADMIN — FAMOTIDINE 20 MG: 20 TABLET ORAL at 20:40

## 2020-02-19 RX ADMIN — DESVENLAFAXINE SUCCINATE 50 MG: 50 TABLET, FILM COATED, EXTENDED RELEASE ORAL at 08:04

## 2020-02-19 RX ADMIN — Medication 500 MG: at 08:04

## 2020-02-19 RX ADMIN — CHLORPROMAZINE HYDROCHLORIDE 100 MG: 100 TABLET, SUGAR COATED ORAL at 20:40

## 2020-02-19 RX ADMIN — THERA TABS 1 TABLET: TAB at 08:04

## 2020-02-19 RX ADMIN — FAMOTIDINE 20 MG: 20 TABLET ORAL at 08:04

## 2020-02-19 NOTE — GROUP NOTE
DARLING  GROUP DOCUMENTATION INDIVIDUAL Group Therapy Note Date: 2/19/2020 Group Start Time: 4902 Group End Time: 1300 Group Topic: Nursing [facility] 1 ADULT CHEM DEP Anthony Galarza RN 
 
[facility] GROUP DOCUMENTATION GROUP Group Therapy Note Attendees:4 Attendance: Did not attend Additional Notes: Will continue to provide and promote group activities Mady Roberson RN

## 2020-02-19 NOTE — GROUP NOTE
IP  GROUP DOCUMENTATION INDIVIDUAL Group Therapy Note Date: 2/18/2020 Group Start Time: 2000 Group End Time: 2030 Group Topic: Nursing [facility] 1 ADULT CHEM DEP Tien Roth, CODY HASTINGS  GROUP DOCUMENTATION GROUP Group Therapy Note Attendees: 10 Attendance: Did not attend. Kemal Engle.  Rob Thakkar

## 2020-02-19 NOTE — BSMART NOTE
OCCUPATIONAL THERAPY PROGRESS NOTE Group Time:  1860 Attendance: The patient attended full group. Sharon Sellers Participation: The patient participated with moderate elaboration in the activity. Attention: The patient was able to focus on the activity. Interaction: The patient acknowledges others or responds to questions,  with no spontaneous interaction. Responses on subject and appropriate.

## 2020-02-19 NOTE — BSMART NOTE
Pt.is a 34year old female with history of Schizophrenia paranoid type and past history of Cocaine and DID. Pt. was admitted to this facility for facility for delusional thinking and ideations to harm. Pt.'s case was discussed with covering psychiatrist. Pt. will be dc on 2/21/20. ARUN will contact [facility] PACT CM to have her assist SW with stepping pt. down to [facility]. ARUN Collateral: SW left a message for [facility] PACT CM. CM contacted ARUN.  ARUN explained the above to . ARUN talked to [address] DC planner was informed about the above dc plan. Discharge Planner suggest for treating psychiatrist to contact [address] [place] [address] doctor for a doctor to doctor consult. ARUN will inform the treating psychiatrist to contact  at [address] [facility] [address]. ARUN Contact:  ARUN met with pt. to discuss dc planning. ARUN provided pt. with positive feedback. SW discussed coping strategies, safety plan and medication compliance. Pt.'s mood is improving, has fair insight, and is cooperative. ARUN informed pt. she would transition to [facility] for continued stabilization or her apartment at the [facility] with [employer]. Pt. did discuss referrals to psychosocial day treatment program.  ARUN will continue to assist the pt. with the dc process.

## 2020-02-19 NOTE — PROGRESS NOTES
Behavioral Health Progress Note    Admit Date: 1/24/2020  Hospital day 26    Vitals :   Patient Vitals for the past 8 hrs:   BP Temp Pulse Resp   02/19/20 0751 110/63 97.6 °F (36.4 °C) 97 18     Labs:  No results found for this or any previous visit (from the past 24 hour(s)).   Meds:   Current Facility-Administered Medications   Medication Dose Route Frequency    paliperidone palmitate (INVEGA SUSTENNA) injection 234 mg  234 mg IntraMUSCular [id number]    bisacodyL (DULCOLAX) tablet 5 mg  5 mg Oral DAILY PRN    chlorproMAZINE (THORAZINE) tablet 100 mg  100 mg Oral QHS    desvenlafaxine succinate (PRISTIQ) ER tablet 50 mg  50 mg Oral DAILY    docusate sodium (COLACE) capsule 100 mg  100 mg Oral DAILY    nicotine (NICORETTE) gum 2 mg  2 mg Oral Q2H PRN    hydrOXYzine pamoate (VISTARIL) capsule 50 mg  50 mg Oral Q4H PRN    haloperidoL (HALDOL) tablet 5 mg  5 mg Oral Q4H PRN    haloperidol lactate (HALDOL) injection 5 mg  5 mg IntraMUSCular Q4H PRN    LORazepam (ATIVAN) injection 1-2 mg  1-2 mg IntraMUSCular Q4H PRN    traZODone (DESYREL) tablet 50 mg  50 mg Oral QHS PRN    ibuprofen (MOTRIN) tablet 400 mg  400 mg Oral Q4H PRN    therapeutic multivitamin (THERAGRAN) tablet 1 Tab  1 Tab Oral DAILY    ascorbic acid (vitamin C) (VITAMIN C) tablet 500 mg  500 mg Oral DAILY    famotidine (PEPCID) tablet 20 mg  20 mg Oral BID    influenza vaccine 2019-20 (6 mos+)(PF) (FLUARIX/FLULAVAL/FLUZONE QUAD) injection 0.5 mL  0.5 mL IntraMUSCular PRIOR TO DISCHARGE      Hospital Problems: Principal Problem:    Schizophrenia ([address] (4/18/2017)    Active Problems:    Cigarette nicotine dependence without complication (6/6/9137)        Subjective:   Medication side effects: none  none    Mental Status Exam  Sensorium: alert  Orientation: only aware of  time, place and person  Relations: cooperative  Eye Contact: appropriate  Appearance: shows no evidence of impairment  Thought Process: slow rate of thoughts and poor abstract reasoning/computation   Thought Content: no evidence of impairment   Suicidal: denies   Homicidal: none   Mood: is euthymic   Affect: odd, blunted  Memory: is impaired and is remote     Concentration: fair  Abstraction: concrete  Insight: The patient shows little insight    OR Fair  Judgement: is psychologically impaired OR  Fair    Assessment/Plan:   improved  Patient was discussed in treatment team and all agree that she is doing somewhat better. She had been approved by the rack committee to be placed on the list for [facility] but is at the bottom of the list and it could be months before she would possibly go there. We see that she has improved and is denying homicidal or suicidal ideas. She denies hallucinations today. She does have a paucity of thought content but this appears to be at baseline. We are recommending that she be discharged from here to go to the regional crisis stabilization unit. She needs to be in a psychosocial day program and this needs to be set up. She feels that she would do well in an adult living facility. There is a question as to whether she would go to an [facility] or whether or not she would go back to the [address] [place] [address] program.  This will be worked out with her and her  when she is discharged.     Continue close observation,

## 2020-02-19 NOTE — PROGRESS NOTES
Problem: Suicide  Goal: *STG: Remains safe in hospital  Description  Patient will remain free of harm to self and others every shift throughout hospitalization. Outcome: Progressing Towards Goal  Note:   Patient will remain safe for duration of hospital stay  Goal: *STG: Attends activities and groups  Description  Patient will attend at least 50% or 2 of 4 groups daily throughout hospital stay. Outcome: Progressing Towards Goal  Note:   Patient will attend at least 2-3 group activities daily  Goal: *STG/LTG: Complies with medication therapy  Description  Patient will take medication as prescribed every shift throughout hospital stay. Outcome: Progressing Towards Goal  Note:   Patient will comply with medication therapy daily     Patient has been active and visible intermittently throughout shift. Patient is calm and cooperative upon approach. Patient is attending groups and is med compliant. Patient does contract for safety.  Will continue to monitor and provide therapeutic interventions

## 2020-02-19 NOTE — BSMART NOTE
ART THERAPY GROUP PROGRESS NOTE PATIENT SCHEDULED FOR GROUP AT: 4354 ATTENDANCE: Full PARTICIPATION LEVEL: Participates fully in the art process ATTENTION LEVEL: Able to focus on task FOCUS: Grounding SYMBOLIC & THEMATIC CONTENT AS NOTED IN IMAGERY: She was calm and presented with a blunted affect. She kept to herself and was invested in the task at hand. Her approach to task was planned-out and organized. Her associations were relevant.

## 2020-02-19 NOTE — BSMART NOTE
COUNSELING GROUP PROGRESS NOTE 
  
PATIENT SCHEDULED FOR GROUP AT: 12:30 
  
ATTENDANCE: Full 
  
PARTICIPATION LEVEL: Participates in the group process. 
  
ATTENTION LEVEL: Able to focus on task. 
  
FOCUS: Anxiety 
  
THEMATIC CONTENT AS NOTED IN REFLECTION: She presented with a calm mood and congruent affect and her thought processes were brief and logical. She was engaged in the group therapy process and her approach to task was intentional. She was invested in the directive and appeared more withdrawn socially than previous groups. She only engaged in discussion when directly prompted. Thematic content was predominantly appropriate for the given directive and she did not participate in sharing her work with the group.

## 2020-02-20 VITALS
RESPIRATION RATE: 18 BRPM | SYSTOLIC BLOOD PRESSURE: 93 MMHG | TEMPERATURE: 32.6 F | DIASTOLIC BLOOD PRESSURE: 61 MMHG | HEART RATE: 100 BPM

## 2020-02-20 PROCEDURE — 74011250637 HC RX REV CODE- 250/637: Performed by: PSYCHIATRY & NEUROLOGY

## 2020-02-20 PROCEDURE — 65220000003 HC RM SEMIPRIVATE PSYCH

## 2020-02-20 RX ORDER — CHLORPROMAZINE HYDROCHLORIDE 25 MG/1
50 TABLET, FILM COATED ORAL
Status: DISCONTINUED | OUTPATIENT
Start: 2020-02-20 | End: 2020-02-21 | Stop reason: HOSPADM

## 2020-02-20 RX ADMIN — FAMOTIDINE 20 MG: 20 TABLET ORAL at 20:51

## 2020-02-20 RX ADMIN — THERA TABS 1 TABLET: TAB at 08:13

## 2020-02-20 RX ADMIN — FAMOTIDINE 20 MG: 20 TABLET ORAL at 08:13

## 2020-02-20 RX ADMIN — Medication 500 MG: at 08:13

## 2020-02-20 RX ADMIN — DOCUSATE SODIUM 100 MG: 100 CAPSULE, LIQUID FILLED ORAL at 08:13

## 2020-02-20 RX ADMIN — DESVENLAFAXINE SUCCINATE 50 MG: 50 TABLET, FILM COATED, EXTENDED RELEASE ORAL at 08:13

## 2020-02-20 RX ADMIN — CHLORPROMAZINE HYDROCHLORIDE 50 MG: 25 TABLET, SUGAR COATED ORAL at 20:52

## 2020-02-20 NOTE — BH NOTES
Patient pleasant and cooperative. Noted with improved affect and mood. Patient states she is looking forward to discharge on Friday. Compliant with medications. Denies SI/HI/AH. Smiles appropriately. Contracts for safety on unit. Will continue to monitor.

## 2020-02-20 NOTE — PROGRESS NOTES
Problem: Psychosis  Goal: *STG: Remains safe in hospital  Description  Patient will remain free of harm to self and others every shift throughout hospitalization. Outcome: Progressing Towards Goal  Goal: *STG/LTG: Complies with medication therapy  Description  Patient will take medication as prescribed every shift throughout hospital stay. Outcome: Progressing Towards Goal     Pt denies thoughts, ideations, or plan. Pt endorses auditory hallucinations stating, \"I hear them sometimes. They say curse words. They don't tell me to hurt myself or anything. \" Pt is medication compliant. Will continue to monitor.

## 2020-02-20 NOTE — BSMART NOTE
COUNSELING GROUP PROGRESS NOTE Group time: 12:30 The patient declined group, she came to the table to get a copy of the directive then went back to her room to continue preparing for discharge.

## 2020-02-20 NOTE — GROUP NOTE
DARLING  GROUP DOCUMENTATION INDIVIDUAL Group Therapy Note Date: 2/20/2020 Group Start Time: 4023 Group End Time: 4100 Group Topic: Nursing [address] ADULT CHEM DEP Josephine Bolton [facility] GROUP DOCUMENTATION GROUP Group Therapy Note Attendees: 11 Attendance: Attended Patient's Goal:  Breathing Retraining Exercises Interventions/techniques: Challenged and Informed Follows Directions: Followed directions Interactions: Interacted appropriately Mental Status: Calm Behavior/appearance: Cooperative Goals Achieved: Able to engage in interactions and Able to listen to others Solitario Milian

## 2020-02-20 NOTE — BSMART NOTE
Pt.is a 34year old female with history of Schizophrenia paranoid type and past history of Cocaine and DID. Pt. was admitted to this facility for facility for delusional thinking and ideations to harm. ARUN Contact:  SW met with pt. to discuss dc planning. SW discussed possible dc plan to crisis stabilization at [facility] [address] Pt. expressed she was micheal mclean h stepping down to crisis stabilization oppose to going back to her apartment at this time. SW discussed continued coping strategies, safety and plan. Pt.'s mood is improving, a little anxious about dc and  has fair insight. SW will continue to assist the pt. with the dc process. ARUN Collateral: [facility] stabilization. ARUN submitted pt. s paperwork. ARUN talked to Lesley Zamorano staff at [facility] . The staff had pt information reviewed by Martha Craig psychiatrist.  Lesley Zamorano informed ARUN pt. can not come to CSU today due to inclement weather. Lesley Zamorano will inform ARUN on 2/21/20 when pt can come to CSU . The above case was discussed with covering psychiatrist

## 2020-02-20 NOTE — GROUP NOTE
DARLING  GROUP DOCUMENTATION INDIVIDUAL Group Therapy Note Date: 2/20/2020 Group Start Time: 7240 Group End Time: 3891 Group Topic: Nursing [facility] 1 ADULT CHEM DEP Rae Men [facility] GROUP DOCUMENTATION GROUP Group Therapy Note Attendees: 9 Attendance: Attended Patient's Goal:  Negative Thinking Traps Interventions/techniques: Challenged and Informed Follows Directions: Followed directions Interactions: Interacted appropriately Mental Status: Calm Behavior/appearance: Cooperative Goals Achieved: Able to engage in interactions and Able to listen to others Mitul Nunez

## 2020-02-20 NOTE — BSMART NOTE
ART THERAPY GROUP PROGRESS NOTE PATIENT SCHEDULED FOR GROUP AT: 9:45 ATTENDANCE: 3/4 PARTICIPATION LEVEL: Participates fully in the art process and needed some minimal encouragement. ATTENTION LEVEL: Able to focus on task and needed a little re-direction. FOCUS: Creative Expression SYMBOLIC & THEMATIC CONTENT AS NOTED IN IMAGERY: Elyse's mood and affect were blunted and congruent. She initially struggled with her approach to task but once she found an image she was able to begin and worked quietly and quickly on the intervention. She was pulled by a staff member at one point but returned for discussion. She stated that the experience was \"expansive, as I worked scary thoughts came to my mind and I didn't try to control them, I just accepted that they were there. \"

## 2020-02-20 NOTE — GROUP NOTE
DARLING  GROUP DOCUMENTATION INDIVIDUAL Group Therapy Note Date: 2/19/2020 Group Start Time: 2000 Group End Time: 2015 Group Topic: Nursing [facility] 1 ADULT CHEM DEP CODY Bains  GROUP DOCUMENTATION GROUP Group Therapy Note Attendees:  8 Attendance: Did not attend Jaki Lino RN

## 2020-02-20 NOTE — PROGRESS NOTES
Behavioral Health Progress Note    Admit Date: 1/24/2020  Hospital day 27    Vitals : 24-Hr Vitals/Weight (last day)     Date/Time Temp Pulse BP MAP (Calculated) BP 1 Location BP Patient Position Resp SpO2 O2 Device O2 Flow Rate (L/min) Level of Consciousness MEWS Score Weight   02/20/20 0825 96.7 °F (35.9 °C) 100 93/61 72 Left arm At rest 18    Alert 2    02/19/20 0751 97.6 °F (36.4 °C) 97 110/63 79   18    Alert 1      Labs:  No results found for this or any previous visit (from the past 24 hour(s)). Meds:      Medication Administration Report   Attending Provider: Jatin Adams MD    Allergies: Robitussin [Guaifenesin]    Isolation: None   Infection: None   Code Status: Prior   Advance Care Planning Activity    Service: PSY    Ht: --   Wt: --   Admission Wt: --    Admission Dx: Schizophrenia ([facility]   Principal Problem: [facility] ([facility] [F20.9]     BMI: --   BSA: --         Medication Administration Report   for Hilary Stringer as of 02/11/20 through 2/20/20     1 Day 3 Days 7 Days 10 Days <  Today >     Legend:                          Discontinued    Completed    Future    MAR Hold     Linked           Medications 02/11 02/12 02/13 02/14 02/15 02/16 02/17 02/18 02/19 02/20   ascorbic acid (vitamin C) (VITAMIN C) tablet 500 mg   Dose: 500 mg  Freq: DAILY Route: PO  Start: 01/25/20 0900   Order ID: [id number]    08 (500 mg)      08 (500 mg)      08 (500 mg)      08 (500 mg)      08 (500 mg)      08 (500 mg)      08 (500 mg)      08 (500 mg)      08 (500 mg)      08 (500 mg)        bisacodyL (DULCOLAX) tablet 5 mg   Dose: 5 mg  Freq: DAILY PRN Route: PO  PRN Reason: Constipation  Start: 02/09/20 0800    Admin Instructions:   Do not crush, break or chew.  Swallow whole.    Order ID: [id number]                desvenlafaxine succinate (PRISTIQ) ER tablet 50 mg   Dose: 50 mg  Freq: DAILY Route: PO  Indications of Use: major depressive disorder  Start: 01/31/20 0900    Admin Instructions:   Swallow whole with fluid. Do not divide, crush, chew, or dissolve   Order ID: [id number]    08 (50 mg)      08 (50 mg)      08 (50 mg)      08 (50 mg)      08 (50 mg)      08 (50 mg)      08 (50 mg)      08 (50 mg)      08 (50 mg)      08 (50 mg)        docusate sodium (COLACE) capsule 100 mg   Dose: 100 mg  Freq: DAILY Route: PO  Indications of Use: constipation  Start: 01/27/20 1400   Order ID: [id number]    08 (100 mg)      08 (100 mg)      08 (100 mg)      08 (100 mg)      08 (100 mg)      08 (100 mg)      08 (100 mg)      08 (100 mg)      08 (100 mg)      08 (100 mg)        famotidine (PEPCID) tablet 20 mg   Dose: 20 mg  Freq: 2 TIMES DAILY Route: PO  Start: 01/24/20 2100    Order specific questions:   H2RA INDICATION Symptomatic GERD   Order ID: [id number]    08 (20 mg)     21 (20 mg)      08 (20 mg)     20 (20 mg)      08 (20 mg)     20 (20 mg)      08 (20 mg)     20 (20 mg)      08 (20 mg)     20 (20 mg)      08 (20 mg)     20 (20 mg)      08 (20 mg)     20 (20 mg)      08 (20 mg)     20 (20 mg)      08 (20 mg)     20 (20 mg)      08 (20 mg)     21        haloperidoL (HALDOL) tablet 5 mg   Dose: 5 mg  Freq: EVERY 4 HOURS AS NEEDED Route: PO  PRN Reasons: Psychosis,Agitation  Start: 01/24/20 1729   Order ID: [id number]                haloperidol lactate (HALDOL) injection 5 mg   Dose: 5 mg  Freq: EVERY 4 HOURS AS NEEDED Route: IM  PRN Reasons: Psychosis,Other  PRN Comment: severe agitation  Start: 01/24/20 1729   Order ID: [id number]                hydrOXYzine pamoate (VISTARIL) capsule 50 mg   Dose: 50 mg  Freq: EVERY 4 HOURS AS NEEDED Route: PO  PRN Reason: Anxiety  Start: 01/24/20 1729   Order ID: [id number]     08 (50 mg)         13 (50 mg)            ibuprofen (MOTRIN) tablet 400 mg   Dose: 400 mg  Freq: EVERY 4 HOURS AS NEEDED Route: PO  PRN Reasons: Mild Pain,Headache  Start: 01/24/20 1729   Order ID: [id number]                influenza vaccine 2019-20 (6 mos+)(PF) (FLUARIX/FLULAVAL/FLUZONE QUAD) injection 0.5 mL   Dose: 0.5 mL  Freq: PRIOR TO DISCHARGE Route: IM  Start: 01/24/20 1704    Admin Instructions:   Shake well prior to administration. Order ID: [id number]                LORazepam (ATIVAN) injection 1-2 mg   Dose: 1-2 mg  Freq: EVERY 4 HOURS AS NEEDED Route: IM  PRN Reason: Other  PRN Comment: severe agitation  Start: 01/24/20 1729    Admin Instructions:   Severe agitation unrelieved by Haldol   Order ID: [id number]                nicotine (NICORETTE) gum 2 mg   Dose: 2 mg  Freq: EVERY 2 HOURS AS NEEDED Route: PO  PRN Reason: Nicotine Withdrawal  Indications of Use: smoking cessation  Start: 01/25/20 1303   Order ID: [id number]                therapeutic multivitamin (THERAGRAN) tablet 1 Tab   Dose: 1 Tab  Freq: DAILY Route: PO  Start: 01/25/20 0900   Order ID: [id number]    08 (1 Tab)      08 (1 Tab)      08 (1 Tab)      08 (1 Tab)      08 (1 Tab)      08 (1 Tab)      08 (1 Tab)      08 (1 Tab)      08 (1 Tab)      08 (1 Tab)        traZODone (DESYREL) tablet 50 mg   Dose: 50 mg  Freq: BEDTIME PRN Route: PO  PRN Reason: Sleep  Start: 01/24/20 1729   Order ID: [id number]       20 (50 mg)              Future Medications   Medications 02/11 02/12 02/13 02/14 02/15 02/16 02/17 02/18 02/19 02/20   chlorproMAZINE (THORAZINE) tablet 50 mg   Dose: 50 mg  Freq: EVERY BEDTIME Route: PO  Indications of Use: schizophrenia  Start: 02/20/20 2100   Order ID: [id number]             21        paliperidone palmitate (INVEGA SUSTENNA) injection 156 mg   Dose: 156 mg  Freq: EVERY 4 WEEKS Route: IM  Indications of Use: schizophrenia  Start: 03/16/20 0900    Admin Instructions: For IM use only; do not give intravascularly or subcutaneous. Inject slowly into deltoid muscle for the first two doses, and into deltoid or gluteal muscle for subsequent monthly maintenance doses. Administer as a single injection; do not administer as a divided injection.    Order ID: [id number]                Discontinued Medications   Medications 02/11 02/12 02/13 02/14 02/15 02/16 02/17 02/18 02/19 02/20   bisacodyL (DULCOLAX) tablet 10 mg   Dose: 10 mg  Freq: DAILY PRN Route: PO  PRN Reason: Constipation  Indications of Use: constipation  Start: 01/31/20 0800 End: 02/08/20 1850    Admin Instructions:   Do not crush, break or chew.  Swallow whole. Order ID: [id number]                chlorproMAZINE (THORAZINE) tablet 100 mg   Dose: 100 mg  Freq: EVERY BEDTIME Route: PO  Indications of Use: schizophrenia  Start: 01/31/20 2100 End: 02/20/20 1802   Order ID: [id number]    21 (100 mg)      20 (100 mg)      20 (100 mg)      20 (100 mg)      20 (100 mg)      20 (100 mg)      20 (100 mg)      20 (100 mg)      20 (100 mg)         divalproex DR (DEPAKOTE) tablet 500 mg   Dose: 500 mg  Freq: 2 TIMES DAILY Route: PO  Start: 01/24/20 2100 End: 02/04/20 1203   Order ID: [id number]                ferrous sulfate tablet 325 mg   Dose: 1 Tab  Freq: DAILY WITH BREAKFAST Route: PO  Start: 01/25/20 0800 End: 02/10/20 1402   Order ID: [id number]                metoprolol tartrate (LOPRESSOR) tablet 25 mg   Dose: 25 mg  Freq: 2 TIMES DAILY Route: PO  Start: 01/24/20 2100 End: 01/26/20 2102    Admin Instructions:   . Order ID: [id number]                paliperidone (INVEGA) SR tablet 6 mg   Dose: 6 mg  Freq: EVERY BEDTIME Route: PO  Start: 01/25/20 2100 End: 01/31/20 1343    Admin Instructions:   Do not crush, break or chew.  Swallow whole. Order ID: [id number]                paliperidone palmitate (INVEGA SUSTENNA) injection 234 mg   Dose: 234 mg  Freq: EVERY 4 WEEKS Route: IM  Indications of Use: schizophrenia  Start: 02/17/20 0900 End: 02/20/20 1802    Admin Instructions: For IM use only; do not give intravascularly or subcutaneous. Inject slowly into deltoid muscle for the first two doses, and into deltoid or gluteal muscle for subsequent monthly maintenance doses. Administer as a single injection; do not administer as a divided injection.    Order ID: [id number]          09 (234 mg) psyllium husk-aspartame (METAMUCIL FIBER) packet 1 Packet   Dose: 1 Packet  Freq: 2 TIMES DAILY Route: PO  Indications of Use: constipation  Start: 01/30/20 2100 End: 02/11/20 1305    Admin Instructions:   Mix in 8 oz of water, administer 2 hrs before or after other medications. Order ID: [id number]    (08)                 thiamine HCL (B-1) tablet 100 mg   Dose: 100 mg  Freq: DAILY Route: PO  Start: 01/25/20 0900 End: 01/31/20 1343   Order ID: [id number]                Medications 02/11 02/12 02/13 02/14 02/15 02/16 02/17 02/18 02/19 02/20         Hospital Problems: Principal Problem:    [facility] ([facility]18[facility]2017)    Active Problems:    Cigarette nicotine dependence without complication (1/5/8544)        Subjective:   Medication side effects: disconnected  disconnected    Mental Status Exam  Sensorium: alert  Orientation: only aware of  time, place and person  Relations: guarded and passive  Eye Contact: poor  Appearance: is unkempt  Thought Process: slow rate of thoughts and poor abstract reasoning/computation   Thought Content: paranoia and aud halluc   Suicidal: denies   Homicidal: none   Mood: is anxious   Affect: constricted, odd  Memory: is impaired and is remote     Concentration: distractable  Abstraction: concrete  Insight: The patient shows little insight    OR Fair  Judgement: is psychologically impaired OR  Fair    Assessment/Plan:   not changed  Patient does feel better with the medications though she reports that it is making her feel too \"spacey\" and disconnected. She did receive the higher dose of the [place] 234 mg. It may be that she needs to have the next dose somewhat lower at 156 mg. In the meantime, she will cut the. Thorazine from 100 mg at night down to 50 mg at night. She still has hallucinations which she hears outside her head of people calling names and follow words. If this gets worse we will have to resume the Thorazine back at the higher dosing.   She is denying homicidal or suicidal ideas. It was hoped that she may be able to go to the crisis stabilization unit but they do not believe that they have staffing that is adequate to accept her over the weekend and will let us know tomorrow if they would have staffing for transfer tomorrow. If not, she would not be able to be transferred over there until Monday morning. This is a good way to try to transition her out of hospital and back out into the community. It does not appear that she would be able to get access to go to the [facility] at all. We will continue with reality orientation and medication management.     Continue close observation,

## 2020-02-20 NOTE — BSMART NOTE
OCCUPATIONAL THERAPY PROGRESS NOTE Group Time:  0962 Attendance: The patient attended full group. Participation: The patient participated with minimal elaboration in the activity. Attention: The patient was able to focus on the activity. Interaction: The patient acknowledges others or responds to questions,  with no spontaneous interaction. Insight can be good, view of self/self esteem poor.

## 2020-02-20 NOTE — BSMART NOTE
SOCIAL WORK GROUP THERAPY PROGRESS NOTE Group Time:  10:45am 
 
Group Topic:  Coping Skills    C D Issues Group Participation:   
 
Pt not involved during group discussion but remained attentive. Affect blunted with mood still dysphoric. Made no comments. Members discussed handout on the role of \"neurotransmitters\" and how they regulate different aspects of one's moods, cognitions & related behavior. Emphasis of session was presentation of basic \"CBT\" principles including diagram of the process, as well as list of typical cognitive distortions.

## 2020-02-20 NOTE — INTERDISCIPLINARY ROUNDS
Treatment team met - Medical Director: _x____present Psychiatrist: ___x__present Charge nurse: _x____present MSW: ____x_present : __x___present Nurse Manager: __x___present Student RNs: __n___present Medical Students: __x___present Art Therapist: _x____present Clinical Coordinator: __x___present Occupational Therapist: _x____present : ___n____ present UR  _____x__ present Crisis Supervisor___x____present Plan of care discussed and updated as appropriate. Iteracting sometimes. Has improved. Possible D/C on Friday.

## 2020-02-21 PROCEDURE — 74011250637 HC RX REV CODE- 250/637: Performed by: PSYCHIATRY & NEUROLOGY

## 2020-02-21 RX ORDER — CHLORPROMAZINE HYDROCHLORIDE 50 MG/1
50 TABLET, FILM COATED ORAL
Qty: 30 TAB | Refills: 0 | Status: SHIPPED | OUTPATIENT
Start: 2020-02-21 | End: 2020-03-12

## 2020-02-21 RX ORDER — FAMOTIDINE 20 MG/1
20 TABLET, FILM COATED ORAL 2 TIMES DAILY
Qty: 60 TAB | Refills: 0 | Status: ON HOLD | OUTPATIENT
Start: 2020-02-21 | End: 2020-03-11 | Stop reason: SDUPTHER

## 2020-02-21 RX ORDER — DOCUSATE SODIUM 100 MG/1
100 CAPSULE, LIQUID FILLED ORAL DAILY
Qty: 30 CAP | Refills: 0 | Status: ON HOLD | OUTPATIENT
Start: 2020-02-22 | End: 2020-03-11 | Stop reason: SDUPTHER

## 2020-02-21 RX ORDER — ASCORBIC ACID 500 MG
500 TABLET ORAL DAILY
Qty: 30 TAB | Refills: 0 | Status: SHIPPED | OUTPATIENT
Start: 2020-02-21 | End: 2020-06-17

## 2020-02-21 RX ORDER — DESVENLAFAXINE SUCCINATE 50 MG/1
50 TABLET, EXTENDED RELEASE ORAL DAILY
Qty: 30 TAB | Refills: 0 | Status: SHIPPED | OUTPATIENT
Start: 2020-02-22 | End: 2020-03-12

## 2020-02-21 RX ADMIN — DOCUSATE SODIUM 100 MG: 100 CAPSULE, LIQUID FILLED ORAL at 08:05

## 2020-02-21 RX ADMIN — FAMOTIDINE 20 MG: 20 TABLET ORAL at 08:05

## 2020-02-21 RX ADMIN — Medication 500 MG: at 08:05

## 2020-02-21 RX ADMIN — THERA TABS 1 TABLET: TAB at 08:05

## 2020-02-21 RX ADMIN — DESVENLAFAXINE SUCCINATE 50 MG: 50 TABLET, FILM COATED, EXTENDED RELEASE ORAL at 08:05

## 2020-02-21 NOTE — DISCHARGE INSTRUCTIONS
Patient armband removed and shredded      BEHAVIORAL HEALTH NURSING DISCHARGE NOTE      The following personal items collected during your admission are returned to you:   Dental Appliance: Dental Appliances: None  Vision: Visual Aid: None  Hearing Aid:    Jewelry:    Clothing:    Other Valuables:    Valuables sent to safe:        PATIENT INSTRUCTIONS:    Important numbers given to patient    The discharge information has been reviewed with the patient. The patient verbalized understanding.

## 2020-02-21 NOTE — DISCHARGE SUMMARY
[address]    Name:  Ananda Granados  MR#:   [id number]  :  1990  ACCOUNT #:  [de-identified]  ADMIT DATE:  2020  DISCHARGE DATE:  2020    IDENTIFYING DATA:  The patient was admitted as a 78-year-old  white female, resident of [facility], covered by a Medicaid [facility] plan. She was admitted after presenting to [facility] in a bizarre and psychotic condition, saying her brain was empty. She thought someone had shot and sanitized her brain. She had described auditory hallucinations saying she was a racist and was encouraging her to shout profanity and racial slurs, feeling that she was suffering the wrath of God. She was known to have previously been on clozapine, Pristiq, Depakote for schizophrenia. She had three admissions to [facility] in 2018 and has had multiple admissions to facilities throughout [facility] and [address] [place] [address]. She has been in the hospital for most of the past year, going from hospital to hospital.  She had been a client of the PACT team.  She was a poor historian and could not give much further information. She was known to have been at the [facility] on [facility]. She was known to have been on Latuda and chlorpromazine in the past and had received injections of [place]. She said her doctor was Dr. Rosie Simmons at the [facility]. She said her medicines made her feel like she was Delilah millsie passed out on a cross. \"    Laboratory testing done in the emergency room included a normal CBC, dilute urine, normal comprehensive metabolic panel, normal lipid panel, negative hCG, negative acetaminophen level and a subtherapeutic salicylate level 2.6 mg/dL. Valproic acid level was subtherapeutic at 20 mcg/mL. Alcohol level was 0, and urine drug screen was negative. TSH was normal at 0.81 micro-international units/mL.     HOSPITAL COURSE:  The patient was admitted to the [facility] treatment unit where she was afforded individual, group and milieu therapies. She was quite difficult to stabilize as she was grossly psychotic for much of the hospital stay. Review of records show that she had never been able to be stabilized on a single antipsychotic medication and had required several different ones. She was resumed back on the [place] injections and had received the first two injections of 234 and 156 mg and then 1 month later received a second injection of 234 mg with the most recent injection occurring on 02/17/2020. She had been placed on a conditional antipsychotic medication of chlorpromazine 100 mg at bedtime; as she improved, this was able to be decreased down to 50 mg at bedtime. She was also on multiple vitamins daily, several doses of trazodone 50 mg at night for sleep, several doses of hydroxyzine 50 mg for anxiety, Pepcid 20 mg b.i.d. for gastroesophageal reflux, Colace 100 mg daily for constipation, Pristiq ER 50 mg daily for her prior complaints of depression. She did receive Dulcolax on several occasions and was on her routine dosing of ascorbic acid 500 mg daily. Her psychosis slowly improved. She did say that she has always had auditory hallucinations even when treated with medications, and at her best, was able to ignore them somewhat. On the day of discharge, she said she was not having any, though she had them mildly and quietly the day before. She was denying paranoid or persecutory ideas and was denying being depressed. She had been willing to be discharged to go to the crisis stabilization unit as a transition and was willing to return back to housing in the community, though she said that she was concerned that she might need to live in an adult living facility as she was having some degree of difficulty in fixing her own food and cleaning apartment. CONDITION ON DISCHARGE:  Fair. PROGNOSIS:  Fair. ASSESSMENT:  AXIS I:  Schizophrenia.   Nicotine use disorder, moderate. AXIS II:  None. AXIS III:  Gastroesophageal reflux disease. History of hypertension. History of vitamin D deficiency. DISPOSITION:  Discharge with transfer to the regional crisis stabilization unit being accepted by Dr. Maria D Carter. They will make arrangements for further outpatient treatment once she is discharged from there. Follow up with own primary care provider as outpatient. DISCHARGE MEDICATIONS:  1. Invega Sustenna 156 mg IM every 4 weeks, injection due on 03/14/2020.  2.  Ascorbic acid 500 mg tablet one daily, #30, no refill. 3.  Pristiq (desvenlafaxine) ER 50 mg tablet one daily, #30, no refill. 4.  Docusate sodium (Colace) 100 mg capsule one daily, #30, no refill. 5.  Famotidine (Pepcid) 20 mg tablet one b.i.d., #60, no refill. 6.  Multiple vitamins one daily. 7.  Chlorpromazine 50 mg tablet one at bedtime, #30, no refill.       Shalini Gonzalez MD      [id number]  D:  02/21/2020 12:13  T:  02/21/2020 13:59  JOB #:  [id number]

## 2020-02-21 NOTE — BH NOTES
IRINEO Note: The above pt was given a pair of brown shoes and a cost at the end of her stay (discharge) she verbalized \"I gave my shoes away when I first got admitted\" (the shoes that was on her inventory sheet. Staff (person writing) provided the above pt with a pair of shoes and a coat prior to getting discharged.

## 2020-02-21 NOTE — BSMART NOTE
Pt.is a 34year old female with history of Schizophrenia paranoid type and past history of Cocaine and DID.  Pt. was admitted to this facility for facility for delusional thinking and ideations to harm. Pt.'s case was discussed in treatment team this a.m  Pt. Might be dc today. Arun will follow-with [facility]. ARUN Collateral:  ARUN talked to Lesley Zamorano staff at [facility]. Autumn informed SW  Pt. Will be assessed for compliance by Milla العلي ptSejal's CM with [facility] PACT team.  ARUN informed treating psychiatrist and the pt. PACT CM came and assessed the pt. . pt. agreed to step-down and comply with treatment at [place] [address]. The pt. denies ideations and hallucinations. Pt. Discussed treatment goals with CM and ARUN. PACT CM informed Autumn at [place] [address]. Pt. Was accepted and step-down to the facility. [facility]. Pt. Will be followed by Dr. Shanice Archer 2/21/20 @ [address]  Phone: [phone]. Pt. Than will continue to be followed by Sejal Rivero with [facility] team.

## 2020-02-21 NOTE — PROGRESS NOTES
Behavioral Health Progress Note    Admit Date: 1/24/2020  Hospital day 28    Vitals : No data found. Labs:  No results found for this or any previous visit (from the past 24 hour(s)).   Meds:   Current Facility-Administered Medications   Medication Dose Route Frequency    [START ON 3/16/2020] paliperidone palmitate (INVEGA SUSTENNA) injection 156 mg  156 mg IntraMUSCular [id number]    chlorproMAZINE (THORAZINE) tablet 50 mg  50 mg Oral QHS    bisacodyL (DULCOLAX) tablet 5 mg  5 mg Oral DAILY PRN    desvenlafaxine succinate (PRISTIQ) ER tablet 50 mg  50 mg Oral DAILY    docusate sodium (COLACE) capsule 100 mg  100 mg Oral DAILY    nicotine (NICORETTE) gum 2 mg  2 mg Oral Q2H PRN    hydrOXYzine pamoate (VISTARIL) capsule 50 mg  50 mg Oral Q4H PRN    haloperidoL (HALDOL) tablet 5 mg  5 mg Oral Q4H PRN    haloperidol lactate (HALDOL) injection 5 mg  5 mg IntraMUSCular Q4H PRN    LORazepam (ATIVAN) injection 1-2 mg  1-2 mg IntraMUSCular Q4H PRN    traZODone (DESYREL) tablet 50 mg  50 mg Oral QHS PRN    ibuprofen (MOTRIN) tablet 400 mg  400 mg Oral Q4H PRN    therapeutic multivitamin (THERAGRAN) tablet 1 Tab  1 Tab Oral DAILY    ascorbic acid (vitamin C) (VITAMIN C) tablet 500 mg  500 mg Oral DAILY    famotidine (PEPCID) tablet 20 mg  20 mg Oral BID    influenza vaccine 2019-20 (6 mos+)(PF) (FLUARIX/FLULAVAL/FLUZONE QUAD) injection 0.5 mL  0.5 mL IntraMUSCular PRIOR TO DISCHARGE      Hospital Problems: Principal Problem:    Schizophrenia ([facility] (4/18/2017)    Active Problems:    Cigarette nicotine dependence without complication (8/6/0956)        Subjective:   Medication side effects: none  none    Mental Status Exam  Sensorium: alert  Orientation: only aware of  time, place and person  Relations: cooperative  Eye Contact: poor  Appearance: subdued, quiet  Thought Process: normal rate of thoughts and poor abstract reasoning/computation   Thought Content: paucity of content and spontaneous speech and activity Suicidal: denies   Homicidal: none   Mood: is euthymic   Affect: stable  Memory: is impaired and is remote     Concentration: fair  Abstraction: concrete  Insight: The patient shows little insight    OR Fair  Judgement: is psychologically impaired OR  Fair    Assessment/Plan:   improved  Nurses report that she has been calm and cooperative. She has spoken to them that she felt ready to go. She tells me the same thing. She denies auditory hallucinations today though she had some yesterday morning. She says that she always has these and they have never totally gone away. She wants to be able to be discharged and agrees that she could go to the crisis stabilization unit. When there they can work on transition to outpatient services. She denies medication complaints. The patient had been presented to Dr. Jerson Venegas at the [facility] crisis stabilization unit and he wanted the patient to be seen again by the pact team.  They are currently on the way over here to give an opinion as to whether she is ready for discharge today or that she would need to wait until next week. We will continue with medications as is.   Continue close observation,

## 2020-02-21 NOTE — BSMART NOTE
ART THERAPY GROUP PROGRESS NOTE PATIENT SCHEDULED FOR GROUP AT: 9:35 ATTENDANCE: Full PARTICIPATION LEVEL: Participates fully in the art process. ATTENTION LEVEL: Able to focus on task. FOCUS: Creative Expression SYMBOLIC & THEMATIC CONTENT AS NOTED IN IMAGERY: Leathas mood and affect were euthymic and congruent. Her approach to task was slow and considerate. She identified that \"I hope I can get out of here\"  and that she was \"having trouble focusing\". Inocencia Sachanaida was able to focus her attention when guided to work the way she wanted and created a piece for her two sons.

## 2020-03-01 ENCOUNTER — APPOINTMENT (OUTPATIENT)
Dept: CT IMAGING | Age: 30
End: 2020-03-01
Attending: PHYSICIAN ASSISTANT
Payer: MEDICAID

## 2020-03-01 ENCOUNTER — HOSPITAL ENCOUNTER (EMERGENCY)
Age: 30
Discharge: PSYCHIATRIC OR SUBSTANCE USE FACILITY | End: 2020-03-02
Attending: EMERGENCY MEDICINE | Admitting: EMERGENCY MEDICINE
Payer: MEDICAID

## 2020-03-01 VITALS
TEMPERATURE: 98 F | SYSTOLIC BLOOD PRESSURE: 111 MMHG | HEART RATE: 84 BPM | OXYGEN SATURATION: 97 % | DIASTOLIC BLOOD PRESSURE: 67 MMHG | RESPIRATION RATE: 18 BRPM

## 2020-03-01 DIAGNOSIS — F29 PSYCHOSIS, UNSPECIFIED PSYCHOSIS TYPE (HCC): ICD-10-CM

## 2020-03-01 DIAGNOSIS — F20.9 SCHIZOPHRENIA, UNSPECIFIED TYPE (HCC): Primary | ICD-10-CM

## 2020-03-01 LAB
ALBUMIN SERPL-MCNC: 3.9 G/DL (ref 3.4–5)
ALBUMIN/GLOB SERPL: 1.1 {RATIO} (ref 0.8–1.7)
ALP SERPL-CCNC: 71 U/L (ref 45–117)
ALT SERPL-CCNC: 21 U/L (ref 13–56)
AMMONIA PLAS-SCNC: <10 UMOL/L (ref 11–32)
AMPHET UR QL SCN: NEGATIVE
ANION GAP SERPL CALC-SCNC: 7 MMOL/L (ref 3–18)
APAP SERPL-MCNC: <2 UG/ML (ref 10–30)
AST SERPL-CCNC: 12 U/L (ref 10–38)
BARBITURATES UR QL SCN: NEGATIVE
BASOPHILS # BLD: 0 K/UL (ref 0–0.1)
BASOPHILS NFR BLD: 0 % (ref 0–2)
BENZODIAZ UR QL: NEGATIVE
BILIRUB SERPL-MCNC: 0.4 MG/DL (ref 0.2–1)
BUN SERPL-MCNC: 3 MG/DL (ref 7–18)
BUN/CREAT SERPL: 5 (ref 12–20)
CALCIUM SERPL-MCNC: 8.9 MG/DL (ref 8.5–10.1)
CANNABINOIDS UR QL SCN: NEGATIVE
CHLORIDE SERPL-SCNC: 99 MMOL/L (ref 100–111)
CO2 SERPL-SCNC: 26 MMOL/L (ref 21–32)
COCAINE UR QL SCN: NEGATIVE
CREAT SERPL-MCNC: 0.58 MG/DL (ref 0.6–1.3)
DIFFERENTIAL METHOD BLD: ABNORMAL
EOSINOPHIL # BLD: 0 K/UL (ref 0–0.4)
EOSINOPHIL NFR BLD: 0 % (ref 0–5)
ERYTHROCYTE [DISTWIDTH] IN BLOOD BY AUTOMATED COUNT: 12.6 % (ref 11.6–14.5)
ETHANOL SERPL-MCNC: <3 MG/DL (ref 0–3)
GLOBULIN SER CALC-MCNC: 3.4 G/DL (ref 2–4)
GLUCOSE SERPL-MCNC: 93 MG/DL (ref 74–99)
HCG SERPL QL: NEGATIVE
HCT VFR BLD AUTO: 37.9 % (ref 35–45)
HDSCOM,HDSCOM: NORMAL
HGB BLD-MCNC: 13.2 G/DL (ref 12–16)
LYMPHOCYTES # BLD: 1.5 K/UL (ref 0.9–3.6)
LYMPHOCYTES NFR BLD: 26 % (ref 21–52)
MCH RBC QN AUTO: 31.7 PG (ref 24–34)
MCHC RBC AUTO-ENTMCNC: 34.8 G/DL (ref 31–37)
MCV RBC AUTO: 90.9 FL (ref 74–97)
METHADONE UR QL: NEGATIVE
MONOCYTES # BLD: 0.5 K/UL (ref 0.05–1.2)
MONOCYTES NFR BLD: 9 % (ref 3–10)
NEUTS SEG # BLD: 3.8 K/UL (ref 1.8–8)
NEUTS SEG NFR BLD: 65 % (ref 40–73)
OPIATES UR QL: NEGATIVE
PCP UR QL: NEGATIVE
PLATELET # BLD AUTO: 231 K/UL (ref 135–420)
PMV BLD AUTO: 9.2 FL (ref 9.2–11.8)
POTASSIUM SERPL-SCNC: 3.6 MMOL/L (ref 3.5–5.5)
PROT SERPL-MCNC: 7.3 G/DL (ref 6.4–8.2)
RBC # BLD AUTO: 4.17 M/UL (ref 4.2–5.3)
SALICYLATES SERPL-MCNC: <1.7 MG/DL (ref 2.8–20)
SODIUM SERPL-SCNC: 132 MMOL/L (ref 136–145)
WBC # BLD AUTO: 5.8 K/UL (ref 4.6–13.2)

## 2020-03-01 PROCEDURE — 80307 DRUG TEST PRSMV CHEM ANLYZR: CPT

## 2020-03-01 PROCEDURE — 99285 EMERGENCY DEPT VISIT HI MDM: CPT

## 2020-03-01 PROCEDURE — 80053 COMPREHEN METABOLIC PANEL: CPT

## 2020-03-01 PROCEDURE — 82140 ASSAY OF AMMONIA: CPT

## 2020-03-01 PROCEDURE — 84703 CHORIONIC GONADOTROPIN ASSAY: CPT

## 2020-03-01 PROCEDURE — 70450 CT HEAD/BRAIN W/O DYE: CPT

## 2020-03-01 PROCEDURE — 85025 COMPLETE CBC W/AUTO DIFF WBC: CPT

## 2020-03-01 NOTE — ED NOTES
Pt stated she did not want any more blood taken because I was taking all of the blood out of her body

## 2020-03-01 NOTE — ED PROVIDER NOTES
EMERGENCY DEPARTMENT HISTORY AND PHYSICAL EXAM    Date: 3/1/2020  Patient Name: Amanda Liu    History of Presenting Illness     Chief Complaint   Patient presents with    Suicidal         History Provided By: Patient and EMS    Chief Complaint: psych  Duration: 1 Days  Timing:  Acute  Location: global  Quality: n/a  Severity: N/A  Modifying Factors: none  Associated Symptoms: denies any other associated signs or symptoms      HPI: Amanda Liu is a 34 y.o. female with a PMH of schizophrenia, bipolar, etho abuse, drug abuse, anemia, gerd, cirrhosis who presents to the ER via EMS from home after calling 911. Medics reports pt states she cannot remember anything and is having some amnesia. Pt w/ significant psych history. Denied any drug or alcohol use today. States she took a few pills earlier today but does not know what they were. Remainder of hx limited due to pts psychosis. PCP: PAU Ramires    Current Outpatient Medications   Medication Sig Dispense Refill    ascorbic acid, vitamin C, (VITAMIN C) 500 mg tablet Take 1 Tab by mouth daily. Indications: inadequate vitamin C 30 Tab 0    famotidine (PEPCID) 20 mg tablet Take 1 Tab by mouth two (2) times a day for 30 days. Indications: gastroesophageal reflux disease 60 Tab 0    [START ON 3/16/2020] paliperidone palmitate (INVEGA SUSTENNA) 234 mg/1.5 mL injection 1 mL by IntraMUSCular route every four (4) weeks. Indications: schizophrenia 1 Syringe 0    chlorproMAZINE (THORAZINE) 50 mg tablet Take 1 Tab by mouth nightly for 30 days. Indications: schizophrenia 30 Tab 0    desvenlafaxine succinate (PRISTIQ) 50 mg ER tablet Take 1 Tab by mouth daily for 30 days. Indications: major depressive disorder 30 Tab 0    docusate sodium (COLACE) 100 mg capsule Take 1 Cap by mouth daily for 30 days. Indications: constipation 30 Cap 0    hydrOXYzine pamoate (VISTARIL) 25 mg capsule Take 1 Cap by mouth two (2) times daily as needed for Anxiety.  27 Cap 0       Past History     Past Medical History:  Past Medical History:   Diagnosis Date    Alcoholic ([facility]     Sober 3 months; Weekly AAA meeting; Had substance abuse counseling;     Anemia     [facility]     [facility]osis ([facility]     Past not current issue per patient    ETOH abuse     GERD (gastroesophageal reflux disease)     Head injury     Marijuana abuse     Phobia     [facility]    Post partum depression     Pregnancy     Psychiatric disorder     Psychotic disorder ([facility]     Depression/  Akshaty Nereida, NP; Bipolar disorder, mood swings.  [facility] ([facility]     Stroke [facility])        Past Surgical History:  Past Surgical History:   Procedure Laterality Date    HX  SECTION      HX  SECTION      C section x 2;   &     HX RHINOPLASTY      HX TUBAL LIGATION  2017       Family History:  Family History   Family history unknown: Yes       Social History:  Social History     Tobacco Use    Smoking status: Current Every Day Smoker     Packs/day: 0.50     Years: 15.00     Pack years: 7.50    Smokeless tobacco: Former User   Substance Use Topics    Alcohol use: No     Comment: Sober for x 3 months    Drug use: Not Currently     Types: Other     Comment: Alcohol       Allergies: Allergies   Allergen Reactions    Robitussin [Guaifenesin] Nausea and Vomiting         Review of Systems   Review of Systems   Unable to perform ROS: Psychiatric disorder       Physical Exam     Vitals:    20 1804 20 2334   BP: 111/77 111/67   Pulse: 87 84   Resp: 16 18   Temp: 98 °F (36.7 °C)    SpO2: 99% 97%     Physical Exam  Vitals signs and nursing note reviewed. Constitutional:       General: She is not in acute distress. Appearance: Normal appearance. HENT:      Head: Normocephalic and atraumatic.       Nose: Nose normal.      Mouth/Throat:      Mouth: Mucous membranes are moist.   Eyes:      Conjunctiva/sclera: Conjunctivae normal.   Cardiovascular: Rate and Rhythm: Normal rate and regular rhythm. Heart sounds: No murmur. Pulmonary:      Effort: Pulmonary effort is normal. No respiratory distress. Breath sounds: Normal breath sounds. No wheezing, rhonchi or rales. Chest:      Chest wall: No tenderness. Abdominal:      General: Abdomen is flat. Tenderness: There is no abdominal tenderness. Skin:     General: Skin is warm and dry. Capillary Refill: Capillary refill takes less than 2 seconds. Neurological:      Mental Status: She is alert. Mental status is at baseline. Psychiatric:         Attention and Perception: She is inattentive. Mood and Affect: Affect is flat. Speech: Speech is delayed and tangential.         Behavior: Behavior is slowed and withdrawn. Thought Content: Thought content includes suicidal ideation. Comments: Pt w/ scattered thought process; not completing sentences; initial SI but now denying this           Diagnostic Study Results     Labs -     Recent Results (from the past 12 hour(s))   CBC WITH AUTOMATED DIFF    Collection Time: 03/01/20  6:22 PM   Result Value Ref Range    WBC 5.8 4.6 - 13.2 K/uL    RBC 4.17 (L) 4.20 - 5.30 M/uL    HGB 13.2 12.0 - 16.0 g/dL    HCT 37.9 35.0 - 45.0 %    MCV 90.9 74.0 - 97.0 FL    MCH 31.7 24.0 - 34.0 PG    MCHC 34.8 31.0 - 37.0 g/dL    RDW 12.6 11.6 - 14.5 %    PLATELET 331 907 - 328 K/uL    MPV 9.2 9.2 - 11.8 FL    NEUTROPHILS 65 40 - 73 %    LYMPHOCYTES 26 21 - 52 %    MONOCYTES 9 3 - 10 %    EOSINOPHILS 0 0 - 5 %    BASOPHILS 0 0 - 2 %    ABS. NEUTROPHILS 3.8 1.8 - 8.0 K/UL    ABS. LYMPHOCYTES 1.5 0.9 - 3.6 K/UL    ABS. MONOCYTES 0.5 0.05 - 1.2 K/UL    ABS. EOSINOPHILS 0.0 0.0 - 0.4 K/UL    ABS.  BASOPHILS 0.0 0.0 - 0.1 K/UL    DF AUTOMATED     METABOLIC PANEL, COMPREHENSIVE    Collection Time: 03/01/20  6:22 PM   Result Value Ref Range    Sodium 132 (L) 136 - 145 mmol/L    Potassium 3.6 3.5 - 5.5 mmol/L    Chloride 99 (L) 100 - 111 mmol/L CO2 26 21 - 32 mmol/L    Anion gap 7 3.0 - 18 mmol/L    Glucose 93 74 - 99 mg/dL    BUN 3 (L) 7.0 - 18 MG/DL    Creatinine 0.58 (L) 0.6 - 1.3 MG/DL    BUN/Creatinine ratio 5 (L) 12 - 20      GFR est AA >60 >60 ml/min/1.73m2    GFR est non-AA >60 >60 ml/min/1.73m2    Calcium 8.9 8.5 - 10.1 MG/DL    Bilirubin, total 0.4 0.2 - 1.0 MG/DL    ALT (SGPT) 21 13 - 56 U/L    AST (SGOT) 12 10 - 38 U/L    Alk. phosphatase 71 45 - 117 U/L    Protein, total 7.3 6.4 - 8.2 g/dL    Albumin 3.9 3.4 - 5.0 g/dL    Globulin 3.4 2.0 - 4.0 g/dL    A-G Ratio 1.1 0.8 - 1.7     DRUG SCREEN, URINE    Collection Time: 03/01/20  6:22 PM   Result Value Ref Range    BENZODIAZEPINES NEGATIVE  NEG      BARBITURATES NEGATIVE  NEG      THC (TH-CANNABINOL) NEGATIVE  NEG      OPIATES NEGATIVE  NEG      PCP(PHENCYCLIDINE) NEGATIVE  NEG      COCAINE NEGATIVE  NEG      AMPHETAMINES NEGATIVE  NEG      METHADONE NEGATIVE  NEG      HDSCOM (NOTE)    ETHYL ALCOHOL    Collection Time: 03/01/20  6:22 PM   Result Value Ref Range    ALCOHOL(ETHYL),SERUM <3 0 - 3 MG/DL   HCG QL SERUM    Collection Time: 03/01/20  6:22 PM   Result Value Ref Range    HCG, Ql. NEGATIVE  NEG     SALICYLATE    Collection Time: 03/01/20  6:22 PM   Result Value Ref Range    Salicylate level <0.1 (L) 2.8 - 20.0 MG/DL   ACETAMINOPHEN    Collection Time: 03/01/20  6:22 PM   Result Value Ref Range    Acetaminophen level <2 (L) 10.0 - 30.0 ug/mL   AMMONIA    Collection Time: 03/01/20 10:45 PM   Result Value Ref Range    Ammonia <10 (L) 11 - 32 UMOL/L       Radiologic Studies -   CT HEAD WO CONT    (Results Pending)     CT Results  (Last 48 hours)    None        CXR Results  (Last 48 hours)    None            Medical Decision Making   I am the first provider for this patient. I reviewed the vital signs, available nursing notes, past medical history, past surgical history, family history and social history. Vital Signs-Reviewed the patient's vital signs.     Records Reviewed: Nursing Notes and Old Medical Records     7:13 PM  35 y/o female w/ hx of schizophrenia and psychosis presents to the ER via EMS from home w/ unorganized and incomplete thoughts. Initially expressed suicidal ideations. Also reported taking 2-3 unknown pills. Unable to focus during exam and very poor historian. Medically cleared at this time. Will plan on telepsych consult. Uri Pool PA-C     10:30 PM  Pt recommended for inpt admission; advised to obtain TDO if pt changes mind from voluntary and attempts to leave per telepsychiatry note. Chart reviewed by Sienna Ortiz at [facility]; she is requesting ct head as pt has documented hx of stroke. Review of chart shows negative head ct in 2011 w/ [facility]. Low suspicion for pts documented stroke hx however will obtain ct head as requested for clearance at this time. Uri Pool PA-C    11:38 PM  Ct head negative. Ammonia level < 10. Charge RN will continue to attempt placement as pt is still medically cleared at this time. Discussed pt w/ Dr. Filippo Johnson, ER attending who will continue to monitor pt while awaiting bed placement. Uri Pool PA-C      Disposition:  Psych admit        Follow-up Information    None         Current Discharge Medication List          Provider Notes (Medical Decision Making):     Procedures:  Procedures        Diagnosis     Clinical Impression:   1. Schizophrenia, unspecified type ([facility]    2.  Psychosis, unspecified psychosis type ([facility]

## 2020-03-01 NOTE — ED NOTES
Pt refusing labs and insisting on speaking to the physician. PA notified and spoke to pt. Pt is agreeable to labs.

## 2020-03-01 NOTE — ED TRIAGE NOTES
Pt presents via EMS for \"amnesia\" Upon questioning, patient states she is suicidal. Would \"cut off her pinky\" or \"lay in a hot tub. \" Pt denies HI. (+) hallucinations auditory and visual. Pt states she took \"a pill\". Pt denies ETOH. Pt is alert. Oriented to time only. When asked questions, pt starts to answer and suddenly stops talking.

## 2020-03-02 ENCOUNTER — HOSPITAL ENCOUNTER (INPATIENT)
Age: 30
LOS: 10 days | Discharge: HOME | DRG: 750 | End: 2020-03-12
Attending: PSYCHIATRY & NEUROLOGY | Admitting: PSYCHIATRY & NEUROLOGY
Payer: MEDICAID

## 2020-03-02 PROCEDURE — 65220000003 HC RM SEMIPRIVATE PSYCH

## 2020-03-02 PROCEDURE — 74011250637 HC RX REV CODE- 250/637: Performed by: NURSE PRACTITIONER

## 2020-03-02 PROCEDURE — 74011250637 HC RX REV CODE- 250/637: Performed by: PSYCHIATRY & NEUROLOGY

## 2020-03-02 RX ORDER — FAMOTIDINE 20 MG/1
20 TABLET, FILM COATED ORAL 2 TIMES DAILY
Status: DISCONTINUED | OUTPATIENT
Start: 2020-03-02 | End: 2020-03-12 | Stop reason: HOSPADM

## 2020-03-02 RX ORDER — DM/P-EPHED/ACETAMINOPH/DOXYLAM 30-7.5/3
2 LIQUID (ML) ORAL
Status: DISCONTINUED | OUTPATIENT
Start: 2020-03-02 | End: 2020-03-02

## 2020-03-02 RX ORDER — DESVENLAFAXINE SUCCINATE 50 MG/1
50 TABLET, EXTENDED RELEASE ORAL DAILY
Status: DISCONTINUED | OUTPATIENT
Start: 2020-03-02 | End: 2020-03-03

## 2020-03-02 RX ORDER — DOCUSATE SODIUM 100 MG/1
100 CAPSULE, LIQUID FILLED ORAL DAILY
Status: DISCONTINUED | OUTPATIENT
Start: 2020-03-02 | End: 2020-03-12 | Stop reason: HOSPADM

## 2020-03-02 RX ORDER — ACETAMINOPHEN 500 MG
2 TABLET ORAL
Status: DISCONTINUED | OUTPATIENT
Start: 2020-03-02 | End: 2020-03-12 | Stop reason: HOSPADM

## 2020-03-02 RX ORDER — TRAZODONE HYDROCHLORIDE 50 MG/1
50 TABLET ORAL
Status: DISCONTINUED | OUTPATIENT
Start: 2020-03-02 | End: 2020-03-12 | Stop reason: HOSPADM

## 2020-03-02 RX ORDER — THERA TABS 400 MCG
1 TAB ORAL DAILY
Status: DISCONTINUED | OUTPATIENT
Start: 2020-03-02 | End: 2020-03-12 | Stop reason: HOSPADM

## 2020-03-02 RX ORDER — CHLORPROMAZINE HYDROCHLORIDE 25 MG/1
50 TABLET, FILM COATED ORAL
Status: DISCONTINUED | OUTPATIENT
Start: 2020-03-02 | End: 2020-03-12 | Stop reason: HOSPADM

## 2020-03-02 RX ORDER — IBUPROFEN 600 MG/1
600 TABLET ORAL
Status: DISCONTINUED | OUTPATIENT
Start: 2020-03-02 | End: 2020-03-12 | Stop reason: HOSPADM

## 2020-03-02 RX ORDER — ASCORBIC ACID 250 MG
500 TABLET ORAL DAILY
Status: DISCONTINUED | OUTPATIENT
Start: 2020-03-02 | End: 2020-03-12 | Stop reason: HOSPADM

## 2020-03-02 RX ADMIN — IBUPROFEN 600 MG: 600 TABLET ORAL at 21:02

## 2020-03-02 RX ADMIN — CHLORPROMAZINE HYDROCHLORIDE 50 MG: 25 TABLET, SUGAR COATED ORAL at 20:23

## 2020-03-02 RX ADMIN — TRAZODONE HYDROCHLORIDE 50 MG: 50 TABLET ORAL at 20:22

## 2020-03-02 RX ADMIN — NICOTINE POLACRILEX 2 MG: 2 GUM, CHEWING ORAL at 11:53

## 2020-03-02 RX ADMIN — FAMOTIDINE 20 MG: 20 TABLET ORAL at 20:22

## 2020-03-02 NOTE — ED NOTES
Note:  Assuming care of patient   from leaving provider    11:52 PM  Bibi MAI MD, assumed care of patient from another provider who is ending their shift in the emergency department . 11:52 PM    Date: 3/1/2020  Patient Name: Afsaneh Leal    History of Presenting Illness     Chief Complaint   Patient presents with    Suicidal       Nursing notes regarding the HPI and triage nursing notes were reviewed. Prior medical records were reviewed. Current Outpatient Medications   Medication Sig Dispense Refill    ascorbic acid, vitamin C, (VITAMIN C) 500 mg tablet Take 1 Tab by mouth daily. Indications: inadequate vitamin C 30 Tab 0    famotidine (PEPCID) 20 mg tablet Take 1 Tab by mouth two (2) times a day for 30 days. Indications: gastroesophageal reflux disease 60 Tab 0    [START ON 3/16/2020] paliperidone palmitate (INVEGA SUSTENNA) 234 mg/1.5 mL injection 1 mL by IntraMUSCular route every four (4) weeks. Indications: schizophrenia 1 Syringe 0    chlorproMAZINE (THORAZINE) 50 mg tablet Take 1 Tab by mouth nightly for 30 days. Indications: schizophrenia 30 Tab 0    desvenlafaxine succinate (PRISTIQ) 50 mg ER tablet Take 1 Tab by mouth daily for 30 days. Indications: major depressive disorder 30 Tab 0    docusate sodium (COLACE) 100 mg capsule Take 1 Cap by mouth daily for 30 days. Indications: constipation 30 Cap 0    hydrOXYzine pamoate (VISTARIL) 25 mg capsule Take 1 Cap by mouth two (2) times daily as needed for Anxiety. 30 Cap 0       Past History     Past Medical History:  Past Medical History:   Diagnosis Date    Alcoholic ([facility]     Sober 3 months; Weekly [employer] meeting;  Had substance abuse counseling;     Anemia     Bipolar disorder ([facility]     Cirrhosis ([facility]     Past not current issue per patient    ETOH abuse     GERD (gastroesophageal reflux disease)     Head injury     Marijuana abuse     Phobia     [place]    Post partum depression     Pregnancy     Psychiatric disorder     Psychotic disorder [facility])     Depression/  Arian Lombardo NP; Bipolar disorder, mood swings.  Schizophrenia ([facility]     Stroke [facility])        Past Surgical History:  Past Surgical History:   Procedure Laterality Date    HX  SECTION      HX  SECTION      C section x 2;   &     HX RHINOPLASTY      HX TUBAL LIGATION  2017       Family History:  Family History   Family history unknown: Yes       Social History:  Social History     Tobacco Use    Smoking status: Current Every Day Smoker     Packs/day: 0.50     Years: 15.00     Pack years: 7.50    Smokeless tobacco: Former User   Substance Use Topics    Alcohol use: No     Comment: Sober for x 3 months    Drug use: Not Currently     Types: Other     Comment: Alcohol       Allergies:   Allergies   Allergen Reactions    Robitussin [Guaifenesin] Nausea and Vomiting       Patient's primary care provider (as noted in EPIC):  PAU Noble    Abnormal lab results from this emergency department encounter:  Labs Reviewed   CBC WITH AUTOMATED DIFF - Abnormal; Notable for the following components:       Result Value    RBC 4.17 (*)     All other components within normal limits   METABOLIC PANEL, COMPREHENSIVE - Abnormal; Notable for the following components:    Sodium 132 (*)     Chloride 99 (*)     BUN 3 (*)     Creatinine 0.58 (*)     BUN/Creatinine ratio 5 (*)     All other components within normal limits   SALICYLATE - Abnormal; Notable for the following components:    Salicylate level <0.6 (*)     All other components within normal limits   ACETAMINOPHEN - Abnormal; Notable for the following components:    Acetaminophen level <2 (*)     All other components within normal limits   AMMONIA - Abnormal; Notable for the following components:    Ammonia <10 (*)     All other components within normal limits   DRUG SCREEN, URINE   ETHYL ALCOHOL   HCG QL SERUM       Lab values for this patient within approximately the last 12 hours:  Recent Results (from the past 12 hour(s))   CBC WITH AUTOMATED DIFF    Collection Time: 03/01/20  6:22 PM   Result Value Ref Range    WBC 5.8 4.6 - 13.2 K/uL    RBC 4.17 (L) 4.20 - 5.30 M/uL    HGB 13.2 12.0 - 16.0 g/dL    HCT 37.9 35.0 - 45.0 %    MCV 90.9 74.0 - 97.0 FL    MCH 31.7 24.0 - 34.0 PG    MCHC 34.8 31.0 - 37.0 g/dL    RDW 12.6 11.6 - 14.5 %    PLATELET 184 218 - 723 K/uL    MPV 9.2 9.2 - 11.8 FL    NEUTROPHILS 65 40 - 73 %    LYMPHOCYTES 26 21 - 52 %    MONOCYTES 9 3 - 10 %    EOSINOPHILS 0 0 - 5 %    BASOPHILS 0 0 - 2 %    ABS. NEUTROPHILS 3.8 1.8 - 8.0 K/UL    ABS. LYMPHOCYTES 1.5 0.9 - 3.6 K/UL    ABS. MONOCYTES 0.5 0.05 - 1.2 K/UL    ABS. EOSINOPHILS 0.0 0.0 - 0.4 K/UL    ABS. BASOPHILS 0.0 0.0 - 0.1 K/UL    DF AUTOMATED     METABOLIC PANEL, COMPREHENSIVE    Collection Time: 03/01/20  6:22 PM   Result Value Ref Range    Sodium 132 (L) 136 - 145 mmol/L    Potassium 3.6 3.5 - 5.5 mmol/L    Chloride 99 (L) 100 - 111 mmol/L    CO2 26 21 - 32 mmol/L    Anion gap 7 3.0 - 18 mmol/L    Glucose 93 74 - 99 mg/dL    BUN 3 (L) 7.0 - 18 MG/DL    Creatinine 0.58 (L) 0.6 - 1.3 MG/DL    BUN/Creatinine ratio 5 (L) 12 - 20      GFR est AA >60 >60 ml/min/1.73m2    GFR est non-AA >60 >60 ml/min/1.73m2    Calcium 8.9 8.5 - 10.1 MG/DL    Bilirubin, total 0.4 0.2 - 1.0 MG/DL    ALT (SGPT) 21 13 - 56 U/L    AST (SGOT) 12 10 - 38 U/L    Alk.  phosphatase 71 45 - 117 U/L    Protein, total 7.3 6.4 - 8.2 g/dL    Albumin 3.9 3.4 - 5.0 g/dL    Globulin 3.4 2.0 - 4.0 g/dL    A-G Ratio 1.1 0.8 - 1.7     DRUG SCREEN, URINE    Collection Time: 03/01/20  6:22 PM   Result Value Ref Range    BENZODIAZEPINES NEGATIVE  NEG      BARBITURATES NEGATIVE  NEG      THC (TH-CANNABINOL) NEGATIVE  NEG      OPIATES NEGATIVE  NEG      PCP(PHENCYCLIDINE) NEGATIVE  NEG      COCAINE NEGATIVE  NEG      AMPHETAMINES NEGATIVE  NEG      METHADONE NEGATIVE  NEG      HDSCOM (NOTE)    ETHYL ALCOHOL    Collection Time: 03/01/20  6:22 PM Result Value Ref Range    ALCOHOL(ETHYL),SERUM <3 0 - 3 MG/DL   HCG QL SERUM    Collection Time: 03/01/20  6:22 PM   Result Value Ref Range    HCG, Ql. NEGATIVE  NEG     SALICYLATE    Collection Time: 03/01/20  6:22 PM   Result Value Ref Range    Salicylate level <1.4 (L) 2.8 - 20.0 MG/DL   ACETAMINOPHEN    Collection Time: 03/01/20  6:22 PM   Result Value Ref Range    Acetaminophen level <2 (L) 10.0 - 30.0 ug/mL   AMMONIA    Collection Time: 03/01/20 10:45 PM   Result Value Ref Range    Ammonia <10 (L) 11 - 32 UMOL/L       Radiologist and cardiologist interpretations if available at time of this note:  Radiology results:  No results found. Medication(s) ordered for patient during this emergency visit encounter:  Medications - No data to display    Pt care assumed from [place] [id number] , ED provider. Pt complaint(s), current treatment plan, progression and available diagnostic results have been discussed thoroughly. Rounding occurred: no.   Intended Disposition: Transfer. Pending diagnostic reports and/or labs (please list): [facility] case management transfer of a voluntary suicidal ideation patient to an inpatient behavioral medicine unit. Dictation disclaimer:  Please note that this dictation was completed with MovieSet, the computer voice recognition software. Quite often unanticipated grammatical, syntax, homophones, and other interpretive errors are inadvertently transcribed by the computer software. Please disregard these errors. Please excuse any errors that have escaped final proofreading. Coding Diagnoses     Clinical Impression:   1. Schizophrenia, unspecified type ([facility] [address] [facility]    2. Psychosis, unspecified psychosis type ([facility] [facility]        Disposition     Disposition: Transfer. Damian Craig M.D.   FRIEDA Board Certified Emergency Physician

## 2020-03-02 NOTE — BSMART NOTE
SOCIAL WORK GROUP THERAPY PROGRESS NOTE    Group Time:  10:45am    Group Topic:  Coping Skills    Group Participation:     Pt refused & seemed inappropriate for session anyway as was apparently confused. Sat for awhile in day area then went to her room despite staff encouragement.

## 2020-03-02 NOTE — BSMART NOTE
ART THERAPY GROUP PROGRESS NOTE    Group time:9:30    The patient refused group despite encouragement.

## 2020-03-02 NOTE — BSMART NOTE
Pt.is a 34year old female with history of Schizophrenia paranoid type, Nicotine Dependence and DID. Pt. was admitted to this facility for paranoia. Pt.s case was discussed in treatment team.     ARUN Contact: SW met with pt to discuss dc planning. Pt. informed SW she got overwhelmed when she left crisis stabilization. Pt. states I got overwhelmed with my own thoughts and my attempts to clean my apartment.  I left the crisis stabilization program too soon.  I wanted to stay longer . D o not believe Brian Stands if she tells you I wanted to leave [facility] stab. The staff at [facility] told Brian Aguilar I wanted to leave . I just got overwhelmed, they switched my medicines.   I was to embarrassed for the staff to help me clean my apartment.  I am embarrassed that I had to come back to the hospital . SW discussed positive coping strategies. SW engaged pt with posietv goal setting. Pt. appears calm, paraniod and has poor insight and judgement. ARUN will contact [facility] team for a collateral.  SW will assist the pt with dc planning. ASSESSMENT:Per Psychiatrist Note  AXIS I:  Schizophrenia. Nicotine use disorder, moderate. AXIS II:  None. AXIS III:  Gastroesophageal reflux disease. MEDICATIONS : Please refer to med list and psychiatrist note    Family History:  Family History   Family history unknown:  2 child . Mother has custody.         Social History:  Social History- Pt. Lives alone in [address] with [employer]   Pt.is a [facility] client.            Tobacco Use    Smoking status: Current Every Day Smoker       Packs/day: 0.50       Years: 15.00       Pack years: 7.50    Smokeless tobacco: Former User   Substance Use Topics    Alcohol use: No       Comment: Sober for x 3 months    Drug use: Not Currently       Types: Other       Comment: Alcohol    No pending legal charges at this time         Allergies:        Allergies   Allergen Reactions    Robitussin [Guaifenesin] Nausea and Vomiting

## 2020-03-02 NOTE — ROUTINE PROCESS
Patient was escorted to the unit via wheel chair  accompanied by two medical transport and  hospital . Pt was alert and oriented to self with periods of confusion. She is of stated age and adequate grooming. Per report, pt called 911  and was taken to [facility] where she underwent mental evaluation. Pt is psychotic making   nonsensical statement, she is disorganized,paranoid and illogical. Pt displayed sad affect, poor insight and judgment. She had difficulties answering yes or no questions, however, compliant with nursing admission process. Pt refused to sign  physician/family  notification form. Pt was oriented to unit and expectations related to treatment. Staff continues to monitor for safety and provide a supportive environment.

## 2020-03-02 NOTE — H&P
[facility] HISTORY AND PHYSICAL    Name:  Angeli Mary  MR#:   [id number]  :  1990  ACCOUNT #:  [de-identified]  ADMIT DATE:  2020    IDENTIFYING DATA:  The patient presents as a 49-year-old single white female, resident of [place], [place], who is admitted voluntarily. She is covered by [facility] plan. BASIS FOR ADMISSION:  The patient is admitted after being presented to the emergency room, being brought by the police. She had apparently called 911 to say that she had just been released two days earlier from the [facility] crisis stabilization unit and now has amnesia and then paralyzed in the brain. She was saying, \"I don't want to go on vacations anymore. I never get what I want. They make me smoke. \"  She said her body was not taken care of, and she had been diagnosed with schizoaffective disorder, \"but I don't think I have it anymore. I can see my brain and it is black. It used to be lit up. I used to be really smart. \"  She was interviewed by the telepsychiatrist who described her to have a history of schizophrenia and recommended readmission. Attention is invited to her recent admission to this facility on 2020. She has been in psychiatric hospitals for the most of this past year, being at [facility] on three occasions, [facility], [facility] in [place] and then the admission to this facility. She had just gotten out of [facility] being placed on [facility]. She has been known to have been on Latuda and chlorpromazine in the past.  She had been tried on Depakote, clozapine and Pristiq with poor compliance. She had been a client of the PACT team and was supposed to see Dr. Aníbal Briscoe.   She was described to have been unable to be stabilized on a single antipsychotic medication and had required both typical and atypical antipsychotic medicines together to try to get her to be stabilized, but she was not showing any response to mood stabilizer medications. She says that she had been placed on Trileptal, though I am uncertain of this since the most recent literature of Trileptal shows it has no effect on psychiatric symptoms whatsoever. She had been stabilized at this facility on a combination of Invega Sustenna 156 mg every 4 weeks with next injection due on 03/14/2020, Pristiq ER 50 mg daily and chlorpromazine initially 100 mg at bedtime, though she had been decreased to 50 mg at bedtime 5 days prior to discharge. She went to the crisis stabilization unit and said the doctor there told her that he was taking her off of the chlorpromazine and giving her Trileptal.  She had started to have increased paranoia and feelings that a camera was watching her while she was at that facility. They sent her out, and within 5 hours, she had called 911 to be taken over to [facility] Emergency Room where she was evaluated and sent back home, saying that she needed to talk to her PACT team and take her medications. She said she was seen by the PACT team the following day and then on Sunday called 911 again saying that she had amnesia and could not remember what she was supposed to be doing. She said she could not recall whether she was eating or not and could not recall taking any of her medications. She said that she had returned back to the apartment which was filthy and that had been a mess from when she was so psychotic. She said she could not figure out how to clean the place. She was dwelling on her feeling the cameras were watching her and that she had no recall of anything about her. MEDICAL HISTORY:  The patient had a past history of hypertension and previously been on metoprolol 25 mg b.i.d., but that had been discontinued since her blood pressure had been back to normal when she was in the hospital last time.   She had a history of reflux esophagitis and previously been on Pepcid 20 mg b.i.d., but did not want it at the current time. She had a history of bilateral tubal ligation two and half years ago. ALLERGIES:  SHE SAID SHE WAS ALLERGIC TO ROBITUSSIN, THEN SAID THAT SHE TAKES ROBITUSSIN, SO IT WAS QUITE DIFFICULT TO UNDERSTAND WHICH ONE IS TRUE. LABORATORY TESTING:  Done at [facility] showed a normal CBC, a comprehensive metabolic panel with a slight decrease of sodium 132 mmol/L, minimal decreased chloride 99 mmol/L, with the remainder normal.  She had a normal lipid panel from 01/25/2020. Her ammonia level was 0, and hCG was negative. Acetaminophen and salicylate levels were negative. Alcohol level and urine drug screen were negative. For some reason, a CT scan was done, and CT of the head showed no acute findings. SUBSTANCE ABUSE HISTORY:  She had started smoking cigarettes again and smoked half a pack a day during the several days that she was out of the hospital.  She denied drug or alcohol abuse. SOCIAL AND FAMILY HISTORY:  She previously could not give family history of psychiatric illness. She was  from her  and has no contacts with him. She has two children that were adopted to other people because of her psychotic illness. She does not see them. She denies having any support mechanisms from family. She is a member of the PACT team.    MENTAL STATUS EXAMINATION:  Revealed her to be an alert, oriented white female. Eye contact was intense. Speech was fluent. It was understandable. Thought processing was organized which was much different from the loose and disorganized condition from her last admission. She endorsed paranoid and persecutory ideas with bizarre delusions and visual hallucinations of seeing wiggly lines on the corner of her vision and auditory hallucinations of voices, though she could not describe what they were saying. She denied suicidal ideas. She denied homicidal ideas.   She did say there were people that were out there to hurt her, and she wanted to know if she had to protect herself again. IQ was estimated in the low normal range. Insight and judgment were poor. ASSESSMENT:  AXIS I:  Schizophrenia. Nicotine use disorder, moderate. AXIS II:  None. AXIS III:  Gastroesophageal reflux disease. TREATMENT PLAN:  This patient is again admitted in a psychotic condition after having been returned back to her apartment. That had been my concern when she was here last time, and it had been my recommendation to the PACT team that they consider placing her in a situation where someone else will fix her meals and make sure that she has her medicines distributed to her, but that apparently did not happen. She has been known to not be able to respond to a single antipsychotic medicine. She claims that the chlorpromazine was discontinued, which I am uncertain if this is true or not. She does say that she again got increasingly paranoid within the week while she was at the crisis stabilization unit and she was released and was back in the emergency room within hours after leaving the facility and was rehospitalized within 2 days of leaving the facility. It had been considered to send her to the [facility] last time, but we were told that she was put on the bottom of the list of voluntary insured patients, which meant that she would either need multiple months or never be able to go to the [facility] so that was not a reasonable alternative. She had been sent to the crisis stabilization unit for them to work on her placement. We will continue with individual, group and milieu therapies, art and recreation therapy, case management services, social work services, physical examination.   She initially said she would not take further injections, then when she found out that she would again have a temporary alf order hearing and request for a judicial authorization hearing again, she said that she would take the injections since she knew that she would end up back on them anyhow. She was saying that she did not want to take Colace, vitamin pills or anything else other than the antipsychotic medications. It does not make any sense to give her the Trileptal since the most recent study showed that it has no psychiatric effect whatsoever. ESTIMATED LENGTH OF STAY:  Two weeks.     ANTICIPATED DISPOSITION:  Referral back to the [facility] Board PACT team.      April Crockett MD      [id number]  D:  03/02/2020 10:42  T:  03/02/2020 15:02  JOB #:  [id number]
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
## 2020-03-02 NOTE — ED NOTES
TRANSFER - OUT REPORT:    Verbal report given to Kel(name) on Devin Boudreaux  being transferred to [facility] (unit) for routine progression of care       Report consisted of patients Situation, Background, Assessment and   Recommendations(SBAR). Information from the following report(s) SBAR, ED Summary and MAR was reviewed with the receiving nurse. Lines:       Opportunity for questions and clarification was provided.       Patient awaiting transport

## 2020-03-02 NOTE — BH NOTES
Patient has been in the milieu today interacting with select peers. Patient presents disorganized and irritable. Patient is upset that her nicorette is not ordered initially and cannot comprehend that this admission is different than the last admission. Patient is attending groups today and refused all her morning medications.

## 2020-03-02 NOTE — BSMART NOTE
OCCUPATIONAL THERAPY PROGRESS NOTE  Group Time:  6340  Attendance: The patient attended full group. Needed encouragement to attend. Participation:  The patient participated with minimal elaboration in the activity. Attention:  The patient was able to focus on the activity. Interaction:  The patient acknowledges others or responds to questions,  with no spontaneous interaction. Preoccupied and withdrawn appearing. Did participate as asked with minimal responses to questions.

## 2020-03-02 NOTE — BH NOTES
Pt quiet, preoccupied, sitting in milieu with distant stare much of shift; cooperative, moderate compliance, hopeless, minimal to no interaction, no behavioral issues during shift; appetite, hygiene and sleep wnl. Denies SI, HI and AVH; pt involved in no falls this shift - skid resistant footwear utilized and floor kept free of items that could precipitate a fall.

## 2020-03-02 NOTE — BH NOTES
Pt is a new admission;arrived at 0440. She has slept for 0 hours thus far. Will continue to monitor for safety.

## 2020-03-02 NOTE — GROUP NOTE
IP  GROUP DOCUMENTATION INDIVIDUAL                                                                          Group Therapy Note    Date: 3/2/2020    Group Start Time: 1300  Group End Time: 5150  Group Topic: Nursing    [facility] ADULT CHEM 6150 Brooke Rivero, RN    IP [address] [facility] GROUP DOCUMENTATION GROUP    Group Therapy Note    Attendees: 1         Attendance: Did not attend      Swetha Allen RN

## 2020-03-02 NOTE — ED NOTES
[facility] called for possible placement. Spoke with Melba Padgett who will review.

## 2020-03-02 NOTE — ED NOTES
Received a call from Donovan from [facility] and [facility] is requesting CT scan of patient head  due to the patient reported amnesia and alleged history of CVA

## 2020-03-03 PROCEDURE — 74011250637 HC RX REV CODE- 250/637: Performed by: PSYCHIATRY & NEUROLOGY

## 2020-03-03 PROCEDURE — 74011250637 HC RX REV CODE- 250/637: Performed by: NURSE PRACTITIONER

## 2020-03-03 PROCEDURE — 65220000003 HC RM SEMIPRIVATE PSYCH

## 2020-03-03 RX ORDER — LORAZEPAM 1 MG/1
1 TABLET ORAL
Status: DISCONTINUED | OUTPATIENT
Start: 2020-03-03 | End: 2020-03-12 | Stop reason: HOSPADM

## 2020-03-03 RX ORDER — HYDROXYZINE PAMOATE 50 MG/1
50 CAPSULE ORAL
Status: DISCONTINUED | OUTPATIENT
Start: 2020-03-03 | End: 2020-03-03

## 2020-03-03 RX ADMIN — CHLORPROMAZINE HYDROCHLORIDE 50 MG: 25 TABLET, SUGAR COATED ORAL at 21:09

## 2020-03-03 RX ADMIN — NICOTINE POLACRILEX 2 MG: 2 GUM, CHEWING ORAL at 08:49

## 2020-03-03 RX ADMIN — IBUPROFEN 600 MG: 600 TABLET ORAL at 14:37

## 2020-03-03 RX ADMIN — FAMOTIDINE 20 MG: 20 TABLET ORAL at 21:09

## 2020-03-03 NOTE — BSMART NOTE
COUNSELING GROUP PROGRESS NOTE    Group time: 9:30    The patient declined group, she came to group table and immediately left despite encouragement to participate.

## 2020-03-03 NOTE — BH NOTES
TRANSFER - OUT REPORT:    Verbal report given to CODY Alvarez on Leroy Reddy  being transferred to [facility](unit) for routine progression of care       Report consisted of patients Situation, Background, Assessment and   Recommendations(SBAR). Information from the following report(s) SBAR was reviewed with the receiving nurse. Lines:       Opportunity for questions and clarification was provided.       Patient transported with:   [employer]

## 2020-03-03 NOTE — PROGRESS NOTES
03/03/20 [facility] [facility]   Attendance Did not attend   Pt encouraged to attend but did not

## 2020-03-03 NOTE — PROGRESS NOTES
Problem: Psychosis  Goal: *STG: Decreased hallucinations  Description  Pt to have decreased hallucinations daily while hospitalized. Outcome: Progressing Towards Goal  Note:   Patient will experience a decrease in hallucinations daily  Goal: *STG: Remains safe in hospital  Description  Pt will remain safe in hospital daily while hospitalized. Outcome: Progressing Towards Goal  Note:   Patient will remain safe in the hospital daily  Goal: *STG/LTG: Complies with medication therapy  Description  Pt will take medication as prescribed by doctor daily while hospitalized. Outcome: Progressing Towards Goal  Note:   Patient will comply with medication therapy daily     Patient has been visible and active in the day area. She originally started out the shift very agitated, yelling at staff, and blaming staff for keeping her on the mental health unit. She stated she wanted to leave and was evaluated by [facility]. However, she told the [facility] that she wanted to stay. Later after visiting with her MD, she stated again she wanted to leave. Spoke to nursing supervisor regarding being evaluated again. Ardine Buerger, supervisor, stated she would inform [facility]. Patient then attended select groups. She became upset later in shift and requested medicine for headache and anxiety. She did receive Motrin for pain and she will decide on Ativan for later. Denies current thoughts of SI, but is labile and unable to understand Iven iLz is wrong with her mind\". Will continue to monitor for safety and support.

## 2020-03-03 NOTE — BSMART NOTE
SOCIAL WORK GROUP THERAPY PROGRESS NOTE    Group Time:  10am    Group Topic:  Coping Skills    Group Participation:     Pt was unavailable for group as she continued to wander around unit pausing to sit for couple minutes then to another location.  Last x15 minutes of session was meeting with her

## 2020-03-03 NOTE — BSMART NOTE
OCCUPATIONAL THERAPY PROGRESS NOTE    Group Time:  1940  Attendance: The patient attended 1/4 of group. Participation:  The patient seemed unable to participate in the activity. Attention:  The patient was unable to attend to the activity. Interaction:  The patient acknowledges others or responds to questions,  with no spontaneous interaction. Feeling restless, interactions tense, loose at times.

## 2020-03-03 NOTE — PROGRESS NOTES
Behavioral Health Progress Note    Admit Date: 3/2/2020  Hospital day 1    Vitals :   Patient Vitals for the past 8 hrs:   BP Temp Pulse Resp   03/03/20 0741 101/63 96.8 °F (36 °C) 100 20     Labs:  No results found for this or any previous visit (from the past 24 hour(s)).   Meds:   Current Facility-Administered Medications   Medication Dose Route Frequency    traZODone (DESYREL) tablet 50 mg  50 mg Oral QHS PRN    therapeutic multivitamin (THERAGRAN) tablet 1 Tab  1 Tab Oral DAILY    famotidine (PEPCID) tablet 20 mg  20 mg Oral BID    chlorproMAZINE (THORAZINE) tablet 50 mg  50 mg Oral QHS    desvenlafaxine succinate (PRISTIQ) ER tablet 50 mg  50 mg Oral DAILY    docusate sodium (COLACE) capsule 100 mg  100 mg Oral DAILY    ascorbic acid (vitamin C) (VITAMIN C) tablet 500 mg  500 mg Oral DAILY    [START ON 3/14/2020] paliperidone palmitate (INVEGA SUSTENNA) injection 156 mg  156 mg IntraMUSCular ONCE    nicotine (NICORETTE) gum 2 mg  2 mg Oral Q2H PRN    ibuprofen (MOTRIN) tablet 600 mg  600 mg Oral Q6H PRN      Hospital Problems: Principal Problem:    Schizophrenia ([facility] [address] (4/18/2017)    Active Problems:    Cigarette nicotine dependence without complication (0/8/8275)        Subjective:   Medication side effects: fatigue/weakness, confusion  somnolence and wakefulnees simultaneously    Mental Status Exam  Sensorium: alert  Orientation: only aware of  time, place and person  Relations: guarded, uncooperative and unreliable  Eye Contact: intense  Appearance: is bizarre  Thought Process: fast rate of thoughts and poor abstract reasoning/computation   Thought Content: delusions, paranoia and auditory halluc   Suicidal: denies   Homicidal: denies   Mood: is hostile, is anxious, is sad and is irritable   Affect: labile  Memory: is impaired, is recent and is remote     Concentration: distractable and hypervigilance  Abstraction: concrete  Insight: The patient shows no insight    OR Poor  Judgement: is psychologically impaired OR  Poor    Assessment/Plan:   not changed  Patient continues to be extremely ambivalent for saying 1 thing then another. She had wanted to leave the hospital and was not going to cooperate with medications and then patient was screened by the [facility] worker telling them that she was going to stay and take medications. As soon as the worker left, she immediately said she would not take medicines believe so I again had asked for a temporary intermediate order screening. She was insisting that she needed new and different medicines though she has been on virtually everything in the past.  She then began insisting that she did not need any medicines and would not be taking any antipsychotic medicines. She was confused saying that the doctor at the [facility] did not have her on those medicines though she has not really been seen by the [facility]. She says that she needed a better trial at home yet she had called the 911 number within hours of getting crisis stabilization unit. She says she was told a crisis stabilization that she did not need to take the Pristiq or to take the Thorazine but rather there are to take the Trileptal which does not do anything. She does need to stay on the [place] but again she was saying that she would not be wanting to take no new injection on the March 13. She did take the Thorazine last night. I will discontinue the she says she is not going to take it and does not have a depression. We continue with reality orientation and antipsychotic medication management.   Continue close observation,

## 2020-03-03 NOTE — BSMART NOTE
ART THERAPY GROUP PROGRESS NOTE    Group time:1242    The patient declined group despite encouragement. Claimed, \"I have been to this group and am just trying to get out of here. \" She was not responsive to encouragement.

## 2020-03-03 NOTE — BH NOTES
Patient was compliant with medication. Education provided on medication given. Patient was informed of order to transfer to another unit. Patient verbalized understanding. Patient requested PRN medication for sleep. All other needs assessed and met.

## 2020-03-03 NOTE — GROUP NOTE
DARLING  GROUP DOCUMENTATION INDIVIDUAL                                                                          Group Therapy Note    Date: 3/2/2020    Group Start Time: 2000  Group End Time: 2015  Group Topic: Medication    [facility] 1 SPECIAL TRTMT 1    Kim Linn RN IP  GROUP DOCUMENTATION GROUP    Group Therapy Note    Attendees: 9       Attendance: Did not attend      Additional Notes:  Patient was not transferred to this unit at time of group    Christie Titus RN

## 2020-03-03 NOTE — BSMART NOTE
Crisis Note: Patient was evaluated by Ren Pablo from [facility] for a possible TDO. She said the patient agreed to stay voluntarily.

## 2020-03-03 NOTE — BSMART NOTE
Pt.is a 34year old female with history of Schizophrenia paranoid type, Nicotine Dependence and DID. Pt. was admitted to this facility for paranoia. Pt.s case was discussed in treatment team.     ARUN Contact: ARUN met with pt to discuss dc planning. Pt. Is not being compliant with medications. ARUN Collateral: ARUN contacted [facility] for a collateral. SW left a message. ARUN Contact:  ARUN met with pt. To discuss dc planning. ARUN addressed pt.'s non compliance with her medications. \" I had questions about the vitamins\". ARUN informed pt. The nurse said she explained medications and medication use to the pt. The pt. Stated \" Yes I , know. \"  \"  I really think I should just take vitamins. ARUN explained the importance of medication compliance. ARUN had pt to reflect on how the medications has helped her in the past.   \" I know I just get so confused because the doctor at the other facility forgot to change my medications\". Pt. Has agreed to take the her medications as recommended by the doctor at this facility. Pt. States she feels anxious about the medications . ARUN provided reassurance. ARUN engaged pt with positive reassurance. Pt. Appeared suspicious, and continues to have poor insight and judgement. ARUN will continue to assist the pt with dc planning.

## 2020-03-04 PROCEDURE — 74011250637 HC RX REV CODE- 250/637: Performed by: PSYCHIATRY & NEUROLOGY

## 2020-03-04 PROCEDURE — 65220000003 HC RM SEMIPRIVATE PSYCH

## 2020-03-04 RX ADMIN — NICOTINE POLACRILEX 2 MG: 2 GUM, CHEWING ORAL at 08:41

## 2020-03-04 RX ADMIN — THERA TABS 1 TABLET: TAB at 08:11

## 2020-03-04 RX ADMIN — CHLORPROMAZINE HYDROCHLORIDE 50 MG: 25 TABLET, SUGAR COATED ORAL at 20:05

## 2020-03-04 NOTE — PROGRESS NOTES
Behavioral Health Progress Note    Admit Date: 3/2/2020  Hospital day 2    Vitals :   Patient Vitals for the past 8 hrs:   BP Temp Pulse Resp   03/04/20 0742 107/74 96.7 °F (35.9 °C) 100 18     Labs:  No results found for this or any previous visit (from the past 24 hour(s)).   Meds:   Current Facility-Administered Medications   Medication Dose Route Frequency    LORazepam (ATIVAN) tablet 1 mg  1 mg Oral Q4H PRN    traZODone (DESYREL) tablet 50 mg  50 mg Oral QHS PRN    therapeutic multivitamin (THERAGRAN) tablet 1 Tab  1 Tab Oral DAILY    famotidine (PEPCID) tablet 20 mg  20 mg Oral BID    chlorproMAZINE (THORAZINE) tablet 50 mg  50 mg Oral QHS    docusate sodium (COLACE) capsule 100 mg  100 mg Oral DAILY    ascorbic acid (vitamin C) (VITAMIN C) tablet 500 mg  500 mg Oral DAILY    [START ON 3/14/2020] paliperidone palmitate (INVEGA SUSTENNA) injection 156 mg  156 mg IntraMUSCular ONCE    nicotine (NICORETTE) gum 2 mg  2 mg Oral Q2H PRN    ibuprofen (MOTRIN) tablet 600 mg  600 mg Oral Q6H PRN      Hospital Problems: Principal Problem:    Schizophrenia ([facility] [address] (4/18/2017)    Active Problems:    Cigarette nicotine dependence without complication (6/3/4017)        Subjective:   Medication side effects: none  none    Mental Status Exam  Sensorium: alert  Orientation: only aware of  time, place, person and situation  Relations: guarded and unreliable  Eye Contact: intense  Appearance: is tense  Thought Process: fast rate of thoughts and poor abstract reasoning/computation   Thought Content: grandiosity and paranoia and halluc, thought insertion and withdrawal  Suicidal: denies   Homicidal: none   Mood: is anxious and is irritable   Affect: blunted, odd  Memory: is impaired, is recent and is remote     Concentration: hypervigilance  Abstraction: concrete  Insight: The patient shows little insight    OR Poor  Judgement: is psychologically impaired OR  Poor    Assessment/Plan:   not changed  Nurses report that she does have some complaints that she does not think that she has an illness at times and then at other times she has complaints of psychotic symptoms. She tells me that she realizes that she gets confused and feels that people are putting thoughts into her head and then taking thoughts out of her head. She then proceeds to think that this is because the medicine does it to her and she should stop any Invega injections in the future. She actually is able to carry on this conversation which she was unable to do so when she first started on the Invega injections. She said that she feels the Thorazine helps her to calm down somewhat and does not feel as overwhelmed and did say that she would stay on it. She talks of getting \"random flashbacks of people that I have seen their eyes and I did not like the feeling that her eyes gave me. \"  She went to temporary custodial order hearing and agreed to voluntarily sign into the hospital.  I had been hoping to get an involuntary commitment in case we needed to get a judicial authorization hearing but the courts did not agree to do this. Unfortunately they had already let her sign in voluntarily 1 other time and then immediately wanted to leave. She had been seen by the community services  yesterday and since she left insisted on leaving which prompted the temporary custodial order in hearing. She talks of how she believes that [employer] [place] accelerated it\" when talking about her psychosis and paranoia. She feels that somehow the government is involved in her having the strange thoughts in her head. We will continue with reality orientation continued with the Thorazine at night. I am continuing to try to encourage her to stay on the [facility] since it is her friends and her ability to take care of herself but it does result in her having an understanding of how her psychosis is affecting her life.   She does need to be able to go to some type of a day program when she gets out of the hospital because she says she sits around her apartment just thinking about how her life has changed and that she has nothing to look forward to.   Continue close observation,

## 2020-03-04 NOTE — BSMART NOTE
COUNSELING GROUP PROGRESS NOTE    Group time: 9:50    The patient declined group, she attempted to come but when she saw this art therapist pull out colored pencils looked scared and said \"oh no I've seen colored pencils before\" and left to go to her room.

## 2020-03-04 NOTE — BH NOTES
Treatment team met -     Medical Director: __x___present   Psychiatrist: __x___present   Charge nurse: _x____present   MSW: _x____present   : _____present   Nurse Manager: __x___present   Student RNs: _____present   Medical Students: __x___present   Art Therapist: __x___present   Clinical Coordinator: _x____present    Occupational Therapist: _x____present   : _______ present  UR  ___x____ present  Crisis Supervisor___x____present      Plan of care discussed and updated as appropriate. Pt was TDO on yesterday. Had court hearing today, was voluntary committed.

## 2020-03-04 NOTE — PROGRESS NOTES
conducted an Spirituality Group for Amirah Flood, who is a 34 y.o.,female. Patient's Primary Language is: [place]. According to the patient's EMR [employer] Affiliation is: [place]. The reason the Patient came to the hospital is:   Patient Active Problem List    Diagnosis Date Noted    Recurrent depression ([facility] 2018    Cigarette nicotine dependence without complication     Single delivery by  2017    Episodic mood disorder ([facility] 2017    Schizophrenia ([facility] 2017         conducted Spirituality Group for Peer Pressure who was one of seven participants. The  provided the following Interventions:  Continued the relationship of care and support. Listened empathically. Offered prayer and assurance of continued prayer on patients behalf. Chart reviewed. The following outcomes were achieved:  Patient expressed gratitude for 's visit. Assessment:  There are no further spiritual or Mandaeism issues which require Spiritual Care Services interventions at this time. Plan:  Chaplains will continue to follow and will provide pastoral care on an as needed/requested basis.  recommends bedside caregivers page  on duty if patient shows signs of acute spiritual or emotional distress.        Kitty [phone]

## 2020-03-04 NOTE — BSMART NOTE
Pt.is a 34year old female with history of Schizophrenia paranoid type, Nicotine Dependence and DID.  Pt. was admitted to this facility for paranoia.     Pt. s case was discussed in treatment team. Pt. Will require court ordered medications for non -compliance.      ARUN Contact:  ARUN met with pt. To discuss dc planning. \" The [place] is making me sick\". \" The medications is making my have suicidal thoughts\". \" I can not even think clearly\". \" I have a lot of things jumbled in my head\". SW discussed the importance of medication compliance. SW also discussed the benefits of medications. Pt. Appeared paranoid talking about medication are poisoning her system. Pt admits she has been experiencing hallucinations. \" The voices tell me no  To take the medications\". Pt continues to have poor insight and judgement. SW engaged pt with coping strategies and reality orientation. SW will continue to provide support and assist pt with dc planning.

## 2020-03-04 NOTE — BSMART NOTE
OCCUPATIONAL THERAPY PROGRESS NOTE    Group Time:  1653  Attendance: The patient attended full group. .  Participation:  The patient participated with moderate elaboration in the activity. Attention:  The patient was able to focus on the activity. Interaction:  The patient occasionally  interacts with others. Brooke Jimenez Helpful to several peers who were having trouble seeing/reading their activity sheets. Limited elaboration on her own answers, answers on subject to few questions she had.

## 2020-03-04 NOTE — BH NOTES
During this shift (3pm-11pm) pt has been labile for part of the evening. Pt's mood was labile d/t wanting to leave the hospital stating, \"The staff is just forcing me to be here, I need to leave. \" RN on duty notified CSB and pt was evaluated by CSB and TDO was issued to pt. After receiving news from [facility]B about continuation of TDO status pt vented to a peer on the unit and retired to her room for the majority of the evening. Pt only came out to the milieu for meals and medications. Pt still visibly upset about outcome of meeting with CSB but has accepted it and has verbalized, \"acting out isn't going to make anything better. \" Staff will continue rounds monitoring pt for changes in pt behavior, location & safety.

## 2020-03-04 NOTE — BSMART NOTE
ART THERAPY GROUP PROGRESS NOTE    Group time:9844    The patient declined group despite encouragement

## 2020-03-04 NOTE — PROGRESS NOTES
Problem: Falls - Risk of  Goal: *Absence of Falls  Description  Document Citlalli Hamm Fall Risk and appropriate interventions in the flowsheet. Pt will have zero fall each shift while hospitalized/   Outcome: Progressing Towards Goal  Note:   Fall Risk Interventions:    Medication Interventions: Teach patient to arise slowly       Problem: Suicide  Goal: *STG: Remains safe in hospital  Description  Pt will contract for safety daily while in the hospital.   Outcome: Progressing Towards Goal     Patient had visit today by insurance worker, Della Pate. Patient and visitor were allowed to visit in conference room on unit due to group in session. Patient has been withdrawn and very soft spoken today, requiring staff to ask patient to repeat herself several times throughout shift. Patient did complete ADL's without assistance. Patient only took multi-vitamin this morning and declined other vitamins and colace. Patient was voluntarily committed at court hearing this morning and required further education on \"what that means. \" patient shook head as if to say \"no\" when asked by this nurse if she will be taking future dose of Invega injection. Patient walked away when this nurse attempted to ask patient's reason for not wanting to take it. Patient encouraged to speak with this nurse \"or any of the staff if you have questions or concerns we can help answer for you. \"  Patient stated \"ok. \" Patient has been free from falls and harm and has eaten no less than 60% of meals. Patient has not required PRN medications this shift. Will continue to monitor and provide appropriate assistance as needed.

## 2020-03-05 PROCEDURE — 74011250637 HC RX REV CODE- 250/637: Performed by: NURSE PRACTITIONER

## 2020-03-05 PROCEDURE — 65220000003 HC RM SEMIPRIVATE PSYCH

## 2020-03-05 PROCEDURE — 74011250637 HC RX REV CODE- 250/637: Performed by: PSYCHIATRY & NEUROLOGY

## 2020-03-05 RX ADMIN — CHLORPROMAZINE HYDROCHLORIDE 50 MG: 25 TABLET, SUGAR COATED ORAL at 20:22

## 2020-03-05 RX ADMIN — THERA TABS 1 TABLET: TAB at 08:02

## 2020-03-05 RX ADMIN — IBUPROFEN 600 MG: 600 TABLET ORAL at 14:46

## 2020-03-05 RX ADMIN — FAMOTIDINE 20 MG: 20 TABLET ORAL at 20:22
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
## 2020-03-05 NOTE — BSMART NOTE
Pt.is a 34year old female with history of Schizophrenia paranoid type, Nicotine Dependence and DID.  Pt. was admitted to this facility for paranoia.         ARUN Contact:  ARUN met with pt to discuss dc planning. SW discussed treatment goals. Pt. contentious to expressed  medications are still making her sick. SW encouraged to discuss her concerns with the psychiatrist. [place] also reminded pt she was on the medications prior to being dc to [facility] a couple of weeks ago . SW encouraged medication compliance. SW reflected on pt.'s behavior and mental state off medication , has caused multiple hospitalizations. Pt. Agreed. SW encouraged pt to utilize coping stages. Pt. expressed feeling anxious and confused. \" the voices tell me I am going to die if I keep taking the medications. SW engaged pt with reality orientation . She engaged pt with positive self talk and true statements. Pt . continues to have delusional thoughts and poor insight and judgement. SW will continue to assist the pt with dc planning.      ARUN Collateral: ARUN attempted to contact pt.'s CM with  [facility] team.

## 2020-03-05 NOTE — GROUP NOTE
DARLING  GROUP DOCUMENTATION INDIVIDUAL                                                                          Group Therapy Note    Date: 3/4/2020    Group Start Time: 2015  Group End Time: 2030  Group Topic: Nursing    [facility] 1 SPECIAL  [address], [place]    IP [facility] GROUP DOCUMENTATION GROUP    Group Therapy Note    Attendees: 5         Attendance: Attended      Interventions/techniques: Informed    Follows Directions:  Followed directions    Interactions: Interacted appropriately    Mental Status: Calm    Behavior/appearance: Cooperative    Goals Achieved: Able to engage in interactions      Damaso Jamison RN

## 2020-03-05 NOTE — BH NOTES
Pt is calm and has been withdrawn to room for most of the afternoon. Pt has continued thought blocking. Pt is refusing to take several of her medications as outlined in her MAR. Will continue to monitor.

## 2020-03-05 NOTE — PROGRESS NOTES
Problem: Psychosis  Goal: *STG: Decreased hallucinations  Description  Pt to have decreased hallucinations daily while hospitalized. Outcome: Not Progressing Towards Goal  Note:   Pt appears to respond to external stimuli. Goal: *STG: Remains safe in hospital  Description  Pt will remain safe in hospital daily while hospitalized. Outcome: Progressing Towards Goal  Note:   Pt remains safe. Goal: *STG/LTG: Complies with medication therapy  Description  Pt will take medication as prescribed by doctor daily while hospitalized. Outcome: Progressing Towards Goal  Note:   Pt takes medications as ordered. Patient has been in the milieu for most of the day. Patient sits quietly alone, but will engage in conversation if approached. Patient is attending groups and is pleasant with responses. Patient is medication compliant although appears unconcerned and offers little conversation except to answer questions asked.

## 2020-03-05 NOTE — BSMART NOTE
ART [address] completed the art therapy assessment on 3/5/20 from 10:50am -12:10pm was admitted to [facility] due to her schizophrenia. BEHAVIORAL OBSERVATIONS  Leathas mood and affect were depressed and shifted to euthymic to depressed again by the end of session. She was indecisive and disorganized in her material choice picking up and then replacing before committing to a material. Girish Grant would cry frequently in her disclosured during the majority of the drawings. In moments she would stare blankly at the page, make questioning eye contact with the art therapy intern and then look back down at her page. Towards the end of the assessment she was able to wrap up in her Reason for Being Here Drawing by \"sending health to her brain\" through the drawing and drawing a bookshelf that is at her home. Girish Grant then asked for the art therapy intern to review her assessment with her \"so that maybe I can get some insight. \"     DISCUSSION  Leathas drawings and associations suggest the following: schizoaffective disorder. Girish Grant was tangential in her depictions as they related to the directive which is exemplified in her [employer] and Reason for being here drawings. In her scribble, human figure and kinetic family drawings she spoke about how she is \"so frustrated, because I'm a failure, I don't know what my future is anymore. I tried therapy with stuffed animals, because I didn't have my children, they got taken away from me. .. now I feel like my stuffed animals are angry at me because their home and starving. \"  Girish Grant had a vision for her life at one time which she now knows will not be the case for her anymore and it is causing her great internal turmoil. Girish Grant continues to speak about a lack of memory of events that have lead her to this point.  Girish Grant described how her current return to the facility was because \" I didn't have free will, in my head I was saying one thing but my body was doing another. Time was too heavy, I couldn't stand it, it was all off, a gross feeling like madness or sickness, insanity. In my mind I was studying, reading, cleaning but my body was sitting stilll and I was just staring. \"     CONCLUSIONS AND RECOMMENDATIONS  Jluis Mcdermott is a 33 y/o female who presented with a depressed mood and affect due to her desire for a \"normal life\" but lack of insight and understanding as to why she cannot have that normal life. Themes of functional and relationship loss were heavily noted throughout the assessment. Jluis Mcdermott is struggling with her sadness over her loss of abilities and desire to be fully functional. She spoke about the theme of missing her two sons, her former relationship with her [address] (whom she claimed is the father of the youngest son) and the missing of her biological mother. Recommendations: Individual outpatient therapy focusing on Psycho education on her diagnosis, what can be expected for her ability to function moving forward, loss, relationships, and maintenance on well-being.      DIAGNOSTIC IMPRESSION  Schizophrenia by history  R/O Schizoaffective with depression

## 2020-03-05 NOTE — BSMART NOTE
SOCIAL WORK GROUP THERAPY PROGRESS NOTE    Group Time:  10am    Group Topic:  Coping Skills    Group Participation:     Pt reportedly did not attend group due to medical issues and just wanting to stay in bed.

## 2020-03-05 NOTE — BSMART NOTE
ART THERAPY GROUP PROGRESS NOTE    PATIENT SCHEDULED FOR GROUP AT: 12:30    ATTENDANCE: Full    PARTICIPATION LEVEL: Participates fully in the art process. ATTENTION LEVEL: Able to focus on task. FOCUS: Grounding and Relaxation     SYMBOLIC & THEMATIC CONTENT AS NOTED IN IMAGERY: Leathas mood and affect were blunted and congruent. Her approach to task was organized and she was able to follow the directive completely. She remained quietly working the entire session and disclosed at the end that the intervention was \"helpful in me letting go of my insecurities over doing something right. \"

## 2020-03-05 NOTE — BH NOTES
The patient has been up and visible on the unit. She is withdrawn. She denies that she is having thoughts of harming herself or others. The patient stated, \"I was scared when I went to court this morning, but I am all right now. I feel much better today. I am going to take it one day at a time. \"   She denies any thoughts of harming herself or others. Thee have been no falls this evening. Will continue to monitor and will continue to provide support.

## 2020-03-05 NOTE — BSMART NOTE
COUNSELING GROUP PROGRESS NOTE     Group time: 9:30     The patient did not awaken/get up when called for group despite encouragement.

## 2020-03-05 NOTE — BSMART NOTE
OCCUPATIONAL THERAPY PROGRESS NOTE    Group Time:  1959  Attendance: The patient attended full group. .  Participation:. The patient participated with minimal elaboration in the activity. Attention:  The patient was able to focus on the activity. Interaction:  The patient acknowledges others or responds to questions,  with no spontaneous interaction. Says she is confused, responses appropriate. Initially in bed, said she was just laying there thinking too much.

## 2020-03-05 NOTE — GROUP NOTE
DARLING  GROUP DOCUMENTATION INDIVIDUAL                                                                          Group Therapy Note    Date: 3/5/2020    Group Start Time: 9886  Group End Time: 3241  Group Topic: Nursing    [facility] SPECIAL TRTMT 1    Margot Bhatt    IP [facility] GROUP DOCUMENTATION GROUP    Group Therapy Note    Attendees: 3         Attendance: Did Not Attend      Kaylyn Nicole

## 2020-03-06 PROCEDURE — 65220000003 HC RM SEMIPRIVATE PSYCH

## 2020-03-06 PROCEDURE — 74011250637 HC RX REV CODE- 250/637: Performed by: PSYCHIATRY & NEUROLOGY

## 2020-03-06 RX ADMIN — Medication 500 MG: at 08:07

## 2020-03-06 RX ADMIN — CHLORPROMAZINE HYDROCHLORIDE 50 MG: 25 TABLET, SUGAR COATED ORAL at 20:26

## 2020-03-06 RX ADMIN — FAMOTIDINE 20 MG: 20 TABLET ORAL at 20:26

## 2020-03-06 RX ADMIN — FAMOTIDINE 20 MG: 20 TABLET ORAL at 08:07

## 2020-03-06 RX ADMIN — THERA TABS 1 TABLET: TAB at 08:06

## 2020-03-06 NOTE — BSMART NOTE
ART THERAPY GROUP PROGRESS NOTE    Group time:12:30    The patient refused group, despite encouragement.

## 2020-03-06 NOTE — GROUP NOTE
DARLING  GROUP DOCUMENTATION INDIVIDUAL                                                                          Group Therapy Note    Date: 3/6/2020    Group Start Time: 1000  Group End Time: 8917  Group Topic: Nursing    [address] SPECIAL TRTMT 1    Margot Bhatt     [address] [facility] GROUP DOCUMENTATION GROUP    Group Therapy Note    Attendees: 6         Attendance: Attended    Patient's Goal:  Deep Breathing Exercises    Interventions/techniques: Informed    Follows Directions:  Followed directions    Interactions: Interacted appropriately    Mental Status: Calm, Flat    Behavior/appearance: Cooperative    Goals Achieved: Able to listen to others      [employer]

## 2020-03-06 NOTE — BH NOTES
Pt presents with flat affect. Pt has been withdrawn to room for most of the day but has been attending groups. Pt states, \"My medications made me not feel good. \" Will continue to monitor.

## 2020-03-06 NOTE — PROGRESS NOTES
Behavioral Health Progress Note    Admit Date: 3/2/2020  Hospital day 4    Vitals :   Patient Vitals for the past 8 hrs:   BP Temp Pulse Resp   03/06/20 0801 119/72 97.3 °F (36.3 °C) 96 16     Labs:  No results found for this or any previous visit (from the past 24 hour(s)).   Meds:   Current Facility-Administered Medications   Medication Dose Route Frequency    LORazepam (ATIVAN) tablet 1 mg  1 mg Oral Q4H PRN    traZODone (DESYREL) tablet 50 mg  50 mg Oral QHS PRN    therapeutic multivitamin (THERAGRAN) tablet 1 Tab  1 Tab Oral DAILY    famotidine (PEPCID) tablet 20 mg  20 mg Oral BID    chlorproMAZINE (THORAZINE) tablet 50 mg  50 mg Oral QHS    docusate sodium (COLACE) capsule 100 mg  100 mg Oral DAILY    ascorbic acid (vitamin C) (VITAMIN C) tablet 500 mg  500 mg Oral DAILY    [START ON 3/14/2020] paliperidone palmitate (INVEGA SUSTENNA) injection 156 mg  156 mg IntraMUSCular ONCE    nicotine (NICORETTE) gum 2 mg  2 mg Oral Q2H PRN    ibuprofen (MOTRIN) tablet 600 mg  600 mg Oral Q6H PRN      Hospital Problems: Principal Problem:    Schizophrenia ([facility] [address] (4/18/2017)    Active Problems:    Cigarette nicotine dependence without complication (9/7/1078)        Subjective:   Medication side effects: confusion  tired    Mental Status Exam  Sensorium: alert  Orientation: only aware of  time, place and person  Relations: guarded and unreliable  Eye Contact: intense  Appearance: shows no evidence of impairment  Thought Process: normal rate of thoughts and poor abstract reasoning/computation   Thought Content: paranoia and obsessions    Suicidal: none   Homicidal: none   Mood: is irritable   Affect: blunted  Memory: shows no evidence of impairment     Concentration: fair  Abstraction: concrete  Insight: The patient shows little insight    OR Fair  Judgement: is psychologically impaired OR  Fair    Assessment/Plan:   improved  Nurses report that she is cooperative though she continues to claim that the Paty Vasquez injection is the cause of her psychiatric problems. She continues to be unable to understand how truly confused she was before she was on the medications and now that the medicines are on board she is able to make sense to be able to argue about it. She will say that she will take the injections then later say that she will not. She is not due for the next injection until March 13. She is taking the chlorpromazine as ordered. We are continue with reality orientation and antipsychotic medication management. She denies homicidal or suicidal ideas. She still gets intrusive thoughts of paranoia. She denies auditory hallucinations right now but apparently had some yesterday.   Continue close observation,

## 2020-03-06 NOTE — BSMART NOTE
OCCUPATIONAL THERAPY PROGRESS NOTE    Group Time:  6510  Attendance: The patient attended full group. .  Participation:. The patient participated with minimal elaboration in the activity. Attention:  The patient was able to focus on the activity. Interaction:  The patient acknowledges others or responds to questions,  with no spontaneous interaction. Withdrawn with little interaction. Did appropriately participate in activity without much elaboration.

## 2020-03-06 NOTE — BSMART NOTE
Pt.is a 34year old female with history of Schizophrenia paranoid type, Nicotine Dependence and DID. Pt. was admitted to this facility for paranoia. Pt.s case was discussed in treatment team. Pt. is showing compliance to medications. ARUN Contact:  ARUN met with pt. to discuss dc planning. I am not sure what is going to happen when I leave the hospital. Pt expressed concerns about leaving the hospital and transitioning back to her apartment. Pt.s CM was coming today to meet with pt. and SW to discuss dc plan. SW informed pt. She can return to her apartment, but stepping down to CSU again is not an option right now. SW explained to pt. her CM will be here on 3/10/20 to discuss dc plan. SW discussed treatment goals. SW provided pt. with positive feedback for being medication compliant. Pt denies ideations and hallucinations at this time. . Pt. s mood is flat. Pt. continues to have poor insight and judgement. SW engaged pt. with reality orientation SW would continue to assist the pt. with dc planning. ARUN Collateral: ARUN talked to pt.'s CM with [facility] team .  Zoë Lamb provided pt. With an update on pt. CM was under the impresson pt. Was IC and could be referred to [facility]. ARUN explained to CM pt. was VC. CM expressed concerns about pt. committal status because this is pt.'s 11th hospitalization within the last 6-7 months. CM reports pt was non compliant when she was dc from [facility]. CM states she is coming to met next with pt to dc plan and compliance.

## 2020-03-06 NOTE — BSMART NOTE
SOCIAL WORK GROUP THERAPY PROGRESS NOTE    Group Time:  10am    Group Topic:  Coping Skills    Group Participation:     Pt was unavailable for group due to meeting with her Dr. Total group attendance time about x10 minutes. Seemed still preoccupied with internal stimuli.

## 2020-03-06 NOTE — PROGRESS NOTES
Problem: Psychosis  Goal: *STG: Decreased hallucinations  Description  Pt to have decreased hallucinations daily while hospitalized. Outcome: Progressing Towards Goal  Note:   Patient will exhibit a decrease in hallucinations by time of discharge  Goal: *STG: Decreased delusional thinking  Description  Pt to have decreased delusional thinking while hospitalized. Outcome: Progressing Towards Goal  Note:   Patient will exhibit a decrease in delusional thinking by time of discharge  Goal: *STG: Remains safe in hospital  Description  Pt will remain safe in hospital daily while hospitalized. Outcome: Progressing Towards Goal  Note:   Patient will remain safe for duration of hospital stay    Patient has been visible and active in dayroom throughout shift. Patient has been attending groups and is med compliant. Patient is calm upon approach but still continues to thought block. Patient stated she is just trying to hold it together since it all \"cracked open\" at her apartment.  Will continue to monitor and provide therapeutic interventions

## 2020-03-07 PROCEDURE — 65220000003 HC RM SEMIPRIVATE PSYCH

## 2020-03-07 PROCEDURE — 74011250637 HC RX REV CODE- 250/637: Performed by: PSYCHIATRY & NEUROLOGY

## 2020-03-07 RX ADMIN — FAMOTIDINE 20 MG: 20 TABLET ORAL at 08:13

## 2020-03-07 RX ADMIN — Medication 500 MG: at 08:13

## 2020-03-07 RX ADMIN — THERA TABS 1 TABLET: TAB at 08:12

## 2020-03-07 RX ADMIN — TRAZODONE HYDROCHLORIDE 50 MG: 50 TABLET ORAL at 20:18

## 2020-03-07 RX ADMIN — NICOTINE POLACRILEX 2 MG: 2 GUM, CHEWING ORAL at 10:19

## 2020-03-07 RX ADMIN — CHLORPROMAZINE HYDROCHLORIDE 50 MG: 25 TABLET, SUGAR COATED ORAL at 20:18

## 2020-03-07 RX ADMIN — FAMOTIDINE 20 MG: 20 TABLET ORAL at 20:18

## 2020-03-07 NOTE — GROUP NOTE
IP  GROUP DOCUMENTATION INDIVIDUAL                                                                          Group Therapy Note    Date: 3/6/2020    Group Start Time: 0815  Group End Time: 0830  Group Topic: Nursing    [facility] SPECIAL TRT 1    Melissa Nettles RN    [facility] GROUP DOCUMENTATION GROUP    Group Therapy Note    Attendees:7         Attendance: Attended      Interventions/techniques: Informed    Follows Directions:  Followed directions    Interactions: Did not interact appropriately    Mental Status: Flat    Behavior/appearance: Attentive    Goals Achieved: Able to listen to others          Federico Cabrera RN

## 2020-03-07 NOTE — BH NOTES
Pt spent most of her day in milieu sitting quietly. Pt was withdrawal with flat effect. She didn't socialize with her peers or staff, also didn't disclose any personal information about herself. Pt did eat her meals and was contracted for safety. Staff will continue to monitor and support.

## 2020-03-07 NOTE — PROGRESS NOTES
Behavioral Health Progress Note    Admit Date: 3/2/2020  Hospital day 5    Vitals :   Patient Vitals for the past 8 hrs:   BP Temp Pulse Resp   03/07/20 0801 113/68 97 °F (36.1 °C) (!) 108 16     Labs:  No results found for this or any previous visit (from the past 24 hour(s)). Meds:   Current Facility-Administered Medications   Medication Dose Route Frequency    LORazepam (ATIVAN) tablet 1 mg  1 mg Oral Q4H PRN    traZODone (DESYREL) tablet 50 mg  50 mg Oral QHS PRN    therapeutic multivitamin (THERAGRAN) tablet 1 Tab  1 Tab Oral DAILY    famotidine (PEPCID) tablet 20 mg  20 mg Oral BID    chlorproMAZINE (THORAZINE) tablet 50 mg  50 mg Oral QHS    docusate sodium (COLACE) capsule 100 mg  100 mg Oral DAILY    ascorbic acid (vitamin C) (VITAMIN C) tablet 500 mg  500 mg Oral DAILY    [START ON 3/14/2020] paliperidone palmitate (INVEGA SUSTENNA) injection 156 mg  156 mg IntraMUSCular ONCE    nicotine (NICORETTE) gum 2 mg  2 mg Oral Q2H PRN    ibuprofen (MOTRIN) tablet 600 mg  600 mg Oral Q6H PRN      Hospital Problems: Principal Problem:    Schizophrenia ([facility] [address] (4/18/2017)    Active Problems:    Cigarette nicotine dependence without complication (8/4/2619)        Subjective:   Medication side effects: none  none    Mental Status Exam  Sensorium: alert  Orientation: only aware of  time, place and person  Relations: cooperative  Eye Contact: intense  Appearance: odd  Thought Process: normal rate of thoughts and poor abstract reasoning/computation   Thought Content: paranoia   Suicidal: denies   Homicidal: none   Mood: is euthymic   Affect: blunted  Memory: shows no evidence of impairment     Concentration: fair  Abstraction: concrete  Insight: The patient shows little insight    OR Fair  Judgement: is psychologically impaired OR  Fair    Assessment/Plan:   not changed  The patient has been taking her medications.   She was talking about her admission saying that she had a panic attack after she got out of the crisis stabilization unit which was why she ended up going to the hospital at [facility].  She thought that she would have done better had she gotten out on a earlier weekday and been able to have the in-home worker meet with her and also get involved in a day program.  As result, she is asking to get out of the hospital at the beginning of the week this next week. She is denying homicidal or suicidal ideas hallucinate or delusions today.   Continue close observation,

## 2020-03-07 NOTE — PROGRESS NOTES
Problem: Psychosis  Goal: *STG: Remains safe in hospital  Description  Pt will remain safe in hospital daily while hospitalized. Outcome: Progressing Towards Goal  Goal: *STG/LTG: Complies with medication therapy  Description  Pt will take medication as prescribed by doctor daily while hospitalized. Outcome: Progressing Towards Goal     Pt in day area interacting minimally with peers and staff. Pt denies current SI. Pt denies AVH but does appear to be responding to internal stimuli. Pt compliant with select medications. Rounds maintained Q 15 mins.  Staff will continued to offer a safe and supportive environment

## 2020-03-08 PROCEDURE — 65220000003 HC RM SEMIPRIVATE PSYCH

## 2020-03-08 PROCEDURE — 74011250637 HC RX REV CODE- 250/637: Performed by: NURSE PRACTITIONER

## 2020-03-08 PROCEDURE — 74011250637 HC RX REV CODE- 250/637: Performed by: PSYCHIATRY & NEUROLOGY

## 2020-03-08 RX ADMIN — NICOTINE POLACRILEX 2 MG: 2 GUM, CHEWING ORAL at 10:34

## 2020-03-08 RX ADMIN — FAMOTIDINE 20 MG: 20 TABLET ORAL at 20:26

## 2020-03-08 RX ADMIN — THERA TABS 1 TABLET: TAB at 08:25

## 2020-03-08 RX ADMIN — Medication 500 MG: at 08:26

## 2020-03-08 RX ADMIN — FAMOTIDINE 20 MG: 20 TABLET ORAL at 08:26

## 2020-03-08 RX ADMIN — IBUPROFEN 600 MG: 600 TABLET ORAL at 18:12

## 2020-03-08 RX ADMIN — TRAZODONE HYDROCHLORIDE 50 MG: 50 TABLET ORAL at 20:26

## 2020-03-08 RX ADMIN — CHLORPROMAZINE HYDROCHLORIDE 50 MG: 25 TABLET, SUGAR COATED ORAL at 20:26

## 2020-03-08 NOTE — BH NOTES
Pt appeared to have slept for about 6 hours thus far during the night shift. staff will continue to monitor Pt for safety and behaviour while in the unit.

## 2020-03-08 NOTE — PROGRESS NOTES
Problem: Psychosis  Goal: *STG: Decreased hallucinations  Description  Pt to have decreased hallucinations daily while hospitalized. Outcome: Progressing Towards Goal  Goal: *STG: Decreased delusional thinking  Description  Pt to have decreased delusional thinking while hospitalized. Outcome: Progressing Towards Goal  Goal: *STG: Remains safe in hospital  Description  Pt will remain safe in hospital daily while hospitalized. Outcome: Progressing Towards Goal  Goal: *STG: Seeks staff when feelings of self harm or harm towards others arise  Description  Pt to seek staff each shift when feelings of self harm or harm towards others arise while hospitalized. Outcome: Progressing Towards Goal  Goal: *STG/LTG: Complies with medication therapy  Description  Pt will take medication as prescribed by doctor daily while hospitalized. Outcome: Progressing Towards Goal     Problem: Falls - Risk of  Goal: *Absence of Falls  Description  Document Kodi Treviño Fall Risk and appropriate interventions in the flowsheet. Pt will have zero fall each shift while hospitalized/   Outcome: Progressing Towards Goal  Note: Fall Risk Interventions:            Medication Interventions: Teach patient to arise slowly                   Problem: Suicide  Goal: *STG: Remains safe in hospital  Description  Pt will contract for safety daily while in the hospital.   Outcome: Progressing Towards Goal  Goal: *STG: Seeks staff when feelings of self harm or harm towards others arise  Outcome: Progressing Towards Goal     Problem: Psychosis  Goal: Interventions  Outcome: Progressing Towards Goal     Problem: Suicide  Goal: Interventions  Outcome: Progressing Towards Goal   Pt has a flat affect. She is preoccupied and distracted. Her speech is very soft and slow. Pt denies any thoughts of harming self or others. She is compliant with medication and attends groups. Her responses are very brief in one to one.  She interacts very minimally with staff and peers.

## 2020-03-08 NOTE — BH NOTES
Pt spent most of this shift in her room and appeared sleeping. Pt was in day area for meals and interacted with peers for a little bit. Pt speaks when spoken to first or asked a question. Pt did state she feels slightly better today. Staff will continue to monitor and support.

## 2020-03-08 NOTE — BH NOTES
Motrin 600mg given po for c/o generalized aches. Patient tearful and states she is having \"highs and lows\". Patient encouraged to speak to her doctor tomorrow. Calmer after expressing her concerns and with support.

## 2020-03-08 NOTE — GROUP NOTE
DARLING  GROUP DOCUMENTATION INDIVIDUAL                                                                          Group Therapy Note    Date: 3/8/2020    Group Start Time: 0800  Group End Time: 0815  Group Topic: Nursing    [facility] SPECIAL TRT [id number] Charles Romero RN    IP [address] [facility] GROUP DOCUMENTATION GROUP    Group Therapy Note    Attendees: 4         Attendance: Did not attend    Olvin Weaver RN

## 2020-03-08 NOTE — BH NOTES
Pt has been asleep in room during this shift (11-7). Pt has had no behavioral outbursts. Will continue to monitor.

## 2020-03-08 NOTE — PROGRESS NOTES
Behavioral Health Progress Note    Admit Date: 3/2/2020  Hospital day 6    Vitals :   Patient Vitals for the past 8 hrs:   BP Temp Pulse Resp   03/08/20 1007 104/62 96 °F (35.6 °C) 94 16     Labs:  No results found for this or any previous visit (from the past 24 hour(s)). Meds:   Current Facility-Administered Medications   Medication Dose Route Frequency    LORazepam (ATIVAN) tablet 1 mg  1 mg Oral Q4H PRN    traZODone (DESYREL) tablet 50 mg  50 mg Oral QHS PRN    therapeutic multivitamin (THERAGRAN) tablet 1 Tab  1 Tab Oral DAILY    famotidine (PEPCID) tablet 20 mg  20 mg Oral BID    chlorproMAZINE (THORAZINE) tablet 50 mg  50 mg Oral QHS    docusate sodium (COLACE) capsule 100 mg  100 mg Oral DAILY    ascorbic acid (vitamin C) (VITAMIN C) tablet 500 mg  500 mg Oral DAILY    [START ON 3/14/2020] paliperidone palmitate (INVEGA SUSTENNA) injection 156 mg  156 mg IntraMUSCular ONCE    nicotine (NICORETTE) gum 2 mg  2 mg Oral Q2H PRN    ibuprofen (MOTRIN) tablet 600 mg  600 mg Oral Q6H PRN      Hospital Problems: Principal Problem:    Schizophrenia ([address] (4/18/2017)    Active Problems:    Cigarette nicotine dependence without complication (2/6/5592)        Subjective:   Medication side effects: none  none    Mental Status Exam  Sensorium: alert  Orientation: only aware of  place and person  Relations: passive  Eye Contact: poor  Appearance: is bizarre  Thought Process: slow rate of thoughts and fair abstract reasoning/computation   Thought Content: no evidence of impairment   Suicidal: denies   Homicidal: none   Mood: is irritable   Affect: odd,constricted  Memory: is impaired and is remote     Concentration: fair  Abstraction: concrete  Insight: The patient shows little insight    OR Fair  Judgement: is psychologically impaired OR  Fair    Assessment/Plan:   not changed  Nurses report no change in her behavior. She does not speak much. She does answer questions when asked.   She has been dressed today and not wearing hospital attire. She denies feeling paranoid and denies auditory hallucinations. She is denying homicidal or suicidal ideas. She wants to meet with the  tomorrow because she wishes to be able to be discharged back to her apartment and wants to go to a day program at the [facility]. She says that the skill builder coming into the apartment will be helpful to her and the pact team comes  over to gives her  medications. We will continue with reality orientation and supportive care.   Continue close observation,

## 2020-03-09 PROCEDURE — 74011250637 HC RX REV CODE- 250/637: Performed by: PSYCHIATRY & NEUROLOGY

## 2020-03-09 PROCEDURE — 65220000003 HC RM SEMIPRIVATE PSYCH

## 2020-03-09 RX ADMIN — TRAZODONE HYDROCHLORIDE 50 MG: 50 TABLET ORAL at 20:42

## 2020-03-09 RX ADMIN — CHLORPROMAZINE HYDROCHLORIDE 50 MG: 25 TABLET, SUGAR COATED ORAL at 20:42

## 2020-03-09 RX ADMIN — LURASIDONE HYDROCHLORIDE 80 MG: 20 TABLET, FILM COATED ORAL at 17:54

## 2020-03-09 RX ADMIN — FAMOTIDINE 20 MG: 20 TABLET ORAL at 20:42

## 2020-03-09 RX ADMIN — THERA TABS 1 TABLET: TAB at 08:23

## 2020-03-09 RX ADMIN — Medication 500 MG: at 08:23

## 2020-03-09 NOTE — PROGRESS NOTES
Problem: Psychosis  Goal: *STG: Decreased hallucinations  Description  Pt to have decreased hallucinations daily while hospitalized. Outcome: Progressing Towards Goal  Note:   Patient thoughts will progress to reality during hospital admission. Problem: Falls - Risk of  Goal: *Absence of Falls  Description  Document Edgar Brunner Fall Risk and appropriate interventions in the flowsheet. Pt will have zero fall each shift while hospitalized/   Outcome: Progressing Towards Goal  Note:   Fall Risk Interventions:          Patient affect appeared blunted. Patient responded to writer that she felt emotional when asked, how she was felling. Patient stated, she is feeling emotional because she can't remember things and feels messed up since she began taking the Invega injections 3 three months ago. Patient has not voiced any thoughts of self harm. Patient has been medication compliant with her scheduled medications. Patient has been spending most of all the day in her room. Patient is monitored every 15 minutes for safety and therapeutic support. Medication Interventions: Teach patient to arise slowly       Patient will be free from falls during hospital stay.

## 2020-03-09 NOTE — BSMART NOTE
SOCIAL WORK GROUP THERAPY PROGRESS NOTE Group Time:  10:15am   1:15pm 
 
Group Topic:  Coping Skills    C D Issues Group Participation: Actively participated in group discussion. Pt moderately involved during group discussion but remained attentive. Members discussed handout on the role of \"neurotransmitters\" and how they regulate different aspects of one's moods, cognitions & related behavior. Emphasis of session was presentation of basic \"CBT\" principles including diagram of the process, as well as list of typical cognitive distortions. Looked at the \"Process of setting Goals\", the value of a \"daily\" /  \"weekly\" schedule as tool to build self esteem, sharpen decision making skills and help define one's reality. Discussion included the process of making \"Change\" by answering questions on handout with an emphasis on strengths & weaknesses to support improving one's self esteem. Also looked at the typical \"stages of alcohol & drug recovery\" from withdrawal, \"honeymoon\" stage, hitting \"The WALL\", learning new skills, maintaining a \"balanced lifestyle & monitoring for \"relapse\" warning signs. Part of session was on the variety of \"normal\" physical & emotional recovery symptoms that can possibly be experienced for several months and even up to (2) yrs. \"Seven Steps\" for taking responsibility for our Happiness was reviewed including commitment to change, self-care, setting limits, goal setting & letting go. Explored \"my body's\" anger warning signs as well as common triggers. Taught client about relationship between mood disorders & self medicating them. Reviewed strategies to keep a \"Journal\" for moods, cognitions, behavior & outcome.

## 2020-03-09 NOTE — BH NOTES
Patient participated in groups , she was  co-operative during shift, she was sad , she became emotional crying saying, \"I have been taking my meds , I want to go home\". staff encouraged and explained to her that she should be consistent with taking her medicine, she calmed down and returned to her room, staff will continue to monitor patient for safety.

## 2020-03-09 NOTE — BSMART NOTE
Pt.is a 34year old female with history of Schizophrenia paranoid type, Nicotine Dependence and DID.  Pt. was admitted to this facility for paranoia.      Pt. s case was discussed in treatment team. SW informed team pt.'s CM with Mila Matute is coming tomorrow to discuss dc plan.      ARUN Contact:  SW met with pt. to discuss dc planning. Pt. Was informed about the above. Pt expressed concerns about going home form the hospital.  Pt. States she is not sure what she wants to do. SW informed pt. She can discussed concerns and options with the PACT CM. SW encouraged continued medication compliance and discussed coping skills. Pt states she was feel`ing a little unfocused and down when she was taking one of the medications. SW encouraged to address concerns about the medications with the treating psychiatrist.   Pt. Denies ideations and hallucinations. Pt. Appears anxious, cooperative and has  Poor insight.  SW engaged pt. with reality orientation SW would continue to assist the pt. with dc planning.

## 2020-03-09 NOTE — BSMART NOTE
OCCUPATIONAL THERAPY PROGRESS NOTE    Group time:2972    The patient did not awaken/get up when called for group.

## 2020-03-09 NOTE — GROUP NOTE
DARLING  GROUP DOCUMENTATION INDIVIDUAL                                                                          Group Therapy Note    Date: 3/8/2020    Group Start Time: 1945  Group End Time: 2015  Group Topic: Nursing    [facility] 1    CODY Scott  GROUP DOCUMENTATION GROUP    Group Therapy Note    Attendees: 10         Attendance: Attended        Interventions/techniques: Challenged and Informed    Follows Directions: Followed directions    Interactions: Interacted appropriately    Mental Status: Calm    Behavior/appearance: Attentive and Cooperative    Goals Achieved: Able to engage in interactions and Able to listen to others          Merritt Bell

## 2020-03-09 NOTE — PROGRESS NOTES
Behavioral Health Progress Note    Admit Date: 3/2/2020  Hospital day 7    Vitals :   Patient Vitals for the past 8 hrs:   BP Temp Pulse Resp   03/09/20 0750 104/69 97.2 °F (36.2 °C) (!) 107 16     Labs:  No results found for this or any previous visit (from the past 24 hour(s)). Meds:   Current Facility-Administered Medications   Medication Dose Route Frequency    lurasidone (LATUDA) tablet 80 mg  80 mg Oral DAILY WITH DINNER    LORazepam (ATIVAN) tablet 1 mg  1 mg Oral Q4H PRN    traZODone (DESYREL) tablet 50 mg  50 mg Oral QHS PRN    therapeutic multivitamin (THERAGRAN) tablet 1 Tab  1 Tab Oral DAILY    famotidine (PEPCID) tablet 20 mg  20 mg Oral BID    chlorproMAZINE (THORAZINE) tablet 50 mg  50 mg Oral QHS    docusate sodium (COLACE) capsule 100 mg  100 mg Oral DAILY    ascorbic acid (vitamin C) (VITAMIN C) tablet 500 mg  500 mg Oral DAILY    nicotine (NICORETTE) gum 2 mg  2 mg Oral Q2H PRN    ibuprofen (MOTRIN) tablet 600 mg  600 mg Oral Q6H PRN      Hospital Problems: Principal Problem:    Schizophrenia ([facility] [address] (4/18/2017)    Active Problems:    Cigarette nicotine dependence without complication (9/9/9179)        Subjective:   Medication side effects: fatigue/weakness  none    Mental Status Exam  Sensorium: alert  Orientation: only aware of  time, place and person  Relations: guarded and uncooperative  Eye Contact: intense  Appearance: is bizarre  Thought Process: fast rate of thoughts and poor abstract reasoning/computation   Thought Content: paranoia   Suicidal: denies   Homicidal: none   Mood: is anxious and is irritable   Affect: sad  Memory: is impaired and is remote     Concentration: distractable  Abstraction: concrete  Insight: The patient shows little insight    OR Poor  Judgement: is psychologically impaired OR  Poor    Assessment/Plan:   not changed  Nurses report that she has look somewhat unhappy. She has not been aggressive.   The patient tells me that she is have more rapid mood swings and wanted to go back on something for her mood swings. She continues to say that she does not want to take the Invega sustention and will not be taking it when she leaves the hospital.  She had previously been on combination of Latuda 80 mg and up to 120 mg in the. She said this had helped her to feel better and help with the mood swings and she would prefer to go back on this. She apparently had this discontinued while she had been noncompliant with treatment when she was at the prior  facility and had received the injectable medicines. She says that she prefers to be on the [place] and would take it so we will switch her back to this starting her on 80 mg. We will continue with the Thorazine.   She is supposed to be seen by pact team  to talk to her about her housing when she leaves the hospital.  Continue close observation,

## 2020-03-09 NOTE — BH NOTES
The writer has observed the patient's behavior throughout this shift ([id number]). During this shift the patient was observed crying in her bed. Pt. Stated that \" my brain is leaving me and people are against me\". In addition, patient has attended groups and was in compliance with scheduled medications.

## 2020-03-10 PROCEDURE — 74011250637 HC RX REV CODE- 250/637: Performed by: PSYCHIATRY & NEUROLOGY

## 2020-03-10 PROCEDURE — 65220000003 HC RM SEMIPRIVATE PSYCH

## 2020-03-10 RX ADMIN — CHLORPROMAZINE HYDROCHLORIDE 50 MG: 25 TABLET, SUGAR COATED ORAL at 20:22

## 2020-03-10 RX ADMIN — Medication 500 MG: at 08:10

## 2020-03-10 RX ADMIN — FAMOTIDINE 20 MG: 20 TABLET ORAL at 20:22

## 2020-03-10 RX ADMIN — THERA TABS 1 TABLET: TAB at 08:10

## 2020-03-10 NOTE — BSMART NOTE
ART THERAPY GROUP PROGRESS NOTE    PATIENT SCHEDULED FOR GROUP AT: 12:30    ATTENDANCE: Full    PARTICIPATION LEVEL: Participates fully in the art process. ATTENTION LEVEL: Able to focus on task. FOCUS: Grounding     SYMBOLIC & THEMATIC CONTENT AS NOTED IN IMAGERY:   Leathas mood and affect appeared depressed and congruent at the beginning of group and blunted by the end. She was quiet and planned in her approach to the task. She disclosed that the intervention helped her get her mind off of what has been bothering her. She did not disclose what has been bothering her.

## 2020-03-10 NOTE — BSMART NOTE
OCCUPATIONAL THERAPY PROGRESS NOTE    Group Time:  8952  Attendance: The patient attended full group. .  Participation:  The patient participated with moderate elaboration in the activity. Attention:  The patient was able to focus on the activity. Interaction:  The patient acknowledges others or responds to questions,  with no spontaneous interaction. Appropriate. Consistently says she would like a day program and possibly nursing home.

## 2020-03-10 NOTE — MED STUDENT NOTES
*ATTENTION:  This note has been created by a medical student for educational purposes only. Please do not refer to the content of this note for clinical decision-making, billing, or other purposes. Please see attending physicians note to obtain clinical information on this patient. *     Progress Note  Patient Name: [facility] day 8     Subjective  24 Hour Events: Patient experienced no new acute events overnight and slept well. She was compliant with taking her Latuda 80 mg po at bedtime last night but says that it caused a terrible migraine and nausea within 5 minutes of taking it. This reminded her of why she didn't like this medications but attributed it to her pregnancy in the past. Patient states that her mood is calm and low. She states that she would rather comply with the injection since she is now seeing that it has been the best for her. Discussed orienting herself to time and goals for the future to help with her mood. Pt discussed still refraining from visiting her children as it might stimulate her too much and she does not know if she can handle this yet. Patient has agreed to stay until Thursday for her next shot dosing and being discharged after. She states that she thinks she would benefit from getting back her home routine and joining a day program if possible. Pt denies SI, HI, hallucinations or delusions and no aggressive behavior.     ROS: patient denies any current fevers, chills, nausea, vomiting, headaches, changes in vision     Objective  General Appearance: dressed in civilian clothes; appropriately groomed     Visit Vitals:   Temp 97.6  P 100  RR 16  /68     Mental Status Examination      Appearance/Hygiene 34 y.o.  female  Hygiene: fair   Behavior/Social Relatedness Appropriate; reserved and hesitant to interact at times   Musculoskeletal Gait/Station: appropriate  Tone (flaccid, cogwheeling, spastic): not assessed  Psychomotor (hyperkinetic, hypokinetic): calm   Involuntary movements (tics, dyskinesias, akathisa, stereotypies): none   Speech                Quiet but clear and appropriate; content is sensible and related to discussion   Mood                \"calm, low\"   Affect                               flat   Thought Process Goal oriented, organized   Thought Content and Perceptual Disturbances Ideas of how to fix her medications and what she can do to become healthy; pt denies hearing or seeing things and appropriately responds to external stimuli   Sensorium and Cognition    Grossly intact   Insight                Mildly impaired - fair   Judgment fair      Assessment: This is a 33 yo F with a PMH significant for bipolar disorder and depression. Patient tried a prior medication that she does not like and has agreed to stick with the injection for which her next dose is on Thursday. She appears to be functional and at baseline so should be d/c after medication administration to prevent noncompliance.     Plan:   1. Continue current treatment plan for now, observe and encourage group therapy.   3. Plan for discharge is 3/12/20 after injection administration; patient will f/u with her outpatient appointments and  to set up day program if desired.     Roderick Orlando  OMS III

## 2020-03-10 NOTE — PROGRESS NOTES
Problem: Psychosis  Goal: *STG: Decreased hallucinations  Description  Pt to have decreased hallucinations daily while hospitalized. Outcome: Progressing Towards Goal  Note:   Pt states she is feeling better, just admits to some voices. Goal: *STG: Remains safe in hospital  Description  Pt will remain safe in hospital daily while hospitalized. Outcome: Progressing Towards Goal  Note:   Pt remains safe. Goal: *STG/LTG: Complies with medication therapy  Description  Pt will take medication as prescribed by doctor daily while hospitalized. Outcome: Progressing Towards Goal  Note:   Pt takes medications as ordered. Patient has been in the milieu sitting quietly. Patient denies SI/HI and AVH. Patient is very clear on her medications and answers questions appropriately.

## 2020-03-10 NOTE — BSMART NOTE
COUNSELING GROUP PROGRESS NOTE    Group time: 9:40    The patient did not awaken/get up when called for group despite encouragement.

## 2020-03-10 NOTE — PROGRESS NOTES
NUTRITION    Nutrition Screen    RECOMMENDATIONS / PLAN:     - Add supplements: Ensure Enlive, BID  - Monitor and encourage po supplement intake. - Continue RD inpatient monitoring and evaluation. NUTRITION DIAGNOSIS & INTERVENTIONS:     - Meals/snacks: general healthy diet  - Medical food supplement therapy: initiate    Nutrition Diagnosis: Unintended weight loss related to predicted inadqeuate energy intake 2/2 mentation as evidenced by pt with a 57#, 27% wt loss x year PTA per chart review       ASSESSMENT:     Admitted from [address] after she called 911. Found to be psychotic, making nonsensical statements, disorganized & paranoid. Pt known from previous admission where meal intake was variable 2/2 mentation. Noted significant wt loss x year per chart review, will start supplements. ETOH level negative upon admission. Nutritional intake adequate to meet patients estimated nutritional needs:  Unable to determine at this time    Diet: DIET REGULAR    Food Allergies: NKFA  Current Appetite: Unknown  Appetite/meal intake prior to admission: Unknown  Feeding Limitations:  [] Swallowing Difficulty       [] Chewing Difficulty       [] Other   Current Meal Intake: No data found. Gastrointestinal Issues:  [] Yes    [x] No: none known   Skin Integrity:  WDL    Pertinent Medications:  Reviewed: ascorbic acid, colace, famotidine, theragran  Labs:  Reviewed     Anthropometrics:  Ht Readings from Last 1 Encounters:   03/02/20 5' 5.5\" (1.664 m)       Last 3 Recorded Weights in this Encounter    03/02/20 0445   Weight: 67.6 kg (149 lb)       Body mass index is 24.42 kg/m². Weight History: -57#, 27% x year PTA per chart review. Noted wt fluctuations since 2016 with gradual wt gain from 2017 until 2019, now noted wt to be trending down.     Weight Metrics 3/2/2020 1/23/2020 12/4/2019 10/22/2019 8/14/2019 7/1/2019 5/30/2019   Weight 149 lb 156 lb 180 lb 186 lb 214 lb 215 lb 215 lb 12.8 oz   BMI 24.42 kg/m2 25.18 kg/m2 29.05 kg/m2 30.02 kg/m2 34.54 kg/m2 34.7 kg/m2 34.83 kg/m2       Admitting Diagnosis: Schizophrenia ([facility] [F20.9]  Past Medical History:   Diagnosis Date    Alcoholic ([facility]     Sober 3 months; Weekly [employer] meeting; Had substance abuse counseling;     Anemia     Bipolar disorder ([facility]     Cirrhosis ([facility]     Past not current issue per patient    ETOH abuse     GERD (gastroesophageal reflux disease)     Head injury     Marijuana abuse     Phobia     [place] [facility]    Post partum depression     Pregnancy     Psychiatric disorder     Psychotic disorder ([facility]     Depression/  Orelia Litter, NP; Bipolar disorder, mood swings.  Schizophrenia ([facility]     Stroke [facility]) 2011        Education Needs:        [x] None identified  [] Identified - Not appropriate at this time  []  Identified and addressed - refer to education log  Learning Limitations:   [] None identified  [x] Identified: mental illness    Cultural, Buddhism & ethnic food preferences identified:  [x] None    [] Yes      ESTIMATED NUTRITION NEEDS:     7260-9266 kcal (MSJx1.2-1.3), 54-68 gm protein (0.8-1 gm/kg), 1 mL/kcal  Based on: 68 kg kg       [x] Actual BW      [] IBW          MONITORING & EVALUATION:     Nutrition Goal(s):   - PO nutrition intake will meet >75% of patient estimated nutritional needs within the next 7 days. Outcome: New/Initial goal     Monitor: [x] Food and nutrient intake   [x] Food and nutrient administration  [x] Comparative standards   [x] Nutrition-focused physical findings   [x] Anthropometric Measurements   [x] Treatment/therapy   [x] Biochemical data, medical tests, and procedures         Previous Recommendations (for follow-up assessments only):    Not Applicable      Discharge Planning: No nutritional discharge needs at this time.    [x]  Participated in care planning, discharge planning, & interdisciplinary rounds as appropriate      Rommel Acuna RD   Pager: [phone]

## 2020-03-10 NOTE — BSMART NOTE
ART THERAPY GROUP PROGRESS NOTE    PATIENT SCHEDULED FOR GROUP AT: 12:30    ATTENDANCE: Full     PARTICIPATION LEVEL:Participates fully in the art process. ATTENTION LEVEL: Able to focus on task. FOCUS: Grounding     SYMBOLIC & THEMATIC CONTENT AS NOTED IN IMAGERY: Elyse's mood and affect were blunted and congruent. Her approach to task was planned and thoughtful as she identified that the \"cool colors are the side that is growth, and the warm colors are what I want to let go of. \" Monisha Addison spoke of how she'd like to Kremže go of my bad choices, misunderstandings, lies, blaming people and just forgive them and myself. I'd like to let go of expectations that I haven't and will not be able to meet, the superficial things that aren't part of my life. I feel pretty helpless, I'm just trying to be strong. Somehow its got to work out. \"

## 2020-03-10 NOTE — GROUP NOTE
DARLING  GROUP DOCUMENTATION INDIVIDUAL                                                                          Group Therapy Note    Date: 3/10/2020    Group Start Time: 1905  Group End Time: 1930  Group Topic: Nursing    [facility]    [place], [address] GROUP DOCUMENTATION GROUP    Group Therapy Note    Attendees: 6         Attendance: Did not attend    Patient's Goal:        Interventions/techniques: Informed    Follows Directions:   Interactions: Interacted appropriately    Mental Status: Labile    Behavior/appearance: Attentive    Goals Achieved: Able to listen to others      Additional Notes:        Katherine Powell

## 2020-03-10 NOTE — BSMART NOTE
Pt.is a 34year old female with history of Schizophrenia paranoid type, Nicotine Dependence and DID. Pt. was admitted to this facility for paranoia. Pt.s case was discussed in treatment team. ARUN informed team pt.'s CM with [facility] is coming today. Pt.s anticipated dc is for tomorrow. Treating psychiatrist informed SW pt. has decided to take invega shot. The pt. will be dc on 3/12/20. [facility]. The team discussed the above. The pt. plans to return to her Pt. has agreed to take Invega. Was informed about the above. Pt expressed concerns about going home from the hospital. PACT CM will be referring pt. to [facility] at [address]. Appears anxious, cooperative and has  Poor insight. ARUN will continue to encourage pt to utilize coping strategies and continued medication compliance. Pt. Denies ideations and hallucinations. Pt. SW would continue to assist the pt. with dc planning. Pt. will be dc via cab to PACT Office. Pt. will see Dr. Mauricio Flores on 3/12/20. Pt. will transition to her home with supports until she is accepted to [employer].

## 2020-03-10 NOTE — PROGRESS NOTES
Behavioral Health Progress Note    Admit Date: 3/2/2020  Hospital day 8    Vitals :   Patient Vitals for the past 8 hrs:   BP Temp Pulse Resp   03/10/20 0752 118/68 97.6 °F (36.4 °C) 100 16     Labs:  No results found for this or any previous visit (from the past 24 hour(s)).   Meds:   Current Facility-Administered Medications   Medication Dose Route Frequency    [START ON 3/12/2020] paliperidone palmitate (INVEGA SUSTENNA) injection 156 mg  156 mg IntraMUSCular Q30D    LORazepam (ATIVAN) tablet 1 mg  1 mg Oral Q4H PRN    traZODone (DESYREL) tablet 50 mg  50 mg Oral QHS PRN    therapeutic multivitamin (THERAGRAN) tablet 1 Tab  1 Tab Oral DAILY    famotidine (PEPCID) tablet 20 mg  20 mg Oral BID    chlorproMAZINE (THORAZINE) tablet 50 mg  50 mg Oral QHS    docusate sodium (COLACE) capsule 100 mg  100 mg Oral DAILY    ascorbic acid (vitamin C) (VITAMIN C) tablet 500 mg  500 mg Oral DAILY    nicotine (NICORETTE) gum 2 mg  2 mg Oral Q2H PRN    ibuprofen (MOTRIN) tablet 600 mg  600 mg Oral Q6H PRN      Hospital Problems: Principal Problem:    Schizophrenia ([facility] [address] (4/18/2017)    Active Problems:    Cigarette nicotine dependence without complication (2/1/3702)        Subjective:   Medication side effects: fatigue/weakness, headahe  nausea    Mental Status Exam  Sensorium: alert  Orientation: only aware of  time, place and person  Relations: unreliable  Eye Contact: intense  Appearance: is unkempt  Thought Process: slow rate of thoughts and poor abstract reasoning/computation   Thought Content: paranoia   Suicidal: denies   Homicidal: none   Mood: is anxious   Affect: constricted  Memory: shows no evidence of impairment     Concentration: distractable  Abstraction: concrete  Insight: The patient shows little insight    OR Poor  Judgement: is psychologically impaired OR  Poor    Assessment/Plan:   not changed  The patient had been insisting previously that she would not take the [place] injection and she wanted to be on the Latuda pills. We did issue requested and started the Latuda pills yesterday. She said that it caused her to have migraine headaches nausea and a stabbing pain in the head. She says that she now recalls that that was the problem she had when she took it before. She does not want to take the Blima Schlichter now and says that her best treatment would probably be to go back on the [place] injections and just take those along with the Thorazine. We had discussed the possibility of discharge tomorrow but we will not be able to do that since she will receive the [place] injection. I have written for this for Thursday for about 156 mg and then she can be discharged that day to be followed by the pact team.  The patient is saying that she would prefer to go to some type of a day program rather than just stay in her apartment and she would agree that going to a group home would be preferable since she would have someone around. This may have to be worked out after she is discharged. We will continue with reality orientation and antipsychotic medication management.   Continue close observation,

## 2020-03-11 PROCEDURE — 74011250637 HC RX REV CODE- 250/637: Performed by: PSYCHIATRY & NEUROLOGY

## 2020-03-11 PROCEDURE — 65220000003 HC RM SEMIPRIVATE PSYCH

## 2020-03-11 RX ORDER — TRAZODONE HYDROCHLORIDE 50 MG/1
50 TABLET ORAL
Qty: 15 TAB | Refills: 1 | Status: ON HOLD | OUTPATIENT
Start: 2020-03-11 | End: 2020-06-17 | Stop reason: SDUPTHER

## 2020-03-11 RX ORDER — CHLORPROMAZINE HYDROCHLORIDE 50 MG/1
50 TABLET, FILM COATED ORAL
Qty: 15 TAB | Refills: 1 | Status: SHIPPED | OUTPATIENT
Start: 2020-03-11 | End: 2020-06-17

## 2020-03-11 RX ORDER — FAMOTIDINE 20 MG/1
20 TABLET, FILM COATED ORAL 2 TIMES DAILY
Qty: 60 TAB | Refills: 0 | Status: SHIPPED | OUTPATIENT
Start: 2020-03-11 | End: 2020-04-10

## 2020-03-11 RX ORDER — DOCUSATE SODIUM 100 MG/1
100 CAPSULE, LIQUID FILLED ORAL DAILY
Qty: 30 CAP | Refills: 0 | Status: SHIPPED | OUTPATIENT
Start: 2020-03-11 | End: 2020-04-10

## 2020-03-11 RX ADMIN — Medication 500 MG: at 08:02

## 2020-03-11 RX ADMIN — CHLORPROMAZINE HYDROCHLORIDE 50 MG: 25 TABLET, SUGAR COATED ORAL at 20:03

## 2020-03-11 RX ADMIN — FAMOTIDINE 20 MG: 20 TABLET ORAL at 20:03

## 2020-03-11 RX ADMIN — THERA TABS 1 TABLET: TAB at 08:02

## 2020-03-11 RX ADMIN — TRAZODONE HYDROCHLORIDE 50 MG: 50 TABLET ORAL at 20:03

## 2020-03-11 NOTE — BH NOTES
Treatment team met -     Medical Director: __x___present   Psychiatrist: __x___present   Charge nurse: _x____present   MSW: _x____present   : _____present   Nurse Manager: _____present   Student RNs: _____present   Medical Students: _x____present   Art Therapist: _x____present   Clinical Coordinator: __x___present    Occupational Therapist: _x____present   : _______ present  UR  ___x____ present  Crisis Supervisor___x____present      Plan of care discussed and updated as appropriate. Patient will transition to her home when discharged with support until she's accepted to [facility].

## 2020-03-11 NOTE — MED STUDENT NOTES
*ATTENTION:  This note has been created by a medical student for educational purposes only. Please do not refer to the content of this note for clinical decision-making, billing, or other purposes. Please see attending physicians note to obtain clinical information on this patient. *     Progress Note  Patient Name: [facility] day 9     Subjective  24 Hour Events: Patient experienced no new acute events overnight and slept well, reporting 8 hours of sleep. Pt states that she already feels much better this morning and her mood has improved since yesterday since she did not take the [address]. Patient states that her mood is \"good. \" Reviewed yesterdays discussion and plan for discharge after next scheduled med injection and patient understands and says that she likes the plan. Pt denies SI, HI, hallucinations or delusions and no aggressive behavior.     ROS: patient denies any current fevers, chills, nausea, vomiting, headaches, changes in vision     Objective  General Appearance: dressed in civilian clothes; appropriately groomed, laying in bed     Visit Vitals:   Temp 97.0  P 95  RR 16  /67     Mental Status Examination      Appearance/Hygiene 34 y.o.  female  Hygiene: fair   Behavior/Social Relatedness Appropriate but reserved    Musculoskeletal Gait/Station: appropriate  Tone (flaccid, cogwheeling, spastic): not assessed  Psychomotor (hyperkinetic, hypokinetic): calm   Involuntary movements (tics, dyskinesias, akathisa, stereotypies): none   Speech                Quiet but clear and appropriate; content is sensible and related to discussion   Mood                \"good\"   Affect                               shallow   Thought Process Goal oriented, organized   Thought Content and Perceptual Disturbances Patient is positive and reflecting on her mood improvement and a new day, pt denies hearing or seeing things and appropriately responds to external stimuli   Sensorium and Cognition    Grossly intact   Insight                fair   Judgment fair      Assessment: This is a 33 yo F with a PMH significant for bipolar disorder and depression. Patient tried a prior medication that she does not like and has agreed to stick with the injection for which her next dose is on Thursday. She appears to be functional and at baseline so will be discharged after medication administration to prevent noncompliance.     Plan:   1. Continue current treatment plan for now, observe and encourage group therapy.   2. Plan for discharge is 3/12/20 after injection administration; patient will f/u with her outpatient appointments and  to set up day program if desired.     Judy Carson  S III

## 2020-03-11 NOTE — PROGRESS NOTES
Behavioral Health Progress Note    Admit Date: 3/2/2020  Hospital day 9    Vitals :   Patient Vitals for the past 8 hrs:   BP Temp Pulse Resp   03/11/20 0747 114/67 97 °F (36.1 °C) 95 16     Labs:  No results found for this or any previous visit (from the past 24 hour(s)). Meds:   Current Facility-Administered Medications   Medication Dose Route Frequency    [START ON 3/12/2020] paliperidone palmitate (INVEGA SUSTENNA) injection 156 mg  156 mg IntraMUSCular Q30D    LORazepam (ATIVAN) tablet 1 mg  1 mg Oral Q4H PRN    traZODone (DESYREL) tablet 50 mg  50 mg Oral QHS PRN    therapeutic multivitamin (THERAGRAN) tablet 1 Tab  1 Tab Oral DAILY    famotidine (PEPCID) tablet 20 mg  20 mg Oral BID    chlorproMAZINE (THORAZINE) tablet 50 mg  50 mg Oral QHS    docusate sodium (COLACE) capsule 100 mg  100 mg Oral DAILY    ascorbic acid (vitamin C) (VITAMIN C) tablet 500 mg  500 mg Oral DAILY    nicotine (NICORETTE) gum 2 mg  2 mg Oral Q2H PRN    ibuprofen (MOTRIN) tablet 600 mg  600 mg Oral Q6H PRN      Hospital Problems: Principal Problem:    Schizophrenia ([facility] [address] (4/18/2017)    Active Problems:    Cigarette nicotine dependence without complication (3/9/4130)        Subjective:   Medication side effects: none  none    Mental Status Exam  Sensorium: alert  Orientation: only aware of  time, place and person  Relations: guarded  Eye Contact: appropriate  Appearance: shows no evidence of impairment  Thought Process: normal rate of thoughts and fair abstract reasoning/computation   Thought Content: paranoia   Suicidal: denies   Homicidal: none   Mood: is anxious   Affect: blunted  Memory: shows no evidence of impairment     Concentration: fair  Abstraction: concrete  Insight: The patient's insight shows no evidence of impairment    OR Fair  Judgement: is psychologically impaired OR  Fair    Assessment/Plan:   improved  Patient was discussed in treatment team this morning.   The  had been told by the community services Board that they are going to try to get the patient into the [address] but it is uncertain as to when this will occur. They were planning on picking her up tomorrow and probably will go back to the apartment. Patient has continued to request that she have a day placement to have someone around. They are also going to have an in-home worker with her. She feels better not taking the [place]. She contracts for safety. She is denying hallucinations or delusions today and denies homicidal or suicidal ideas. We will make arrangements for discharge tomorrow morning after she receives her Invega sustenna injection.   Continue close observation,

## 2020-03-11 NOTE — PROGRESS NOTES
Problem: Psychosis  Goal: *STG: Decreased hallucinations  Description: Pt to have decreased hallucinations daily while hospitalized. Outcome: Progressing Towards Goal  Note: Pt denies AVH. Goal: *STG: Remains safe in hospital  Description: Pt will remain safe in hospital daily while hospitalized. Outcome: Progressing Towards Goal  Note: Pt remains safe. Goal: *STG/LTG: Complies with medication therapy  Description: Pt will take medication as prescribed by doctor daily while hospitalized. Outcome: Progressing Towards Goal  Note: Pt takes medications as ordered. Patient is clear in thought content regarding her medications and why she prefers the injection versus Latuda due to the side effects. Patient is awaiting discharge tomorrow and states that her  is working on additional services but apparently will do that once she is discharged. Patient otherwise is attending groups and expressed no concerns on the unit.

## 2020-03-11 NOTE — BSMART NOTE
History: Pt.is a 34year old female with history of Schizophrenia paranoid type, Nicotine Dependence and DID.  Pt. was admitted to this facility for paranoia. Patient is observed engaged with peers in the milieu. Patient displayed no significant changes. Patient is observed in group and appears focused on treatment. Patient is focused on discharge tomorrow and is encouraged to follow up with additional services for continuity of care.     Bev Ledesma, TASHA-LILIA

## 2020-03-11 NOTE — BSMART NOTE
COUNSELING GROUP PROGRESS NOTE    PATIENT SCHEDULED FOR GROUP AT: 9:45    ATTENDANCE: Full    PARTICIPATION LEVEL: Participates fully in the group process. ATTENTION LEVEL: Able to focus on task. FOCUS: Stress    THEMATIC CONTENT AS NOTED IN REFLECTION: She presented with a calm mood and blunted affect and her thought processes were linear and organized. She was actively engaged in the group therapy directive and her approach to task was purposeful. She participated in group discussions and appeared to have insight towards her experience. Thematic content was appropriate for the given directive and she was able to identify both strengths and weaknesses in her ability to take care of herself.

## 2020-03-11 NOTE — BSMART NOTE
OCCUPATIONAL THERAPY PROGRESS NOTE    Group Time:  7222  Attendance: The patient attended full group. .  Participation:  The patient participated with moderate elaboration in the activity. Attention:  The patient was able to focus on the activity. Interaction:  The patient acknowledges others or responds to questions,  with no spontaneous interaction. Discussed potential discharge and stated goal was to maintain in her apartment until transferred to new placement.

## 2020-03-11 NOTE — BH NOTES
Patient has been sitting quietly in the milieu all shift and interacts only when approached. Patient is pleasant and will respond to questions asked in a clear manner, but otherwise is content to sit quietly alone. Patient has attended all groups and is aware she is getting an injection tomorrow morning prior to discharge.

## 2020-03-11 NOTE — PROGRESS NOTES
conducted an Spirituality Group for Amirah Flood, who is a 34 y.o.,female. Patient's Primary Language is: [place]. According to the patient's EMR Yarsani Affiliation is: [place]. The reason the Patient came to the hospital is:   Patient Active Problem List    Diagnosis Date Noted    Recurrent depression ([facility] 2018    Cigarette nicotine dependence without complication     Single delivery by  2017    Episodic mood disorder ([facility] 2017    Schizophrenia ([facility] 2017         conducted Spirituality Group for ________________ who was one of ____participants. The  provided the following Interventions:  Continued the relationship of care and support. Listened empathically. Offered prayer and assurance of continued prayer on patients behalf. Chart reviewed. The following outcomes were achieved:  Patient expressed gratitude for 's visit. Assessment:  There are no further spiritual or Gnosticism issues which require Spiritual Care Services interventions at this time. Plan:  Chaplains will continue to follow and will provide pastoral care on an as needed/requested basis.  recommends bedside caregivers page  on duty if patient shows signs of acute spiritual or emotional distress.        [address]   [phone]

## 2020-03-11 NOTE — GROUP NOTE
DARLING  GROUP DOCUMENTATION INDIVIDUAL                                                                          Group Therapy Note    Date: 3/10/2020    Group Start Time: 1945  Group End Time: 2000  Group Topic: Nursing    [facility] SPECIAL TRTMT 1    Guy Jackson, RN    [facility]  GROUP DOCUMENTATION GROUP    Group Therapy Note    Attendees: 4         Attendance: Attended        Interventions/techniques: Informed    Follows Directions: Followed directions    Interactions: Interacted appropriately    Mental Status: Calm    Behavior/appearance: Attentive and Cooperative    Goals Achieved: Able to listen to others      Bindu Frances

## 2020-03-11 NOTE — BSMART NOTE
ART THERAPY GROUP PROGRESS NOTE    PATIENT SCHEDULED FOR GROUP AT: 1320    ATTENDANCE: Full    PARTICIPATION LEVEL: Participates fully in the art process    ATTENTION LEVEL: Able to focus on task    FOCUS: Reality Orientation    SYMBOLIC & THEMATIC CONTENT AS NOTED IN IMAGERY: She was calm, alert, and compliant. She was invested in the task at hand and her approach and imagery was planned-out, organized, and controlled. Associations were logical and goal directed. She claimed that she hopes to gain \"independence and freedom\" when she leaves, and claimed that she plans to \"start small this time, with taking care of hygiene and feeding self. \" She became tearful when asked to identify a plan, as if some-what overwhelmed, and was encouraged that starting small is a great plan as well as having support to reach out to/call if things get overwhelming again. She claimed that she can speak to the [facility], crisis hotlines, and her outpatient therapist if she needs assistance.

## 2020-03-12 VITALS
HEIGHT: 66 IN | HEART RATE: 100 BPM | BODY MASS INDEX: 23.95 KG/M2 | TEMPERATURE: 97.6 F | DIASTOLIC BLOOD PRESSURE: 64 MMHG | RESPIRATION RATE: 16 BRPM | SYSTOLIC BLOOD PRESSURE: 104 MMHG | WEIGHT: 149 LBS

## 2020-03-12 PROCEDURE — 74011250637 HC RX REV CODE- 250/637: Performed by: PSYCHIATRY & NEUROLOGY

## 2020-03-12 PROCEDURE — 74011250636 HC RX REV CODE- 250/636: Performed by: PSYCHIATRY & NEUROLOGY

## 2020-03-12 RX ADMIN — FAMOTIDINE 20 MG: 20 TABLET ORAL at 08:10

## 2020-03-12 RX ADMIN — Medication 500 MG: at 08:10

## 2020-03-12 RX ADMIN — THERA TABS 1 TABLET: TAB at 08:10

## 2020-03-12 RX ADMIN — PALIPERIDONE PALMITATE 156 MG: 156 INJECTION INTRAMUSCULAR at 08:10

## 2020-03-12 NOTE — MED STUDENT NOTES
*ATTENTION:  This note has been created by a medical student for educational purposes only. Please do not refer to the content of this note for clinical decision-making, billing, or other purposes. Please see attending physicians note to obtain clinical information on this patient. *       Progress Note  Patient Name: [facility] day 10     Subjective  24 Hour Events: Patient experienced no new acute events overnight and slept well. I discussed with her that I just wanted to stop by and see how she was feeling and patient reports she received her shot and is ready to go. She describes her mood as happy but anxious to leave because it is a change, she overall feels good and is aware of the plan. She has no questions or concerns. Pt denies SI, HI, hallucinations or delusions and no aggressive behavior.     ROS: patient denies any current fevers, chills, nausea, vomiting, headaches, changes in vision     Objective  General Appearance: dressed in civilian clothes; appropriately groomed in common area     Visit Vitals: Temp 97.6  P 100  RR 16  /64     Mental Status Examination      Appearance/Hygiene 29 y. o.  female  Hygiene: fair   Behavior/Social Relatedness Appropriate but reserved    Musculoskeletal Gait/Station: appropriate  Tone (flaccid, cogwheeling, spastic): not assessed  Psychomotor (hyperkinetic, hypokinetic): calm   Involuntary movements (tics, dyskinesias, akathisa, stereotypies): none   Speech                Quiet but clear and appropriate; content is sensible and related to discussion   Mood                \"happy\"   Affect                               shallow   Thought Process Goal oriented, organized   Thought Content and Perceptual Disturbances Patient is positive and reflecting on her mood improvement and a new day, pt denies hearing or seeing things and appropriately responds to external stimuli   Sensorium and Cognition    Grossly intact   Insight                fair Judgment fair      Assessment: This is a 27 yo F with a PMH significant for bipolar disorder and depression. Patient tried a prior medication that she does not like and has agreed to stick with the injection for which her next dose is on Thursday.  She appears to be functional and at baseline so will be discharged after medication administration to prevent noncompliance.     Plan:   1. Plan for discharge is 3/12/20; patient will f/u with her outpatient appointments and  to set up day program if desired.     Patricia Coates OMS III

## 2020-03-12 NOTE — DISCHARGE INSTRUCTIONS
BEHAVIORAL HEALTH NURSING DISCHARGE NOTE      The following personal items collected during your admission are returned to you:   Dental Appliance: Dental Appliances: None  Vision: Visual Aid: None  Hearing Aid:    Jewelry: Jewelry: Earrings, Necklace, Ring, Watch(x 1)  Clothing: Clothing: Footwear, Jacket/Coat, Pants, Shirt, Socks, Undergarments  Other Valuables: Other Valuables: Cell Phone, [phone] sent to safe: Personal Items Sent to Safe: Earring x 1,Necklace x 2,Rings x 4      PATIENT INSTRUCTIONS:      ***Emergency Numbers to Remember***  Behavioral Health: [address] [facility] Emergency Services: [phone]  Suicide Hotline: 9-377.151.4994      The discharge information has been reviewed with the patient. The patient verbalized understanding. Rambus Activation    Thank you for requesting access to [employer]. Please follow the instructions below to securely access and download your online medical record. Rambus allows you to send messages to your doctor, view your test results, renew your prescriptions, schedule appointments, and more. How Do I Sign Up? 1. In your internet browser, go to [email or web address]  2. Click on the First Time User? Click Here link in the Sign In box. You will be redirect to the New Member Sign Up page. 3. Enter your Rambus Access Code exactly as it appears below. You will not need to use this code after youve completed the sign-up process. If you do not sign up before the expiration date, you must request a new code. Rambus Access Code: [email or web address]  Expires: 2020 11:28 AM (This is the date your Rambus access code will )    4. Enter the last four digits of your Social Security Number (xxxx) and Date of Birth (mm/dd/yyyy) as indicated and click Submit. You will be taken to the next sign-up page. 5. Create a Rambus ID. This will be your Rambus login ID and cannot be changed, so think of one that is secure and easy to remember.   6. Create a [email or web address] password. You can change your password at any time. 7. Enter your Password Reset Question and Answer. This can be used at a later time if you forget your password. 8. Enter your e-mail address. You will receive e-mail notification when new information is available in [address] 9. Click Sign Up. You can now view and download portions of your medical record. 10. Click the Download Summary menu link to download a portable copy of your medical information. Additional Information    If you have questions, please visit the Frequently Asked Questions section of the [email or web address] website at [email or web address] Remember, [email or web address] is NOT to be used for urgent needs. For medical emergencies, dial 911.       Patient armband removed and shredded

## 2020-03-12 NOTE — BSMART NOTE
Pt.is a 34year old female with history of Schizophrenia paranoid type, Nicotine Dependence and DID.  Pt. was admitted to this facility for paranoia. Pt. Will be dc today to [address] [phone] phone [phone] fax [phone]. Pt. Has an appointment with Dr. Emili Bales PACT psychiatrist    Medicaid cab( confirmation # [id number])     ARUN Collateral:  SW met with pt to discuss dc plan. SW encouraged continued compliance in the community. Pt was informed she will be cab to PACT office at [address] to see Mark Shen. Pt. Currently denies ideations and hallucinations. ARUN assisted pt with dc by coordinating Medicaid transportation.

## 2020-03-12 NOTE — BH NOTES
Patient is being discharged off the unit with her belongings, discharge paperwork and prescriptions. Patient is taking a Medicaid cab to her follow up appointment at the [facility] directly from here.

## 2020-03-13 NOTE — DISCHARGE SUMMARY
[address] [place] [address]    Name:  Braxton Jimenez  MR#:   [id number]  :  1990  ACCOUNT #:  [de-identified]  ADMIT DATE:  2020  DISCHARGE DATE:  2020    IDENTIFYING DATA:  The patient is a 80-year-old single white female, resident of [place], [place], who was admitted voluntarily after presenting to emergency room of [facility].  She had just been released from the [facility] two days earlier. She had been at this facility, being admitted here on 2020. She had spent much of the prior six months in and out of psychiatric hospitals including [address] [place] [address] in [place] and then this facility. She had just gotten out of [facility], being placed on [place] 234 mg load and then 156 mg. She was known to have been on Latuda and chlorpromazine in the past.  She had been tried on Depakote, clozapine, Pristiq with poor compliance. She was a client of PACT Team and was supposed to be seeing Dr. Emili Bales. She had been unable to be stabilized on a single antipsychotic medication at any of her hospitalizations and had required both a typical and an atypical antipsychotic medication to be stabilized. She was not showing any response to mood stabilizer medicines. After discharge from this facility, she was placed on Trileptal, though I am uncertain as to why since most recent literature shows it has no effect on psychiatric symptoms whatsoever. She had been stabilized here on a combination of Invega Sustenna 156 mg every four weeks with next injection due 2020, Pristiq ER 50 mg daily, and chlorpromazine initially 100 mg at bedtime with a decrease to 50 mg at bedtime prior to discharge. She said the doctor there told her to take the Trileptal, discontinue the Pristiq, and stop the chlorpromazine.   She was discharged and within five hours had called 911 to go back to [facility] Emergency Room. She was evaluated and sent home with them telling her that she needed to talk to the PACT Team and take her medicines. She was seen by the PACT Team the following day and then on the Sunday morning she again called 911 saying she amnesia and could not remember what she was supposed to be doing in life. She could not recall whether she was eating, nor recall taking any of her medicines. She was grossly psychotic and had to be readmitted to hospital.    Laboratory testing done at [facility] showed a normal CBC, comprehensive metabolic panel with a slight decreased sodium 132 mmol/L, decreased chloride 99 mmol/L, remainder normal.  She had a normal lipid panel from 01/25/2020. Ammonia level was 0. HCG was negative. Acetaminophen and salicylate levels were negative. Alcohol level and urine drug screen were negative. They did a CT scan of the head which showed no acute findings. HOSPITAL COURSE:  The patient was convinced to sign in voluntarily to psychiatric hospital.  She was accepted and we initially thought she was being admitted on a temporary alf order but was not. Upon arrival, she was again saying that she wanted to leave the hospital and did not want to take medications. We requested for temporary alf order for commitment, so she could again have a judicial authorization issued to take medicines. The [employer] convinced her to stay in the hospital voluntarily and as soon as they left, she again requested to leave A. We again requested a temporary alf order, so we could get involuntary medications. She went to court and the  instead allowed her to sign in as a voluntary commitment. She again started saying she did not want to take the Invega injections and said that she would take [place].   We did try her on different medication and she decided that she felt worse and eventually decided that she would continue on the original medications with the chlorpromazine and the [place] injection. She had received her next [place] 156 mg injection on 03/14/2020 and it was to be due every four weeks. She was placed on chlorpromazine 50 mg daily. She did calm considerably over the following several days after admission and she said the auditory hallucinations had backed away. She said that the paranoia had decreased. She was unhappy with her life situation, talking of how she was disappointed with herself in having the schizophrenic symptoms. She talked of how she needed to have a day placement, so that she would have something to do during the daytime. We [facility] this [facility] and they agreed with this. CONDITION ON DISCHARGE:  Fair. PROGNOSIS:  Fair. ASSESSMENT:  AXIS I:  Schizophrenia. Nicotine use disorder, moderate. AXIS II:  None. AXIS III:  Gastroesophageal reflux disease. DISPOSITION/FOLLOWUP PLANS:  Discharged to [facility]. The patient will be followed by the [facility].  They will be evaluating her for [facility]. MEDICATIONS:  [place] 156 mg IM every four weeks and chlorpromazine 50 mg p.o. at bedtime.       Lydia Toure MD      [email or web address]  D:  03/12/2020 18:46  T:  03/13/2020 0:17  JOB #:  [id number]

## 2020-04-28 ENCOUNTER — HOSPITAL ENCOUNTER (EMERGENCY)
Age: 30
Discharge: PSYCHIATRIC OR SUBSTANCE USE FACILITY | End: 2020-04-29
Attending: EMERGENCY MEDICINE
Payer: MEDICAID

## 2020-04-28 VITALS
DIASTOLIC BLOOD PRESSURE: 83 MMHG | BODY MASS INDEX: 25.83 KG/M2 | RESPIRATION RATE: 16 BRPM | WEIGHT: 155 LBS | OXYGEN SATURATION: 98 % | HEIGHT: 65 IN | SYSTOLIC BLOOD PRESSURE: 122 MMHG | HEART RATE: 88 BPM | TEMPERATURE: 98.1 F

## 2020-04-28 DIAGNOSIS — F20.1 DISORGANIZED SCHIZOPHRENIA (HCC): ICD-10-CM

## 2020-04-28 DIAGNOSIS — F23 ACUTE PSYCHOSIS (HCC): Primary | ICD-10-CM

## 2020-04-28 LAB
ALBUMIN SERPL-MCNC: 4.4 G/DL (ref 3.4–5)
ALBUMIN/GLOB SERPL: 1.3 {RATIO} (ref 0.8–1.7)
ALP SERPL-CCNC: 66 U/L (ref 45–117)
ALT SERPL-CCNC: 10 U/L (ref 13–56)
AMPHET UR QL SCN: NEGATIVE
ANION GAP SERPL CALC-SCNC: 7 MMOL/L (ref 3–18)
AST SERPL-CCNC: 9 U/L (ref 10–38)
BARBITURATES UR QL SCN: NEGATIVE
BASOPHILS # BLD: 0 K/UL (ref 0–0.1)
BASOPHILS NFR BLD: 0 % (ref 0–2)
BENZODIAZ UR QL: NEGATIVE
BILIRUB SERPL-MCNC: 0.3 MG/DL (ref 0.2–1)
BUN SERPL-MCNC: 3 MG/DL (ref 7–18)
BUN/CREAT SERPL: 5 (ref 12–20)
CALCIUM SERPL-MCNC: 9.2 MG/DL (ref 8.5–10.1)
CANNABINOIDS UR QL SCN: NEGATIVE
CHLORIDE SERPL-SCNC: 103 MMOL/L (ref 100–111)
CO2 SERPL-SCNC: 28 MMOL/L (ref 21–32)
COCAINE UR QL SCN: NEGATIVE
CREAT SERPL-MCNC: 0.66 MG/DL (ref 0.6–1.3)
DIFFERENTIAL METHOD BLD: NORMAL
EOSINOPHIL # BLD: 0 K/UL (ref 0–0.4)
EOSINOPHIL NFR BLD: 0 % (ref 0–5)
ERYTHROCYTE [DISTWIDTH] IN BLOOD BY AUTOMATED COUNT: 12.2 % (ref 11.6–14.5)
ETHANOL SERPL-MCNC: <3 MG/DL (ref 0–3)
GLOBULIN SER CALC-MCNC: 3.3 G/DL (ref 2–4)
GLUCOSE SERPL-MCNC: 99 MG/DL (ref 74–99)
HCG UR QL: NEGATIVE
HCT VFR BLD AUTO: 42.2 % (ref 35–45)
HDSCOM,HDSCOM: NORMAL
HGB BLD-MCNC: 14.6 G/DL (ref 12–16)
LYMPHOCYTES # BLD: 2.4 K/UL (ref 0.9–3.6)
LYMPHOCYTES NFR BLD: 33 % (ref 21–52)
MCH RBC QN AUTO: 31.3 PG (ref 24–34)
MCHC RBC AUTO-ENTMCNC: 34.6 G/DL (ref 31–37)
MCV RBC AUTO: 90.4 FL (ref 74–97)
METHADONE UR QL: NEGATIVE
MONOCYTES # BLD: 0.8 K/UL (ref 0.05–1.2)
MONOCYTES NFR BLD: 10 % (ref 3–10)
NEUTS SEG # BLD: 4.2 K/UL (ref 1.8–8)
NEUTS SEG NFR BLD: 57 % (ref 40–73)
OPIATES UR QL: NEGATIVE
PCP UR QL: NEGATIVE
PLATELET # BLD AUTO: 187 K/UL (ref 135–420)
PMV BLD AUTO: 10.1 FL (ref 9.2–11.8)
POTASSIUM SERPL-SCNC: 3.4 MMOL/L (ref 3.5–5.5)
PROT SERPL-MCNC: 7.7 G/DL (ref 6.4–8.2)
RBC # BLD AUTO: 4.67 M/UL (ref 4.2–5.3)
SODIUM SERPL-SCNC: 138 MMOL/L (ref 136–145)
WBC # BLD AUTO: 7.4 K/UL (ref 4.6–13.2)

## 2020-04-28 PROCEDURE — 80307 DRUG TEST PRSMV CHEM ANLYZR: CPT

## 2020-04-28 PROCEDURE — 99285 EMERGENCY DEPT VISIT HI MDM: CPT

## 2020-04-28 PROCEDURE — 80053 COMPREHEN METABOLIC PANEL: CPT

## 2020-04-28 PROCEDURE — 74011250637 HC RX REV CODE- 250/637: Performed by: EMERGENCY MEDICINE

## 2020-04-28 PROCEDURE — 81025 URINE PREGNANCY TEST: CPT

## 2020-04-28 PROCEDURE — 85025 COMPLETE CBC W/AUTO DIFF WBC: CPT

## 2020-04-28 PROCEDURE — 80164 ASSAY DIPROPYLACETIC ACD TOT: CPT

## 2020-04-28 RX ORDER — PALIPERIDONE 3 MG/1
6 TABLET, EXTENDED RELEASE ORAL DAILY
Status: DISCONTINUED | OUTPATIENT
Start: 2020-04-28 | End: 2020-04-28

## 2020-04-28 RX ORDER — ASPIRIN 325 MG/1
100 TABLET, FILM COATED ORAL DAILY
Status: DISCONTINUED | OUTPATIENT
Start: 2020-04-29 | End: 2020-04-29 | Stop reason: HOSPADM

## 2020-04-28 RX ORDER — DIVALPROEX SODIUM 250 MG/1
500 TABLET, DELAYED RELEASE ORAL 2 TIMES DAILY
Status: DISCONTINUED | OUTPATIENT
Start: 2020-04-29 | End: 2020-04-29 | Stop reason: HOSPADM

## 2020-04-28 RX ORDER — CHLORPROMAZINE HYDROCHLORIDE 25 MG/1
50 TABLET, FILM COATED ORAL
Status: DISCONTINUED | OUTPATIENT
Start: 2020-04-28 | End: 2020-04-29 | Stop reason: HOSPADM

## 2020-04-28 RX ORDER — TRAZODONE HYDROCHLORIDE 50 MG/1
50 TABLET ORAL
Status: DISCONTINUED | OUTPATIENT
Start: 2020-04-28 | End: 2020-04-29 | Stop reason: HOSPADM

## 2020-04-28 RX ADMIN — CHLORPROMAZINE HYDROCHLORIDE 50 MG: 25 TABLET, FILM COATED ORAL at 23:52

## 2020-04-28 RX ADMIN — TRAZODONE HYDROCHLORIDE 50 MG: 50 TABLET ORAL at 23:52

## 2020-04-28 NOTE — ED TRIAGE NOTES
Alert female arrives to ED accompanied by Rhys VILLATORO. Pt states she feels \"off\", \"blank\", pt unable to answer whether si/hi but states she has a lot of problems in her head. Pt slow to answer questions. Denies any pain. Pt will stare off blankly during assessment, states to MD \"you don't have to leave\" while in the middle of conversation.

## 2020-04-28 NOTE — ED PROVIDER NOTES
EMERGENCY DEPARTMENT HISTORY AND PHYSICAL EXAM    7:54 PM      Date: 4/28/2020  Patient Name: Nain Springer    History of Presenting Illness     Chief Complaint   Patient presents with   [address]         History Provided By: patient/police    Additional History (Context): aNin Springer is a 27 y.o. female presents with history of schizophrenia, contacted [phone] and police were sent out, she is calm and cooperative, makes a few bizarre statements, says she needs her Depakote level checked and feels she needs to go to the hospital.  The police know her well and say she is in a relatively well controlled state at this point. The patient herself wants help describes staring spells inability to move, makes a few bizarre statements such as the drug will save my life, and you do not have to leave. Micah Culp PCP: [facility] Gandara    Chief Complaint:   Duration:    Timing:    Location:   Quality:   Severity:   Modifying Factors:   Associated Symptoms:       Current Outpatient Medications   Medication Sig Dispense Refill    chlorproMAZINE (THORAZINE) 50 mg tablet Take 1 Tab by mouth nightly. Indications: schizophrenia 15 Tab 1    paliperidone palmitate (INVEGA SUSTENNA) 156 mg/mL injection 1 mL by IntraMUSCular route every thirty (30) days. Indications: schizophrenia 1 Syringe 0    traZODone (DESYREL) 50 mg tablet Take 1 Tab by mouth nightly as needed for Sleep. Indications: insomnia associated with depression 15 Tab 1    ascorbic acid, vitamin C, (VITAMIN C) 500 mg tablet Take 1 Tab by mouth daily. Indications: inadequate vitamin C 30 Tab 0       Past History     Past Medical History:  Past Medical History:   Diagnosis Date    Alcoholic ([facility] [address]     Sober 3 months; Weekly [employer] meeting;  Had substance abuse counseling;     Anemia     Bipolar disorder ([facility] [address]     Cirrhosis ([facility] [address]     Past not current issue per patient    ETOH abuse     GERD (gastroesophageal reflux disease)     Head injury     Marijuana abuse     Phobia     [facility]    Post partum depression     Pregnancy     Psychiatric disorder     Psychotic disorder ([facility] [address]     Depression/  Marigene Claus, NP; Bipolar disorder, mood swings.  Schizophrenia ([facility] [address]     Stroke [facility])        Past Surgical History:  Past Surgical History:   Procedure Laterality Date    HX  SECTION      HX  SECTION      C section x 2;   &     HX RHINOPLASTY      HX TUBAL LIGATION  2017       Family History:  Family History   Family history unknown: Yes       Social History:  Social History     Tobacco Use    Smoking status: Current Every Day Smoker     Packs/day: 0.50     Years: 15.00     Pack years: 7.50    Smokeless tobacco: Former User   Substance Use Topics    Alcohol use: No     Comment: Sober for x 3 months    Drug use: Not Currently     Types: Other     Comment: Alcohol       Allergies: Allergies   Allergen Reactions    Robitussin [Guaifenesin] Nausea and Vomiting         Review of Systems     Review of Systems   Constitutional: Negative for diaphoresis and fever. HENT: Negative for congestion and sore throat. Eyes: Negative for pain and itching. Respiratory: Negative for cough and shortness of breath. Cardiovascular: Negative for chest pain and palpitations. Gastrointestinal: Negative for abdominal pain and diarrhea. Endocrine: Negative for polydipsia and polyuria. Genitourinary: Negative for dysuria and hematuria. Musculoskeletal: Negative for arthralgias and myalgias. Skin: Negative for rash and wound. Neurological: Negative for seizures and syncope. Hematological: Does not bruise/bleed easily. Psychiatric/Behavioral: Negative for agitation and hallucinations. Physical Exam       No data found. Physical Exam  Vitals signs and nursing note reviewed. Constitutional:       Appearance: She is well-developed. HENT:      Head: Normocephalic and atraumatic.    Eyes:      General: No scleral icterus. Conjunctiva/sclera: Conjunctivae normal.   Neck:      Musculoskeletal: Normal range of motion and neck supple. Vascular: No JVD. Cardiovascular:      Rate and Rhythm: Normal rate and regular rhythm. Pulmonary:      Effort: Pulmonary effort is normal. No respiratory distress. Musculoskeletal: Normal range of motion. Skin:     General: Skin is warm and dry. Neurological:      Mental Status: She is alert. Psychiatric:      Comments: Mood euthymic, affect blunted, good eye contact, few bizarre statements, question psychosis. She is not agitated or violent, she is calm           Diagnostic Study Results   Labs -  No results found for this or any previous visit (from the past 12 hour(s)). Radiologic Studies -   No orders to display     No results found. Medications ordered:   Medications - No data to display      Medical Decision Making   Initial Medical Decision Making and DDx:     Strongly suspect psychiatric disorder, carries a diagnosis of schizophrenia. Do not suspect organic cause. Will rule out toxic cause    ED Course: Progress Notes, Reevaluation, and Consults:     No acute events in the ED, labs nonactionable, UDS pending, turned over to Dr Tate Ramirez pending psych eval,     I am the first provider for this patient. I reviewed the vital signs, available nursing notes, past medical history, past surgical history, family history and social history. No data found. Vital Signs-Reviewed the patient's vital signs. Pulse Oximetry Analysis, Cardiac Monitor, 12 lead ekg:      Interpreted by the EP. Records Reviewed: Nursing notes reviewed (Time of Review: 7:54 PM)    Procedures:   Critical Care Time:   Aspirin: (was aspirin given for stroke?)    Diagnosis     Clinical Impression: No diagnosis found.     Disposition:       Follow-up Information    None          Patient's Medications   Start Taking    No medications on file   Continue Taking    ASCORBIC ACID, VITAMIN C, (VITAMIN C) 500 MG TABLET    Take 1 Tab by mouth daily. Indications: inadequate vitamin C    CHLORPROMAZINE (THORAZINE) 50 MG TABLET    Take 1 Tab by mouth nightly. Indications: schizophrenia    PALIPERIDONE PALMITATE (INVEGA SUSTENNA) 156 MG/ML INJECTION    1 mL by IntraMUSCular route every thirty (30) days. Indications: schizophrenia    TRAZODONE (DESYREL) 50 MG TABLET    Take 1 Tab by mouth nightly as needed for Sleep.  Indications: insomnia associated with depression   These Medications have changed    No medications on file   Stop Taking    No medications on file     _______________________________    Notes:    Vonzella Habermann, MD using Dragon dictation      _______________________________

## 2020-04-29 LAB — VALPROATE SERPL-MCNC: 92 UG/ML (ref 50–100)

## 2020-04-29 NOTE — CONSULTS
Name: Zaira Coto                                                                        : 1990  Date: 2020                                                                             Time: 1130p et                 Location of patient: [facility] ED                                              Location of doctor: [facility]    This evaluation was conducted via telepsychiatry with the assistance of onsite staff     Chief Complaint: +AH, paranoid     History of Present Illness:   Pt is a 26 yo WF with hx of Schizophrenia, comes to ED after she contacted police for help, made bizarre statements, blank stares. Pt was seen and evaluated. Was confused, says she can't follow through with her thoughts. Reports she has \"lots of voices, driving me insane\", says she takes her meds daily, says she got her last Carie Ditto shot on , but unsure. Pt says she has been trying her best to stay at home and deal with it, worried about her home, paranoid. Feels ok to get help in hospital but was hesitant, but understands        SI/ Self harm: denies  HI/Violence: denies  Trauma history: denies  Access to weapons: denies  Legal: denies  Psychiatric History/Treatment History: last admit was March, multiple admits, f/u at [facility], multiple meds in past2    Drug/Alcohol History: denies    Medical History:   Past Medical History:   Diagnosis Date    Alcoholic ([address]     Sober 3 months; Weekly AAA meeting; Had substance abuse counseling;     Anemia     Bipolar disorder ([address]     Cirrhosis ([address]     Past not current issue per patient    ETOH abuse     GERD (gastroesophageal reflux disease)     Head injury     Marijuana abuse     Phobia     [facility]    Post partum depression     Pregnancy     Psychiatric disorder     Psychotic disorder ([address]     Depression/  Susan Ibrahim, NP; Bipolar disorder, mood swings.     Schizophrenia ([address]     Stroke [facility])      Medications & Freq:   Current Facility-Administered Medications:     chlorproMAZINE (THORAZINE) tablet 50 mg, 50 mg, Oral, QHS, Vanessa Lopez MD    traZODone (DESYREL) tablet 50 mg, 50 mg, Oral, QHS, Vanessa Lopez MD    paliperidone (INVEGA) SR tablet 6 mg, 6 mg, Oral, DAILY, Vanessa Lopez MD  [address] [place] [address] Bias ON 4/29/2020] thiamine mononitrate (B-1) tablet 100 mg, 100 mg, Oral, DAILY, Vanessa Lopez MD  [address] [place] [address] [place] ON 4/29/2020] divalproex DR (DEPAKOTE) tablet 500 mg, 500 mg, Oral, BID, Vanessa Lopez MD    Current Outpatient Medications:     chlorproMAZINE (THORAZINE) 50 mg tablet, Take 1 Tab by mouth nightly. Indications: schizophrenia, Disp: 15 Tab, Rfl: 1    paliperidone palmitate (INVEGA SUSTENNA) 156 mg/mL injection, 1 mL by IntraMUSCular route every thirty (30) days. Indications: schizophrenia, Disp: 1 Syringe, Rfl: 0    traZODone (DESYREL) 50 mg tablet, Take 1 Tab by mouth nightly as needed for Sleep. Indications: insomnia associated with depression, Disp: 15 Tab, Rfl: 1    ascorbic acid, vitamin C, (VITAMIN C) 500 mg tablet, Take 1 Tab by mouth daily. Indications: inadequate vitamin C, Disp: 30 Tab, Rfl: 0    Allergies: Allergies   Allergen Reactions    Robitussin [Guaifenesin] Nausea and Vomiting       Family Psych History/History of suicide: unknown  Social History: lives alone? SSI?  Unknown                       Mental Status Exam:   Appearance and attire: hosp gown, sitting in bed  Attitude and behavior: confused, calm  Speech: rrr  Mood/Affect: ok/constricted  Thought processes: disorganized  Thought content: no si/hi, +paranoia   Perception: +AH, \"lots\"  Cognition: grossly impaired due to psychosis  Insight and judgment: poor     Diagnosis:   Schizophrenia     Treatment Recommendations:    -recommend inpatient psych admission, pt psychotic, gravely disabled, +AH, paranoid  -pt voluntary for now, however if pt changes mind, would need TDO  -can continue psych meds: Invega 6mg PO daily, Thorazine 50mg qhs (hold on Invega IM for now)

## 2020-04-29 NOTE — ED NOTES
Patient transported via [address] to [facility]. Patient belongings, medical records, and EMTALA given to transport personnel.

## 2020-04-29 NOTE — ED NOTES
Vitals:  Patient Vitals for the past 12 hrs:   Temp Pulse Resp BP SpO2   04/28/20 1957 98.1 °F (36.7 °C) 88 16 122/83 98 %         Medications ordered:   Medications   chlorproMAZINE (THORAZINE) tablet 50 mg (50 mg Oral Given 4/28/20 2352)   traZODone (DESYREL) tablet 50 mg (50 mg Oral Given 4/28/20 2352)   thiamine mononitrate (B-1) tablet 100 mg (has no administration in time range)   divalproex DR (DEPAKOTE) tablet 500 mg (has no administration in time range)         Lab findings:  Recent Results (from the past 12 hour(s))   CBC WITH AUTOMATED DIFF    Collection Time: 04/28/20  8:02 PM   Result Value Ref Range    WBC 7.4 4.6 - 13.2 K/uL    RBC 4.67 4.20 - 5.30 M/uL    HGB 14.6 12.0 - 16.0 g/dL    HCT 42.2 35.0 - 45.0 %    MCV 90.4 74.0 - 97.0 FL    MCH 31.3 24.0 - 34.0 PG    MCHC 34.6 31.0 - 37.0 g/dL    RDW 12.2 11.6 - 14.5 %    PLATELET 248 317 - 581 K/uL    MPV 10.1 9.2 - 11.8 FL    NEUTROPHILS 57 40 - 73 %    LYMPHOCYTES 33 21 - 52 %    MONOCYTES 10 3 - 10 %    EOSINOPHILS 0 0 - 5 %    BASOPHILS 0 0 - 2 %    ABS. NEUTROPHILS 4.2 1.8 - 8.0 K/UL    ABS. LYMPHOCYTES 2.4 0.9 - 3.6 K/UL    ABS. MONOCYTES 0.8 0.05 - 1.2 K/UL    ABS. EOSINOPHILS 0.0 0.0 - 0.4 K/UL    ABS. BASOPHILS 0.0 0.0 - 0.1 K/UL    DF AUTOMATED     METABOLIC PANEL, COMPREHENSIVE    Collection Time: 04/28/20  8:02 PM   Result Value Ref Range    Sodium 138 136 - 145 mmol/L    Potassium 3.4 (L) 3.5 - 5.5 mmol/L    Chloride 103 100 - 111 mmol/L    CO2 28 21 - 32 mmol/L    Anion gap 7 3.0 - 18 mmol/L    Glucose 99 74 - 99 mg/dL    BUN 3 (L) 7.0 - 18 MG/DL    Creatinine 0.66 0.6 - 1.3 MG/DL    BUN/Creatinine ratio 5 (L) 12 - 20      GFR est AA >60 >60 ml/min/1.73m2    GFR est non-AA >60 >60 ml/min/1.73m2    Calcium 9.2 8.5 - 10.1 MG/DL    Bilirubin, total 0.3 0.2 - 1.0 MG/DL    ALT (SGPT) 10 (L) 13 - 56 U/L    AST (SGOT) 9 (L) 10 - 38 U/L    Alk.  phosphatase 66 45 - 117 U/L    Protein, total 7.7 6.4 - 8.2 g/dL    Albumin 4.4 3.4 - 5.0 g/dL Globulin 3.3 2.0 - 4.0 g/dL    A-G Ratio 1.3 0.8 - 1.7     ETHYL ALCOHOL    Collection Time: 04/28/20  8:02 PM   Result Value Ref Range    ALCOHOL(ETHYL),SERUM <3 0 - 3 MG/DL   HCG URINE, QL    Collection Time: 04/28/20  8:02 PM   Result Value Ref Range    HCG urine, QL Negative NEG     DRUG SCREEN, URINE    Collection Time: 04/28/20  8:02 PM   Result Value Ref Range    BENZODIAZEPINES Negative NEG      BARBITURATES Negative NEG      THC (TH-CANNABINOL) Negative NEG      OPIATES Negative NEG      PCP(PHENCYCLIDINE) Negative NEG      COCAINE Negative NEG      AMPHETAMINES Negative NEG      METHADONE Negative NEG      HDSCOM (NOTE)        EKG interpretation by ED Physician:      X-Ray, CT or other radiology findings or impressions:  No orders to display       Progress notes, Consult notes or additional Procedure notes:   Turned over from Dr. Nikole Joya to follow-up on tele-psych consult. Discussed with tele-psychiatry who recommends inpatient admission and patient is currently voluntary. Should patient become involuntary recommends consulting with community service Board for TDO evaluation. I have rewritten for her psychiatric medications  Pt states she has had her invega injection this month already    Patient has been accepted at another facility for mental health treatment. Reevaluation of patient:   stable for transport and for inpatient mental health stabilization. Patient has no acute medical condition that would prevent transfer    Disposition:  Diagnosis:   1. Acute psychosis ([facility] [address]    2. Disorganized schizophrenia ([facility]        Disposition: transfer    Follow-up Information    None           Patient's Medications   Start Taking    No medications on file   Continue Taking    ASCORBIC ACID, VITAMIN C, (VITAMIN C) 500 MG TABLET    Take 1 Tab by mouth daily. Indications: inadequate vitamin C    CHLORPROMAZINE (THORAZINE) 50 MG TABLET    Take 1 Tab by mouth nightly.  Indications: schizophrenia    PALIPERIDONE PALMITATE (INVEGA SUSTENNA) 156 MG/ML INJECTION    1 mL by IntraMUSCular route every thirty (30) days. Indications: schizophrenia    TRAZODONE (DESYREL) 50 MG TABLET    Take 1 Tab by mouth nightly as needed for Sleep.  Indications: insomnia associated with depression   These Medications have changed    No medications on file   Stop Taking    No medications on file

## 2020-06-08 ENCOUNTER — HOSPITAL ENCOUNTER (EMERGENCY)
Age: 30
Discharge: PSYCHIATRIC OR SUBSTANCE USE FACILITY | End: 2020-06-08
Attending: EMERGENCY MEDICINE
Payer: MEDICAID

## 2020-06-08 ENCOUNTER — HOSPITAL ENCOUNTER (INPATIENT)
Age: 30
LOS: 9 days | Discharge: HOME | DRG: 750 | End: 2020-06-17
Attending: PSYCHIATRY & NEUROLOGY | Admitting: PSYCHIATRY & NEUROLOGY
Payer: MEDICAID

## 2020-06-08 VITALS
HEART RATE: 72 BPM | SYSTOLIC BLOOD PRESSURE: 99 MMHG | DIASTOLIC BLOOD PRESSURE: 66 MMHG | TEMPERATURE: 97.3 F | OXYGEN SATURATION: 98 % | RESPIRATION RATE: 18 BRPM

## 2020-06-08 DIAGNOSIS — F23 ACUTE PSYCHOSIS (HCC): Primary | ICD-10-CM

## 2020-06-08 LAB
ALBUMIN SERPL-MCNC: 3.9 G/DL (ref 3.4–5)
ALBUMIN/GLOB SERPL: 1.2 {RATIO} (ref 0.8–1.7)
ALP SERPL-CCNC: 67 U/L (ref 45–117)
ALT SERPL-CCNC: 13 U/L (ref 13–56)
AMPHET UR QL SCN: NEGATIVE
ANION GAP SERPL CALC-SCNC: 7 MMOL/L (ref 3–18)
APPEARANCE UR: CLEAR
AST SERPL-CCNC: 10 U/L (ref 10–38)
BACTERIA URNS QL MICRO: ABNORMAL /HPF
BARBITURATES UR QL SCN: NEGATIVE
BASOPHILS # BLD: 0 K/UL (ref 0–0.1)
BASOPHILS NFR BLD: 0 % (ref 0–2)
BENZODIAZ UR QL: NEGATIVE
BILIRUB SERPL-MCNC: 0.5 MG/DL (ref 0.2–1)
BILIRUB UR QL: NEGATIVE
BUN SERPL-MCNC: 8 MG/DL (ref 7–18)
BUN/CREAT SERPL: 13 (ref 12–20)
CALCIUM SERPL-MCNC: 8.9 MG/DL (ref 8.5–10.1)
CANNABINOIDS UR QL SCN: NEGATIVE
CHLORIDE SERPL-SCNC: 104 MMOL/L (ref 100–111)
CO2 SERPL-SCNC: 26 MMOL/L (ref 21–32)
COCAINE UR QL SCN: NEGATIVE
COLOR UR: YELLOW
COVID-19 RAPID TEST, COVR: NOT DETECTED
CREAT SERPL-MCNC: 0.63 MG/DL (ref 0.6–1.3)
DIFFERENTIAL METHOD BLD: ABNORMAL
EOSINOPHIL # BLD: 0 K/UL (ref 0–0.4)
EOSINOPHIL NFR BLD: 0 % (ref 0–5)
EPITH CASTS URNS QL MICRO: ABNORMAL /LPF (ref 0–5)
ERYTHROCYTE [DISTWIDTH] IN BLOOD BY AUTOMATED COUNT: 12.8 % (ref 11.6–14.5)
ETHANOL SERPL-MCNC: <3 MG/DL (ref 0–3)
GLOBULIN SER CALC-MCNC: 3.3 G/DL (ref 2–4)
GLUCOSE SERPL-MCNC: 109 MG/DL (ref 74–99)
GLUCOSE UR STRIP.AUTO-MCNC: NEGATIVE MG/DL
HCG UR QL: NEGATIVE
HCT VFR BLD AUTO: 36.1 % (ref 35–45)
HDSCOM,HDSCOM: NORMAL
HGB BLD-MCNC: 12.6 G/DL (ref 12–16)
HGB UR QL STRIP: NEGATIVE
KETONES UR QL STRIP.AUTO: 40 MG/DL
LEUKOCYTE ESTERASE UR QL STRIP.AUTO: ABNORMAL
LYMPHOCYTES # BLD: 2.5 K/UL (ref 0.9–3.6)
LYMPHOCYTES NFR BLD: 36 % (ref 21–52)
MCH RBC QN AUTO: 30.5 PG (ref 24–34)
MCHC RBC AUTO-ENTMCNC: 34.9 G/DL (ref 31–37)
MCV RBC AUTO: 87.4 FL (ref 74–97)
METHADONE UR QL: NEGATIVE
MONOCYTES # BLD: 0.7 K/UL (ref 0.05–1.2)
MONOCYTES NFR BLD: 10 % (ref 3–10)
NEUTS SEG # BLD: 3.6 K/UL (ref 1.8–8)
NEUTS SEG NFR BLD: 54 % (ref 40–73)
NITRITE UR QL STRIP.AUTO: NEGATIVE
OPIATES UR QL: NEGATIVE
PCP UR QL: NEGATIVE
PH UR STRIP: 6.5 [PH] (ref 5–8)
PLATELET # BLD AUTO: 244 K/UL (ref 135–420)
PMV BLD AUTO: 9.2 FL (ref 9.2–11.8)
POTASSIUM SERPL-SCNC: 3.5 MMOL/L (ref 3.5–5.5)
PROT SERPL-MCNC: 7.2 G/DL (ref 6.4–8.2)
PROT UR STRIP-MCNC: NEGATIVE MG/DL
RBC # BLD AUTO: 4.13 M/UL (ref 4.2–5.3)
RBC #/AREA URNS HPF: NEGATIVE /HPF (ref 0–5)
SODIUM SERPL-SCNC: 137 MMOL/L (ref 136–145)
SOURCE, COVRS: NORMAL
SP GR UR REFRACTOMETRY: 1.01 (ref 1–1.03)
UROBILINOGEN UR QL STRIP.AUTO: 1 EU/DL (ref 0.2–1)
WBC # BLD AUTO: 6.8 K/UL (ref 4.6–13.2)
WBC URNS QL MICRO: ABNORMAL /HPF (ref 0–4)

## 2020-06-08 PROCEDURE — 81001 URINALYSIS AUTO W/SCOPE: CPT

## 2020-06-08 PROCEDURE — 80053 COMPREHEN METABOLIC PANEL: CPT

## 2020-06-08 PROCEDURE — 99283 EMERGENCY DEPT VISIT LOW MDM: CPT

## 2020-06-08 PROCEDURE — 65220000003 HC RM SEMIPRIVATE PSYCH

## 2020-06-08 PROCEDURE — 87635 SARS-COV-2 COVID-19 AMP PRB: CPT

## 2020-06-08 PROCEDURE — 85025 COMPLETE CBC W/AUTO DIFF WBC: CPT

## 2020-06-08 PROCEDURE — 81025 URINE PREGNANCY TEST: CPT

## 2020-06-08 PROCEDURE — 80307 DRUG TEST PRSMV CHEM ANLYZR: CPT

## 2020-06-08 RX ORDER — HALOPERIDOL 5 MG/ML
5 INJECTION INTRAMUSCULAR
Status: DISCONTINUED | OUTPATIENT
Start: 2020-06-08 | End: 2020-06-17 | Stop reason: HOSPADM

## 2020-06-08 RX ORDER — TRAZODONE HYDROCHLORIDE 50 MG/1
50 TABLET ORAL
Status: DISCONTINUED | OUTPATIENT
Start: 2020-06-08 | End: 2020-06-17 | Stop reason: HOSPADM

## 2020-06-08 RX ORDER — PALIPERIDONE 3 MG/1
6 TABLET, EXTENDED RELEASE ORAL
Status: DISCONTINUED | OUTPATIENT
Start: 2020-06-09 | End: 2020-06-11

## 2020-06-08 RX ORDER — LORAZEPAM 2 MG/ML
1 INJECTION INTRAMUSCULAR
Status: DISCONTINUED | OUTPATIENT
Start: 2020-06-08 | End: 2020-06-17 | Stop reason: HOSPADM

## 2020-06-08 RX ORDER — HALOPERIDOL 5 MG/1
5 TABLET ORAL
Status: DISCONTINUED | OUTPATIENT
Start: 2020-06-08 | End: 2020-06-17 | Stop reason: HOSPADM

## 2020-06-08 RX ORDER — BENZTROPINE MESYLATE 1 MG/ML
1 INJECTION INTRAMUSCULAR; INTRAVENOUS
Status: DISCONTINUED | OUTPATIENT
Start: 2020-06-08 | End: 2020-06-17 | Stop reason: HOSPADM

## 2020-06-08 RX ORDER — LORAZEPAM 1 MG/1
1 TABLET ORAL
Status: DISCONTINUED | OUTPATIENT
Start: 2020-06-08 | End: 2020-06-17 | Stop reason: HOSPADM

## 2020-06-08 RX ORDER — IBUPROFEN 600 MG/1
600 TABLET ORAL
Status: DISCONTINUED | OUTPATIENT
Start: 2020-06-08 | End: 2020-06-17 | Stop reason: HOSPADM

## 2020-06-08 RX ORDER — BENZTROPINE MESYLATE 1 MG/1
1 TABLET ORAL
Status: DISCONTINUED | OUTPATIENT
Start: 2020-06-08 | End: 2020-06-17 | Stop reason: HOSPADM

## 2020-06-08 RX ORDER — CHLORPROMAZINE HYDROCHLORIDE 100 MG/1
100 TABLET, FILM COATED ORAL
Status: DISCONTINUED | OUTPATIENT
Start: 2020-06-08 | End: 2020-06-12

## 2020-06-08 NOTE — ED PROVIDER NOTES
EMERGENCY DEPARTMENT HISTORY AND PHYSICAL EXAM    Date: 6/8/2020  Patient Name: Karen Shannon    History of Presenting Illness     Chief Complaint   Patient presents with   [address] Problem         History Provided By:patient and [place] [employer]    Chief Complaint: mental health issues   Duration unknown   Timing: acute  Location: n/a  Quality:n/a  Severity:moderate to severe  Modifying Factors: none  Associated Symptoms: SI/HI       Additional History (Context): Karen Shannon is a 27 y.o. female with PMH bipolar disorder, alcohol abuse, cirrhosis of the liver, GERD, different hand, and stroke who presents with to the ED in custody of PD under an ECO. Patient is reported to the police into [facility] that she \" wants to light the head off birds. \"  She has also verbalized thoughts about hurting herself and other people. Denies any direct complaints at this time. No other complaints reported at this time. PCP: PAU Saucedo    Current Outpatient Medications   Medication Sig Dispense Refill    chlorproMAZINE (THORAZINE) 50 mg tablet Take 1 Tab by mouth nightly. Indications: schizophrenia 15 Tab 1    paliperidone palmitate (INVEGA SUSTENNA) 156 mg/mL injection 1 mL by IntraMUSCular route every thirty (30) days. Indications: schizophrenia 1 Syringe 0    traZODone (DESYREL) 50 mg tablet Take 1 Tab by mouth nightly as needed for Sleep. Indications: insomnia associated with depression 15 Tab 1    ascorbic acid, vitamin C, (VITAMIN C) 500 mg tablet Take 1 Tab by mouth daily. Indications: inadequate vitamin C 30 Tab 0       Past History     Past Medical History:  Past Medical History:   Diagnosis Date    Alcoholic ([facility] [address]     Sober 3 months; Weekly AAA meeting;  Had substance abuse counseling;     Anemia     Bipolar disorder ([facility] [address]     Cirrhosis ([facility] [address]     Past not current issue per patient    ETOH abuse     GERD (gastroesophageal reflux disease)     Head injury     Marijuana abuse     Phobia [address]    Post partum depression     Pregnancy     Psychiatric disorder     Psychotic disorder ([facility] [address]     Depression/  Atif Kyle, NP; Bipolar disorder, mood swings.  Schizophrenia ([facility] [address]     Stroke [facility])        Past Surgical History:  Past Surgical History:   Procedure Laterality Date    HX  SECTION      HX  SECTION      C section x 2;   &     HX RHINOPLASTY      HX TUBAL LIGATION  2017       Family History:  Family History   Family history unknown: Yes       Social History:  Social History     Tobacco Use    Smoking status: Current Every Day Smoker     Packs/day: 0.50     Years: 15.00     Pack years: 7.50    Smokeless tobacco: Former User   Substance Use Topics    Alcohol use: No     Comment: Sober for x 3 months    Drug use: Not Currently     Types: Other     Comment: Alcohol       Allergies: Allergies   Allergen Reactions    Robitussin [Guaifenesin] Nausea and Vomiting         Review of Systems   Review of Systems   Constitutional: Negative. Negative for chills and fever. HENT: Negative. Negative for congestion, ear pain and rhinorrhea. Eyes: Negative. Negative for pain and redness. Respiratory: Negative. Negative for cough, shortness of breath, wheezing and stridor. Cardiovascular: Negative. Negative for chest pain and leg swelling. Gastrointestinal: Negative. Negative for abdominal pain, constipation, diarrhea, nausea and vomiting. Genitourinary: Negative. Negative for dysuria and frequency. Musculoskeletal: Negative. Negative for back pain and neck pain. Skin: Negative. Negative for rash and wound. Neurological: Negative. Negative for dizziness, seizures, syncope and headaches. Psychiatric/Behavioral:        Psychosis    All other systems reviewed and are negative.     All Other Systems Negative  Physical Exam     Vitals:    20 1412 20 1413   BP: 106/70    Pulse: 76    Resp: 18    Temp:  98.1 °F (36.7 °C) SpO2: 97%      Physical Exam  Vitals signs and nursing note reviewed. Constitutional:       General: She is not in acute distress. Appearance: She is well-developed. She is not diaphoretic. HENT:      Head: Normocephalic and atraumatic. Eyes:      General: No scleral icterus. Right eye: No discharge. Left eye: No discharge. Conjunctiva/sclera: Conjunctivae normal.   Neck:      Musculoskeletal: Normal range of motion and neck supple. Cardiovascular:      Rate and Rhythm: Normal rate and regular rhythm. Heart sounds: Normal heart sounds. No murmur. No friction rub. No gallop. Pulmonary:      Effort: Pulmonary effort is normal. No respiratory distress. Breath sounds: Normal breath sounds. No stridor. No wheezing, rhonchi or rales. Musculoskeletal: Normal range of motion. Skin:     General: Skin is warm and dry. Findings: No erythema or rash. Neurological:      Mental Status: She is alert and oriented to person, place, and time. Coordination: Coordination normal.      Comments: Gait is steady. Able to ambulate without difficulty. Psychiatric:      Comments: Bizarre affect, admits to thoughts of harming herself, no plans at present.                  Diagnostic Study Results     Labs -     Recent Results (from the past 12 hour(s))   URINALYSIS W/ RFLX MICROSCOPIC    Collection Time: 06/08/20  2:20 PM   Result Value Ref Range    Color YELLOW      Appearance CLEAR      Specific gravity 1.011 1.005 - 1.030      pH (UA) 6.5 5.0 - 8.0      Protein Negative NEG mg/dL    Glucose Negative NEG mg/dL    Ketone 40 (A) NEG mg/dL    Bilirubin Negative NEG      Blood Negative NEG      Urobilinogen 1.0 0.2 - 1.0 EU/dL    Nitrites Negative NEG      Leukocyte Esterase TRACE (A) NEG     HCG URINE, QL    Collection Time: 06/08/20  2:20 PM   Result Value Ref Range    HCG urine, QL Negative NEG     DRUG SCREEN, URINE    Collection Time: 06/08/20  2:20 PM   Result Value Ref Range    BENZODIAZEPINES Negative NEG      BARBITURATES Negative NEG      THC (TH-CANNABINOL) Negative NEG      OPIATES Negative NEG      PCP(PHENCYCLIDINE) Negative NEG      COCAINE Negative NEG      AMPHETAMINES Negative NEG      METHADONE Negative NEG      HDSCOM (NOTE)    URINE MICROSCOPIC ONLY    Collection Time: 06/08/20  2:20 PM   Result Value Ref Range    WBC 0 to 3 0 - 4 /hpf    RBC Negative 0 - 5 /hpf    Epithelial cells 2+ 0 - 5 /lpf    Bacteria 1+ (A) NEG /hpf   CBC WITH AUTOMATED DIFF    Collection Time: 06/08/20  3:59 PM   Result Value Ref Range    WBC 6.8 4.6 - 13.2 K/uL    RBC 4.13 (L) 4.20 - 5.30 M/uL    HGB 12.6 12.0 - 16.0 g/dL    HCT 36.1 35.0 - 45.0 %    MCV 87.4 74.0 - 97.0 FL    MCH 30.5 24.0 - 34.0 PG    MCHC 34.9 31.0 - 37.0 g/dL    RDW 12.8 11.6 - 14.5 %    PLATELET 545 290 - 887 K/uL    MPV 9.2 9.2 - 11.8 FL    NEUTROPHILS 54 40 - 73 %    LYMPHOCYTES 36 21 - 52 %    MONOCYTES 10 3 - 10 %    EOSINOPHILS 0 0 - 5 %    BASOPHILS 0 0 - 2 %    ABS. NEUTROPHILS 3.6 1.8 - 8.0 K/UL    ABS. LYMPHOCYTES 2.5 0.9 - 3.6 K/UL    ABS. MONOCYTES 0.7 0.05 - 1.2 K/UL    ABS. EOSINOPHILS 0.0 0.0 - 0.4 K/UL    ABS. BASOPHILS 0.0 0.0 - 0.1 K/UL    DF AUTOMATED     METABOLIC PANEL, COMPREHENSIVE    Collection Time: 06/08/20  3:59 PM   Result Value Ref Range    Sodium 137 136 - 145 mmol/L    Potassium 3.5 3.5 - 5.5 mmol/L    Chloride 104 100 - 111 mmol/L    CO2 26 21 - 32 mmol/L    Anion gap 7 3.0 - 18 mmol/L    Glucose 109 (H) 74 - 99 mg/dL    BUN 8 7.0 - 18 MG/DL    Creatinine 0.63 0.6 - 1.3 MG/DL    BUN/Creatinine ratio 13 12 - 20      GFR est AA >60 >60 ml/min/1.73m2    GFR est non-AA >60 >60 ml/min/1.73m2    Calcium 8.9 8.5 - 10.1 MG/DL    Bilirubin, total 0.5 0.2 - 1.0 MG/DL    ALT (SGPT) 13 13 - 56 U/L    AST (SGOT) 10 10 - 38 U/L    Alk.  phosphatase 67 45 - 117 U/L    Protein, total 7.2 6.4 - 8.2 g/dL    Albumin 3.9 3.4 - 5.0 g/dL    Globulin 3.3 2.0 - 4.0 g/dL    A-G Ratio 1.2 0.8 - 1.7     ETHYL ALCOHOL Collection Time: 06/08/20  3:59 PM   Result Value Ref Range    ALCOHOL(ETHYL),SERUM <3 0 - 3 MG/DL       Radiologic Studies -   No orders to display     CT Results  (Last 48 hours)    None        CXR Results  (Last 48 hours)    None            Medical Decision Making   I am the first provider for this patient. I reviewed the vital signs, available nursing notes, past medical history, past surgical history, family history and social history. Vital Signs-Reviewed the patient's vital signs. Records Reviewed:  Shalini Martin PA-C     Procedures:  Procedures    Provider Notes (Medical Decision Making): Impression:  Acute psychosis     UDS and alcohol negative, labs unremarkable. Pt medically cleared    5:46 PM pt has been accepted for admission to the [facility] by Dr. Mark Johns, stable for transfer. Shalini Martin PA-C     MED RECONCILIATION:  No current facility-administered medications for this encounter. Current Outpatient Medications   Medication Sig    chlorproMAZINE (THORAZINE) 50 mg tablet Take 1 Tab by mouth nightly. Indications: schizophrenia    paliperidone palmitate (INVEGA SUSTENNA) 156 mg/mL injection 1 mL by IntraMUSCular route every thirty (30) days. Indications: schizophrenia    traZODone (DESYREL) 50 mg tablet Take 1 Tab by mouth nightly as needed for Sleep. Indications: insomnia associated with depression    ascorbic acid, vitamin C, (VITAMIN C) 500 mg tablet Take 1 Tab by mouth daily. Indications: inadequate vitamin C       Disposition:  trasnferred to [facility]        Diagnosis     Clinical Impression:   1.  Acute psychosis ([address]

## 2020-06-08 NOTE — ED NOTES
Per [facility] patient has not slept in in 3 days, not eating, paranoid, and responding to internal stimuli

## 2020-06-08 NOTE — ED NOTES
TRANSFER - OUT REPORT:    Verbal report given to [facility]) on Chencho Meraz  being transferred to [facility] (unit) for routine progression of care       Report consisted of patients Situation, Background, Assessment and   Recommendations(SBAR). Information from the following report(s) SBAR, ED Summary and MAR was reviewed with the receiving nurse. Lines:       Opportunity for questions and clarification was provided.       Patient awaiting TDO

## 2020-06-08 NOTE — BH NOTES
TDO pt arrived from [facility]. Pt was transported to [facility] by [employer] with her belongings which were searched for contraband and inventoried. Pt denied current si/hi and vh.  Pt endorsed ah and also reported having suicidal thoughts earlier in the day, pt did contract for safety. Pt displayed thought blocking. Pt was cooperative during assessment.   RNS WILL INITIATE, DEVELOP, IMPLEMENT, REVIEW OR REVISE TREATMENT PLAN

## 2020-06-08 NOTE — ED TRIAGE NOTES
Patient is ECO. Arrived to ED with police officers. Patient reporting to police and [facility] that she \"wants to light the head off birds. \" \"I have a laboratory of drugs at home and now its all messed. \" Patient verbalizing thoughts of harming herself and other people.

## 2020-06-09 PROBLEM — K21.9 GERD (GASTROESOPHAGEAL REFLUX DISEASE): Chronic | Status: ACTIVE | Noted: 2020-06-09

## 2020-06-09 PROCEDURE — 65220000003 HC RM SEMIPRIVATE PSYCH

## 2020-06-09 PROCEDURE — 74011250637 HC RX REV CODE- 250/637: Performed by: PSYCHIATRY & NEUROLOGY

## 2020-06-09 RX ORDER — FAMOTIDINE 20 MG/1
20 TABLET, FILM COATED ORAL DAILY
Status: DISCONTINUED | OUTPATIENT
Start: 2020-06-10 | End: 2020-06-12

## 2020-06-09 RX ADMIN — CHLORPROMAZINE HYDROCHLORIDE 100 MG: 100 TABLET, SUGAR COATED ORAL at 20:44

## 2020-06-09 NOTE — GROUP NOTE
[facility]  GROUP DOCUMENTATION INDIVIDUAL                                                                          Group Therapy Note    Date: 6/9/2020    Group Start Time: 0745  Group End Time: 0800  Group Topic: Nursing    [facility] SPECIAL TRTMT Karly Urbano, RN     [facility] GROUP    Group Therapy Note    Attendees: 6         Attendance: Did not attend              Marla Trevizo RN

## 2020-06-09 NOTE — H&P
[facility] HISTORY AND PHYSICAL    Name:  Noemy Davenport  MR#:   [id number]  :  1990  ACCOUNT #:  [de-identified]  ADMIT DATE:  2020    IDENTIFYING DATA:  The patient presents as a 70-year-old single white female, resident of [place], [place], who is unemployed. She is psychiatrically disabled and covered by [facility] Medicaid. BASIS FOR ADMISSION:  The patient is admitted on temporary [facility]PC order. This is a legally mandated admission. The patient again presents in a psychotic condition to [facility]. She has a history of repeated admissions for her schizophrenia with extreme difficulty in being stabilized. She has been admitted repeatedly in the past 2 years, and this is her third admission to this facility in . She generally does not wish to come into the hospital and refuses to take medications. She has required involuntary commitments and judicial authorizations to make her take medicines because she will sign into hospitals, then refuse to be treated. She just got out of 7-day hospitalization at [facility]. She is a poor historian, but it sounds as though she was treated with Abilify Maintena shot at some point and then was given Haldol and Pristiq in addition to this. She says she was followed by the PACT team, seeing Dr. Naresh Colorado. It sounds as though she has been out of the hospital only 4 days and immediately thereafter began decompensating. This sounds as though she was not taking her medication for at least 2 days. She endorsed return of paranoia and persecutory ideas with auditory hallucinations and visual distortions, seeing faces of birds in her food. She said she was confused and could not figure out what the doctor was saying to her. She says she has felt controlled by unknown forces for quite some time.   During her most recent hospitalization at this facility, she was rehospitalized at that point again with psychotic condition. This describes her repeated hospitalizations including to [facility], [facility], several hospitals in [place], [facility] facility, [address] [place] [address] and [facility]. At times in the past, she had been on Depakote, clozapine and Pristiq with poor compliance. She was unable to be stabilized on single antipsychotic medicine and had required both typical and atypical antipsychotic medications to be stabilized and not shown response to mood stabilizers. She was placed on Trileptal with absolutely no response whatsoever. She had been placed on Invega Sustenna 156 mg every 4 weeks and chlorpromazine 50 to 100 mg at bedtime. Prior to that, she had been in the crisis stabilization unit whereupon they took her off of the chlorpromazine, put her on Trileptal and she resumed psychotic symptoms within 1 day of getting out of the crisis stabilization unit. She was here from 03/02/2020 through 03/12/2020 with diagnosis of schizophrenia, and at that time was discharged on Invega Sustenna 156 mg every 4 weeks and chlorpromazine 50 mg at bedtime. MEDICAL HISTORY:  Significant for gastroesophageal reflux disease. In the past, she did have hypertension intermittently and had been on metoprolol 25 mg twice a day, but it was discontinued when blood pressure returned to normal.  She had a history of a bilateral tubal ligation two and a half years ago. ALLERGIES:  SHE IS ALLERGIC TO ROBITUSSIN. LABORATORY DATA:  Testing in the emergency room described a normal CBC, normal urinalysis other than trace of leukocyte esterase but 0-3 wbc's, normal comprehensive metabolic panel, negative hCG, negative alcohol level, negative urine drug screen, negative SARS COVID test.  She did have a CT scan of the head on 03/01/2020, which showed no acute intracranial abnormality.     SUBSTANCE ABUSE HISTORY:  She would smoke cigarettes intermittently. One time, she was smoking half a pack a day, but says that recently she is smoking three cigarettes a day. She denied drug or alcohol abuse. SOCIAL HISTORY AND FAMILY HISTORY:  She had not given any history at this point, but previously said she did not have a family history of psychiatric illness. She was  from her  and had no contacts with him. She has two children that were adopted to other people because of her psychotic illness, and she does not see them. She denies any support mechanisms from family. She is a member of the PACT team.    MENTAL STATUS EXAMINATION:  Revealed her to be an alert white female with a staring affect. She had a latency of responses and latency of action. Speech was minimal, soft and often difficult to understand. Mood was mildly unhappy with a strange blunted affect. Thought processing was usually confused and disorganized, answering things that were not asked. She appeared to be responding to internal stimuli; she would not answer questions regarding this. She would have strange comments, saying, \"I am forced to smoke in my apartment\" and claimed that as a result of someone forcing her to smoke, she would smoke the three cigarettes a day. She denied homicidal or suicidal ideas. IQ was estimated in the low normal range. Insight and judgment were nil. ASSESSMENT:  AXIS I:  Schizophrenia. Nicotine use disorder, mild. AXIS II:  None. AXIS III:  Gastroesophageal reflux disease. TREATMENT PLAN:  The patient is admitted in a psychotic condition. She is admitted on a temporary care home order. She will require involuntary medications because she is again talking of refusing medications. She would need to be committed to this facility and then request for judicial authorization for involuntary medicines. We will place her back on Invega pills at this point as well as the chlorpromazine.   We need to get the information from [facility] since she just got out of the hospital.  We will continue with individual, group and milieu therapies, art and recreation therapy, case management services, social work services, physical examination and laboratory testing. ESTIMATED LENGTH OF STAY:  10 days. ANTICIPATED DISPOSITION:  Follow up with the [facility] team.    PROGNOSIS:  Guarded. The patient has had a series of admissions over the past 2 years.       Charanjit Pickering MD      [id number]  D:  06/09/2020 12:49  T:  06/09/2020 14:04  JOB #:  [id number]

## 2020-06-09 NOTE — PROGRESS NOTES
Problem: Psychosis  Goal: *STG: Decreased hallucinations  Description: AEB pt having decreased hallucinations daily while in the hospital.   Outcome: Progressing Towards Goal  Goal: *STG: Decreased delusional thinking  Description: AEB pt having decreased delusional thinking daily while in the hospital   Outcome: Progressing Towards Goal  Goal: *STG: Remains safe in hospital  Description: AEB pt not harming self or others and mely for safety daily while in the hospital   Outcome: Progressing Towards Goal   Patient has been in bed for most of the morning. She did get up for her lunch. Affect is flat. Refused her morning medications. She stated \" I don't take that\". \" My drSejal changed my medications and I don't want them to get mess up\". States  that she informed her drSejal here. She went on to say she did not hear voices but she told her out pt.  that she was seeing distorted faces. Patient admit to being angry because she did not feel she needed to be here.

## 2020-06-09 NOTE — PROGRESS NOTES
Non verbal exchange. Patient in room resting w/o distress noted. Verbalized no concerns at this time. No behaviors during this shift. Will continue to guide and assist patient as needed. Problem: Psychosis  Goal: *STG: Remains safe in hospital  Description: AEB pt not harming self or others and mely for safety daily while in the hospital   Outcome: Progressing Towards Goal  Goal: *STG: Participates in individual and group therapy  Description: AEB pt attending 3 groups daily while in the hospital.   Outcome: Progressing Towards Goal     Problem: Falls - Risk of  Goal: *Absence of Falls  Description: Document Johnny Day Fall Risk and appropriate interventions in the flowsheet.   Outcome: Progressing Towards Goal  Note: Fall Risk Interventions:            Medication Interventions: Teach patient to arise slowly

## 2020-06-09 NOTE — BSMART NOTE
06/09/20 437   Group Therapy   Group Social work  Positive Goal setting   Attendance Attended   Number of participants 3   Time in 145   Time out 210   Total Time 25   MTP problem psychosis   Interventions/techniques Challenged; Informed;Validated;Provided feedback;Promoted peer support;Supported   Follows directions Followed directions   Interactions Interacted appropriately   Mental Status Anxious, Preoccupied   Behavior/appearance Cooperative   Long Term Risk of Suicide Low   Immediate Risk for Suicide Low   Suicide Protective Factors Adequate family/social support;Denies intent   Risk of Violence Low   Risk of Trauma Low   Goals Achieved Identified feelings; Able to receive feedback; Discussed coping;Discussed discharge plans

## 2020-06-09 NOTE — BSMART NOTE
ART THERAPY GROUP PROGRESS NOTE    Group time:9230    The patient did not awaken/get up when called for group.

## 2020-06-09 NOTE — BSMART NOTE
OCCUPATIONAL THERAPY PROGRESS NOTE  Group Time:  0070  Attendance: The patient attended full group. Participation:  The patient participated with moderate elaboration in the activity. Attention:  The patient needed redirection to activity at least once. Interaction:  The patient acknowledges others or responds to questions,  with no spontaneous interaction. Responses to questions appropriate, did say she was repeatedly hospitalized this past year. When questioned about if she had ever been in a group home/assisted living as was discussed on previous admissions, she indicated she was still at the same apartment/room as before (accuracy unknown). Affect blunted.

## 2020-06-09 NOTE — BSMART NOTE
BH Biopsychosocial Assessment    Current Level of Psychosocial Functioning     [x]Independent  []Dependent  []Minimal Assist      Comments:      Psychosocial High Risk Factors (check all that apply)      []Unable to obtain meds                                                               [x]Chronic illness/pain    [x]Substance abuse   []Lack of Family Support   []Financial stress   []Isolation   []Inadequate Community Resources  []Suicide attempt(s)  [x]Not taking medications   []Victim of crime   []Developmental Delay  []Unable to manage personal needs    []Age 72 or older   []  Homeless  []Rimma transportation   [x]Readmission within 30 days  []Unemployment  []Traumatic Event    Psychiatric Advanced Directive: Pt. Was temporarily detained to the hospital    Family to involve in treatment: None. Pt. Has supportive services with the [facility] team    Sexual Orientation:  Heterosexual    Patient Strengths: Pt. Is willing to participate in her treatment. Patient Barriers: Pt. Is paranoid and suspicious. Pt. Has history of medication non compliance. Pt has been hospitalized multiple times thought the year. Opiate education provided: pt. Denies use    Safety plan: ARUN discussed safety plan. Pt. contracts for safety at this time. CMHC/ history: Please refer to psychiatrist and nurse practitioner's history and physical assessment  AXIS I:  Schizophrenia. Nicotine use disorder, moderate. AXIS II:  None. AXIS III:  Gastroesophageal reflux disease. Plan of Care: ARUN encouraged pt to participate in the following activities as apart of the pt.'s treatment: medication management, group/individual therapies, family meetings, psycho -education, treatment team meetings to assist with stabilization     Initial Discharge Plan:  Pt.'s length of stay has not been determined at this time.      Clinical Summary:    Pt.is a 27year old female with history of Schizophrenia paranoid type, Nicotine Dependence and DID. Pt. has been hospitalized several times this year (multiple admissions at this facility). Pt. is a [facility] PACT client. Pt. was temporarily detained to this facility for hallucinations, ideations to harm animals/birds and paranoia. ARUN met with pt. to discuss dc planning. I am tired of this .  I just left [address] [address] a couple of days ago. I was hospitalized here after I told the PACT team that I saw a dead birds head eating my pop tart.  The bird told me so you ate me , so I am  now going eat  your food. Pt. states  I saw what I saw. Pt. Does not  want to attribute her situation to hallucinations. Pt. reports the medication she is taking is causing her not to be able to think clearly. SW engaged pt with reality orientation, discussed safety plan and continued medication compliance.  I am tired of taking medications .  I dont want to take medications. SW discussed the benefits of medications. SW discussed positive coping strategies and safety plan. Pt. appears paranoid, irritable  and has poor insight and judgement. ARUN will contact [facility] team for collateral.  ARUN will assist the pt. with dc planning.

## 2020-06-10 PROCEDURE — 65220000003 HC RM SEMIPRIVATE PSYCH

## 2020-06-10 PROCEDURE — 74011250637 HC RX REV CODE- 250/637: Performed by: PSYCHIATRY & NEUROLOGY

## 2020-06-10 RX ADMIN — PALIPERIDONE 6 MG: 3 TABLET, EXTENDED RELEASE ORAL at 07:00

## 2020-06-10 RX ADMIN — CHLORPROMAZINE HYDROCHLORIDE 100 MG: 100 TABLET, SUGAR COATED ORAL at 20:18

## 2020-06-10 NOTE — GROUP NOTE
DARLING  GROUP DOCUMENTATION INDIVIDUAL                                                                          Group Therapy Note    Date: 6/10/2020    Group Start Time: 0815  Group End Time: 0830  Group Topic: Nursing    [facility] SPECIAL TRTMT 1    Annmarie Nicole RN    [facility] GROUP DOCUMENTATION GROUP    Group Therapy Note    Attendees:4         Attendance: Attended    Patient's Goal:   Coping Skills    Interventions/techniques: Informed    Follows Directions:  Followed directions    Interactions: Interacted appropriately    Mental Status: Calm    Behavior/appearance: Cooperative    Goals Achieved: Able to engage in interactions and Able to listen to others    Hermilo Wu RN

## 2020-06-10 NOTE — BSMART NOTE
ART THERAPY GROUP PROGRESS NOTE    PATIENT SCHEDULED FOR GROUP AT: 80    ATTENDANCE: Full    PARTICIPATION LEVEL: Participates fully in the art process    ATTENTION LEVEL: Able to focus on task    FOCUS: Goals    SYMBOLIC & THEMATIC CONTENT AS NOTED IN IMAGERY: She was calm and presented with a flat affect. She was invested in the task at hand and kept to herself unless directly prompted. She claimed that her main goal is to start \"taking responsibility instead of blaming others. \" When asked to elaborate, response was circumstantial and vague.

## 2020-06-10 NOTE — PROGRESS NOTES
conducted an Spirituality Group for [employer], who is a 27 y.o.,female. Patient's Primary Language is: [place]. According to the patient's EMR Evangelical Affiliation is: Juani Manning. The reason the Patient came to the hospital is:   Patient Active Problem List    Diagnosis Date Noted    GERD (gastroesophageal reflux disease) 2020    Recurrent depression ([facility] 2018    Cigarette nicotine dependence without complication     Single delivery by  2017    Episodic mood disorder ([facility] 2017    Schizophrenia ([facility] 2017         conducted Spirituality Group for ________________ who was one of ____participants. The  provided the following Interventions:  Continued the relationship of care and support. Listened empathically. Offered prayer and assurance of continued prayer on patients behalf. Chart reviewed. The following outcomes were achieved:  Patient expressed gratitude for 's visit. Assessment:  There are no further spiritual or Orthodoxy issues which require Spiritual Care Services interventions at this time. Plan:  Chaplains will continue to follow and will provide pastoral care on an as needed/requested basis.  recommends bedside caregivers page  on duty if patient shows signs of acute spiritual or emotional distress.        [address]   [phone]

## 2020-06-10 NOTE — BSMART NOTE
Pt.is a 27year old female with history of Schizophrenia paranoid type, Nicotine Dependence and DID.  Pt. has been hospitalized several times this year (multiple admissions at this facility). Pt. is a [facility] PACT client. Pt. was temporarily detained to this facility for hallucinations, ideations to harm animals/birds and paranoia. Pt.'s case was discussed in treatment team.  The pt. Has been refusing medications. Pt. continues to appear as though she is responding and paranoid. The team had pt to join the meeting. The pt.'s treatment goals were reviewed. Pt. Expressed the medications has been taking have been changed back and forth. Pt reports she is tired of taking medications that cause her not to feel  And not to think. \" I feel like staying in my apartment is like a torture chamber\" \" I feel like the life is being sick out of my bones\". \" You all don't know how it feels to walk around staring off into space  and only have moments of you to come through once and a while\". Pt. declared she has been medication compliant in the community. The team prides pt with support, encouragement and positive feedback. The pt.'s mood was irritable, appeared frustrated and has impaired insight. Pt. Had some thought blocking. Pt appears to respond to internal stimuli. SW will engage pt with reality orientation. SW will assist pt with dc planning. ARUN Collateral:  ARUN talked to Hailey White pt.'s PACT CM with [address] @ [phone] or [phone] . CM will fax over [employer]. CM reports pt. Has been in and out of the hospital since January of this year. Pt. Was hospitalized multiple times at this facility . The last hospitalization was 3/20. Pt. Has been hard to maintain in the community. Cm reports pt was hospitalized at  [facility] 4/29/20-5/6/20 , [address] [place] [address] on 5/20/20-5/26/20 and 5/29/20-6/4/20. Pt.  Had to be hospitalized by the PACT team on this admission due to endorses hallucinations, impaired sleep, decreased appetite and  pt telling outpt psychiatrist that she wanted to harm someone. The pt. Per CM needs long term. CM states pt rojas want help. Pt. Will write down her thoughts because sometimes the voices will tell pt not to talk ( per CM). CM also informed SW pt has DID.

## 2020-06-10 NOTE — BH NOTES
Patient refused medication Invega, the patient stated that she told the physician she will only take the injection form of Mendy Danielson will continue to monitor.

## 2020-06-10 NOTE — BH NOTES
Treatment team met -     Medical Director:                                _____present        Psychiatrist:                                        __x___present      Charge nurse:                                     __x___present           MSW:                                                _x____present      :                      __x___present      Nurse Manager:                                  __x___present      Student RNs:                                      _____present      Medical Students:                               _____present      Art Therapist:                                      _____present   Clinical Coordinator:                           _____present   Internal :                      _x____present        Plan of care discussed and updated as appropriate. Patient was part of the treatment meeting. Refused Invega and Pepcid. Attending wants staff to get the doses from [address]. Patient stated the medication changes are masking her symptoms and she continues to feel worse. She said she is compliant with her dosages with PACT daily visits. Stated she continues to [employer] voices coming from the walls and through her feet. \" Compared her living situation to living in a torture chamber. She got angry and started pounding on her hands \" I am in  F.... [place] [employer] pain, I feel demented\" Admits she has trouble focusing on anything, felt like something was suffocating her for a month or so. Patient was teary, poor eye contact, slouched shoulders, with slow movements.  \" I don't know who the hell I am anymore\"

## 2020-06-10 NOTE — PROGRESS NOTES
Problem: Psychosis  Goal: *STG: Decreased hallucinations  Description: AEB pt having decreased hallucinations daily while in the hospital.   Outcome: Not Progressing Towards Goal  Goal: *STG: Decreased delusional thinking  Description: AEB pt having decreased delusional thinking daily while in the hospital   Outcome: Not Progressing Towards Goal  Goal: *STG: Remains safe in hospital  Description: AEB pt not harming self or others and mely for safety daily while in the hospital   Outcome: Progressing Towards Goal  Goal: *STG/LTG: Complies with medication therapy  Description: AEB pt taking all scheduled medications daily while in the hospital.   Outcome: Not Progressing Towards Goal   Patient was tearful earlier this morning when talking about her illness. Patient admits to hearing voices , referred to them as \" Voices coming through her feet\". She expressed frustration when discussing her medication plan. Will continue to offer emotional support and reassurance.

## 2020-06-10 NOTE — BSMART NOTE
OCCUPATIONAL THERAPY PROGRESS NOTE  Group Time:  1968  Attendance: The patient attended full group. Participation:  The patient participated with minimal elaboration in the activity. Attention:  The patient was able to focus on the activity. y. Interaction:  The patient acknowledges others or responds to questions,  with no spontaneous interaction. Responses appropriate and on subject, appears depressed.

## 2020-06-10 NOTE — BH NOTES
Patient was hesitate at first sand didn't want to take her schedule  medication Thorazine. The patient stated, I wish the doctor would talk to me about my medications before giving them to me\". The patient was explained the reason and use for the medication, the patient took the medication will continue to monitor.

## 2020-06-10 NOTE — PROGRESS NOTES
Behavioral Health Progress Note    Admit Date: 6/8/2020  Hospital day 2    Vitals :   Patient Vitals for the past 8 hrs:   BP Temp Pulse Resp   06/10/20 0751 107/71 97.1 °F (36.2 °C) 100 16     Labs:  No results found for this or any previous visit (from the past 24 hour(s)).   Meds:   Current Facility-Administered Medications   Medication Dose Route Frequency    famotidine (PEPCID) tablet 20 mg  20 mg Oral DAILY    haloperidol lactate (HALDOL) injection 5 mg  5 mg IntraMUSCular Q4H PRN    ibuprofen (MOTRIN) tablet 600 mg  600 mg Oral Q6H PRN    LORazepam (ATIVAN) injection 1 mg  1 mg IntraMUSCular Q6H PRN    LORazepam (ATIVAN) tablet 1 mg  1 mg Oral Q6H PRN    traZODone (DESYREL) tablet 50 mg  50 mg Oral QHS PRN    haloperidoL (HALDOL) tablet 5 mg  5 mg Oral Q4H PRN    benztropine (COGENTIN) tablet 1 mg  1 mg Oral Q6H PRN    benztropine (COGENTIN) injection 1 mg  1 mg IntraMUSCular Q6H PRN    paliperidone (INVEGA) SR tablet 6 mg  6 mg Oral 7am    chlorproMAZINE (THORAZINE) tablet 100 mg  100 mg Oral QHS      Hospital Problems: Principal Problem:    Schizophrenia ([address] (4/18/2017)    Active Problems:    GERD (gastroesophageal reflux disease) (6/9/2020)        Subjective:   Medication side effects: none  none    Mental Status Exam  Sensorium: alert  Orientation: only aware of  place and person  Relations: passive  Eye Contact: intense  Appearance: is bizarre  Thought Process: fast rate of thoughts and poor abstract reasoning/computation   Thought Content: delusions, paranoia and obsessions    Suicidal: denies   Homicidal: denies   Mood: is depressed, is anxious and is irritable   Affect: labile  Memory: is impaired, is recent and is remote     Concentration: distractable  Abstraction: concrete  Insight: The patient's insight is blaming    OR Poor  Judgement: is psychologically impaired OR  Poor    Assessment/Plan:   not changed     Patient was seen in treatment team attended by patient, physician, nurse, nursing administration and social work. Nurses report that she had been refusing to take Invega and had also refused to take the Pepcid. She was quite angry and irritable saying that the Pepcid had been discontinued and that physicians are constantly jumping back and forth from medicine to medicine. She was complaining that the Pristiq makes her feel too mellow and that it was hiding her depression. We did try to reinterpret this that that was exactly what the medicine was supposed to do was to assist her in dealing with her depression. We have not gotten the information back from [facility] to see if they actually had switched her from the [facility] over low [facility]. I am still waiting for this information. If this is the case we will discontinue the oral Invega and instead write for Abilify pills. We also need to know when she got the most recent injection so we would know what to prescribe  . She was quite frustrated talking about how her life is deteriorated since she has had the schizophrenia and attempts were made to empathize with her but unfortunately she was mainly rejecting of this saying that others cannot understand what she is feeling. She was encouraged to continue with her antipsychotic and mood stabilization medications. The patient was involuntarily committed to the hospital.  She says she will take medications but if she does not it may be necessary to have involuntary medication hearing.   Continue close observation,

## 2020-06-10 NOTE — GROUP NOTE
DARLING  GROUP DOCUMENTATION INDIVIDUAL                                                                          Group Therapy Note    Date: 6/9/2020    Group Start Time: 2000  Group End Time: 2015  Group Topic: Medication    [facility] SPECIAL TRTMT 1    Kimmy Ge RN     [facility] GROUP    Group Therapy Note    Attendees: 2         Attendance: Did not attend    Maurizio Araya RN

## 2020-06-11 PROCEDURE — 65220000003 HC RM SEMIPRIVATE PSYCH

## 2020-06-11 PROCEDURE — 74011250637 HC RX REV CODE- 250/637: Performed by: PSYCHIATRY & NEUROLOGY

## 2020-06-11 RX ORDER — PALIPERIDONE 3 MG/1
6 TABLET, EXTENDED RELEASE ORAL
Status: DISCONTINUED | OUTPATIENT
Start: 2020-06-12 | End: 2020-06-12

## 2020-06-11 RX ADMIN — PALIPERIDONE 6 MG: 3 TABLET, EXTENDED RELEASE ORAL at 07:19

## 2020-06-11 NOTE — PROGRESS NOTES
Problem: Psychosis  Goal: *STG: Decreased hallucinations  Description: AEB pt having decreased hallucinations daily while in the hospital.   Outcome: Not Progressing Towards Goal  Goal: *STG: Decreased delusional thinking  Description: AEB pt having decreased delusional thinking daily while in the hospital   Outcome: Not Progressing Towards Goal  Goal: *STG: Remains safe in hospital  Description: AEB pt not harming self or others and mely for safety daily while in the hospital   Outcome: Progressing Towards Goal  Goal: *STG/LTG: Complies with medication therapy  Description: AEB pt taking all scheduled medications daily while in the hospital.   Outcome: Progressing Towards Goal   Patient continue to be paranoid, hesitant in taking morning medications but she change her mind and took them. Thought blocking noted ,continue to have auditory hallucinations. Provided 1:1 for emotional support. 14:00 patient resting in bed. Stated \" I shouldn't have taken that medication, its has me between sleepy and tired\". Denies auditory hallucinations at this time.

## 2020-06-11 NOTE — PROGRESS NOTES
Behavioral Health Progress Note    Admit Date: 6/8/2020  Hospital day 3    Vitals : No data found. Labs:  No results found for this or any previous visit (from the past 24 hour(s)). Meds:   Current Facility-Administered Medications   Medication Dose Route Frequency    [START ON 6/12/2020] paliperidone (INVEGA) SR tablet 6 mg  6 mg Oral QHS    famotidine (PEPCID) tablet 20 mg  20 mg Oral DAILY    haloperidol lactate (HALDOL) injection 5 mg  5 mg IntraMUSCular Q4H PRN    ibuprofen (MOTRIN) tablet 600 mg  600 mg Oral Q6H PRN    LORazepam (ATIVAN) injection 1 mg  1 mg IntraMUSCular Q6H PRN    LORazepam (ATIVAN) tablet 1 mg  1 mg Oral Q6H PRN    traZODone (DESYREL) tablet 50 mg  50 mg Oral QHS PRN    haloperidoL (HALDOL) tablet 5 mg  5 mg Oral Q4H PRN    benztropine (COGENTIN) tablet 1 mg  1 mg Oral Q6H PRN    benztropine (COGENTIN) injection 1 mg  1 mg IntraMUSCular Q6H PRN    chlorproMAZINE (THORAZINE) tablet 100 mg  100 mg Oral QHS      Hospital Problems: Principal Problem:    Schizophrenia ([address] (4/18/2017)    Active Problems:    GERD (gastroesophageal reflux disease) (6/9/2020)        Subjective:   Medication side effects: fatigue/weakness  somnolence    Mental Status Exam  Sensorium: alert  Orientation: only aware of  place and person  Relations: guarded and passive  Eye Contact: poor  Appearance: is unkempt and shows poor hygiene  Thought Process: slow rate of thoughts and poor abstract reasoning/computation   Thought Content: delusions and paranoia   Suicidal: denies   Homicidal: denies   Mood: is depressed and is anxious   Affect: blunted, strange  Memory: shows no evidence of impairment     Concentration: distractable  Abstraction: concrete  Insight: The patient shows little insight    OR Poor  Judgement: is psychologically impaired OR  Poor    Assessment/Plan:   not changed  The patient says that the medications are making her sleepy during the daytime.   We will change the Invega to nighttime for now.  I am awaiting the medication list from the community services Board to see what happened when she was at  [facility]. She is continuing to be saddened and have a blunted strange affect with hallucinations and paranoia. She is not violent or aggressive at this point. We will continue with reality orientation any psychotic medication.   Continue close observation

## 2020-06-11 NOTE — BSMART NOTE
Pt.is a 27year old female with history of Schizophrenia paranoid type, Nicotine Dependence and DID. Pt. has been hospitalized several times this year (multiple admissions at this facility). Pt. is a [facility] PACT client. Pt. was temporarily detained to this facility for hallucinations, ideations to harm animals/birds and paranoia. Pt.'s medication list was received. SW Contact:  SW met with pt to discuss dc planning. \" I feel sick\". Pt. Reports she took medication this a.m (InVega). Pt. reports the medication has been making her feel mentally sick. t states I can't think and I feel sleepy. \" I don't like feeling this way. SW suggested for pt to utilize her journal to communicate her thought when she feel , she is unable to verbally communicate thoughts. Pt appears irritable, paranoid and thought blocking. SW will engage pt with reality orientation. SW will assist pt with dc planning.

## 2020-06-11 NOTE — BH NOTES
On 6/10/20 during the 3-11 pm shift, patient was very quiet. Patient spoke only when spoken to. Patient did attend groups today and participate. Patient spent the majority of her day in her room unless for dinner, snack, groups and to take her medication. When the writer would check on patient in her room, if awake, she would respond in a calm and friendly tone that she is okay. Patient held a few conversations when in milieu with patients who initiated conversations with her first. Patient often goes into a blank stare as well, the writer observed. The writer will continue to monitor patient throughout the remainder of the shift to ensure patients safety.

## 2020-06-11 NOTE — BH NOTES
Patient has slept for about 8 hours. Patient refused morning medication and explained that she discussed medication wth the MD \"and he was supposed to start me on something different. \"  Encouraged patient to speak with the MD again and review medication. Will continue to monitor and support as needed.

## 2020-06-11 NOTE — GROUP NOTE
DARLING  GROUP DOCUMENTATION INDIVIDUAL                                                                          Group Therapy Note    Date: 6/11/2020    Group Start Time: 0800  Group End Time: 0830  Group Topic: Nursing    [facility] SPECIAL TRTMT Karly Agrawal 124, RN    IP [address] GROUP DOCUMENTATION GROUP    Group Therapy Note    Attendees: 2         Attendance: Did not attend          Uri Willett RN

## 2020-06-11 NOTE — BH NOTES
Depressed. Dull flat quiet sad withdrawn reserved isolative. Interacts very little with staff and peers. Does not attend RN group. Paranoid. Will continue to monitor.

## 2020-06-12 PROCEDURE — 74011250637 HC RX REV CODE- 250/637: Performed by: PSYCHIATRY & NEUROLOGY

## 2020-06-12 PROCEDURE — 65220000003 HC RM SEMIPRIVATE PSYCH

## 2020-06-12 RX ORDER — HALOPERIDOL 5 MG/1
5 TABLET ORAL
Status: DISCONTINUED | OUTPATIENT
Start: 2020-06-12 | End: 2020-06-17 | Stop reason: HOSPADM

## 2020-06-12 RX ORDER — ARIPIPRAZOLE 5 MG/1
5 TABLET ORAL DAILY
Status: DISCONTINUED | OUTPATIENT
Start: 2020-06-13 | End: 2020-06-17 | Stop reason: HOSPADM

## 2020-06-12 RX ADMIN — HALOPERIDOL 5 MG: 5 TABLET ORAL at 20:09

## 2020-06-12 NOTE — BSMART NOTE
Pt.is a 27year old female with history of Schizophrenia paranoid type, Nicotine Dependence and DID.  Pt. has been hospitalized several times this year (multiple admissions at this facility).  Pt. is a [employer]. Pt. was temporarily detained to this facility for hallucinations, ideations to harm animals/birds and paranoia.     Pt.'s case was discussed in treatment team.    SW Contact: SW met with pt to discuss dc planning. \" I did not take that medicine this morning\". I talked to the doctor about how it made me feel. \" My thoughts are jumbled\". When they are like that I can to think and don't understand what to do\". SW provided positive offered positive coping strategies such as journeling, art and music therapy to help pt. Expressed self. Pt. Was receptive to feedback. Pt. Mood is depressed, anxious, preoccupied, impaired judgement and not irritable today. SW will continue to engage pt with reality orientation.  SW will assist pt with dc planning.

## 2020-06-12 NOTE — PROGRESS NOTES
Behavioral Health Progress Note    Admit Date: 6/8/2020  Hospital day 4    Vitals :   Patient Vitals for the past 8 hrs:   BP Temp Pulse Resp   06/12/20 0751 105/60 97.6 °F (36.4 °C) 94 16     Labs:  No results found for this or any previous visit (from the past 24 hour(s)).   Meds:   Current Facility-Administered Medications   Medication Dose Route Frequency    haloperidoL (HALDOL) tablet 5 mg  5 mg Oral QHS    [START ON 6/13/2020] ARIPiprazole (ABILIFY) tablet 5 mg  5 mg Oral DAILY    haloperidol lactate (HALDOL) injection 5 mg  5 mg IntraMUSCular Q4H PRN    ibuprofen (MOTRIN) tablet 600 mg  600 mg Oral Q6H PRN    LORazepam (ATIVAN) injection 1 mg  1 mg IntraMUSCular Q6H PRN    LORazepam (ATIVAN) tablet 1 mg  1 mg Oral Q6H PRN    traZODone (DESYREL) tablet 50 mg  50 mg Oral QHS PRN    haloperidoL (HALDOL) tablet 5 mg  5 mg Oral Q4H PRN    benztropine (COGENTIN) tablet 1 mg  1 mg Oral Q6H PRN    benztropine (COGENTIN) injection 1 mg  1 mg IntraMUSCular Q6H PRN      Hospital Problems: Principal Problem:    Schizophrenia ([address] (4/18/2017)    Active Problems:    GERD (gastroesophageal reflux disease) (6/9/2020)        Subjective:   Medication side effects: fatigue/weakness  tired    Mental Status Exam  Sensorium: alert  Orientation: only aware of  time, place and person  Relations: guarded and vague  Eye Contact: poor  Appearance: is unkempt, is bizarre and is tense  Thought Process: slow rate of thoughts and poor abstract reasoning/computation   Thought Content: delusions, paranoia and halluc, visual distortions   Suicidal: denies   Homicidal: denies   Mood: is anxious and unghappy   Affect: odd.constricted, disconnected  Memory: is impaired and is recent     Concentration: distractable and hypervigilance  Abstraction: concrete  Insight: The patient shows little insight    OR Poor  Judgement: is psychologically impaired OR  Poor    Assessment/Plan:   not changed  Nurses report that she had refused to take the Pepcid now saying that she no longer has any type of dyspepsia and does not need it anymore. She also does not want to take the Invega nor to take the Thorazine. I did receive the copy of medications from the [facility] services Board and it does appear that the patient had been switched at [facility]. She had told them that she was not doing well with the systemic injections and she instead to [facility] receiving an injection of 5/27/2020. She had previously been placed on Abilify 30 mg daily. She had been placed on Haldol 5 mg at bedtime rather than the Thorazine. Patient reports that she continues to have distortions seeing things strangely. She continues to feel that her brain is's confused and disorganized. She is more able to think today than she was yesterday saying that she had been excessively sedated with the combination of nighttime dosing of the Thorazine and taking the Invega. She continues to feel paranoid with persecutory ideas. Continue close observation, reality orientation. Discontinue the Pepcid, Invega and Thorazine. Resume Haldol 5 mg at bedtime and start Abilify 5 mg in the morning time to start tomorrow. The patient had been placed on the Aristada and will require an injection 880 mg on about 6/25/2020.

## 2020-06-12 NOTE — GROUP NOTE
DARL[facility]  GROUP DOCUMENTATION INDIVIDUAL                                                                          Group Therapy Note    Date: 6/12/2020    Group Start Time: 0845  Group End Time: 0900  Group Topic: Nursing    [facility] SPECIAL TRTMT Karly Urbano, RN    IP [facility] GROUP DOCUMENTATION GROUP    Group Therapy Note    Attendees: 2         Attendance: Did not attend              Yogi Orlando RN

## 2020-06-12 NOTE — BSMART NOTE
OCCUPATIONAL THERAPY PROGRESS NOTE  Group Time:  7435  Attendance: The patient attended full group. Participation:  The patient participated with minimal elaboration in the activity. Attention:  The patient needed redirection to activity at least once. Interaction:  The patient acknowledges others or responds to questions,  with no spontaneous interaction.

## 2020-06-12 NOTE — BH NOTES
MHT NOTE: López Sierra was in bed sleep majority of shift. López Sierra came out of the room to eat breakfast and lunch. López Sierra also participated in community group as well as social work group. López Sierra sat in the dayroom for a short moment and returned to her room. No major issues to report, no slips,or falls to report.

## 2020-06-12 NOTE — BSMART NOTE
06/12/20 [address] work  Preparing to Stay Well   Attendance Attended   Number of participants 5   Time in 1045   Time out 1115   Total Time 30   MTP problem psychosis   Interventions/techniques Challenged; Informed;Validated;Provided feedback;Promoted peer support;Supported   Follows directions Followed directions   Interactions Quiet withdrawn   Mental Status Anxious, Preoccupied   Behavior/appearance Cooperative   Long Term Risk of Suicide Low   Immediate Risk for Suicide Low   Suicide Protective Factors Denies intent   Risk of Violence Low   Risk of Trauma Low   Goals Achieved Able to receive feedback

## 2020-06-12 NOTE — PROGRESS NOTES
Problem: Psychosis  Goal: *STG: Decreased hallucinations  Description: AEB pt having decreased hallucinations daily while in the hospital.   Outcome: Not Progressing Towards Goal  Goal: *STG: Decreased delusional thinking  Description: AEB pt having decreased delusional thinking daily while in the hospital   Outcome: Not Progressing Towards Goal  Goal: *STG: Remains safe in hospital  Description: AEB pt not harming self or others and mely for safety daily while in the hospital   Outcome: Progressing Towards Goal  Goal: *STG/LTG: Complies with medication therapy  Description: AEB pt taking all scheduled medications daily while in the hospital.   Outcome: Not Progressing Towards Goal  Patient appeared angry this morning regarding her medications. She stated she was going to speak with her dr. She was observed sitting in the day area starring into space. When asked if she is hearing voices, she replies \" not here\". Encouraged to attend groups.

## 2020-06-13 PROCEDURE — 74011250637 HC RX REV CODE- 250/637: Performed by: PSYCHIATRY & NEUROLOGY

## 2020-06-13 PROCEDURE — 65220000003 HC RM SEMIPRIVATE PSYCH

## 2020-06-13 RX ADMIN — HALOPERIDOL 5 MG: 5 TABLET ORAL at 20:17

## 2020-06-13 RX ADMIN — ARIPIPRAZOLE 5 MG: 5 TABLET ORAL at 08:09

## 2020-06-13 NOTE — GROUP NOTE
DARL[facility]  GROUP DOCUMENTATION INDIVIDUAL                                                                          Group Therapy Note    Date: 6/12/2020    Group Start Time: 1925  Group End Time: 1945  Group Topic: Nursing    [facility] [address], RN    IP [address] [facility] GROUP DOCUMENTATION GROUP    Group Therapy Note    Attendees: 2      Attendance: Did not attend      Spenser Burr RN

## 2020-06-13 NOTE — BH NOTES
Pt self-isolated to room majority of shift only coming out during meal times. During the brief periods pt was in the milieu she was quiet and reserved, not engaging with peers and staff except very briefly. Will monitor for safety.

## 2020-06-13 NOTE — PROGRESS NOTES
[address]  Inpatient Progress Note     Date of Service: 06/13/20  Hospital Day: 5     Subjective/Interval History   06/13/20    Treatment Team Notes:  Notes reviewed and/or discussed and report that Shon Amaro is a pt with a hx of Schizophrenia, with multiple psych admissions associated to poor tx compliance. Patient interview: Shon Amaro was interviewed by this writer today. Rather quiet today, in her room apparently most of the time, denied meds related side effects. Advised to let staff know if she needs anything that we could help with. Objective     Visit Vitals  BP 91/56   Pulse 82   Temp 97 °F (36.1 °C)   Resp 16   SpO2 98%     Mild asymptomatic hypotension noticed. No results found for this or any previous visit (from the past 24 hour(s)). Mental Status Examination     Appearance/Hygiene 27 y.o. WHITE OR  female  Hygiene: poor   Behavior/Social Relatedness withdrawn. Musculoskeletal Gait/Station: appropriate  Tone (flaccid, cogwheeling, spastic): not assessed  Psychomotor (hyperkinetic, hypokinetic): calm   Involuntary movements (tics, dyskinesias, akathisa, stereotypies): none   Speech   Rate, rhythm, volume, fluency and articulation are appropriate   Mood   Denies being depressed. Rather blunted, however. Affect    flat   Thought Process Disorganized. Thought Content and Perceptual Disturbances Denies self-injurious behavior (SIB), suicidal ideation (SI), aggressive behavior or homicidal ideation (HI)  Denies auditory and visual hallucinations, however distractable, possibly responding. Sensorium and Cognition  Grossly intact   Insight  Poor. Judgment Poor.         Assessment/Plan      Psychiatric Diagnoses:   Patient Active Problem List   Diagnosis Code    Cigarette nicotine dependence without complication U20.432    Recurrent depression (HCC) F33.9    Schizophrenia ([facility] F20.9    Episodic mood disorder ([facility] F39    Single delivery by  O82    GERD (gastroesophageal reflux disease) K21.9       Medical Diagnoses: same. Psychosocial and contextual factors: lack of tx compliance. Level of impairment/disability: high. 1.  Tx plan per attending. tx with aripiprazole/halopoeridol is well tolerated. Dosing may have to be increased,.  2.  Reviewed instructions, risks, benefits and side effects of medications  3. Disposition/Discharge Date: self-care/per attending.     Roberto Cohn MD, [address] [place] [address]

## 2020-06-13 NOTE — PROGRESS NOTES
Problem: Psychosis  Goal: *STG: Seeks staff when feelings of self harm or harm towards others arise  Description: AEB pt seeking staff as needed when feelings of self harm or harm towards others arise while in the hospital   Outcome: Progressing Towards Goal  Goal: *STG: Participates in individual and group therapy  Description: AEB pt attending 3 groups daily while in the hospital.   Outcome: Progressing Towards Goal  Goal: *STG/LTG: Complies with medication therapy  Description: AEB pt taking all scheduled medications daily while in the hospital.   Outcome: Progressing Towards Goal  Goal: Interventions  Outcome: Progressing Towards Goal     Problem: Falls - Risk of  Goal: *Absence of Falls  Description: Document Tiffanie Pimentel Fall Risk and appropriate interventions in the flowsheet. Patient denies fall history, she will remain fall free daily while in the hospital.   Outcome: Progressing Towards Goal  Note: Fall Risk Interventions:            Medication Interventions: Teach patient to arise slowly    Pt has a far off blank stare. She became upset and angry when asked why she is in the hospital. She stated it was all just a misunderstanding. Pt stated her therapist said that she said something that concerned her but that it might have been just a passing thought. Pt stated she went home and then next thing she knew the police were at her house and brought her to the hospital for no reason. She denies any thoughts of harming self or others. Pt is distracted and preoccupied. She is compliant with medication. Pt ate her breakfast this morning.

## 2020-06-13 NOTE — PROGRESS NOTES
06/13/20 1106   [facility]   Attendance Did not attend   Encouraged to attend but did not

## 2020-06-14 PROCEDURE — 74011250637 HC RX REV CODE- 250/637: Performed by: PSYCHIATRY & NEUROLOGY

## 2020-06-14 PROCEDURE — 65220000003 HC RM SEMIPRIVATE PSYCH

## 2020-06-14 RX ADMIN — HALOPERIDOL 5 MG: 5 TABLET ORAL at 20:20

## 2020-06-14 RX ADMIN — ARIPIPRAZOLE 5 MG: 5 TABLET ORAL at 08:13

## 2020-06-14 NOTE — PROGRESS NOTES
[address] [facility] [address]  Inpatient Progress Note     Date of Service: 06/14/20  Hospital Day: 6     Subjective/Interval History   06/14/20    Treatment Team Notes:  Notes reviewed and/or discussed and report that Jamshid Paniagua is a pt with a chronic hx of a thought disorder, known to the facility due to her hx of multiple admissions in the recent past.      Patient interview: Jamshid Paniagua was interviewed by this writer today. Withdrawn, denying meds related side effects, staying in her room, not attending GT sessions this weekend even though staff strongly suggested her doing so, she is taking her medications as prescribed. Objective     Visit Vitals  BP 93/56 (BP 1 Location: Right arm, BP Patient Position: Sitting)   Pulse 85   Temp 97.4 °F (36.3 °C)   Resp 16   SpO2 98%     Mild asymptomatic hypotension noticed above. No results found for this or any previous visit (from the past 24 hour(s)). Mental Status Examination     Appearance/Hygiene 27 y.o. WHITE OR  female  Hygiene: fair. Behavior/Social Relatedness Withdrawn. Musculoskeletal Gait/Station: appropriate  Tone (flaccid, cogwheeling, spastic): not assessed  Psychomotor (hyperkinetic, hypokinetic): calm   Involuntary movements (tics, dyskinesias, akathisa, stereotypies): none   Speech   Rate, rhythm, volume, fluency and articulation are appropriate   Mood   Denies depression. Affect    Flat. Thought Process Linear and goal directed, loose at times. Thought Content and Perceptual Disturbances Denies self-injurious behavior (SIB), suicidal ideation (SI), aggressive behavior or homicidal ideation (HI)  Bizarre at times, difficult on occasion to follow what she is talking about. Pseudo intellectualizing. [place]. Delusional.   Sensorium and Cognition  Grossly intact   Insight  Limited. Judgment Limited, however taking meds appropriately.         Assessment/Plan      Psychiatric Diagnoses:   Patient Active Problem List   Diagnosis Code    Cigarette nicotine dependence without complication M31.895    Recurrent depression ([facility] F33.9    Schizophrenia ([facility] F20.9    Episodic mood disorder ([facility] F39    Single delivery by  O82    GERD (gastroesophageal reflux disease) K21.9       Medical Diagnoses: same. Psychosocial and contextual factors: same. Level of impairment/disability: high. 1.  No evidence of meds related side effects. Will continue the same. Attending will be back tomorrow. Pt informed. 2.  Reviewed instructions, risks, benefits and side effects of medications  3. Disposition/Discharge Date: self-care/home.     Selene Nelson MD, [address] [place] [address]  Psychiatry

## 2020-06-14 NOTE — PROGRESS NOTES
Problem: Psychosis  Goal: *STG: Remains safe in hospital  Description: AEB pt not harming self or others and mely for safety daily while in the hospital   Outcome: Progressing Towards Goal  Goal: *STG: Seeks staff when feelings of self harm or harm towards others arise  Description: AEB pt seeking staff as needed when feelings of self harm or harm towards others arise while in the hospital   Outcome: Progressing Towards Goal  Goal: *STG: Participates in individual and group therapy  Description: AEB pt attending 3 groups daily while in the hospital.   Outcome: Progressing Towards Goal  Goal: *STG/LTG: Complies with medication therapy  Description: AEB pt taking all scheduled medications daily while in the hospital.   Outcome: Progressing Towards Goal  Goal: Interventions  Outcome: Progressing Towards Goal     Problem: Falls - Risk of  Goal: *Absence of Falls  Description: Document Earma Cb Fall Risk and appropriate interventions in the flowsheet. Patient denies fall history, she will remain fall free daily while in the hospital.   Outcome: Progressing Towards Goal  Note: Fall Risk Interventions:            Medication Interventions: Teach patient to arise slowly       Pt remains guarded with a flat dull affect. She wanders the unit at times. She is preoccupied and distracted. Her responses today were minimal. She does interact with staff more in the afternoon and with one male peer at times. She is compliant with medication and denies any thoughts of harming self or others.

## 2020-06-14 NOTE — BH NOTES
Uneventful shift patient slept 7 hours. No complaints or concerns voiced.  Will continue to monitor for safety and changes

## 2020-06-14 NOTE — BH NOTES
The writer has observed the patient's behavior throughout this shift (3559-9050). Patient has fully participated in group session and was able to eat 100% of her dinner and snacks. Patient was observed engaging in a conversation with another patient for a brief moment and then appeared to her room. For most of this shift patient has been isolative to her room and was observed lying in bed and looking staring straight up at the ceiling. Patient remains quiet and says at times that she's confused and unstable. In addition, patient said that she wants to return home but is afraid because of her unstable condition. Patient was in compliance with scheduled medications and will continue to monitor the patient's safety and safety locations.

## 2020-06-15 PROCEDURE — 74011250637 HC RX REV CODE- 250/637: Performed by: PSYCHIATRY & NEUROLOGY

## 2020-06-15 PROCEDURE — 65220000003 HC RM SEMIPRIVATE PSYCH

## 2020-06-15 RX ADMIN — ARIPIPRAZOLE 5 MG: 5 TABLET ORAL at 09:00

## 2020-06-15 RX ADMIN — HALOPERIDOL 5 MG: 5 TABLET ORAL at 20:24

## 2020-06-15 NOTE — PROGRESS NOTES
06/14/20 New Kevin Attendance Did not attend   Number of participants 4   Time in 2000   Time out 2030   Total Time 30

## 2020-06-15 NOTE — BSMART NOTE
OCCUPATIONAL THERAPY PROGRESS NOTE  Group Time:  9101  Attendance: The patient attended full group. Participation:  The patient participated with minimal elaboration in the activity. Attention:  The patient was able to focus on the activity. Interaction:  The patient acknowledges others or responds to questions,  with no spontaneous interaction. Participated as asked.

## 2020-06-15 NOTE — BH NOTES
Pt has been calm and cooperative. Flat affect. Does not interact with peers and minimally with peers. Compliant with all medications. Denies SI/HI/AVH at this time. Will continue to monitor for safety.

## 2020-06-15 NOTE — BSMART NOTE
ART THERAPY GROUP PROGRESS NOTE    Group time:1300    The patient declined group despite encouragement

## 2020-06-15 NOTE — GROUP NOTE
DARL[facility]  GROUP DOCUMENTATION INDIVIDUAL                                                                          Group Therapy Note    Date: 6/15/2020    Group Start Time: 1489  Group End Time: 2660  Group Topic: Nursing    [facility] SPECIAL TRTMT 1    [place], [address] [address] [facility] GROUP DOCUMENTATION GROUP    Group Therapy Note    Attendees: 4         Attendance: Did Not Attend      Glenora Meter

## 2020-06-15 NOTE — GROUP NOTE
DARLING  GROUP DOCUMENTATION INDIVIDUAL                                                                          Group Therapy Note    Date: 6/15/2020    Group Start Time: 0815  Group End Time: 0845  Group Topic: Nursing    [facility] SPECIAL TRTMT Karly Urbano, RN    IP [facility] GROUP DOCUMENTATION GROUP    Group Therapy Note    Attendees: 2           Attendance: Did not attend            Yogi Orlando RN

## 2020-06-15 NOTE — PROGRESS NOTES
Problem: Psychosis  Goal: *STG: Decreased delusional thinking  Description: AEB pt having decreased delusional thinking daily while in the hospital   Outcome: Progressing Towards Goal  Goal: *STG: Remains safe in hospital  Description: AEB pt not harming self or others and mely for safety daily while in the hospital   Outcome: Progressing Towards Goal  Goal: *STG/LTG: Complies with medication therapy  Description: AEB pt taking all scheduled medications daily while in the hospital.   Outcome: Progressing Towards Goal   Pt.stays at her room most of the time except mealtimes. She does not interract with other patient or attends to any groups. She takes her meds. without any problem.

## 2020-06-15 NOTE — BSMART NOTE
Pt.is a 27year old female with history of Schizophrenia paranoid type, Nicotine Dependence and DID.  Pt. has been hospitalized several times this year (multiple admissions at this facility).  Pt. is a [employer]. Pt. was temporarily detained to this facility for hallucinations, ideations to harm animals/birds and paranoia. ARUN Contact:  ARUN met with pt to discuss dc planning. \" I am okay\". \" I am hoping I will be able to leave within the next couple of days\". SW discussed continued medication and treatment compliance. \" I feel like I can focus and think my own thoughts\". SW provided pt with positive feedback. Pt.'s mood was blunt affect, cooperative and has impaired judgement. Pt. denies ideations and hallucinations. SW will continue to provide pt. with dc planning .

## 2020-06-15 NOTE — BSMART NOTE
06/15/20 1:00     Group Therapy   Group Social work  Positive Goal Setting   Attendance Attended   Number of participants 5   Time in 1130   Time out 1200   Total Time 30   MTP problem psychosis   Interventions/techniques Challenged; Informed;Validated;Provided feedback;Promoted peer support;Supported   Follows directions Followed directions   Interactions Quiet   Mental Status Attentive   Behavior/appearance Cooperative   Long Term Risk of Suicide Low   Immediate Risk for Suicide Low   Suicide Protective Factors Denies intent   Risk of Violence Low   Risk of Trauma Low   Goals Achieved Able to receive feedback

## 2020-06-16 PROCEDURE — 74011250637 HC RX REV CODE- 250/637: Performed by: PSYCHIATRY & NEUROLOGY

## 2020-06-16 PROCEDURE — 65220000003 HC RM SEMIPRIVATE PSYCH

## 2020-06-16 RX ADMIN — ARIPIPRAZOLE 5 MG: 5 TABLET ORAL at 08:01

## 2020-06-16 RX ADMIN — HALOPERIDOL 5 MG: 5 TABLET ORAL at 20:15

## 2020-06-16 NOTE — BSMART NOTE
Pt.is a 27year old female with history of Schizophrenia paranoid type, Nicotine Dependence and DID.  Pt. has been hospitalized several times this year (multiple admissions at this facility).  Pt. is a [employer]. Pt. was temporarily detained to this facility for hallucinations, ideations to harm animals/birds and paranoia. Pt.'s case was discussed in treatment team.  Pt. Has been complaint with medications. The team had pt to join the meeting. \" I am ready to get on with my life\". \" I am tired of  being in and out of the hospital\". The team encouraged continued medication and treatment compliance. Pt. Appears anxious, has fair insight and judgement. The pt. Will be dc tomorrow. Pt. Plans to return back to her home. Pt. Will continue to be to receive medication management with [facility]APARR[facility] PACT team.     ARUN Collateral:  ARUN contacted Pt.'s Cm with [facility] team.  ARUN informed CM about the above dc .

## 2020-06-16 NOTE — PROGRESS NOTES
Behavioral Health Progress Note    Admit Date: 6/8/2020  Hospital day 8    Vitals :   Patient Vitals for the past 8 hrs:   BP Temp Pulse Resp Height Weight   06/16/20 1100 -- -- -- -- 5' 6\" (1.676 m) 68 kg (150 lb)   06/16/20 0735 94/59 98.6 °F (37 °C) 100 16 -- --     Labs:  No results found for this or any previous visit (from the past 24 hour(s)).   Meds:   Current Facility-Administered Medications   Medication Dose Route Frequency    haloperidoL (HALDOL) tablet 5 mg  5 mg Oral QHS    ARIPiprazole (ABILIFY) tablet 5 mg  5 mg Oral DAILY    haloperidol lactate (HALDOL) injection 5 mg  5 mg IntraMUSCular Q4H PRN    ibuprofen (MOTRIN) tablet 600 mg  600 mg Oral Q6H PRN    LORazepam (ATIVAN) injection 1 mg  1 mg IntraMUSCular Q6H PRN    LORazepam (ATIVAN) tablet 1 mg  1 mg Oral Q6H PRN    traZODone (DESYREL) tablet 50 mg  50 mg Oral QHS PRN    haloperidoL (HALDOL) tablet 5 mg  5 mg Oral Q4H PRN    benztropine (COGENTIN) tablet 1 mg  1 mg Oral Q6H PRN    benztropine (COGENTIN) injection 1 mg  1 mg IntraMUSCular Q6H PRN      Hospital Problems: Principal Problem:    Schizophrenia ([address] (4/18/2017)    Active Problems:    GERD (gastroesophageal reflux disease) (6/9/2020)        Subjective:   Medication side effects: none  none    Mental Status Exam  Sensorium: alert  Orientation: only aware of  time, place and person  Relations: guarded and passive  Eye Contact: poor  Appearance: is tense  Thought Process: slow rate of thoughts and fair abstract reasoning/computation   Thought Content: paranoia   Suicidal: denies   Homicidal: denies   Mood: is anxious and unhappy   Affect: constricted, odd  Memory: is impaired and is remote     Concentration: distractable  Abstraction: concrete  Insight: The patient shows little insight    OR Fair  Judgement: is psychologically impaired OR  Fair    Assessment/Plan:   improved  The patient was seen today in treatment team meeting with nurse, administration, social work, physician and patient. She denies feeling depressed but does look quite subdued and is unhappy with being in the hospital.  She is taking her Abilify but does not come to morning groups. She says she is tired of groups and did not want to keep taking them. She is due for her next Aristada injection on June 26. She denies medication complaints. She is denying homicidal or suicidal ideas or hallucinations now. She does not seem to have much spontaneous speech or interactions. She does say that she will continue to take her medications. She denies homicidal suicidal ideas. The patient is improved at this point and I anticipate she will probably end up being discharged to home this week. She says she will be returning back to the [facility] apartment but wants to have an apartment of her own away from other psychiatric patients since it tends to make her feel worse. She will be talking to the [facility] workers about her living arrangements. .  Continue close observation

## 2020-06-16 NOTE — PROGRESS NOTES
NUTRITION    Nutrition Screen    RECOMMENDATIONS / PLAN:     - No further nutrition interventions indicated at this time. - Continue RD inpatient monitoring and evaluation     NUTRITION DIAGNOSIS & INTERVENTIONS:     - Meals/snacks: general healthy diet:     Nutrition Diagnosis: No nutrition diagnosis at this time. ASSESSMENT:     Admitted on a temporary [facility] order, hx of schizophrenia. Pt reports poor intake x few days PTA but good intake and appetite since admission. Tolerating diet. Denies alcohol use, negative level in ED. Appears well nourished. Nutritional intake adequate to meet patients estimated nutritional needs:  Yes    Diet: DIET REGULAR    Food Allergies: NKFA  Current Appetite: Good  Appetite/meal intake prior to admission: Poor x 3 days  Feeding Limitations:  [] Swallowing Difficulty       [] Chewing Difficulty       [] Other   Current Meal Intake: No data found. Gastrointestinal Issues:  [] Yes    [x] No: none known   Skin Integrity:  WDL    Pertinent Medications:  Reviewed: abilify    Labs:  Reviewed     Anthropometrics:  Ht Readings from Last 1 Encounters:   06/16/20 5' 6\" (1.676 m)       Last 3 Recorded Weights in this Encounter    06/16/20 1100   Weight: 68 kg (150 lb)       Body mass index is 24.21 kg/m². Weight History: 6/16- pt stated wt. Pt unfamiliar with overall wt trends. Weight Metrics 6/16/2020 6/8/2020 4/28/2020 3/2/2020 1/23/2020 12/4/2019 10/22/2019   Weight 150 lb - 155 lb 149 lb 156 lb 180 lb 186 lb   BMI - 24.21 kg/m2 25.79 kg/m2 24.42 kg/m2 25.18 kg/m2 29.05 kg/m2 30.02 kg/m2       Admitting Diagnosis: Schizophrenia ([address] [F20.9]  Past Medical History:   Diagnosis Date    Alcoholic ([address]     Sober 3 months; Weekly AAA meeting;  Had substance abuse counseling;     Anemia     Bipolar disorder ([address]     Cirrhosis ([address]     Past not current issue per patient    ETOH abuse     GERD (gastroesophageal reflux disease)     Head injury     Marijuana abuse     Phobia     [employer]    Post partum depression     Pregnancy     Psychiatric disorder     Psychotic disorder ([facility]     Depression/  [facility] NP; Bipolar disorder, mood swings.  Schizophrenia ([facility]     Stroke [facility]) 2011        Education Needs:        [x] None identified  [] Identified - Not appropriate at this time  []  Identified and addressed - refer to education log  Learning Limitations:   [x] None identified  [] Identified    Cultural, Jew & ethnic food preferences identified:  [x] None    [] Yes      ESTIMATED NUTRITION NEEDS:     9458-4768 kcal (MSJx1.2-1.4), 68-82 gm protein (1-1.2 gm/kg), 1 mL/kcal  Based on: 68 kg       [x] Actual BW- pt stated     MONITORING & EVALUATION:     Nutrition Goal(s):   - PO nutrition intake will continue to meet >75% of patients estimated nutritional needs over the next 7 days. Outcome: New/Initial goal     Monitor: [x] Food and nutrient intake   [x] Food and nutrient administration  [x] Comparative standards   [x] Nutrition-focused physical findings   [x] Anthropometric Measurements   [x] Treatment/therapy   [x] Biochemical data, medical tests, and procedures         Previous Recommendations (for follow-up assessments only):    Not Applicable      Discharge Planning: No nutritional discharge needs at this time.    [x]  Participated in care planning, discharge planning, & interdisciplinary rounds as appropriate      Suman Jensen RD   Pager: [phone]

## 2020-06-16 NOTE — BH NOTES
Treatment team met -     Medical Director: _____present   Psychiatrist: __x___present   Charge nurse: __x___present   MSW: __x___present   : _____present   Nurse Manager: __x___present   Student RNs: _____present   Medical Students: _____present   Art Therapist: _____present   Clinical Coordinator: _x____present    Occupational Therapist: _____present   : _______ present    _______ present  Crisis Supervisor_______present  Patient_____x___present      Plan of care discussed and updated as appropriate. Patient was medication compliant this morning. She's due for her next Aripiprazole  Injection within the next two weeks. Patient was encouraged to attend and participate in more daily groups. Patient voiced interest in wanting to attend a day program when she's discharged, the  provided feedback.

## 2020-06-16 NOTE — GROUP NOTE
DARLING  GROUP DOCUMENTATION INDIVIDUAL                                                                          Group Therapy Note    Date: 6/16/2020    Group Start Time: 1330  Group End Time: 1400  Group Topic: Nursing    [facility] SPECIAL TRTMT Karly Agrawal 124, RN    IP [facility] GROUP DOCUMENTATION GROUP    Group Therapy Note    Attendees: 4         Attendance: Attended    Patient's Goal:  What is Anxiety ?: Worksheet    Interventions/techniques: Informed    Follows Directions:  Followed directions    Interactions: Interacted appropriately    Mental Status: Calm    Behavior/appearance: Attentive and Cooperative    Goals Achieved: Able to engage in interactions and Able to listen to others        Linette Quiroga RN

## 2020-06-16 NOTE — BSMART NOTE
ART THERAPY GROUP PROGRESS NOTE    PATIENT SCHEDULED FOR GROUP AT: 765    ATTENDANCE: Full    PARTICIPATION LEVEL: Participates fully in the art process    ATTENTION LEVEL : Able to focus on task    FOCUS: Reality Orientation    SYMBOLIC & THEMATIC CONTENT AS NOTED IN IMAGERY: She was calm, alert, and presented with a brighter affect than noted in previous interactions. She was invested in the task at hand and smiled while sharing her work. Her approach to task was planned-out and organized and imagery and associations were relevant.

## 2020-06-16 NOTE — BSMART NOTE
OCCUPATIONAL THERAPY PROGRESS NOTE  Group Time:  2712  Attendance: The patient attended full group. .  Participation:  The patient participated with moderate elaboration in the activity. Attention:  The patient was able to focus on the activity. Interaction:  The patient acknowledges others or responds to questions,  with no spontaneous interaction. Generally helpless in ID of things she can do differently or to help herself.

## 2020-06-16 NOTE — PROGRESS NOTES
Problem: Psychosis  Goal: *STG: Decreased hallucinations  Description: AEB pt having decreased hallucinations daily while in the hospital.   Outcome: Progressing Towards Goal  Goal: *STG: Decreased delusional thinking  Description: AEB pt having decreased delusional thinking daily while in the hospital   Outcome: Progressing Towards Goal  Goal: *STG: Remains safe in hospital  Description: AEB pt not harming self or others and mely for safety daily while in the hospital   Outcome: Progressing Towards Goal   Patient denies having auditory hallucinations. Affect is flat, mood is \" ok\". Patient did attend groups today. Voiced no further complaints.

## 2020-06-17 VITALS
WEIGHT: 150 LBS | RESPIRATION RATE: 16 BRPM | OXYGEN SATURATION: 98 % | BODY MASS INDEX: 24.11 KG/M2 | DIASTOLIC BLOOD PRESSURE: 58 MMHG | TEMPERATURE: 98.4 F | HEART RATE: 92 BPM | HEIGHT: 66 IN | SYSTOLIC BLOOD PRESSURE: 93 MMHG

## 2020-06-17 PROCEDURE — 74011250637 HC RX REV CODE- 250/637: Performed by: PSYCHIATRY & NEUROLOGY

## 2020-06-17 RX ORDER — TRAZODONE HYDROCHLORIDE 50 MG/1
50 TABLET ORAL
Qty: 15 TAB | Refills: 1 | Status: SHIPPED | OUTPATIENT
Start: 2020-06-17 | End: 2020-07-19

## 2020-06-17 RX ORDER — HALOPERIDOL 5 MG/1
5 TABLET ORAL
Qty: 15 TAB | Refills: 1 | Status: SHIPPED | OUTPATIENT
Start: 2020-06-17 | End: 2020-07-19

## 2020-06-17 RX ADMIN — ARIPIPRAZOLE 5 MG: 5 TABLET ORAL at 09:00

## 2020-06-17 NOTE — PROGRESS NOTES
Problem: Psychosis  Goal: *STG: Decreased hallucinations  Description: AEB pt having decreased hallucinations daily while in the hospital.   6/17/2020 1024 by Kasey Hoffmann RN  Outcome: Progressing Towards Goal  6/17/2020 1004 by Kasey Hoffmann RN  Outcome: Progressing Towards Goal  Goal: *STG: Decreased delusional thinking  Description: AEB pt having decreased delusional thinking daily while in the hospital   6/17/2020 1024 by Kasey Hoffmann RN  Outcome: Progressing Towards Goal  6/17/2020 1004 by Kasey Hoffmann RN  Outcome: Progressing Towards Goal  Goal: *STG: Remains safe in hospital  Description: Lonie Esters pt not harming self or others and mely for safety daily while in the hospital   6/17/2020 1024 by Kasey Hoffmann RN  Outcome: Progressing Towards Goal  6/17/2020 1004 by Kasey Hoffmann RN  Outcome: Progressing Towards Goal  Goal: *STG: Seeks staff when feelings of self harm or harm towards others arise  Description: AEB pt seeking staff as needed when feelings of self harm or harm towards others arise while in the hospital   Outcome: Progressing Towards Goal   Patient has a flat affect, mood is calm. She has denies auditory hallucinations. Medication compliant. Attending groups.

## 2020-06-17 NOTE — PROGRESS NOTES
conducted an Spirituality Group for [employer], who is a 27 y.o.,female. Patient's Primary Language is: [place]. According to the patient's EMR Muslim Affiliation is: [place]. The reason the Patient came to the hospital is:   Patient Active Problem List    Diagnosis Date Noted    GERD (gastroesophageal reflux disease) 2020    Recurrent depression ([facility] 2018    Cigarette nicotine dependence without complication     Single delivery by  2017    Episodic mood disorder ([facility] 2017    Schizophrenia ([facility] 2017         conducted Spirituality Group \"What is Spirituality\" and the patient was one of the participants. The  provided the following Interventions:  Continued the relationship of care and support. Listened empathically. Offered prayer and assurance of continued prayer on patients behalf. Chart reviewed. The following outcomes were achieved:  Patient expressed gratitude for 's visit.  w  Assessment:  There are no further spiritual or [facility]aritan issues which require Spiritual Care Services interventions at this time. Plan:  Chaplains will continue to follow and will provide pastoral care on an as needed/requested basis.  recommends bedside caregivers page  on duty if patient shows signs of acute spiritual or emotional distress. Chaplain Bronwyn MILLAN  [address] [place] [address]   [phone]

## 2020-06-17 NOTE — BH NOTES
Patient has been isolative to room most of shift. She denied suicidal/homicidal ideations. She stated \"I don't want to go home because its too much. I feel like if I go home I will have to come back. \" She didn't attend group. She continues to be medication compliant. Nursing will continue to provide a safe and supportive environment.

## 2020-06-17 NOTE — BH NOTES
Pt in NAD and no c/o of si/hi or avh. Pt given her belongings and was escorted of unit to her transportation by staff.

## 2020-06-17 NOTE — GROUP NOTE
DARLING  GROUP DOCUMENTATION INDIVIDUAL                                                                          Group Therapy Note    Date: 6/17/2020    Group Start Time: 1400  Group End Time: 6219  Group Topic: Nursing    [facility] SPECIAL TRTMT Karly Agrawal 124, RN    IP 1150 [facility] GROUP DOCUMENTATION GROUP    Group Therapy Note    Attendees: 3         Attendance: Attended    Patient's Goal:  Discharge Planning    Interventions/techniques: Informed    Follows Directions:  Followed directions    Interactions: Interacted appropriately    Mental Status: Calm    Behavior/appearance:     Aryan Morrow RN

## 2020-06-17 NOTE — GROUP NOTE
DARLING  GROUP DOCUMENTATION INDIVIDUAL                                                                          Group Therapy Note    Date: 6/16/2020    Group Start Time: 1930  Group End Time: 2000  Group Topic: Nursing    [facility] Omari [facility]    IP [facility] GROUP DOCUMENTATION GROUP    Group Therapy Note    Attendees: 2      Attendance: Did not attend    SERGEI Forrester RN

## 2020-06-17 NOTE — DISCHARGE INSTRUCTIONS
BEHAVIORAL HEALTH NURSING DISCHARGE NOTE      The following personal items collected during your admission are returned to you:   Dental Appliance: Dental Appliances: (None)  Vision:    Hearing Aid:    Jewelry: Jewelry: None  Clothing: Clothing: (Dress,Pants,Bra, Socks, Sneakers)  Other Valuables: Other Valuables: (Cell Phone, [place] (3), [place] ID Card)  Valuables sent to safe: Personal Items Sent to Safe: None      PATIENT INSTRUCTIONS:         The discharge information has been reviewed with the patient. The patient verbalized understanding.         Patient armband removed and shredded

## 2020-06-17 NOTE — BSMART NOTE
Pt.is a 27year old female with history of Schizophrenia paranoid type, Nicotine Dependence and DID.  Pt. has been hospitalized several times this year (multiple admissions at this facility).  Pt. is a [employer]. Pt. was temporarily detained to this facility for hallucinations, ideations to harm animals/birds and paranoia.     Pt.'s case was discussed. Pt. Will be dc today. SW met with pt to discuss dc plan. Pt. Plans to take medications. Pt. denies ideations and hallucinations. Pt plans to follow-up with [facility] PACT team.  SW encouraged continued medication and treatment compliance. SW also discussed safety plan. SW assisted pt with a Medicaid Cab . Pt. Plans to return to admitting address.

## 2020-06-17 NOTE — BSMART NOTE
ART THERAPY GROUP PROGRESS NOTE    PATIENT SCHEDULED FOR GROUP AT: F    ATTENDANCE: Full    PARTICIPATION LEVEL: Participates fully in the art process    ATTENTION LEVEL : Able to focus on task    FOCUS: Stress vs peace    SYMBOLIC & THEMATIC CONTENT AS NOTED IN IMAGERY: She was calm, compliant, and presented with a blunted affect. She claimed she is tired of going back to the same living situation and feels like she is told she will be given different options but nothing happens. She acknowledged that she needs to \"take initiative\" and make calls regarding different resources, vs \"acting as if everything will be ok and ignoring issues. \"

## 2020-06-18 ENCOUNTER — HOSPITAL ENCOUNTER (EMERGENCY)
Age: 30
Discharge: HOME | End: 2020-06-18
Attending: EMERGENCY MEDICINE
Payer: MEDICAID

## 2020-06-18 VITALS
OXYGEN SATURATION: 100 % | RESPIRATION RATE: 18 BRPM | SYSTOLIC BLOOD PRESSURE: 136 MMHG | HEART RATE: 79 BPM | DIASTOLIC BLOOD PRESSURE: 49 MMHG | TEMPERATURE: 98.6 F

## 2020-06-18 DIAGNOSIS — F41.0 PANIC ATTACK: Primary | ICD-10-CM

## 2020-06-18 PROCEDURE — 74011250637 HC RX REV CODE- 250/637: Performed by: EMERGENCY MEDICINE

## 2020-06-18 PROCEDURE — 99284 EMERGENCY DEPT VISIT MOD MDM: CPT

## 2020-06-18 RX ORDER — HALOPERIDOL 5 MG/1
5 TABLET ORAL
Status: COMPLETED | OUTPATIENT
Start: 2020-06-18 | End: 2020-06-18

## 2020-06-18 RX ORDER — HYDROXYZINE PAMOATE 25 MG/1
25 CAPSULE ORAL
Status: COMPLETED | OUTPATIENT
Start: 2020-06-18 | End: 2020-06-18

## 2020-06-18 RX ORDER — TRAZODONE HYDROCHLORIDE 50 MG/1
50 TABLET ORAL
Status: COMPLETED | OUTPATIENT
Start: 2020-06-18 | End: 2020-06-18

## 2020-06-18 RX ADMIN — TRAZODONE HYDROCHLORIDE 50 MG: 50 TABLET ORAL at 20:18

## 2020-06-18 RX ADMIN — HALOPERIDOL 5 MG: 5 TABLET ORAL at 20:18

## 2020-06-18 RX ADMIN — HYDROXYZINE PAMOATE 25 MG: 25 CAPSULE ORAL at 20:17

## 2020-06-18 NOTE — DISCHARGE SUMMARY
[facility] DISCHARGE    Name:  Anisa Quevedo  MR#:   [id number]  :  1990  ACCOUNT #:  [de-identified]  ADMIT DATE:  2020  DISCHARGE DATE:  2020    IDENTIFYING DATA:  The patient presented as a 51-year-old single white female, resident of [place], [place], who is unemployed and psychiatrically disabled. She is covered by [facility] Medicaid. She was admitted on a temporary skilled nursing order as a legally mandated admission. She has a history of repeated admissions for her schizophrenia. She has required involuntary commitments and judicial authorizations to take medicines in the past.  She just got out of a 7-day hospitalization in [facility] in which she was switched from [place] to Tokoza Ext injection and was given Haldol at bedtime. She was to be followed up by the PACT team, seeing Dr. Mariajose Kaur. It appears she had been out of the hospital only 4 days and started decompensating, seeing birds in her food and having paranoid and persecutory ideas with auditory hallucinations. Laboratory testing in the emergency room described normal CBC, normal urinalysis other than trace of leukocyte esterase but 0-3 wbc's, normal comprehensive metabolic panel, negative hCG, negative alcohol level, negative urine drug screen, negative SARS-COVID test.  She had a CT scan of the head on 2020, which had shown no acute intracranial abnormality. HOSPITAL COURSE:  The patient was admitted to the locked special treatment unit. Vital signs were generally normal with most recent temperature 98.4 degrees, pulse 92, blood pressure 93/58.   While in the hospital, she was treated initially with chlorpromazine and Invega which she was previously taking, but then when we found out that she had been switched medication, we switched over to the same medicines that she had been receiving at [facility] and was placed on the haloperidol 5 mg at bedtime and Abilify 5 mg daily. There was the intent to continue with her Ananda Browne which was next due on 06/26/2020. She did not wish to continue taking the Pristiq antidepressant, saying she was not depressed. She was unhappy about her continued problems with her intermittent psychotic symptoms of hallucinations. She said these had cleared in the structure of the hospital.  She was denying homicidal or suicidal ideas, hallucinatory or delusional material.  She wanted to be able to have an apartment to herself when she got out of the hospital, and she was referred to talk to the [facility] PACT team as to her living situation when she got out of hospital.  There was no evidence of hallucinations or delusions at the time of discharge. CONDITION ON DISCHARGE:  Fair. PROGNOSIS:  Fair. ASSESSMENT:  AXIS I:  Schizophrenia. Nicotine use disorder, mild. AXIS II:  None. AXIS III:  Gastroesophageal reflux disease. DISPOSITION:  Discharged to self. Follow up with the [facility] team.  Follow up with own primary care provider as is. DISCHARGE MEDICATIONS:  1.  Haloperidol 5 mg at bedtime, #15, one refill. 2.  Trazodone 50 mg tablet one at bedtime as needed for sleep, #15, one refill. 3.  Aristada injection every 4 weeks, next due on 06/26/2020.       MD CAMRYN [email or web address]  D:  06/17/2020 11:44  T:  06/17/2020 20:11  JOB #:  [id number]

## 2020-06-18 NOTE — ED TRIAGE NOTES
Patient states she had a panic attack today over a lot of spiritual stuff. Patient states she is in the moment.   When asked if she wants to harm herself or others she states \"it is what it is\"    States she forgot to take her anti anxiety meds today

## 2020-06-18 NOTE — ED TRIAGE NOTES
Per EMS-\"Patient is alert, disoriented, has multiple complaints and states she was just discharged from [facility] yesterday. Patient was given prescriptions for Trazodone and Haldol but did not get them filled. Her  showed up at the scene, her name is Domitila Hernandez and her number is [phone]. \"

## 2020-06-19 NOTE — DISCHARGE INSTRUCTIONS
Patient Education        Panic Attacks: Care Instructions  Your Care Instructions     During a panic attack, you may have a feeling of intense fear or terror, trouble breathing, chest pain or tightness, heartbeat changes, dizziness, sweating, and shaking. A panic attack starts suddenly and usually lasts from 5 to 20 minutes but may last even longer. You have the most anxiety about 10 minutes after the attack starts. An attack can begin with a stressful event, or it can happen without a cause. Although panic attacks can cause scary symptoms, you can learn to manage them with self-care, counseling, and medicine. Follow-up care is a key part of your treatment and safety. Be sure to make and go to all appointments, and call your doctor if you are having problems. It's also a good idea to know your test results and keep a list of the medicines you take. How can you care for yourself at home? · Take your medicine exactly as directed. Call your doctor if you think you are having a problem with your medicine. · Go to your counseling sessions and follow-up appointments. · Recognize and accept your anxiety. Then, when you are in a situation that makes you anxious, say to yourself, \"This is not an emergency. I feel uncomfortable, but I am not in danger. I can keep going even if I feel anxious. \"  · Be kind to your body:  ? Relieve tension with exercise or a massage. ? Get enough rest.  ? Avoid alcohol, caffeine, nicotine, and illegal drugs. They can increase your anxiety level, cause sleep problems, or trigger a panic attack. ? Learn and do relaxation techniques. See below for more about these techniques. · Engage your mind. Get out and do something you enjoy. Go to a funny movie, or take a walk or hike. Plan your day. Having too much or too little to do can make you anxious. · Keep a record of your symptoms.  Discuss your fears with a good friend or family member, or join a support group for people with similar problems. Talking to others sometimes relieves stress. · Get involved in social groups, or volunteer to help others. Being alone sometimes makes things seem worse than they are. · Get at least 30 minutes of exercise on most days of the week to relieve stress. Walking is a good choice. You also may want to do other activities, such as running, swimming, cycling, or playing tennis or team sports. Relaxation techniques  Do relaxation exercises for 10 to 20 minutes a day. You can play soothing, relaxing music while you do them, if you wish. · Tell others in your house that you are going to do your relaxation exercises. Ask them not to disturb you. · Find a comfortable place, away from all distractions and noise. · Lie down on your back, or sit with your back straight. · Focus on your breathing. Make it slow and steady. · Breathe in through your nose. Breathe out through either your nose or mouth. · Breathe deeply, filling up the area between your navel and your rib cage. Breathe so that your belly goes up and down. · Do not hold your breath. · Breathe like this for 5 to 10 minutes. Notice the feeling of calmness throughout your whole body. As you continue to breathe slowly and deeply, relax by doing the following for another 5 to 10 minutes:  · Tighten and relax each muscle group in your body. You can begin at your toes and work your way up to your head. · Imagine your muscle groups relaxing and becoming heavy. · Empty your mind of all thoughts. · Let yourself relax more and more deeply. · Become aware of the state of calmness that surrounds you. · When your relaxation time is over, you can bring yourself back to alertness by moving your fingers and toes and then your hands and feet and then stretching and moving your entire body. Sometimes people fall asleep during relaxation, but they usually wake up shortly afterward.   · Always give yourself time to return to full alertness before you drive a car or do anything that might cause an accident if you are not fully alert. Never play a relaxation tape while driving a car. When should you call for help? [id number] anytime you think you may need emergency care. For example, call if:  · You feel you cannot stop from hurting yourself or someone else. Watch closely for changes in your health, and be sure to contact your doctor if:  · Your panic attacks get worse. · You have new or different anxiety. · You are not getting better as expected. Where can you learn more? Go to http://devendra-earnest.info/  Enter H601 in the search box to learn more about \"Panic Attacks: Care Instructions. \"  Current as of: January 31, 2020               Content Version: 12.5  © 4837-5463 [employer]wise, Incorporated. Care instructions adapted under license by [employer] (which disclaims liability or warranty for this information). If you have questions about a medical condition or this instruction, always ask your healthcare professional. [place] [address] any warranty or liability for your use of this information.

## 2020-06-19 NOTE — ED PROVIDER NOTES
Jessie Mccray is a 27 y.o. female with history of panic attacks and agoraphobia who was just released from the hospital states she got home and had another panic attack where she got overwhelmed with everything and then called 911 to come in here. Patient did not  her medications from yesterday. She denies any current suicidal or homicidal thoughts. She is feeling very anxious about everything at home. The history is provided by the patient. Past Medical History:   Diagnosis Date    Alcoholic ([facility]     Sober 3 months; Weekly AAA meeting; Had substance abuse counseling;     Anemia     Bipolar disorder ([facility]     Cirrhosis ([facility]     Past not current issue per patient    ETOH abuse     GERD (gastroesophageal reflux disease)     Head injury     Marijuana abuse     Phobia     [place] [employer]    Post partum depression     Pregnancy     Psychiatric disorder     Psychotic disorder ([facility] [address] [facility]     Depression/  Aly Ask, NP; Bipolar disorder, mood swings.     Schizophrenia ([facility]     Stroke ([facility] [facility]        Past Surgical History:   Procedure Laterality Date    HX  SECTION      HX  SECTION      C section x 2;   &     HX RHINOPLASTY      HX TUBAL LIGATION  2017         Family History:   Family history unknown: Yes       Social History     Socioeconomic History    Marital status: LEGALLY      Spouse name: Not on file    Number of children: 2    Years of education: Not on file    Highest education level: Not on file   Occupational History    Occupation: disabled   Social Needs    Financial resource strain: Not on file    Food insecurity     Worry: Not on file     Inability: Not on file   [employer] needs     Medical: Not on file     Non-medical: Not on file   Tobacco Use    Smoking status: Current Every Day Smoker     Packs/day: 0.50     Years: 15.00     Pack years: 7.50    Smokeless tobacco: Former User   Substance and Sexual Activity    Alcohol use: No     Comment: Sober for x 3 months    Drug use: Not Currently     Types: Other     Comment: Alcohol    Sexual activity: Never   Lifestyle    Physical activity     Days per week: Not on file     Minutes per session: Not on file    Stress: Not on file   Relationships    Social connections     Talks on phone: Not on file     Gets together: Not on file     Attends Protestant service: Not on file     Active member of club or organization: Not on file     Attends meetings of clubs or organizations: Not on file     Relationship status: Not on file    Intimate partner violence     Fear of current or ex partner: Not on file     Emotionally abused: Not on file     Physically abused: Not on file     Forced sexual activity: Not on file   Other Topics Concern    Not on file   Social History Narrative    ** Merged History Encounter **         ;  2 children; unemployed    Disabled related to mental issues/ 2017    2 boys live with her mom. Currently in [address]: [place]    Review of Systems   Constitutional: Negative for fever. HENT: Negative for sore throat. Eyes: Negative for visual disturbance. Respiratory: Positive for chest tightness and shortness of breath. Cardiovascular: Positive for palpitations. Gastrointestinal: Negative for abdominal pain. Genitourinary: Negative for difficulty urinating. Musculoskeletal: Negative for gait problem. Skin: Negative for rash. Neurological: Positive for light-headedness. Negative for syncope. Psychiatric/Behavioral: Positive for sleep disturbance. Negative for suicidal ideas. The patient is nervous/anxious. Vitals:    06/18/20 1856   BP: 136/49   Pulse: (!) 17   Resp: 18   Temp: 98.6 °F (37 °C)   SpO2: 100%            Physical Exam  Vitals signs and nursing note reviewed. Constitutional:       General: She is not in acute distress. Appearance: She is well-developed.  She is not ill-appearing, toxic-appearing or diaphoretic. Comments: Slightly anxious   HENT:      Head: Normocephalic and atraumatic. Right Ear: External ear normal.      Left Ear: External ear normal.      Nose: Nose normal.      Mouth/Throat:      Pharynx: Uvula midline. Eyes:      General: No scleral icterus. Conjunctiva/sclera: Conjunctivae normal.   Neck:      Musculoskeletal: Neck supple. Cardiovascular:      Rate and Rhythm: Normal rate and regular rhythm. Heart sounds: Normal heart sounds. Pulmonary:      Effort: Pulmonary effort is normal.      Breath sounds: Normal breath sounds. Abdominal:      Palpations: Abdomen is soft. Tenderness: There is no abdominal tenderness. Skin:     General: Skin is warm and dry. Neurological:      Mental Status: She is alert and oriented to person, place, and time. Gait: Gait normal.   Psychiatric:         Attention and Perception: Attention normal.         Mood and Affect: Mood is anxious. Speech: Speech normal.         Behavior: Behavior normal.         Thought Content: Thought content is not paranoid. Thought content does not include homicidal or suicidal ideation. MDM       Procedures    Vitals:  Patient Vitals for the past 12 hrs:   Temp Pulse Resp BP SpO2   06/18/20 1856 98.6 °F (37 °C) (!) 17 18 136/49 100 %         Medications ordered:   Medications   hydrOXYzine pamoate (VISTARIL) capsule 25 mg (25 mg Oral Given 6/18/20 2017)   haloperidoL (HALDOL) tablet 5 mg (5 mg Oral Given 6/18/20 2018)   traZODone (DESYREL) tablet 50 mg (50 mg Oral Given 6/18/20 2018)         Lab findings:  No results found for this or any previous visit (from the past 12 hour(s)). EKG interpretation by ED Physician:      X-Ray, CT or other radiology findings or impressions:  No orders to display       Progress notes, Consult notes or additional Procedure notes:   Patient feeling significantly better at this time. She has much more calm.   She is not actively suicidal and does not feel a threat to herself. She feels comfortable going home    I have discussed with patient and/or family/sig other the results, interpretation of any imaging if performed, suspected diagnosis and treatment plan to include instructions regarding the diagnoses listed to which understanding was expressed with all questions answered      Reevaluation of patient:   jessica    Disposition:  Diagnosis:   1. Panic attack        Disposition: home      Follow-up Information     Follow up With Specialties Details Why Contact Info    Deirdre Geester, PA Family [facility] Schedule an appointment as soon as possible for a visit  [address]  [phone]              Patient's Medications   Start Taking    No medications on file   Continue Taking    HALOPERIDOL (HALDOL) 5 MG TABLET    Take 1 Tab by mouth nightly. Indications: schizophrenia    TRAZODONE (DESYREL) 50 MG TABLET    Take 1 Tab by mouth nightly as needed for Sleep.  Indications: insomnia associated with depression   These Medications have changed    No medications on file   Stop Taking    No medications on file

## 2020-06-19 NOTE — ED NOTES
10:41 PM  06/18/20     Discharge instructions given to patient (name) with verbalization of understanding. Patient accompanied by self. Patient discharged with the following prescriptions none. Patient discharged to home (destination).       Iris Martinez RN

## 2020-07-06 ENCOUNTER — HOSPITAL ENCOUNTER (EMERGENCY)
Age: 30
Discharge: PSYCHIATRIC OR SUBSTANCE USE FACILITY | End: 2020-07-06
Attending: EMERGENCY MEDICINE
Payer: MEDICAID

## 2020-07-06 VITALS
BODY MASS INDEX: 22.98 KG/M2 | WEIGHT: 143 LBS | RESPIRATION RATE: 18 BRPM | DIASTOLIC BLOOD PRESSURE: 60 MMHG | SYSTOLIC BLOOD PRESSURE: 102 MMHG | OXYGEN SATURATION: 99 % | HEIGHT: 66 IN | TEMPERATURE: 98 F | HEART RATE: 72 BPM

## 2020-07-06 DIAGNOSIS — F23 ACUTE PSYCHOSIS (HCC): Primary | ICD-10-CM

## 2020-07-06 LAB
ALBUMIN SERPL-MCNC: 4.3 G/DL (ref 3.4–5)
ALBUMIN/GLOB SERPL: 1.4 {RATIO} (ref 0.8–1.7)
ALP SERPL-CCNC: 65 U/L (ref 45–117)
ALT SERPL-CCNC: 21 U/L (ref 13–56)
AMPHET UR QL SCN: NEGATIVE
ANION GAP SERPL CALC-SCNC: 6 MMOL/L (ref 3–18)
APPEARANCE UR: CLEAR
AST SERPL-CCNC: 16 U/L (ref 10–38)
BACTERIA URNS QL MICRO: NEGATIVE /HPF
BARBITURATES UR QL SCN: NEGATIVE
BASOPHILS # BLD: 0 K/UL (ref 0–0.1)
BASOPHILS NFR BLD: 0 % (ref 0–2)
BENZODIAZ UR QL: NEGATIVE
BILIRUB SERPL-MCNC: 0.8 MG/DL (ref 0.2–1)
BILIRUB UR QL: NEGATIVE
BUN SERPL-MCNC: 3 MG/DL (ref 7–18)
BUN/CREAT SERPL: 4 (ref 12–20)
CALCIUM SERPL-MCNC: 8.8 MG/DL (ref 8.5–10.1)
CANNABINOIDS UR QL SCN: NEGATIVE
CHLORIDE SERPL-SCNC: 106 MMOL/L (ref 100–111)
CO2 SERPL-SCNC: 26 MMOL/L (ref 21–32)
COCAINE UR QL SCN: NEGATIVE
COLOR UR: YELLOW
COVID-19 RAPID TEST, COVR: NOT DETECTED
CREAT SERPL-MCNC: 0.67 MG/DL (ref 0.6–1.3)
DIFFERENTIAL METHOD BLD: ABNORMAL
EOSINOPHIL # BLD: 0 K/UL (ref 0–0.4)
EOSINOPHIL NFR BLD: 0 % (ref 0–5)
EPITH CASTS URNS QL MICRO: NORMAL /LPF (ref 0–5)
ERYTHROCYTE [DISTWIDTH] IN BLOOD BY AUTOMATED COUNT: 13 % (ref 11.6–14.5)
ETHANOL SERPL-MCNC: <3 MG/DL (ref 0–3)
GLOBULIN SER CALC-MCNC: 3 G/DL (ref 2–4)
GLUCOSE SERPL-MCNC: 100 MG/DL (ref 74–99)
GLUCOSE UR STRIP.AUTO-MCNC: NEGATIVE MG/DL
HCG SERPL QL: NEGATIVE
HCT VFR BLD AUTO: 37.4 % (ref 35–45)
HDSCOM,HDSCOM: NORMAL
HGB BLD-MCNC: 12.7 G/DL (ref 12–16)
HGB UR QL STRIP: NEGATIVE
KETONES UR QL STRIP.AUTO: 15 MG/DL
LEUKOCYTE ESTERASE UR QL STRIP.AUTO: ABNORMAL
LYMPHOCYTES # BLD: 2.1 K/UL (ref 0.9–3.6)
LYMPHOCYTES NFR BLD: 30 % (ref 21–52)
MCH RBC QN AUTO: 30.4 PG (ref 24–34)
MCHC RBC AUTO-ENTMCNC: 34 G/DL (ref 31–37)
MCV RBC AUTO: 89.5 FL (ref 74–97)
METHADONE UR QL: NEGATIVE
MONOCYTES # BLD: 0.8 K/UL (ref 0.05–1.2)
MONOCYTES NFR BLD: 11 % (ref 3–10)
NEUTS SEG # BLD: 4.1 K/UL (ref 1.8–8)
NEUTS SEG NFR BLD: 59 % (ref 40–73)
NITRITE UR QL STRIP.AUTO: NEGATIVE
OPIATES UR QL: NEGATIVE
PCP UR QL: NEGATIVE
PH UR STRIP: 6 [PH] (ref 5–8)
PLATELET # BLD AUTO: 215 K/UL (ref 135–420)
PMV BLD AUTO: 10.5 FL (ref 9.2–11.8)
POTASSIUM SERPL-SCNC: 3.7 MMOL/L (ref 3.5–5.5)
PROT SERPL-MCNC: 7.3 G/DL (ref 6.4–8.2)
PROT UR STRIP-MCNC: NEGATIVE MG/DL
RBC # BLD AUTO: 4.18 M/UL (ref 4.2–5.3)
RBC #/AREA URNS HPF: NORMAL /HPF (ref 0–5)
SODIUM SERPL-SCNC: 138 MMOL/L (ref 136–145)
SOURCE, COVRS: NORMAL
SP GR UR REFRACTOMETRY: 1.01 (ref 1–1.03)
UROBILINOGEN UR QL STRIP.AUTO: 1 EU/DL (ref 0.2–1)
WBC # BLD AUTO: 7 K/UL (ref 4.6–13.2)
WBC URNS QL MICRO: NORMAL /HPF (ref 0–4)

## 2020-07-06 PROCEDURE — 87635 SARS-COV-2 COVID-19 AMP PRB: CPT

## 2020-07-06 PROCEDURE — 84703 CHORIONIC GONADOTROPIN ASSAY: CPT

## 2020-07-06 PROCEDURE — 99285 EMERGENCY DEPT VISIT HI MDM: CPT

## 2020-07-06 PROCEDURE — 85025 COMPLETE CBC W/AUTO DIFF WBC: CPT

## 2020-07-06 PROCEDURE — 80053 COMPREHEN METABOLIC PANEL: CPT

## 2020-07-06 PROCEDURE — 80307 DRUG TEST PRSMV CHEM ANLYZR: CPT

## 2020-07-06 PROCEDURE — 81001 URINALYSIS AUTO W/SCOPE: CPT

## 2020-07-06 NOTE — ED TRIAGE NOTES
Pt arrives with [place] [employer] after going missing from [facility] and not keeping in touch with them. Pt here on [facility] and verbalized SI. Pt denies a plan at present.

## 2020-07-06 NOTE — ED PROVIDER NOTES
EMERGENCY DEPARTMENT HISTORY AND PHYSICAL EXAM    4:52 PM      Date: 7/6/2020  Patient Name: Elías Nuñez    History of Presenting Illness     No chief complaint on file. History Provided By: Patient and [place] [employer]    Chief Complaint: not caring for herself  Duration:  Days  Timing:  Acute  Location:   Quality: N/A  Severity: N/A  Modifying Factors: none  Associated Symptoms: denies any other associated signs or symptoms      Additional History (Context):Elyse Nam is a 27 y.o. female with a pertinent history of schizophrenia, CVA, GERD, EtOH abuse, cirrhosis, marijuana use, bipolar disorder who presents under an ECO with police to the emergency department for evaluation of not caring for self. Patient has reportedly been wandering the streets for the past 3 days. [facility] initiated an [facility]. Patient denies any illicit drug use or EtOH use. She is unsure if she is taking her meds. She denies  SI or HI. No hallucinations. No other concerns at this time. Mariana Cui PCP:  PAU Wang        Current Outpatient Medications   Medication Sig Dispense Refill    traZODone (DESYREL) 50 mg tablet Take 1 Tab by mouth nightly as needed for Sleep. Indications: insomnia associated with depression 15 Tab 1    haloperidoL (HALDOL) 5 mg tablet Take 1 Tab by mouth nightly. Indications: schizophrenia 15 Tab 1       Past History     Past Medical History:  Past Medical History:   Diagnosis Date    Alcoholic ([address]     Sober 3 months; Weekly AAA meeting; Had substance abuse counseling;     Anemia     B[facility] disorder ([facility] [facility]     Cirrhosis ([facility] [address]     Past not current issue per patient    ETOH abuse     GERD (gastroesophageal reflux disease)     Head injury     Marijuana abuse     Phobia     [place] CBS    Post partum depression     Pregnancy     Psychiatric disorder     Psychotic disorder ([address]     Depression/  Monica Eth, NP; Bipolar disorder, mood swings.     Schizophrenia ([address]     Stroke [facility])        Past Surgical History:  Past Surgical History:   Procedure Laterality Date    HX  SECTION      HX  SECTION      C section x 2;   &     HX RHINOPLASTY      HX TUBAL LIGATION  2017       Family History:  Family History   Family history unknown: Yes       Social History:  Social History     Tobacco Use    Smoking status: Current Every Day Smoker     Packs/day: 0.50     Years: 15.00     Pack years: 7.50    Smokeless tobacco: Former User   Substance Use Topics    Alcohol use: Yes     Comment: today    Drug use: Not Currently     Types: Other     Comment: Alcohol       Allergies: Allergies   Allergen Reactions    Robitussin [Guaifenesin] Nausea and Vomiting         Review of Systems       Review of Systems   Constitutional: Negative for chills and fever. HENT: Negative for congestion, rhinorrhea and sore throat. Respiratory: Negative for cough and shortness of breath. Cardiovascular: Negative for chest pain. Gastrointestinal: Negative for abdominal pain, blood in stool, constipation, diarrhea, nausea and vomiting. Genitourinary: Negative for dysuria, frequency and hematuria. Musculoskeletal: Negative for back pain and myalgias. Skin: Negative for rash and wound. Neurological: Negative for dizziness and headaches. Psychiatric/Behavioral: Positive for behavioral problems. All other systems reviewed and are negative. Physical Exam     Visit Vitals  /60 (BP 1 Location: Left arm)   Pulse 72   Temp 98 °F (36.7 °C)   Resp 18   Ht 5' 6\" (1.676 m)   Wt 64.9 kg (143 lb)   SpO2 99%   BMI 23.08 kg/m²         Physical Exam  Vitals signs and nursing note reviewed. Constitutional:       General: She is not in acute distress. Appearance: She is well-developed. She is not diaphoretic. HENT:      Head: Normocephalic and atraumatic.    Eyes:      Conjunctiva/sclera: Conjunctivae normal.   Neck:      Musculoskeletal: Normal range of motion and neck supple. Cardiovascular:      Rate and Rhythm: Normal rate and regular rhythm. Heart sounds: Normal heart sounds. Pulmonary:      Effort: Pulmonary effort is normal. No respiratory distress. Breath sounds: Normal breath sounds. Chest:      Chest wall: No tenderness. Abdominal:      General: Bowel sounds are normal. There is no distension. Palpations: Abdomen is soft. Tenderness: There is no abdominal tenderness. There is no guarding or rebound. Musculoskeletal:         General: No deformity. Skin:     General: Skin is warm and dry. Neurological:      Mental Status: She is alert and oriented to person, place, and time. Deep Tendon Reflexes: Reflexes are normal and symmetric. Diagnostic Study Results     Labs -  Recent Results (from the past 12 hour(s))   CBC WITH AUTOMATED DIFF    Collection Time: 07/06/20  1:22 PM   Result Value Ref Range    WBC 7.0 4.6 - 13.2 K/uL    RBC 4.18 (L) 4.20 - 5.30 M/uL    HGB 12.7 12.0 - 16.0 g/dL    HCT 37.4 35.0 - 45.0 %    MCV 89.5 74.0 - 97.0 FL    MCH 30.4 24.0 - 34.0 PG    MCHC 34.0 31.0 - 37.0 g/dL    RDW 13.0 11.6 - 14.5 %    PLATELET 068 639 - 136 K/uL    MPV 10.5 9.2 - 11.8 FL    NEUTROPHILS 59 40 - 73 %    LYMPHOCYTES 30 21 - 52 %    MONOCYTES 11 (H) 3 - 10 %    EOSINOPHILS 0 0 - 5 %    BASOPHILS 0 0 - 2 %    ABS. NEUTROPHILS 4.1 1.8 - 8.0 K/UL    ABS. LYMPHOCYTES 2.1 0.9 - 3.6 K/UL    ABS. MONOCYTES 0.8 0.05 - 1.2 K/UL    ABS. EOSINOPHILS 0.0 0.0 - 0.4 K/UL    ABS.  BASOPHILS 0.0 0.0 - 0.1 K/UL    DF AUTOMATED     ETHYL ALCOHOL    Collection Time: 07/06/20  1:22 PM   Result Value Ref Range    ALCOHOL(ETHYL),SERUM <3 0 - 3 MG/DL   METABOLIC PANEL, COMPREHENSIVE    Collection Time: 07/06/20  1:22 PM   Result Value Ref Range    Sodium 138 136 - 145 mmol/L    Potassium 3.7 3.5 - 5.5 mmol/L    Chloride 106 100 - 111 mmol/L    CO2 26 21 - 32 mmol/L    Anion gap 6 3.0 - 18 mmol/L    Glucose 100 (H) 74 - 99 mg/dL    BUN 3 (L) 7.0 - 18 MG/DL    Creatinine 0.67 0.6 - 1.3 MG/DL    BUN/Creatinine ratio 4 (L) 12 - 20      GFR est AA >60 >60 ml/min/1.73m2    GFR est non-AA >60 >60 ml/min/1.73m2    Calcium 8.8 8.5 - 10.1 MG/DL    Bilirubin, total 0.8 0.2 - 1.0 MG/DL    ALT (SGPT) 21 13 - 56 U/L    AST (SGOT) 16 10 - 38 U/L    Alk. phosphatase 65 45 - 117 U/L    Protein, total 7.3 6.4 - 8.2 g/dL    Albumin 4.3 3.4 - 5.0 g/dL    Globulin 3.0 2.0 - 4.0 g/dL    A-G Ratio 1.4 0.8 - 1.7     DRUG SCREEN, URINE    Collection Time: 07/06/20  1:22 PM   Result Value Ref Range    BENZODIAZEPINES Negative NEG      BARBITURATES Negative NEG      THC (TH-CANNABINOL) Negative NEG      OPIATES Negative NEG      PCP(PHENCYCLIDINE) Negative NEG      COCAINE Negative NEG      AMPHETAMINES Negative NEG      METHADONE Negative NEG      HDSCOM (NOTE)    URINALYSIS W/ RFLX MICROSCOPIC    Collection Time: 07/06/20  1:22 PM   Result Value Ref Range    Color YELLOW      Appearance CLEAR      Specific gravity 1.007 1.005 - 1.030      pH (UA) 6.0 5.0 - 8.0      Protein Negative NEG mg/dL    Glucose Negative NEG mg/dL    Ketone 15 (A) NEG mg/dL    Bilirubin Negative NEG      Blood Negative NEG      Urobilinogen 1.0 0.2 - 1.0 EU/dL    Nitrites Negative NEG      Leukocyte Esterase TRACE (A) NEG     SARS-COV-2    Collection Time: 07/06/20  1:22 PM   Result Value Ref Range    Specimen source Nasopharyngeal      COVID-19 rapid test Not detected NOTD     HCG QL SERUM    Collection Time: 07/06/20  1:22 PM   Result Value Ref Range    HCG, Ql. Negative NEG     URINE MICROSCOPIC ONLY    Collection Time: 07/06/20  1:22 PM   Result Value Ref Range    WBC 0 to 3 0 - 4 /hpf    RBC NONE 0 - 5 /hpf    Epithelial cells 1+ 0 - 5 /lpf    Bacteria Negative NEG /hpf       Radiologic Studies -   No results found. Medical Decision Making   I am the first provider for this patient.     I reviewed the vital signs, available nursing notes, past medical history, past surgical history, family history and social history. Vital Signs-Reviewed the patient's vital signs. Pulse Oximetry Analysis -  99% on room air (Interpretation)    Records Reviewed: Nursing Notes and Old Medical Records (Time of Review: 4:52 PM)    ED Course: Progress Notes, Reevaluation, and Consults:    Provider Notes (Medical Decision Making):   Differential Diagnosis: substance abuse, alcohol intoxication, substance withdrawal, acute psychotic episode, anxiety, panic disorder, didi, undifferentiated schizophrenia, depression, SI, HI, medication non-compliance, other mood disorder    6:00 PM: Patient accepted at  psych. She is medically cleared. COVID negative. Diagnosis     Clinical Impression:   1. Acute psychosis ([facility]        Disposition: Transfer    Follow-up Information    None          Patient's Medications   Start Taking    No medications on file   Continue Taking    HALOPERIDOL (HALDOL) 5 MG TABLET    Take 1 Tab by mouth nightly. Indications: schizophrenia    TRAZODONE (DESYREL) 50 MG TABLET    Take 1 Tab by mouth nightly as needed for Sleep. Indications: insomnia associated with depression   These Medications have changed    No medications on file   Stop Taking    No medications on file     _______________________________    This note was dictated utilizing voice recognition software which may lead to typographical errors. I apologize in advance if the situation occurs. If questions arise please do not hesitate to contact me or call our department.   Jason Montez PA-C

## 2020-07-06 NOTE — PROGRESS NOTES
Chart reviewed. Here on [facility]. Called and spoke to Mary at [address]. Will need lab results to include COVID and medical clearance faxed to him at [phone] once completed. Faxed labs. Waiting for MD to medically clear.     Rebeca Peters RN    Outcomes Manager  [phone]  [phone]

## 2020-07-19 ENCOUNTER — HOSPITAL ENCOUNTER (EMERGENCY)
Age: 30
Discharge: PSYCHIATRIC OR SUBSTANCE USE FACILITY | End: 2020-07-20
Attending: EMERGENCY MEDICINE | Admitting: EMERGENCY MEDICINE
Payer: MEDICAID

## 2020-07-19 DIAGNOSIS — R44.0 AUDITORY HALLUCINATIONS: Primary | ICD-10-CM

## 2020-07-19 DIAGNOSIS — Z86.59 HISTORY OF SCHIZOPHRENIA: ICD-10-CM

## 2020-07-19 LAB
ALBUMIN SERPL-MCNC: 4.1 G/DL (ref 3.4–5)
ALBUMIN/GLOB SERPL: 1.2 {RATIO} (ref 0.8–1.7)
ALP SERPL-CCNC: 62 U/L (ref 45–117)
ALT SERPL-CCNC: 15 U/L (ref 13–56)
AMPHET UR QL SCN: NEGATIVE
ANION GAP SERPL CALC-SCNC: 7 MMOL/L (ref 3–18)
APPEARANCE UR: ABNORMAL
AST SERPL-CCNC: 10 U/L (ref 10–38)
BACTERIA URNS QL MICRO: ABNORMAL /HPF
BARBITURATES UR QL SCN: NEGATIVE
BASOPHILS # BLD: 0 K/UL (ref 0–0.1)
BASOPHILS NFR BLD: 0 % (ref 0–2)
BENZODIAZ UR QL: NEGATIVE
BILIRUB SERPL-MCNC: 0.6 MG/DL (ref 0.2–1)
BILIRUB UR QL: NEGATIVE
BUN SERPL-MCNC: 5 MG/DL (ref 7–18)
BUN/CREAT SERPL: 8 (ref 12–20)
CALCIUM SERPL-MCNC: 8.8 MG/DL (ref 8.5–10.1)
CANNABINOIDS UR QL SCN: NEGATIVE
CHLORIDE SERPL-SCNC: 111 MMOL/L (ref 100–111)
CO2 SERPL-SCNC: 24 MMOL/L (ref 21–32)
COCAINE UR QL SCN: NEGATIVE
COLOR UR: YELLOW
CREAT SERPL-MCNC: 0.66 MG/DL (ref 0.6–1.3)
DIFFERENTIAL METHOD BLD: NORMAL
EOSINOPHIL # BLD: 0 K/UL (ref 0–0.4)
EOSINOPHIL NFR BLD: 0 % (ref 0–5)
EPITH CASTS URNS QL MICRO: ABNORMAL /LPF (ref 0–5)
ERYTHROCYTE [DISTWIDTH] IN BLOOD BY AUTOMATED COUNT: 12.5 % (ref 11.6–14.5)
ETHANOL SERPL-MCNC: <3 MG/DL (ref 0–3)
GLOBULIN SER CALC-MCNC: 3.3 G/DL (ref 2–4)
GLUCOSE SERPL-MCNC: 92 MG/DL (ref 74–99)
GLUCOSE UR STRIP.AUTO-MCNC: NEGATIVE MG/DL
HCG UR QL: NEGATIVE
HCT VFR BLD AUTO: 39.3 % (ref 35–45)
HDSCOM,HDSCOM: ABNORMAL
HGB BLD-MCNC: 13.4 G/DL (ref 12–16)
HGB UR QL STRIP: NEGATIVE
KETONES UR QL STRIP.AUTO: ABNORMAL MG/DL
LEUKOCYTE ESTERASE UR QL STRIP.AUTO: ABNORMAL
LYMPHOCYTES # BLD: 2.2 K/UL (ref 0.9–3.6)
LYMPHOCYTES NFR BLD: 26 % (ref 21–52)
MCH RBC QN AUTO: 30.5 PG (ref 24–34)
MCHC RBC AUTO-ENTMCNC: 34.1 G/DL (ref 31–37)
MCV RBC AUTO: 89.5 FL (ref 74–97)
METHADONE UR QL: NEGATIVE
MONOCYTES # BLD: 0.6 K/UL (ref 0.05–1.2)
MONOCYTES NFR BLD: 8 % (ref 3–10)
MUCOUS THREADS URNS QL MICRO: ABNORMAL /LPF
NEUTS SEG # BLD: 5.5 K/UL (ref 1.8–8)
NEUTS SEG NFR BLD: 66 % (ref 40–73)
NITRITE UR QL STRIP.AUTO: NEGATIVE
OPIATES UR QL: NEGATIVE
PCP UR QL: POSITIVE
PH UR STRIP: 6.5 [PH] (ref 5–8)
PLATELET # BLD AUTO: 218 K/UL (ref 135–420)
PMV BLD AUTO: 10.1 FL (ref 9.2–11.8)
POTASSIUM SERPL-SCNC: 3.5 MMOL/L (ref 3.5–5.5)
PROT SERPL-MCNC: 7.4 G/DL (ref 6.4–8.2)
PROT UR STRIP-MCNC: NEGATIVE MG/DL
RBC # BLD AUTO: 4.39 M/UL (ref 4.2–5.3)
RBC #/AREA URNS HPF: ABNORMAL /HPF (ref 0–5)
SODIUM SERPL-SCNC: 142 MMOL/L (ref 136–145)
SP GR UR REFRACTOMETRY: 1.01 (ref 1–1.03)
UROBILINOGEN UR QL STRIP.AUTO: 1 EU/DL (ref 0.2–1)
WBC # BLD AUTO: 8.3 K/UL (ref 4.6–13.2)
WBC URNS QL MICRO: ABNORMAL /HPF (ref 0–4)
YEAST URNS QL MICRO: ABNORMAL

## 2020-07-19 PROCEDURE — 80307 DRUG TEST PRSMV CHEM ANLYZR: CPT

## 2020-07-19 PROCEDURE — 80053 COMPREHEN METABOLIC PANEL: CPT

## 2020-07-19 PROCEDURE — 81001 URINALYSIS AUTO W/SCOPE: CPT

## 2020-07-19 PROCEDURE — 99285 EMERGENCY DEPT VISIT HI MDM: CPT

## 2020-07-19 PROCEDURE — 81025 URINE PREGNANCY TEST: CPT

## 2020-07-19 PROCEDURE — 85025 COMPLETE CBC W/AUTO DIFF WBC: CPT

## 2020-07-19 RX ORDER — CEPHALEXIN 250 MG/1
500 CAPSULE ORAL
Status: COMPLETED | OUTPATIENT
Start: 2020-07-19 | End: 2020-07-20

## 2020-07-20 ENCOUNTER — HOSPITAL ENCOUNTER (INPATIENT)
Age: 30
LOS: 9 days | Discharge: HOME | DRG: 750 | End: 2020-07-29
Attending: PSYCHIATRY & NEUROLOGY | Admitting: PSYCHIATRY & NEUROLOGY
Payer: MEDICAID

## 2020-07-20 VITALS
TEMPERATURE: 97.1 F | DIASTOLIC BLOOD PRESSURE: 58 MMHG | OXYGEN SATURATION: 97 % | RESPIRATION RATE: 18 BRPM | HEART RATE: 75 BPM | SYSTOLIC BLOOD PRESSURE: 97 MMHG

## 2020-07-20 PROCEDURE — 65220000003 HC RM SEMIPRIVATE PSYCH

## 2020-07-20 PROCEDURE — 74011250637 HC RX REV CODE- 250/637: Performed by: EMERGENCY MEDICINE

## 2020-07-20 RX ORDER — ACETAMINOPHEN 325 MG/1
650 TABLET ORAL
Status: DISCONTINUED | OUTPATIENT
Start: 2020-07-20 | End: 2020-07-29 | Stop reason: HOSPADM

## 2020-07-20 RX ORDER — BENZTROPINE MESYLATE 1 MG/1
1 TABLET ORAL
Status: DISCONTINUED | OUTPATIENT
Start: 2020-07-20 | End: 2020-07-29 | Stop reason: HOSPADM

## 2020-07-20 RX ORDER — BENZTROPINE MESYLATE 1 MG/ML
1 INJECTION INTRAMUSCULAR; INTRAVENOUS
Status: DISCONTINUED | OUTPATIENT
Start: 2020-07-20 | End: 2020-07-29 | Stop reason: HOSPADM

## 2020-07-20 RX ORDER — HALOPERIDOL 5 MG/1
5 TABLET ORAL
Status: DISCONTINUED | OUTPATIENT
Start: 2020-07-20 | End: 2020-07-29 | Stop reason: HOSPADM

## 2020-07-20 RX ORDER — HALOPERIDOL 5 MG/ML
5 INJECTION INTRAMUSCULAR
Status: DISCONTINUED | OUTPATIENT
Start: 2020-07-20 | End: 2020-07-29 | Stop reason: HOSPADM

## 2020-07-20 RX ORDER — HYDROXYZINE PAMOATE 50 MG/1
50 CAPSULE ORAL
Status: DISCONTINUED | OUTPATIENT
Start: 2020-07-20 | End: 2020-07-29 | Stop reason: HOSPADM

## 2020-07-20 RX ORDER — TRAZODONE HYDROCHLORIDE 50 MG/1
50 TABLET ORAL
Status: DISCONTINUED | OUTPATIENT
Start: 2020-07-20 | End: 2020-07-29 | Stop reason: HOSPADM

## 2020-07-20 RX ORDER — ARIPIPRAZOLE 15 MG/1
30 TABLET ORAL DAILY
Status: DISCONTINUED | OUTPATIENT
Start: 2020-07-21 | End: 2020-07-29 | Stop reason: HOSPADM

## 2020-07-20 RX ADMIN — CEPHALEXIN 500 MG: 250 CAPSULE ORAL at 00:16

## 2020-07-20 NOTE — CONSULTS
INSIGHT TELEPSYCHIATRY  Name: Danny Krueger   : 1990  Date: 2020    Time: 11:16 pm  Location of patient: [facility]  Location of doctor: [address]  This evaluation was conducted via telepsychiatry with the assistance of onsite staff    Chief Complaint:  schizophrenia, AH, voices tell her to buy certain substances  (per ER MD)  History of Present Illness: The patient is a 27year old female with schizophrenia presents with recent SI, thought blocking, AH, severe distress , in need of psychiatric hospitalization for safety and stabilization. Patient said she recently discharged from [facility], discharged 2020. She does not remember why she was in the hospital. When asked whe she came to the hospital, she said that her friend have been  judging me, telling me I was really mental and making the world sick. They have been judging my thoughts and saying them back to me. They are not physical friends.   She said that it is the thoughts I project and my imagination.   When asked if she has been diagnosed with schizophrenia, she replied I had an MRI and I think thats when it started.  She said that it started in . She said she had a head injury in the back in the base of my head I fell backward into a cupholder.      Patient said she brought herself to the hospital. She said I dont know if I need behavioral health.  She said that she feels like she needs to be in the hospital. When asked if she is afraid she may hurt herself, she said I am also  ummm.  I dont know how to answer that.    She seems to be having thought blocking. She said that she called the suicide hotline today. She said that she told them that she was suicidal.     [address] [place] [address] (currently or in recent past):   Head trauma/LOC:  denies. Psychotic reactions:   Perceptual Disturbances- currently having AH- I always stop  I hear them. . and then, I dont know what to tell you. I feel really confused.     Delusions- She feels frightened. Unclear exactly why. Disorganization of speech- denies    Disorganization of behavior- denies    Negative symptoms- diminished emotional expression  Cognitive deficits: denies   Anxiety:    Panic attacks- denies    Obsessions or compulsions- denies    Recurrent traumatic memories- denies   Depression: Reports depressed mood, poor sleep , low energy, anhedonia , I just really wanted to give up, but I am trying still.     Hetal: denies     Collateral: none   SI/ Self harm: current SI , though vaguely expressed   HI/Violence: denies   Trauma history:  I dont know . I tried to make amends with that. Im not the victim.    Access to weapons: denies  Legal: denies   Psychiatric History/Treatment History: multiple  past psychiatric hospitalizations. Denies past suicide attempts. Current outpatient treatment: recently discharged from [facility]. She said she has an outpatient doctor named Dr. Sadia Acosta. She sees him at the [facility]. She seems to work with a ACT team.  I think I will become institutionalized.    She said she takes abilify 30 mg daily. She last took this medication this AM.  I cant blame the medicine for whats been going on at home.    Drug/Alcohol History: Denies        Medical History:reports history of injuries 3 times in the past.      Medications & Freq: abilify 30 mg po qd    She said she has taken clozapine in the past and found it helpful. Allergies: robitussin  Sleep: Quantity: 7-8 hours/night    Quality: difficulty falling asleep, staying asleep, early awakenings  Family Psych History/History of suicide:   Unsure   Social History: When asked who she lives with , she said some leslie who calls himself my boyfriend he talks to me , but I cant see him. She said she lives in [address].    My parents are dead I think.  She said she does not have any living relatives. She later said that she had been excommunicated, they dont want me to talk about anything.     Employment: [id number] think it got shot off because I have been in the hospital so much.     Education: high school, some college [address] have a campus somewhere    Stressors: chronic mental illness    Strengths/supports: I dont really get asked that a lot. I just get called names.    Mental Status Exam:   Abnormal Movements: None noted  General Appearance: Adequate grooming and hygiene. No apparent physical distress. Speech: slow, almost halting   Mood-sad, Affect:  restricted  in range and appropriate to situation and content. Thought processes:  thought blocking   Suicidal ideation,plan or intent: recent SI   Homicidal ideation, plan or intent: denies  Visual hallucinations: denies  Auditory hallucinations: AH   Patient  does appear  responding to internal stimuli. Delusions: Denies; none evident on exam  Insight: adequate for immediate demands  Judgment: adequate for immediate demands  Orientation: Oriented to person, place, month, date, year, situation, season. Cognition-grossly intact        Impression/Risk Assessment:  27year old female with schizophrenia presents with recent SI, thought blocking, AH, severe distress , in need of psychiatric hospitalization for safety and stabilization. Diagnosis:   Schizophrenia  Treatment Recommendations:    Inpatient psychiatric hospitalization  Observation level -  Line of sight observation to prevent self harm or elopement. Pharmacological: Abilify 30 mg daily   Consider clozapine. Past positive response per patient. Therapy: Supportive     Level of Care:   Inpatient Psychiatric Hospitalization.   Voluntary , but would meet involuntary criteria  While the patient is presently willing to accept treatment on a voluntary basis, should the patient reverse this decision and becoming unwilling to accept voluntary psychiatric treatment, then the patient would meet criteria for Involuntary Admission for Treatment.   The patient has a mental illness and is in need of hospitalization or treatment, and there exists a substantial likelihood that, as a result of mental illness, the respondent will, in the near future:  cause serious physical harm to [x ] self [ ] others as evidenced by recent behavior causing, attempting, or threatening harm and other relevant information, if any,     or [x ] suffer serious harm due to respondents lack of capacity to protect self from harm or to provide for respondents own basic human needs

## 2020-07-20 NOTE — PROGRESS NOTES
Call placed to [facility] and spoke with Rey Isidro. He states that pt sounds familiar and he was informed that pt was discharged from their facility on 7/17/20. He states that pt on TDO eveytime she was admitted. Told him per tele psych pt is voluntary. He states they have beds and will review information. Pt information faxed.

## 2020-07-20 NOTE — PROGRESS NOTES
conducted an initial consultation and Spiritual Assessment for Michele Vela, who is a 27 y.o.,female. Patients Primary Language is: [place]. According to the patients EMR Yazdanism Affiliation is: Prem Noguera. The reason the Patient came to the hospital is:   Patient Active Problem List    Diagnosis Date Noted    GERD (gastroesophageal reflux disease) 2020    Recurrent depression ([facility] 2018    Cigarette nicotine dependence without complication     Single delivery by  2017    Episodic mood disorder ([facility] 2017    Schizophrenia ([facility] 2017        The  provided the following Interventions:  Initiated a relationship of care and support. Explored issues of sadi, belief, spirituality and Jehovah's witness/ritual needs while hospitalized. Listened empathically. Provided chaplaincy education. Provided information about Spiritual Care Services. Offered prayer and assurance of continued prayers on patient's behalf. Chart reviewed. The following outcomes where achieved:  Patient shared limited information about both their medical narrative and spiritual journey/beliefs.  confirmed Patient's Yazdanism Affiliation. Patient processed feeling about current hospitalization. Patient expressed gratitude for 's visit. Assessment:  Patient does not have any Jehovah's witness/cultural needs that will affect patients preferences in health care. There are no spiritual or Jehovah's witness issues which require intervention at this time. Plan:  Chaplains will continue to follow and will provide pastoral care on an as needed/requested basis.  recommends bedside caregivers page  on duty if patient shows signs of acute spiritual or emotional distress.     LETITIA/ Eris Daly Certified [address] [facility]   [phone]

## 2020-07-20 NOTE — ED PROVIDER NOTES
26 yo CF with PMHx schizophrenia, bipolar, substance abuse presents by police with uncertain complaints. Pt does not give reliable history. Pt states that she feels like the world is forcing thoughts into her and controlling her. Pt states that today she was being told to buy certain substances like 5 hour energy drinks. Pt states she has felt this way since she was sent to [facility] a few months ago. Pt states she feels like someone else is living through her. History otherwise limited. Pt denies medical complaints. Past Medical History:   Diagnosis Date    Alcoholic ([address]     Sober 3 months; Weekly AAA meeting; Had substance abuse counseling;     Anemia     Bipolar disorder ([address]     Cirrhosis ([address]     Past not current issue per patient    ETOH abuse     GERD (gastroesophageal reflux disease)     Head injury     Marijuana abuse     Phobia     [place] [employer]    Post partum depression     Pregnancy     Psychiatric disorder     Psychotic disorder ([address]     Depression/  Reginia Bill, NP; Bipolar disorder, mood swings.     Schizophrenia ([facility]     Stroke [facility])        Past Surgical History:   Procedure Laterality Date    HX  SECTION      HX  SECTION      C section x 2;   &     HX RHINOPLASTY      HX TUBAL LIGATION  2017         Family History:   Family history unknown: Yes       Social History     Socioeconomic History    Marital status: LEGALLY      Spouse name: Not on file    Number of children: 2    Years of education: Not on file    Highest education level: Not on file   Occupational History    Occupation: disabled   Social Needs    Financial resource strain: Not on file    Food insecurity     Worry: Not on file     Inability: Not on file   [employer] needs     Medical: Not on file     Non-medical: Not on file   Tobacco Use    Smoking status: Current Every Day Smoker     Packs/day: 0.50     Years: 15.00     Pack years: 7.50    Smokeless tobacco: Former User   Substance and Sexual Activity    Alcohol use: Yes     Comment: today    Drug use: Not Currently     Types: Other     Comment: Alcohol    Sexual activity: Never   Lifestyle    Physical activity     Days per week: Not on file     Minutes per session: Not on file    Stress: Not on file   Relationships    Social connections     Talks on phone: Not on file     Gets together: Not on file     Attends Worship service: Not on file     Active member of club or organization: Not on file     Attends meetings of clubs or organizations: Not on file     Relationship status: Not on file    Intimate partner violence     Fear of current or ex partner: Not on file     Emotionally abused: Not on file     Physically abused: Not on file     Forced sexual activity: Not on file   Other Topics Concern    Not on file   Social History Narrative    ** Merged History Encounter **         ;  2 children; unemployed    Disabled related to mental issues/ 2017    2 boys live with her mom. Currently in [address]: [email or web address]]    Review of Systems   Unable to perform ROS: Psychiatric disorder       Vitals:    07/19/20 2124 07/20/20 0525   BP: 103/70 98/63   Pulse: 93 66   Resp: 20 14   Temp: 98.3 °F (36.8 °C) 98.3 °F (36.8 °C)   SpO2: 96% 100%            Physical Exam  Vitals signs and nursing note reviewed. Constitutional:       General: She is not in acute distress. Appearance: She is well-developed. Comments: Generally well-appearing, nad   HENT:      Head: Normocephalic and atraumatic. Eyes:      Extraocular Movements: Extraocular movements intact. Pupils: Pupils are equal, round, and reactive to light. Neck:      Musculoskeletal: Normal range of motion. Cardiovascular:      Rate and Rhythm: Normal rate. Pulmonary:      Effort: Pulmonary effort is normal. No respiratory distress. Breath sounds: Normal breath sounds.    Abdominal: Palpations: Abdomen is soft. Tenderness: There is no abdominal tenderness. Musculoskeletal: Normal range of motion. Comments: Mechanically stable   Skin:     General: Skin is warm. Neurological:      General: No focal deficit present. Mental Status: She is alert and oriented to person, place, and time. Comments: No focal deficits noted   Psychiatric:         Mood and Affect: Affect is blunt. Speech: Speech is tangential.         Behavior: Behavior normal.         Thought Content: Thought content does not include suicidal ideation. MDM  Number of Diagnoses or Management Options  Auditory hallucinations:   History of schizophrenia:   Diagnosis management comments: 28 yo CF with PMHx schizophrenia, bipolar presents by police with uncertain complaints. Pt complains of primarily auditory hallucinations that are telling her to buy stuff. No known SI, though pt states she \"doesn't know how she feels. \"  Examination unremarkable. Will medically clear for evaluation. 10:23 PM  +pcp. UA suggestive of uti, will treat empirically. Work-up otherwise unremarkable. Pt is medically cleared. 12:33 AM  Pt seen and evaluate by psych, who recommends hospitalization. Placement is pending. 5:38 AM  Care to be transferred to Dr. Carissa Banks at end of shift, pending placement. Amount and/or Complexity of Data Reviewed  Clinical lab tests: ordered and reviewed  Review and summarize past medical records: yes  Discuss the patient with other providers: yes    Patient Progress  Patient progress: stable         Procedures    PROGRESS NOTES    9:42 PM:   Aylin Patel MD arrives to the bedside to evaluate the patient. Answered the patient's questions regarding the treatment plan.       CONSULTATIONS  None      MEDICATIONS ORDERED  Medications   cephALEXin (KEFLEX) capsule 500 mg (500 mg Oral Given 7/20/20 0016)       RADIOLOGY INTERPRETATIONS  No orders to display       EKG READINGS/LABORATORY RESULTS  Recent Results (from the past 12 hour(s))   CBC WITH AUTOMATED DIFF    Collection Time: 07/19/20  9:42 PM   Result Value Ref Range    WBC 8.3 4.6 - 13.2 K/uL    RBC 4.39 4.20 - 5.30 M/uL    HGB 13.4 12.0 - 16.0 g/dL    HCT 39.3 35.0 - 45.0 %    MCV 89.5 74.0 - 97.0 FL    MCH 30.5 24.0 - 34.0 PG    MCHC 34.1 31.0 - 37.0 g/dL    RDW 12.5 11.6 - 14.5 %    PLATELET 127 776 - 340 K/uL    MPV 10.1 9.2 - 11.8 FL    NEUTROPHILS 66 40 - 73 %    LYMPHOCYTES 26 21 - 52 %    MONOCYTES 8 3 - 10 %    EOSINOPHILS 0 0 - 5 %    BASOPHILS 0 0 - 2 %    ABS. NEUTROPHILS 5.5 1.8 - 8.0 K/UL    ABS. LYMPHOCYTES 2.2 0.9 - 3.6 K/UL    ABS. MONOCYTES 0.6 0.05 - 1.2 K/UL    ABS. EOSINOPHILS 0.0 0.0 - 0.4 K/UL    ABS. BASOPHILS 0.0 0.0 - 0.1 K/UL    DF AUTOMATED     METABOLIC PANEL, COMPREHENSIVE    Collection Time: 07/19/20  9:42 PM   Result Value Ref Range    Sodium 142 136 - 145 mmol/L    Potassium 3.5 3.5 - 5.5 mmol/L    Chloride 111 100 - 111 mmol/L    CO2 24 21 - 32 mmol/L    Anion gap 7 3.0 - 18 mmol/L    Glucose 92 74 - 99 mg/dL    BUN 5 (L) 7.0 - 18 MG/DL    Creatinine 0.66 0.6 - 1.3 MG/DL    BUN/Creatinine ratio 8 (L) 12 - 20      GFR est AA >60 >60 ml/min/1.73m2    GFR est non-AA >60 >60 ml/min/1.73m2    Calcium 8.8 8.5 - 10.1 MG/DL    Bilirubin, total 0.6 0.2 - 1.0 MG/DL    ALT (SGPT) 15 13 - 56 U/L    AST (SGOT) 10 10 - 38 U/L    Alk.  phosphatase 62 45 - 117 U/L    Protein, total 7.4 6.4 - 8.2 g/dL    Albumin 4.1 3.4 - 5.0 g/dL    Globulin 3.3 2.0 - 4.0 g/dL    A-G Ratio 1.2 0.8 - 1.7     ETHYL ALCOHOL    Collection Time: 07/19/20  9:42 PM   Result Value Ref Range    ALCOHOL(ETHYL),SERUM <3 0 - 3 MG/DL   DRUG SCREEN, URINE    Collection Time: 07/19/20  9:45 PM   Result Value Ref Range    BENZODIAZEPINES Negative NEG      BARBITURATES Negative NEG      THC (TH-CANNABINOL) Negative NEG      OPIATES Negative NEG      PCP(PHENCYCLIDINE) Positive (A) NEG      COCAINE Negative NEG      AMPHETAMINES Negative NEG      METHADONE Negative NEG      HDSCOM (NOTE)    HCG URINE, QL    Collection Time: 07/19/20  9:45 PM   Result Value Ref Range    HCG urine, QL Negative NEG     URINALYSIS W/ RFLX MICROSCOPIC    Collection Time: 07/19/20  9:45 PM   Result Value Ref Range    Color YELLOW      Appearance CLOUDY      Specific gravity 1.012 1.005 - 1.030      pH (UA) 6.5 5.0 - 8.0      Protein Negative NEG mg/dL    Glucose Negative NEG mg/dL    Ketone TRACE (A) NEG mg/dL    Bilirubin Negative NEG      Blood Negative NEG      Urobilinogen 1.0 0.2 - 1.0 EU/dL    Nitrites Negative NEG      Leukocyte Esterase MODERATE (A) NEG     URINE MICROSCOPIC ONLY    Collection Time: 07/19/20  9:45 PM   Result Value Ref Range    WBC 11 to 20 0 - 4 /hpf    RBC 0 to 3 0 - 5 /hpf    Epithelial cells 3+ 0 - 5 /lpf    Bacteria 2+ (A) NEG /hpf    Mucus 2+ (A) NEG /lpf    Yeast w/hyphae 1+ (A) NEG       ED DIAGNOSIS & DISPOSITION INFORMATION  Diagnosis:   1. Auditory hallucinations    2. History of schizophrenia        Disposition: Transfer (pending)    Follow-up Information    None         Patient's Medications   Start Taking    No medications on file   Continue Taking    No medications on file   These Medications have changed    No medications on file   Stop Taking    HALOPERIDOL (HALDOL) 5 MG TABLET    Take 1 Tab by mouth nightly. Indications: schizophrenia    TRAZODONE (DESYREL) 50 MG TABLET    Take 1 Tab by mouth nightly as needed for Sleep.  Indications: insomnia associated with depression           Ivelisse Brumfield MD.

## 2020-07-20 NOTE — ED NOTES
Note:  Assuming care of patient   from leaving provider    6:46 AM  Eloise MAI MD, assumed care of patient from another provider who is ending their shift in the emergency department . 6:46 AM    Date: 2020  Patient Name: Michele Vela    History of Presenting Illness     Chief Complaint   Patient presents with    Mental Health Problem    Suicidal       Nursing notes regarding the HPI and triage nursing notes were reviewed. Prior medical records were reviewed. Past History     Past Medical History:  Past Medical History:   Diagnosis Date    Alcoholic ([address]     Sober 3 months; Weekly AAA meeting; Had substance abuse counseling;     Anemia     Bipolar disorder ([address]     Cirrhosis ([address]     Past not current issue per patient    ETOH abuse     GERD (gastroesophageal reflux disease)     Head injury     Marijuana abuse     Phobia     [place] [facility]    Post partum depression     Pregnancy     Psychiatric disorder     Psychotic disorder [facility]     Depression/  Vicente Chino NP; Bipolar disorder, mood swings.  Schizophrenia ([facility]     Stroke [facility])        Past Surgical History:  Past Surgical History:   Procedure Laterality Date    HX  SECTION      HX  SECTION      C section x 2;   &     HX RHINOPLASTY      HX TUBAL LIGATION  2017       Family History:  Family History   Family history unknown: Yes       Social History:  Social History     Tobacco Use    Smoking status: Current Every Day Smoker     Packs/day: 0.50     Years: 15.00     Pack years: 7.50    Smokeless tobacco: Former User   Substance Use Topics    Alcohol use: Yes     Comment: today    Drug use: Not Currently     Types: Other     Comment: Alcohol       Allergies:   Allergies   Allergen Reactions    Robitussin [Guaifenesin] Nausea and Vomiting       Patient's primary care provider (as noted in EPIC):  PAU Barrientos    Abnormal lab results from this emergency department encounter:  Labs Reviewed   METABOLIC PANEL, COMPREHENSIVE - Abnormal; Notable for the following components:       Result Value    BUN 5 (*)     BUN/Creatinine ratio 8 (*)     All other components within normal limits   DRUG SCREEN, URINE - Abnormal; Notable for the following components:    PCP(PHENCYCLIDINE) Positive (*)     All other components within normal limits   URINALYSIS W/ RFLX MICROSCOPIC - Abnormal; Notable for the following components:    Ketone TRACE (*)     Leukocyte Esterase MODERATE (*)     All other components within normal limits   URINE MICROSCOPIC ONLY - Abnormal; Notable for the following components:    Bacteria 2+ (*)     Mucus 2+ (*)     Yeast w/hyphae 1+ (*)     All other components within normal limits   CBC WITH AUTOMATED DIFF   ETHYL ALCOHOL   HCG URINE, QL       Lab values for this patient within approximately the last 12 hours:  Recent Results (from the past 12 hour(s))   CBC WITH AUTOMATED DIFF    Collection Time: 07/19/20  9:42 PM   Result Value Ref Range    WBC 8.3 4.6 - 13.2 K/uL    RBC 4.39 4.20 - 5.30 M/uL    HGB 13.4 12.0 - 16.0 g/dL    HCT 39.3 35.0 - 45.0 %    MCV 89.5 74.0 - 97.0 FL    MCH 30.5 24.0 - 34.0 PG    MCHC 34.1 31.0 - 37.0 g/dL    RDW 12.5 11.6 - 14.5 %    PLATELET 636 631 - 090 K/uL    MPV 10.1 9.2 - 11.8 FL    NEUTROPHILS 66 40 - 73 %    LYMPHOCYTES 26 21 - 52 %    MONOCYTES 8 3 - 10 %    EOSINOPHILS 0 0 - 5 %    BASOPHILS 0 0 - 2 %    ABS. NEUTROPHILS 5.5 1.8 - 8.0 K/UL    ABS. LYMPHOCYTES 2.2 0.9 - 3.6 K/UL    ABS. MONOCYTES 0.6 0.05 - 1.2 K/UL    ABS. EOSINOPHILS 0.0 0.0 - 0.4 K/UL    ABS.  BASOPHILS 0.0 0.0 - 0.1 K/UL    DF AUTOMATED     METABOLIC PANEL, COMPREHENSIVE    Collection Time: 07/19/20  9:42 PM   Result Value Ref Range    Sodium 142 136 - 145 mmol/L    Potassium 3.5 3.5 - 5.5 mmol/L    Chloride 111 100 - 111 mmol/L    CO2 24 21 - 32 mmol/L    Anion gap 7 3.0 - 18 mmol/L    Glucose 92 74 - 99 mg/dL    BUN 5 (L) 7.0 - 18 MG/DL    Creatinine 0. 66 0.6 - 1.3 MG/DL    BUN/Creatinine ratio 8 (L) 12 - 20      GFR est AA >60 >60 ml/min/1.73m2    GFR est non-AA >60 >60 ml/min/1.73m2    Calcium 8.8 8.5 - 10.1 MG/DL    Bilirubin, total 0.6 0.2 - 1.0 MG/DL    ALT (SGPT) 15 13 - 56 U/L    AST (SGOT) 10 10 - 38 U/L    Alk. phosphatase 62 45 - 117 U/L    Protein, total 7.4 6.4 - 8.2 g/dL    Albumin 4.1 3.4 - 5.0 g/dL    Globulin 3.3 2.0 - 4.0 g/dL    A-G Ratio 1.2 0.8 - 1.7     ETHYL ALCOHOL    Collection Time: 07/19/20  9:42 PM   Result Value Ref Range    ALCOHOL(ETHYL),SERUM <3 0 - 3 MG/DL   DRUG SCREEN, URINE    Collection Time: 07/19/20  9:45 PM   Result Value Ref Range    BENZODIAZEPINES Negative NEG      BARBITURATES Negative NEG      THC (TH-CANNABINOL) Negative NEG      OPIATES Negative NEG      PCP(PHENCYCLIDINE) Positive (A) NEG      COCAINE Negative NEG      AMPHETAMINES Negative NEG      METHADONE Negative NEG      HDSCOM (NOTE)    HCG URINE, QL    Collection Time: 07/19/20  9:45 PM   Result Value Ref Range    HCG urine, QL Negative NEG     URINALYSIS W/ RFLX MICROSCOPIC    Collection Time: 07/19/20  9:45 PM   Result Value Ref Range    Color YELLOW      Appearance CLOUDY      Specific gravity 1.012 1.005 - 1.030      pH (UA) 6.5 5.0 - 8.0      Protein Negative NEG mg/dL    Glucose Negative NEG mg/dL    Ketone TRACE (A) NEG mg/dL    Bilirubin Negative NEG      Blood Negative NEG      Urobilinogen 1.0 0.2 - 1.0 EU/dL    Nitrites Negative NEG      Leukocyte Esterase MODERATE (A) NEG     URINE MICROSCOPIC ONLY    Collection Time: 07/19/20  9:45 PM   Result Value Ref Range    WBC 11 to 20 0 - 4 /hpf    RBC 0 to 3 0 - 5 /hpf    Epithelial cells 3+ 0 - 5 /lpf    Bacteria 2+ (A) NEG /hpf    Mucus 2+ (A) NEG /lpf    Yeast w/hyphae 1+ (A) NEG       Radiologist and cardiologist interpretations if available at time of this note:  Radiology results:  No results found.       Medication(s) ordered for patient during this emergency visit encounter:  Medications cephALEXin (KEFLEX) capsule 500 mg (500 mg Oral Given 7/20/20 0016)       Pt care assumed from Dr. Aba Du , ED provider. Pt complaint(s), current treatment plan, progression and available diagnostic results have been discussed thoroughly. Rounding occurred: no  Intended Disposition: Transfer. Pending diagnostic reports and/or labs (please list): [facility] case management transfer of a voluntary psychosis patient with suicidal ideations to an inpatient behavioral medicine facility. Signout Note      Pt care transferred to Dr. Uvaldo Frost provider. History of patient complaint(s), available diagnostic reports and current treatment plan has been discussed thoroughly. Bedside rounding on patient occured : no . Intended disposition of patient : Transfer. Pending diagnostics reports and/or labs (please list): [facility]. Dictation disclaimer:  Please note that this dictation was completed with [email or web address], the computer voice recognition software. Quite often unanticipated grammatical, syntax, homophones, and other interpretive errors are inadvertently transcribed by the computer software. Please disregard these errors. Please excuse any errors that have escaped final proofreading. Coding Diagnoses     Clinical Impression:   1. Auditory hallucinations    2. History of schizophrenia        Disposition     Disposition: Transfer. Harry Vasquez M.D.   FRIEDA Board Certified Emergency Physician

## 2020-07-20 NOTE — PROGRESS NOTES
Praveena Records from [facility] 3 times and has not reviewed pt information. [facility] and [facility] are full. [facility] is full but may have discharges after 1430. Will call and check later. [facility] at [facility]. Spoke with Paticia Nissen from [facility] and has not reviewed chart. She states been busy in there ED and also TDOs from [place] [id number].

## 2020-07-20 NOTE — ED NOTES
Patient received a turnover pending transfer she is voluntary.     Accepted at [facility] - Dr Crescencio Cortez

## 2020-07-20 NOTE — PROGRESS NOTES
Spoke with Luciano Tolliver from [facility] and referred pt. [facility] and not accepting new pt currently.

## 2020-07-20 NOTE — PROGRESS NOTES
Received a call from Ned Aparicio from [facility] and states that Dr Gil Trujillo accepted pt. Pt is going to Room 104 Bed 1. Nursing to call report to [phone]. Room and staff will be ready to accept pt at 1700. Vianca Bee,  will arrange transport. ED MD, charge nurse and pt made aware.

## 2020-07-21 PROBLEM — F39 EPISODIC MOOD DISORDER (HCC): Status: RESOLVED | Noted: 2017-08-16 | Resolved: 2020-07-21

## 2020-07-21 PROCEDURE — 65220000003 HC RM SEMIPRIVATE PSYCH

## 2020-07-21 PROCEDURE — 74011250637 HC RX REV CODE- 250/637: Performed by: PSYCHIATRY & NEUROLOGY

## 2020-07-21 RX ORDER — HALOPERIDOL 5 MG/1
5 TABLET ORAL
Status: DISCONTINUED | OUTPATIENT
Start: 2020-07-21 | End: 2020-07-29 | Stop reason: HOSPADM

## 2020-07-21 RX ORDER — DM/P-EPHED/ACETAMINOPH/DOXYLAM 30-7.5/3
2 LIQUID (ML) ORAL
Status: DISCONTINUED | OUTPATIENT
Start: 2020-07-21 | End: 2020-07-29 | Stop reason: HOSPADM

## 2020-07-21 RX ADMIN — HALOPERIDOL 5 MG: 5 TABLET ORAL at 20:07

## 2020-07-21 RX ADMIN — ARIPIPRAZOLE 30 MG: 15 TABLET ORAL at 08:16

## 2020-07-21 NOTE — H&P
[address] HISTORY AND PHYSICAL    Name:  Joo Beavers  MR#:   [id number]  :  1990  ACCOUNT #:  [de-identified]  ADMIT DATE:  2020    IDENTIFYING DATA:  The patient is a 26-year-old  white female, resident of [place], [place], who he is covered by [employer]. BASIS FOR ADMISSION:  The patient is once again admitted in referral from [facility] Emergency Room after presenting there after being out of [facility] only one and a half days. She was saying that she had schizophrenia and thought that the world was forcing thoughts into her and controlling her. She thought she was being told to buy certain substances like 5-hour Energy drinks. She felt like someone else was living through her. The patient had just been discharged from a 10-day hospitalization at [facility]. She thought they had placed her on only aripiprazole 30 mg daily. [facility].  She said she was uncertain if they were actually checking on her or not, and she had only been out of the hospital but briefly. She had been placed on Aristada injections while she was there on her prior admission, and they apparently asked her to continue these and she refused to do so. The patient is well known to this and multiple other facilities from multiple past admissions going back to year . She has been in and out of the hospital repeatedly over the past 2 years. This is her fourth admission to this facility in . She generally does not wish to come into hospital and refuses to take medications and has generally required both involuntary commitments and judicial authorizations. She will sign into hospital, then refuse to be treated. She was here most recently on 2020 through 2020.   At that time also, she had just gotten out of the 7-day hospitalization at [facility]. She previously had been on Abilify Maintena, then had been on Haldol and Pristiq. She was followed by Dr. Robbin Alcantara at the [facility] team in [place]. She has had repeated stays also to [facility], [facility], several hospitals in [address] [facility]. At times in the past, she had been on Depakote, clozapine and Pristiq with extremely poor compliance. We had tried to get her to the [facility] on several occasions and the state is uncooperative with this, never allowing her to go there. She had been placed on Trileptal with absolutely no response whatsoever to this. She had been on [place] 156 mg every 4 weeks and chlorpromazine 50 to 100 mg at bedtime. At the time of our most recent admission, she was able to be stabilized with the Aristada injection, haloperidol 5 mg at bedtime, trazodone 50 mg at bedtime as needed for sleep. Every time that we have seen her, it has been necessary for her to be on both a typical and an atypical antipsychotic in order to be stabilized. MEDICAL HISTORY:  Shows history of gastroesophageal reflux disease, and time to time, the patient has required medicine for this. She has also had intermittent hypertension, being on metoprolol 25 mg twice a day, but it had been discontinued when blood pressure returned to normal.  She had a bilateral tubal ligation two and a half years ago. ALLERGIES:  SHE IS ALLERGIC TO ROBITUSSIN. LABORATORY TESTING:  Done in the emergency room included a normal CBC; urinalysis with trace ketones, moderate leukocyte esterase, 11-20 wbc's, specific gravity 1.012, 2+ bacteria and yeast.  She had normal CMP, negative hCG. Urine drug screen positive for PCP, though she had taken some cold medications.   She had recent negative rapid COVID test.    SUBSTANCE ABUSE HISTORY:  She does smoke cigarettes, between half a pack a day and sometimes less.  She denied drug or alcohol abuse. FAMILY HISTORY AND SOCIAL HISTORY:  She previously said that she did not have family history of psychiatric illness. She had been  from  and had no contacts with him after divorce. She has two children that were adopted to other people because of her psychotic illness. She does not see them. She denied any support from family. MENTAL STATUS EXAMINATION:  Revealed her to be an alert, oriented, sad white female. Psychomotor activity was decreased. Mood was unhappy with a near tearful, very blunted and odd affect. Thought processing was concrete and simplistic. She endorsed auditory and visual hallucinations, paranoid and persecutory ideas, ideas of thought insertion and withdrawal, and ideas of reference. She said she was having thoughts of suicide with command auditory hallucinations telling her to hurt herself, including to cut her jugular vein. She denied homicidal ideas. Insight and judgment were nil. ASSESSMENT:  AXIS I:  Schizophrenia. Nicotine use disorder, mild. AXIS II:  None. AXIS III:  Gastroesophageal reflux disease. TREATMENT PLAN:  This patient is admitted and has been admitted voluntarily by Dr. Olga Lidia Kim. Unfortunately, she has a history of checking into the hospitals and then refusing to cooperate with treatment, then requiring TDOs and commitments with judicial authorizations. At this point, she does agree to take oral medicines and refuses to take the long-term injectable antipsychotic medicines.   We had recommended those in the past because of her poor history of compliance when she gets out of the hospital.  It is also impossible to ever get her to the [facility] T[facility], so we would have to make plans to make ensure she is going back to outpatient followup with a PACT team.  We will continue with individual, group and milieu therapies, art and recreation therapy, case management services, social work services. ESTIMATED LENGTH OF STAY:  7 days. ANTICIPATED DISPOSITION:  Referral once again back to the PACT team in [place]. PROGNOSIS:  Guarded in light of her noncompliance with treatment.       MD CAMRYN Nick [id number]  D:  07/21/2020 12:20  T:  07/21/2020 14:56  JOB #:  [id number]

## 2020-07-21 NOTE — GROUP NOTE
[facility]  GROUP DOCUMENTATION INDIVIDUAL                                                                          Group Therapy Note    Date: 7/21/2020    Group Start Time: 3909  Group End Time: 1400  Group Topic: Nursing    [address] SPECIAL TRTMT Karly Urbano, RN    IP [address] GROUP DOCUMENTATION GROUP    Group Therapy Note    Attendees: 4         Attendance: Attended    Patient's Goal:  Coping Skills worksheet    Interventions/techniques: Informed    Follows Directions:  Followed directions    Interactions: Interacted appropriately    Mental Status: Calm    Behavior/appearance: Cooperative    Goals Achieved: Able to engage in interactions and Able to listen to others          Aruna Martinez RN

## 2020-07-21 NOTE — BSMART NOTE
BH Biopsychosocial Assessment    Current Level of Psychosocial Functioning     [x]Independent  []Dependent  []Minimal Assist      Comments:      Psychosocial High Risk Factors (check all that apply)      []Unable to obtain meds                                                               [x]Chronic illness/pain    []Substance abuse   [x]Lack of Family Support   []Financial stress   []Isolation   []Inadequate Community Resources  []Suicide attempt(s)  [x]Not taking medications   []Victim of crime   []Developmental Delay  []Unable to manage personal needs    []Age 72 or older   []  Homeless  []Rimma transportation   [x]Readmission within 30 days  []Unemployment  []Traumatic Event      Psychiatric Advanced Directive: None     Family to involve in treatment: None. Pt. Has supportive services with the [facility] team     Sexual Orientation:  Heterosexual     Patient Strengths: Pt. Is willing to participate in her treatment. Patient Barriers:   Pt. Has history of medication non compliance. Opiate education provided: pt. Denies use     Safety plan: SW discussed safety plan. Pt. contracts for safety at this time. CMHC/ history: Please refer to psychiatrist and nurse practitioner's history and physical assessment  AXIS I:  Schizophrenia. Nicotine use disorder, moderate. AXIS II:  None. AXIS III:  Gastroesophageal reflux disease. Plan of Care: ARUN encouraged pt to participate in the following activities as apart of the pt.'s treatment: medication management, group/individual therapies, family meetings, psycho -education, treatment team meetings to assist with stabilization     Initial Discharge Plan:  Pt.'s length of stay has not been determined at this time. Clinical Summary:    Pt.is a 27year old female with history of Schizophrenia paranoid type, Nicotine Dependence and DID.   Pt. has been hospitalized several times this year (multiple admissions at this facility). Pt. is a [facility] PACT client. Pt. was admitted to this facility for having ideations to harm self and hallucincations. SW met with pt. to discuss dc planning.  I was just dc form [facility] on 7/17/20. Yoly Valladares I have been hospitalized their x 2 since I left this facility in 3/20. Pt. expressed to being compliant with medications but states she continues to have thoughts of not being okay. I have no life. I just sit and stare all day. I dont watch television nor listen to music I just feel sick. I want to be able to go back to doing normal things. I have not been able to have a normal life in so long. SW discussed posietv coping skills. SW discussed safety plan and engaged pt. with reality orientation. Pt appears depressed, has thought blocking, paraniod and impaired insight. SW will contact [facility] team for collateral.  SW will assist the pt. with dc planning.      MADDIE KimR

## 2020-07-21 NOTE — BH NOTES
Patient was sitting in Dayroom upon Staff arrival on unit this morning. Patient cooperated with Staff during shift today. Patient Attended and Participated in Group Sessions today during shift today. Patient somewhat interacted with Staff and Peers today during shift. Patient did Not exhibit any defiant/aggressive/negative behavior during shift today. Staff will continue to monitor Patient for safety, behavior, and location.

## 2020-07-21 NOTE — PROGRESS NOTES
Problem: Psychosis  Goal: *STG: Decreased hallucinations  Description: AEB pt having decreased hallucinations daily while in the hospital  Outcome: Progressing Towards Goal  Goal: *STG: Decreased delusional thinking  Description: AEB pt having decreased delusional thinking daily while in the hospital  Outcome: Progressing Towards Goal  Goal: *STG: Participates in individual and group therapy  Description: AEB pt attending 3 groups and all 1:1 sessions daily while in the hospital.  Outcome: Progressing Towards Goal

## 2020-07-21 NOTE — H&P
Psychiatry History and Physical    Subjective:     Date of Evaluation:  2020    Reason for Referral:  Preethi Arthur was referred to the examiners from [facility] ED for SI/hallucinations. History of Presenting Problem: Taye Bellamy is a 26 yo WF with PMH schizophrenia, depression, GERD who was admitted from [facility] ED for SI and hallucinations. She reports SI, but no plan. She reports hearing voices telling her to do things. She denies HI. EtOH negative, tox screen positive PCP, COVID negative from 2020. Patient Active Problem List    Diagnosis Date Noted    GERD (gastroesophageal reflux disease) 2020    Recurrent depression ([address] 2018    Cigarette nicotine dependence without complication     Schizophrenia ([address] 2017     Past Medical History:   Diagnosis Date    Alcoholic ([address]     Sober 3 months; Weekly AAA meeting; Had substance abuse counseling;     Anemia     Bipolar disorder ([address]     Cirrhosis ([address]     Past not current issue per patient    ETOH abuse     GERD (gastroesophageal reflux disease)     Head injury     Marijuana abuse     Phobia     [place] [employer]    Post partum depression     Pregnancy     Psychiatric disorder     Psychotic disorder ([address]     Depression/  [place] NP; Bipolar disorder, mood swings.     Schizophrenia ([address]     Stroke [facility])      Past Surgical History:   Procedure Laterality Date    HX  SECTION      HX  SECTION      C section x 2;   &     HX RHINOPLASTY      HX TUBAL LIGATION  2017       Family History   Family history unknown: Yes      Social History     Tobacco Use    Smoking status: Current Every Day Smoker     Packs/day: 0.50     Years: 15.00     Pack years: 7.50    Smokeless tobacco: Former User   Substance Use Topics    Alcohol use: Yes     Comment: today     Prior to Admission medications    Not on File     Allergies   Allergen Reactions    Robitussin [Guaifenesin] Nausea and Vomiting        Review of Systems - History obtained from chart review and the patient  General ROS: negative  Psychological ROS: positive for - depression, hallucinations and suicidal ideation  Ophthalmic ROS: negative  ENT ROS: negative  Respiratory ROS: no cough, shortness of breath, or wheezing  Cardiovascular ROS: no chest pain or dyspnea on exertion  Gastrointestinal ROS: no abdominal pain, change in bowel habits, or black or bloody stools  Musculoskeletal ROS: negative  Neurological ROS: negative  Dermatological ROS: negative      Objective:     Patient Vitals for the past 8 hrs:   BP Temp Pulse Resp   07/21/20 0749 98/60 97.2 °F (36.2 °C) 100 16       Mental Status exam: WNL except for    Sensorium  oriented to time, place and person   Orientation person, place and time/date   Relations cooperative   Eye Contact appropriate   Appearance:  within normal Limits   Motor Behavior:  within normal limits   Speech:  soft   Vocabulary average   Thought Process: blocked   Thought Content hallucinations   Suicidal ideations intention and no plan    Homicidal ideations no plan  and no intention   Mood:  stable   Affect:  blunted   Memory recent  adequate   Memory remote:  adequate   Concentration:  adequate   Abstraction:  concrete   Insight:  poor   Reliability fair   Judgment:  poor         Physical Exam:   Visit Vitals  BP 98/60 (BP 1 Location: Right arm, BP Patient Position: Sitting)   Pulse 100   Temp 97.2 °F (36.2 °C)   Resp 16     General appearance: alert, cooperative, no distress, appears stated age  Head: Normocephalic, without obvious abnormality, atraumatic  Eyes: negative  Ears: normal TM's and external ear canals AU  Nose: Nares normal. Septum midline. Mucosa normal. No drainage or sinus tenderness. Throat: Lips, mucosa, and tongue normal. Teeth and gums normal  Neck: supple, symmetrical, trachea midline, no adenopathy  Lungs: clear to auscultation bilaterally  Abdomen: soft, non-tender.  Bowel sounds normal. No masses,  no organomegaly  Extremities: extremities normal, atraumatic, no cyanosis or edema  Skin: Skin color, texture, turgor normal. No rashes or lesions  Neurologic: Grossly normal        Impression:      Principal Problem:    Schizophrenia ([facility] (4/18/2017)    Active Problems:    Cigarette nicotine dependence without complication (1/8/4460)          Plan:     Recommendations for Treatment/Conditions:  Psychiatric treatment recommended while in hospital  Admit to inpatient psych for SI/hallucinations    Referral To:    Inpatient psychiatric care        [place], [place]   7/21/2020 3:20 PM

## 2020-07-21 NOTE — BH NOTES
Dull flat sad depressed isolative quiet withdrawn stays to self in room. Interacts very little with staff and peers. Cooperative. Does attend RN group. Will continue to monitor and support.

## 2020-07-21 NOTE — BH NOTES
Voluntary pt transported from [facility] to [facility] via medical transport. Pt arrived on  STU1 on a stretcher with her belongings which were searched for contraband and inventoried. Pt presents flat, with tangential/loose thought process. Pt endorsed intermittent si and did contract for safety. Pt also endorsed auditory hallucinations telling her to harm herself. Pt denies hi and visual hallucinations. Pt was cooperative during assessment. Pt has been inpt multiple x's for psychiatric reasons recently. Pt uds positive for amphetamine.   RNS WILL INITIATE, DEVELOP, IMPLEMENT, REVIEW OR REVISE TREATMENT PLAN

## 2020-07-21 NOTE — BSMART NOTE
OCCUPATIONAL THERAPY PROGRESS NOTE    Group time:1430  Attended about 10 minutes and was called out. Appeared sleepy/tired.

## 2020-07-22 PROCEDURE — 74011250637 HC RX REV CODE- 250/637: Performed by: PSYCHIATRY & NEUROLOGY

## 2020-07-22 PROCEDURE — 65220000003 HC RM SEMIPRIVATE PSYCH

## 2020-07-22 RX ADMIN — ARIPIPRAZOLE 30 MG: 15 TABLET ORAL at 08:15

## 2020-07-22 RX ADMIN — HALOPERIDOL 5 MG: 5 TABLET ORAL at 20:30

## 2020-07-22 NOTE — BSMART NOTE
OCCUPATIONAL THERAPY PROGRESS NOTE  Group Time:  0367  Attendance: The patient attended full group. Participation:  The patient participated with minimal participation or elaboration in the activity. Attention:  The patient was able to focus on the activity. Interaction:  The patient acknowledges others or responds to questions,  with no spontaneous interaction. Responded to activity questions only when specifically asked to and not in general, seemed avoidant.

## 2020-07-22 NOTE — PROGRESS NOTES
conducted an initial consultation and Spiritual Assessment for Srinivas Hercules, who is a 27 y.o.,female. Patient's Primary Language is: [place]. According to the patient's EMR Temple Affiliation is: [employer]. The reason the Patient came to the hospital is:   Patient Active Problem List    Diagnosis Date Noted    GERD (gastroesophageal reflux disease) 06/09/2020    Recurrent depression ([facility] 04/03/2018    Cigarette nicotine dependence without complication 83/81/1311    Schizophrenia ([facility] 04/18/2017        The  provided the following Interventions:  Initiated a relationship of care and support. Explored issues of sadi, belief, spirituality and Evangelical/ritual needs while hospitalized. Listened empathically. Provided chaplaincy education. Provided information about Spiritual Care Services. Offered prayer and assurance of continued prayers on patient's behalf. Chart reviewed. The following outcomes where achieved:  Patient shared limited information about both their medical narrative and spiritual journey/beliefs.  confirmed Patient's Temple Affiliation. Patient processed feeling about current hospitalization. Patient expressed gratitude for 's visit. Assessment:  Patient does not have any Evangelical/cultural needs that will affect patient's preferences in health care. There are no spiritual or Evangelical issues which require intervention at this time. Plan:  Chaplains will continue to follow and will provide pastoral care on an as needed/requested basis.  recommends bedside caregivers page  on duty if patient shows signs of acute spiritual or emotional distress.     [address]   [phone]

## 2020-07-22 NOTE — GROUP NOTE
DARLING  GROUP DOCUMENTATION INDIVIDUAL                                                                          Group Therapy Note    Date: 7/22/2020    Group Start Time: 0830  Group End Time: 0845  Group Topic: Nursing    [address] SPECIAL TRTMT Karly Urbano, RN    IP [address] [facility] GROUP DOCUMENTATION GROUP    Group Therapy Note    Attendees: 4         Attendance: Attended    Patient's Goal:  Medication group    Interventions/techniques: Informed    Follows Directions:  Followed directions    Interactions: Interacted appropriately    Mental Status: Calm    Behavior/appearance: Cooperative    Goals Achieved: Able to engage in interactions and Able to listen to others        Tho Garibay RN

## 2020-07-22 NOTE — BSMART NOTE
Pt.is a 27year old female with history of Schizophrenia paranoid type, Nicotine Dependence and DID. Pt. has been hospitalized several times this year (multiple admissions at this facility). Pt. is a [facility] PACT client. Pt. was admitted to this facility for having ideations to harm self and hallucinations. Pt.'s case was discussed in staffing this am. Pt. continues to face challenges with remaining stable in the community Pt. Has been hospitalized several times this year at this facility. Pt. Boles appears depressed and has thought blocking. SW Contact: SW met with pt to discuss dc planning. Pt. Expressed she had issues with sleep. \" I just been having a hard time trying to talk\". \" My words have been jumbled\" . ' I have not been able to get them out\". Pt. Ricky Saldivarey she to having auditory hallucinations telling her she is not safe. The pt expressed feeling overwhelmed and frustrated. SW provided support and encouraged positive self talk. The pt. Appeared anxious and has poor insight. SW will continue to engage pt with talk therapy.        Raquel Salomon, TIMOTEOHP_R

## 2020-07-22 NOTE — PROGRESS NOTES
Problem: Psychosis  Goal: *STG: Decreased hallucinations  Description: AEB pt having decreased hallucinations daily while in the hospital  Outcome: Progressing Towards Goal  Goal: *STG: Decreased delusional thinking  Description: AEB pt having decreased delusional thinking daily while in the hospital  Outcome: Progressing Towards Goal  Goal: *STG: Remains safe in hospital  Description: AEB pt not harming self or others and mely for safety daily while in the hospital.  Outcome: Progressing Towards:  Patient lying in the bed with covers pulled over her heard ,when asked if she was hearing voices , she replied \" I don't know how to answer that\". She is compliant with her medications. Attending select groups.

## 2020-07-22 NOTE — BH NOTES
Pt has been withdrawn to her room for most of the shift, she did come out to eat dinner. Pt had no c/o of si/hi. Pt was encouraged to engage more in her tx plan.   Will continue to support

## 2020-07-22 NOTE — PROGRESS NOTES
Behavioral Health Progress Note    Admit Date: 7/20/2020  Hospital day 2    Vitals :   Patient Vitals for the past 8 hrs:   BP Temp Pulse Resp   07/22/20 0745 109/69 97.2 °F (36.2 °C) (!) 104 16     Labs:  No results found for this or any previous visit (from the past 24 hour(s)). Meds:   Current Facility-Administered Medications   Medication Dose Route Frequency    haloperidoL (HALDOL) tablet 5 mg  5 mg Oral QHS    nicotine buccal (POLACRILEX) lozenge 2 mg  2 mg Oral Q2H PRN    haloperidol lactate (HALDOL) injection 5 mg  5 mg IntraMUSCular Q6H PRN    haloperidoL (HALDOL) tablet 5 mg  5 mg Oral Q6H PRN    traZODone (DESYREL) tablet 50 mg  50 mg Oral QHS PRN    hydrOXYzine pamoate (VISTARIL) capsule 50 mg  50 mg Oral Q4H PRN    benztropine (COGENTIN) tablet 1 mg  1 mg Oral Q6H PRN    benztropine (COGENTIN) injection 1 mg  1 mg IntraMUSCular Q6H PRN    ARIPiprazole (ABILIFY) tablet 30 mg  30 mg Oral DAILY    acetaminophen (TYLENOL) tablet 650 mg  650 mg Oral Q6H PRN      Hospital Problems: Principal Problem:    Schizophrenia ([address] (4/18/2017)    Active Problems:    Cigarette nicotine dependence without complication (1/0/5984)        Subjective:   Medication side effects: none  none    Mental Status Exam  Sensorium: alert  Orientation: only aware of  time, place and person  Relations: guarded  Eye Contact: poor  Appearance: is unkempt  Thought Process: normal rate of thoughts and poor abstract reasoning/computation   Thought Content: delusions, paranoia and halluc   Suicidal: denies   Homicidal: none   Mood: is anxious and is sad   Affect: odd, constricted  Memory: shows no evidence of impairment     Concentration: distractable  Abstraction: concrete  Insight: The patient shows little insight    OR Poor  Judgement: is psychologically impaired OR  Poor    Assessment/Plan:   not changed  Nurses report that she spends much of her time in her room.   She has taken her medicines but says that they are making her feel sleepy. She endorses continued auditory hallucination saying that she was hearing both commands and a woman's voice saying things are controlling her. Voices were telling her that she could not protect herself. That is where the same voices that made her call 911. She was tearful. She endorses the fact that she had been told that she had a urinary tract infection. She did have 11-20 WBCs but does not been having any type of pain. I did order a urine culture. She continues with antipsychotic medication and taking the Haldol at night and the Abilify during the daytime. We will continue with reality orientation.   Continue close observation

## 2020-07-23 PROCEDURE — 74011250637 HC RX REV CODE- 250/637: Performed by: PSYCHIATRY & NEUROLOGY

## 2020-07-23 PROCEDURE — 65220000003 HC RM SEMIPRIVATE PSYCH

## 2020-07-23 PROCEDURE — 87086 URINE CULTURE/COLONY COUNT: CPT

## 2020-07-23 RX ADMIN — HALOPERIDOL 5 MG: 5 TABLET ORAL at 20:03

## 2020-07-23 RX ADMIN — ARIPIPRAZOLE 30 MG: 15 TABLET ORAL at 08:07

## 2020-07-23 NOTE — GROUP NOTE
DARLING  GROUP DOCUMENTATION INDIVIDUAL                                                                          Group Therapy Note    Date: 7/23/2020    Group Start Time: 5208  Group End Time: 6608  Group Topic: Nursing    [facility] 1 SPECIAL TRTMT Karly [address], RN    IP [address] [facility] GROUP DOCUMENTATION GROUP    Group Therapy Note    Attendees: 3         Attendance: Attended    Patient's Goal:  Positive Coping skills worksheet    Interventions/techniques: Informed    Follows Directions:  Followed directions    Interactions: Patient stayed for 5 minutes    Mental Status: Calm    Behavior/appearance: Cooperative    Goals Achieved: Able to listen to others          Nancy Peguero RN

## 2020-07-23 NOTE — PROGRESS NOTES
Behavioral Health Progress Note    Admit Date: 7/20/2020  Hospital day 3    Vitals : No data found. Labs:  No results found for this or any previous visit (from the past 24 hour(s)). Meds:   Current Facility-Administered Medications   Medication Dose Route Frequency    haloperidoL (HALDOL) tablet 5 mg  5 mg Oral QHS    nicotine buccal (POLACRILEX) lozenge 2 mg  2 mg Oral Q2H PRN    haloperidol lactate (HALDOL) injection 5 mg  5 mg IntraMUSCular Q6H PRN    haloperidoL (HALDOL) tablet 5 mg  5 mg Oral Q6H PRN    traZODone (DESYREL) tablet 50 mg  50 mg Oral QHS PRN    hydrOXYzine pamoate (VISTARIL) capsule 50 mg  50 mg Oral Q4H PRN    benztropine (COGENTIN) tablet 1 mg  1 mg Oral Q6H PRN    benztropine (COGENTIN) injection 1 mg  1 mg IntraMUSCular Q6H PRN    ARIPiprazole (ABILIFY) tablet 30 mg  30 mg Oral DAILY    acetaminophen (TYLENOL) tablet 650 mg  650 mg Oral Q6H PRN      Hospital Problems: Principal Problem:    Schizophrenia ([facility] (4/18/2017)    Active Problems:    Cigarette nicotine dependence without complication (3/0/0890)        Subjective:   Medication side effects: none  none    Mental Status Exam  Sensorium: alert  Orientation: only aware of  place and person  Relations: guarded and unreliable  Eye Contact: poor  Appearance: is unkempt  Thought Process: slow rate of thoughts and poor abstract reasoning/computation   Thought Content: delusions, ideas of reference and obsessions    Suicidal: denies   Homicidal: none   Mood: is depressed   Affect: flat  Memory: is impaired, is recent and is remote     Concentration: impaired  Abstraction: concrete  Insight: The patient shows little insight    OR Poor  Judgement: is psychologically impaired OR  Poor    Assessment/Plan:   not changed  Nurses report minimal interactions today. She has spent much of her time in her room but when she does come to group she does not speak much.   She tells me that she has trouble thinking and she will hear a voice that tells her that \"I have been regressing. \"  She says that she will hear bad things and curse words when she is out in public but also hears them when she is in private. She says this is been has been going on for the past 9 years. She says \"I think I have suffocated my brain. \"  She talked about seeing a brown form come out of her body. \"I feel like time is collapsing. \"  The patient continues to be psychotic. She is needing restabilization back on antipsychotics.   Continue close observation,

## 2020-07-23 NOTE — PROGRESS NOTES
Problem: Psychosis  Goal: *STG: Decreased hallucinations  Description: AEB pt having decreased hallucinations daily while in the hospital  Outcome: Progressing Towards Goal  Goal: *STG: Decreased delusional thinking  Description: AEB pt having decreased delusional thinking daily while in the hospital  Outcome: Progressing Towards Goal  Goal: *STG: Remains safe in hospital  Description: AEB pt not harming self or others and mely for safety daily while in the hospital.  Outcome: Progressing Towards Goal  Goal: *STG: Participates in individual and group therapy  Description: AEB pt attending 3 groups and all 1:1 sessions daily while in the hospital.  Outcome: Progressing Towards Goal  Goal: *STG/LTG: Complies with medication therapy  Description: AEB pt taking all scheduled medications daily while in the hospital.  Outcome: Progressing Towards Goal   Patient remains in bed most of the morning, encouraged to attend her groups. AH , denies SI. Medication compliant. Urine specimen cup given to patient.

## 2020-07-23 NOTE — BSMART NOTE
Pt.is a 27year old female with history of Schizophrenia paranoid type, Nicotine Dependence and DID.  Pt. has been hospitalized several times this year (multiple admissions at this facility).  Pt. is a [employer]. Pt. was admitted to this facility for having ideations to harm self and hallucinations.       SW Contact: SW met with pt to discuss this charge planning. Pt expressed she was feeling better. Pt denies ideation and hallucinations. Pt. is not sure as to why she has issues when she is discharged from the hospital. Th ept. States she likes living on her own. Pt states she is not interested in going to an [facility] nor a group home. SW offered solution focused therapy. SW addressed medications compliance and attending [facility] psychosocial day treatment . SW discussed how compliance  can help to promote continued stabilization outside of the hospital. SW discuss safety plan. Pt. appears to disassociate , she has impaired insight, some thought blocking and poor eye contact. SW Collateral: SW talked to [place] pt.'s PACT CM with [address] @ [phone]. Pt. Has been in and  Out of inpatient treatment several times since pt.'s last hospitalization at this facility. CM reports pt. Was going to be presented to go to [facility] however, pt. started to show improvement and was dc. The pt. was re hospitalized at this facility 2 days later. The PACT plans to wrap supports around pt. In the community.         MALCOLM Salazar_R

## 2020-07-24 LAB
BACTERIA SPEC CULT: NORMAL
SERVICE CMNT-IMP: NORMAL

## 2020-07-24 PROCEDURE — 65220000003 HC RM SEMIPRIVATE PSYCH

## 2020-07-24 PROCEDURE — 74011250637 HC RX REV CODE- 250/637: Performed by: PSYCHIATRY & NEUROLOGY

## 2020-07-24 RX ADMIN — HALOPERIDOL 5 MG: 5 TABLET ORAL at 20:12

## 2020-07-24 RX ADMIN — ARIPIPRAZOLE 30 MG: 15 TABLET ORAL at 08:06

## 2020-07-24 NOTE — BSMART NOTE
Pt.is a 35-year-old female with history of Schizophrenia paranoid type, Nicotine Dependence and DID. Pt. has been hospitalized several times this year (multiple admissions at this facility). Pt. is a [facility] PACT client. Pt. was admitted to this facility for having ideations to harm self and hallucinations. SW Contact: SW met with pt. to discuss this charge planning. I feel better. Pt. denies ideations and hallucinations. Pt. states she just feels things start to happen when she returns to her apartment. SW engaged pt. with reality orientation, positive self-talk and safety plan. SW encouraged pt to write her feeling down. Pt. Was also encouraged to attend groups. SW addressed medications compliance. Pt. Is anxious, depressed and has impaired judgement. SW will continue to assist the pt with dc planning.      Megan Viveros, AMLCOLM_R

## 2020-07-24 NOTE — BH NOTES
Pt isolative and flat. Pt has been in her room for most of the shift except for meals and snack.   Will continue to support

## 2020-07-24 NOTE — GROUP NOTE
DARLING  GROUP DOCUMENTATION INDIVIDUAL                                                                          Group Therapy Note    Date: 7/24/2020    Group Start Time: 0945  Group End Time: 1000  Group Topic: Nursing    [address] SPECIAL TRTMT Karly Urbano, RN     [facility] GROUP    Group Therapy Note    Attendees: 4         Attendance: Did not attend              Brennon Matos RN

## 2020-07-24 NOTE — PROGRESS NOTES
Problem: Psychosis  Goal: *STG: Decreased hallucinations  Description: AEB pt having decreased hallucinations daily while in the hospital  Outcome: Not Progressing Towards Goal  Goal: *STG: Decreased delusional thinking  Description: AEB pt having decreased delusional thinking daily while in the hospital  Outcome: Not Progressing Towards Goal  Goal: *STG: Remains safe in hospital  Description: AEB pt not harming self or others and mely for safety daily while in the hospital.  Outcome: Progressing Towards Goal   Patient states that she is ok she guess. Lying in bed with covers over her head. Affect is flat, mood is depressed. Auditory hallucinations, bizarre, paranoid.

## 2020-07-24 NOTE — PROGRESS NOTES
Behavioral Health Progress Note    Admit Date: 7/20/2020  Hospital day 4    Vitals :   Patient Vitals for the past 8 hrs:   BP Temp Pulse Resp   07/24/20 0738 104/64 97.7 °F (36.5 °C) 81 16     Labs:  No results found for this or any previous visit (from the past 24 hour(s)).   Meds:   Current Facility-Administered Medications   Medication Dose Route Frequency    haloperidoL (HALDOL) tablet 5 mg  5 mg Oral QHS    nicotine buccal (POLACRILEX) lozenge 2 mg  2 mg Oral Q2H PRN    haloperidol lactate (HALDOL) injection 5 mg  5 mg IntraMUSCular Q6H PRN    haloperidoL (HALDOL) tablet 5 mg  5 mg Oral Q6H PRN    traZODone (DESYREL) tablet 50 mg  50 mg Oral QHS PRN    hydrOXYzine pamoate (VISTARIL) capsule 50 mg  50 mg Oral Q4H PRN    benztropine (COGENTIN) tablet 1 mg  1 mg Oral Q6H PRN    benztropine (COGENTIN) injection 1 mg  1 mg IntraMUSCular Q6H PRN    ARIPiprazole (ABILIFY) tablet 30 mg  30 mg Oral DAILY    acetaminophen (TYLENOL) tablet 650 mg  650 mg Oral Q6H PRN      Hospital Problems: Principal Problem:    Schizophrenia ([facility] [address] (4/18/2017)    Active Problems:    Cigarette nicotine dependence without complication (7/4/2761)        Subjective:   Medication side effects: fatigue/weakness  dry mouth    Mental Status Exam  Sensorium: alert  Orientation: only aware of  place and person  Relations: guarded, passive and unreliable  Eye Contact: poor  Appearance: is unkempt  Thought Process: slow rate of thoughts and poor abstract reasoning/computation   Thought Content: delusions and paranoia auditory and visual hallucinations  Suicidal: ideas   Homicidal: none   Mood: is anxious and is sad   Affect: Constricted, strange  Memory: is impaired, is recent and is remote     Concentration: distractable  Abstraction: concrete  Insight: The patient shows little insight    OR Poor  Judgement: is psychologically impaired OR  Poor    Assessment/Plan:   not changed    Continue close observation,   Nurses report that she tends to stay in her room. She has very little interactions with others. She talks of how she feels she is under the influence of a power greater than her that wants to influence her in some negative way. She says she is having \"fear frustration and misunderstanding\" of her life and that every time she goes home she immediately begins having the same problems started up in which she has a worsening of the auditory hallucinations that never go away no matter how much medicines that she takes. She says that at home she would journal in color sometimes but had not been doing this when she went home last time and for some reason has decided not to do it here even though it helps her feel better. We continue with reality orientation. Nurses gone into her room to try one-to-one supportive care when she will not come out to group sessions. We will continue with resumption of antipsychotic medications. Sylvie Regan

## 2020-07-25 PROCEDURE — 74011250637 HC RX REV CODE- 250/637: Performed by: PSYCHIATRY & NEUROLOGY

## 2020-07-25 PROCEDURE — 65220000003 HC RM SEMIPRIVATE PSYCH

## 2020-07-25 RX ADMIN — ARIPIPRAZOLE 30 MG: 15 TABLET ORAL at 08:11

## 2020-07-25 RX ADMIN — HALOPERIDOL 5 MG: 5 TABLET ORAL at 20:03

## 2020-07-25 NOTE — PROGRESS NOTES
[address] [facility] [address]  Inpatient Progress Note     Date of Service: 07/25/20  Hospital Day: 5     Subjective/Interval History   07/25/20    Treatment Team Notes:  Notes reviewed and/or discussed and report that Carmen Mendez is a 44-year-old female with a history of schizophrenia admitted for acute exacerbation of schizophrenia. No behavioral issues reported overnight. Patient is quiet and withdrawn to her room. She does not come out of her room except for meals. She has not been attending groups. She has been compliant with her medications. Patient interview: Carmen Mendez was interviewed by this writer today. Patient reports that she is worried about her \"safety and capabilities\" and does not think that she will be able to take care of herself when she is discharged. She is frustrated with frequent hospitalizations and feels that she is controlled by something which causes her to behave in ways that will lead to hospitalizations. She endorses paranoia in that he feels something is controlling her and ruining her life. She denies auditory and visual hallucinations today. She denies suicidal ideations. She is tolerating current medication regimen. Objective     Visit Vitals  BP 95/62 (BP 1 Location: Left leg, BP Patient Position: Sitting)   Pulse 78   Temp 96.8 °F (36 °C)   Resp 16       No results found for this or any previous visit (from the past 24 hour(s)). Mental Status Examination     Appearance/Hygiene 27 y.o.  WHITE OR  female  Hygiene: Fair   Behavior/Social Relatedness Appropriate   Musculoskeletal Gait/Station: appropriate  Tone (flaccid, cogwheeling, spastic): not assessed  Psychomotor (hyperkinetic, hypokinetic): calm   Involuntary movements (tics, dyskinesias, akathisa, stereotypies): none   Speech   Rate, rhythm, volume, fluency and articulation are appropriate   Mood   \"worried\"   Affect    flat   Thought Process Linear and goal directed   Thought Content and Perceptual Disturbances Denies self-injurious behavior (SIB), suicidal ideation (SI), aggressive behavior or homicidal ideation (HI)    Denies auditory and visual hallucinations   Sensorium and Cognition  Grossly intact   Insight  limited   Judgment limited        Assessment/Plan      Psychiatric Diagnoses:   Patient Active Problem List   Diagnosis Code    Cigarette nicotine dependence without complication N06.068    Recurrent depression ([facility] [address] F33.9    Schizophrenia ([facility] [address] F20.9    GERD (gastroesophageal reflux disease) K21.9       Level of impairment/disability: Severe Dortha Rist is a 27 y.o. who is currently admitted for exacerbation of psychosis. 1.  Continue current treatment plan without changes. Encourage group attendance and participation. 2.  Disposition/Discharge Date: self-care/home, be determined.     Candelaria Winter MD DR. [facility]  Psychiatry

## 2020-07-25 NOTE — BH NOTES
Does not attend RN group. Dull flat sad depressed quiet isolative reserved. Interacts very little with staff and or peers. Stays to herself mostly in room throughout most of shift. No behavior issues. Cooperative. Will continue to monitor and support.

## 2020-07-25 NOTE — PROGRESS NOTES
Problem: Psychosis  Goal: *STG: Remains safe in hospital  Description: AEB pt not harming self or others and mely for safety daily while in the hospital.  Outcome: Progressing Towards Goal  Goal: *STG: Participates in individual and group therapy  Description: AEB pt attending 3 groups and all 1:1 sessions daily while in the hospital.  Outcome: Progressing Towards Goal  Goal: *STG/LTG: Complies with medication therapy  Description: AEB pt taking all scheduled medications daily while in the hospital.  Outcome: Progressing Towards Goal  Goal: Interventions  Outcome: Progressing Towards Goal     Problem: Falls - Risk of  Goal: *Absence of Falls  Description: Document Hortensia [place] Fall Risk and appropriate interventions in the flowsheet. Outcome: Progressing Towards Goal  Note: Fall Risk Interventions:            Medication Interventions: Teach patient to arise slowly                   Problem: Patient Education: Go to Patient Education Activity  Goal: Patient/Family Education  Outcome: Progressing Towards Goal     Problem: Suicide  Goal: *STG: Seeks staff when feelings of self harm or harm towards others arise  Description: AEB pt seeking staff as needed when feelings of self harm or harm towards others arise while in the hospital  Outcome: Progressing Towards Goal  Goal: *STG/LTG: No longer expresses self destructive or suicidal thoughts  Description: AEB pt no longer expressing self destructive or suicidal thoughts daily while in the hospital  Outcome: Progressing Towards Goal  Goal: Interventions  Outcome: Progressing Towards Goal   Pt is mostly isolating in her room. Her responses are very brief. She is guarded and preoccupied. Pt is compliant with medication. She denies any thoughts of harming self or others.

## 2020-07-25 NOTE — BH NOTES
During this period (3pm-11pm) pt has been isolative in her room for majority of the evening. Pt only leaves room for meals and medications. Pt uninterested in participating in group. Pt continues to endorse audio hallucinations at this time. Pt is able to voice needs to staff. Pt did not experience a behavioral outburst during this period. Staff will continue rounds monitoring pt for changes in behavior, location & safety.

## 2020-07-25 NOTE — GROUP NOTE
[facility]  GROUP DOCUMENTATION INDIVIDUAL                                                                          Group Therapy Note    Date: 7/24/2020    Group Start Time: 2030  Group End Time: 2045  Group Topic: Hygiene    SO [facility] 1 ADULT CHEM Lidya Arredondo, RN    [address] [facility] GROUP DOCUMENTATION GROUP    Group Therapy Note    Attendees: 3         Attendance: Did not attend    Additional Notes:  Patient chose to remain in bed during group session  Beth Short RN

## 2020-07-26 PROCEDURE — 74011250637 HC RX REV CODE- 250/637: Performed by: PSYCHIATRY & NEUROLOGY

## 2020-07-26 PROCEDURE — 65220000003 HC RM SEMIPRIVATE PSYCH

## 2020-07-26 RX ADMIN — ARIPIPRAZOLE 30 MG: 15 TABLET ORAL at 08:19

## 2020-07-26 RX ADMIN — HALOPERIDOL 5 MG: 5 TABLET ORAL at 20:40

## 2020-07-26 NOTE — BH NOTES
The patient is up in the day area. She is quiet and sitting alone. She denies that there are thoughts of harming herself or others. She denies hallucinations. Will continue to monitor and will continue to provide support.

## 2020-07-26 NOTE — PROGRESS NOTES
Problem: Psychosis  Goal: *STG: Decreased hallucinations  Description: AEB pt having decreased hallucinations daily while in the hospital  Outcome: Progressing Towards Goal  Goal: *STG: Decreased delusional thinking  Description: AEB pt having decreased delusional thinking daily while in the hospital  Outcome: Progressing Towards Goal  Goal: *STG: Remains safe in hospital  Description: AEB pt not harming self or others and mely for safety daily while in the hospital.  Outcome: Progressing Towards Goal  Goal: *STG: Participates in individual and group therapy  Description: AEB pt attending 3 groups and all 1:1 sessions daily while in the hospital.  Outcome: Progressing Towards Goal  Goal: *STG/LTG: Complies with medication therapy  Description: AEB pt taking all scheduled medications daily while in the hospital.  Outcome: Progressing Towards Goal  Goal: Interventions  Outcome: Progressing Towards Goal     Problem: Falls - Risk of  Goal: *Absence of Falls  Description: Document Hortensia Rice Fall Risk and appropriate interventions in the flowsheet. Outcome: Progressing Towards Goal  Note: Fall Risk Interventions:            Medication Interventions: Teach patient to arise slowly                   Problem: Patient Education: Go to Patient Education Activity  Goal: Patient/Family Education  Outcome: Progressing Towards Goal     Problem: Suicide  Goal: *STG: Seeks staff when feelings of self harm or harm towards others arise  Description: AEB pt seeking staff as needed when feelings of self harm or harm towards others arise while in the hospital  Outcome: Progressing Towards Goal  Goal: *STG/LTG: No longer expresses self destructive or suicidal thoughts  Description: AEB pt no longer expressing self destructive or suicidal thoughts daily while in the hospital  Outcome: Progressing Towards Goal   Pt is very quiet with minimal responses. She mostly isolates in her room. She comes out for meals and medication.  Pt denies hallucinations and any thoughts of harming self or others.

## 2020-07-26 NOTE — PROGRESS NOTES
[address] [facility] [address]  Inpatient Progress Note     Date of Service: 07/26/20  Hospital Day: 6     Subjective/Interval History   07/26/20    Treatment Team Notes:  Notes reviewed and/or discussed and report that Mychal Murphy is a 58-year-old female with a history of schizophrenia admitted for acute exacerbation of schizophrenia. No behavioral issues reported overnight. Patient is quiet and withdrawn to her room. She does not come out of her room except for meals. She has not been attending groups. She has been compliant with her medications. Patient interview: Mychal Murphy was interviewed by this writer today. Patient reports her mood is \"more positive\" today. She is still worried but not as much as yesterday. She thinks that the PACT team can help her with her medications in the outpatient setting, she may do better and be able to stay out of the hospital.  She denies auditory or visual hallucinations or suicidal ideations. She denies problems with sleep and appetite but reports low energy. Her affect is still flat and her speech is soft with low volume. She is tolerating current medication regimen. Objective     Visit Vitals  /66 (BP 1 Location: Right arm, BP Patient Position: Sitting)   Pulse 100   Temp 96.8 °F (36 °C)   Resp 16       No results found for this or any previous visit (from the past 24 hour(s)). Mental Status Examination     Appearance/Hygiene 27 y.o.  WHITE OR  female  Hygiene: Fair   Behavior/Social Relatedness Appropriate   Musculoskeletal Gait/Station: appropriate  Tone (flaccid, cogwheeling, spastic): not assessed  Psychomotor (hyperkinetic, hypokinetic): calm   Involuntary movements (tics, dyskinesias, akathisa, stereotypies): none   Speech   Rate, rhythm, volume, fluency and articulation are appropriate   Mood   euthymic   Affect    flat   Thought Process Linear and goal directed   Thought Content and Perceptual Disturbances Denies self-injurious behavior (SIB), suicidal ideation (SI), aggressive behavior or homicidal ideation (HI)    Denies auditory and visual hallucinations   Sensorium and Cognition  Grossly intact   Insight  limited   Judgment limited        Assessment/Plan      Psychiatric Diagnoses:   Patient Active Problem List   Diagnosis Code    Cigarette nicotine dependence without complication X77.754    Recurrent depression ([facility] [address] F33.9    Schizophrenia ([facility] [address] F20.9    GERD (gastroesophageal reflux disease) K21.9       Level of impairment/disability: Severe Delbra Pride is a 27 y.o. who is currently admitted for exacerbation of psychosis. 1.  Continue current treatment plan without changes. Encourage group attendance and participation. 2.  Disposition/Discharge Date: self-care/home, be determined.     Nay Greenberg MD DR. [facility]  Psychiatry

## 2020-07-27 PROCEDURE — 65220000003 HC RM SEMIPRIVATE PSYCH

## 2020-07-27 PROCEDURE — 74011250637 HC RX REV CODE- 250/637: Performed by: PSYCHIATRY & NEUROLOGY

## 2020-07-27 RX ADMIN — ARIPIPRAZOLE 30 MG: 15 TABLET ORAL at 08:07

## 2020-07-27 RX ADMIN — HALOPERIDOL 5 MG: 5 TABLET ORAL at 20:06

## 2020-07-27 NOTE — BSMART NOTE
Pt.is a 70-year-old female with history of Schizophrenia paranoid type, Nicotine Dependence and DID. Pt. has been hospitalized several times this year (multiple admissions at this facility). Pt. is a [facility] PACT client. Pt. was admitted to this facility for having ideations to harm self and hallucinations. Pt.s case was discussed in treatment team this a.m. Pt. is showing some improvement but is not dc ready. SW will contact PACT team with an update. ARUN Contact: ARUN met with pt. to discuss this charge planning. I feel okay. I am just trying to find ways I can stay out of the hospital longer when I leave .  I dont want to come back to the hospital but sometimes the voices in my head are so overwhelming.  I dont know it seems when I go back to my apartment, I start thinking about choices I made. My mind starts racing and the voices say negative things. SW had pt. to reflect on positive goal setting and suggested for pt to make a vision board to put up in her home. Pt. reports she has a coping skills board in her home. Pt. denies ideations and hallucinations. Pt. states she just feels things start to happen when she returns to her apartment. SW engaged pt. with reality orientation, positive self-talk and safety plan. Pt. Is anxious mood is improving and has impaired judgement. SW will continue to assist the pt. with dc planning.       Armin Jhaveri MA, LMHP_R

## 2020-07-27 NOTE — PROGRESS NOTES
Problem: Psychosis  Goal: *STG: Decreased hallucinations  Description: AEB pt having decreased hallucinations daily while in the hospital  Outcome: Not Progressing Towards Goal  Goal: *STG: Decreased delusional thinking  Description: AEB pt having decreased delusional thinking daily while in the hospital  Outcome: Not Progressing Towards Goal  Goal: *STG: Remains safe in hospital  Description: AEB pt not harming self or others and mely for safety daily while in the hospital.  Outcome: Progressing Towards Goal  Goal: *STG: Participates in individual and group therapy  Description: AEB pt attending 3 groups and all 1:1 sessions daily while in the hospital.  Outcome: Progressing Towards Goal  Goal: *STG/LTG: Complies with medication therapy  Description: AEB pt taking all scheduled medications daily while in the hospital.  Outcome: Progressing Towards Goal   Patient remains in her room for most of the morning. She will come out for her meals . She is isolative and need much encouragement to attend groups. She was smiling this morning and was pleasant. She denies SI and hearing voices.

## 2020-07-27 NOTE — GROUP NOTE
[facility]  GROUP DOCUMENTATION INDIVIDUAL                                                                          Group Therapy Note    Date: 7/26/2020    Group Start Time: 1900  Group End Time: 1915  Group Topic: Nursing    [facility] SPECIAL  Ryan [address], RN    IP [address] [facility] GROUP DOCUMENTATION GROUP    Group Therapy Note    Attendees: 3         Attendance: Did not attend    Rosie Arevalo RN

## 2020-07-27 NOTE — PROGRESS NOTES
Behavioral Health Progress Note    Admit Date: 7/20/2020  Hospital day 7    Vitals :   Patient Vitals for the past 8 hrs:   BP Temp Pulse Resp   07/27/20 0820 93/60 97.1 °F (36.2 °C) 100 18     Labs:  No results found for this or any previous visit (from the past 24 hour(s)). Meds:   Current Facility-Administered Medications   Medication Dose Route Frequency    haloperidoL (HALDOL) tablet 5 mg  5 mg Oral QHS    nicotine buccal (POLACRILEX) lozenge 2 mg  2 mg Oral Q2H PRN    haloperidol lactate (HALDOL) injection 5 mg  5 mg IntraMUSCular Q6H PRN    haloperidoL (HALDOL) tablet 5 mg  5 mg Oral Q6H PRN    traZODone (DESYREL) tablet 50 mg  50 mg Oral QHS PRN    hydrOXYzine pamoate (VISTARIL) capsule 50 mg  50 mg Oral Q4H PRN    benztropine (COGENTIN) tablet 1 mg  1 mg Oral Q6H PRN    benztropine (COGENTIN) injection 1 mg  1 mg IntraMUSCular Q6H PRN    ARIPiprazole (ABILIFY) tablet 30 mg  30 mg Oral DAILY    acetaminophen (TYLENOL) tablet 650 mg  650 mg Oral Q6H PRN      Hospital Problems: Principal Problem:    Schizophrenia ([facility] [address] (4/18/2017)    Active Problems:    Cigarette nicotine dependence without complication (9/2/3341)        Subjective:   Medication side effects: none  none    Mental Status Exam  Sensorium: alert  Orientation: only aware of  time, place and person  Relations: guarded  Eye Contact: poor  Appearance: is unkempt  Thought Process: normal rate of thoughts and poor abstract reasoning/computation   Thought Content: no evidence of impairment   Suicidal: denies   Homicidal: denies   Mood: is anxious   Affect: odd, blunted  Memory: shows no evidence of impairment     Concentration: distractable  Abstraction: concrete  Insight: The patient shows little insight    OR Fair  Judgement: is psychologically impaired OR  Fair    Assessment/Plan:   improved  Patient says that she has been feeling better. Nurses say that she has been more cooperative.   She did say she felt somewhat down this past weekend being unhappy about her life situation. She says she will probably still be going back to [facility] housing. She wants her own place but knows that that is not going to happen immediately. She said when she went there she was supposed to get a job and then have a mental health skill building but none of this is happened. She says she returns there and is only seeing the skill builder about 5 times during the amount of time that she has been there. Every time she is there she either ends up back in the hospital or they cannot get anyone in to see her quickly because of the problem with the Covid virus. She says she needs to work with someone on things like an appropriate diet, her life schedule and exercise. She does appear to be getting better and we need to work on discharge this week. I will speak to  if she appears to be ready for discharge possibly tomorrow if we can get this worked out.   Continue close observation,

## 2020-07-27 NOTE — GROUP NOTE
[facility]  GROUP DOCUMENTATION INDIVIDUAL                                                                          Group Therapy Note    Date: 7/27/2020    Group Start Time: 1200  Group End Time: 3218  Group Topic: Nursing    SO [facility] SPECIAL TRTMT Karly Urbano, RN     [facility] GROUP    Group Therapy Note    Attendees: 3         Attendance: Did not attend      Joseph Stanton RN

## 2020-07-27 NOTE — PROGRESS NOTES
07/27/20 [address] work   Attendance Attended   Number of participants 2   Time in 1040   Time out 1110   Total Time 30   Interventions/techniques Supported; Informed;Provided feedback   Follows directions Followed directions   Interactions Interacted appropriately   Mental Status Anxious   Behavior/appearance Attentive; Motivated; Cooperative   Long Term Risk of Suicide Low   Immediate Risk for Suicide Low   Suicide Protective Factors No organized plan   Risk of Violence Low   Risk of Trauma Low   Goals Achieved Able to reflect/comment on own behavior;Able to manage/cope with feelings; Able to receive feedback

## 2020-07-27 NOTE — BSMART NOTE
OCCUPATIONAL THERAPY PROGRESS NOTE  Group Time:  5985  Attendance: The patient attended full group. Participation:  The patient participated with moderate elaboration in the activity. Attention:  The patient was able to focus on the activity. Interaction:  The patient acknowledges others or responds to questions,  with no spontaneous interaction. Affect brighter, smiling occasionally. Able to ID and practice affirmation related to managing stress.

## 2020-07-28 PROCEDURE — 74011250637 HC RX REV CODE- 250/637: Performed by: PSYCHIATRY & NEUROLOGY

## 2020-07-28 PROCEDURE — 65220000003 HC RM SEMIPRIVATE PSYCH

## 2020-07-28 RX ORDER — HALOPERIDOL 5 MG/1
5 TABLET ORAL
Qty: 30 TAB | Refills: 0 | Status: SHIPPED | OUTPATIENT
Start: 2020-07-28 | End: 2020-09-08

## 2020-07-28 RX ORDER — ARIPIPRAZOLE 30 MG/1
30 TABLET ORAL DAILY
Qty: 30 TAB | Refills: 0 | Status: SHIPPED | OUTPATIENT
Start: 2020-07-29 | End: 2020-09-08

## 2020-07-28 RX ADMIN — HALOPERIDOL 5 MG: 5 TABLET ORAL at 20:34

## 2020-07-28 RX ADMIN — ARIPIPRAZOLE 30 MG: 15 TABLET ORAL at 08:08

## 2020-07-28 NOTE — BH NOTES
Treatment team met -     Medical Director: _____present   Psychiatrist: __x___present   Charge nurse: _x____present   MSW: __x___present   : _____present   Nurse Manager: _x____present   Student RNs: _____present   Medical Students: _____present   Art Therapist: _____present   Clinical Coordinator: __x___present    Occupational Therapist: _____present   : _______ present    _______ present  Crisis Supervisor_______present      Plan of care discussed and updated as appropriate. Patient was present for treatment team meeting. Staff reported the patient is doing better and patient stated she's feeling better. She denied hallucinations, paranoia and ideations. Patient stated she has services with the [facility] team daily for med management and sees the doctor weekly via virtual. SW discussed discharge follow up. Plan is for discharge tomorrow.

## 2020-07-28 NOTE — BSMART NOTE
OCCUPATIONAL THERAPY PROGRESS NOTE  Group Time:  5794  Attendance: The patient attended full group. Participation:  The patient participated with moderate elaboration in the activity. Attention:  The patient was able to focus on the activity. Interaction:  The patient acknowledges others or responds to questions,  with no spontaneous interaction.

## 2020-07-28 NOTE — BH NOTES
Patient attended community  group today, she has sad demeanor, she was encouraged by staff, she spent most of the shift in bed rested . Staff will continue to monitor patient for safety.

## 2020-07-28 NOTE — PROGRESS NOTES
Nutrition Assessment     Type and Reason for Visit: RD nutrition re-screen/LOS    Nutrition Recommendations/Plan:  - Monitor and encourage po intake as tolerated. - Pt politely declined addition of supplements on 7/28    Nutrition Assessment:  Reports around 50% intake of recent meals, unable to state reasoning for decreased appetite. Offered supplement but pt politely declined, stating she is planning on discharge tomorrow and will consuming the once she is discharged. Coupons for Ensure supplement provided. Malnutrition Assessment:  Malnutrition Status: Insufficient data     Nutrition History and Allergies: PMHx of schizophrenia, depression, GERD who was admitted from [facility] ED for SI and hallucinations. Reports usual meal intake of 1-2 meals/day & unsure of wt hx. Per chart significant wt loss x year (-72#, 33%). NKFA    Estimated Daily Nutrient Needs:  Energy (kcal):  0241-2468  Protein (g):  52-78       Fluid (ml/day):  2328-0204    Nutrition Related Findings:  Denies N/V or diet intolerance. Flat affect      Current Nutrition Therapies:  DIET REGULAR    Anthropometric Measures:  · Height:  5' 6\" (167.6 cm)(previously documented)  · Current Body Wt:  64.9 kg (143 lb 1.3 oz)  · BMI: 23.1    Nutrition Diagnosis:   · Unintended weight loss related to (predicted inadequate energy intake over time) as evidenced by weight loss greater than or equal to 20% in 1 year    Nutrition Intervention:  Food and/or Nutrient Delivery: Continue current diet  Nutrition Education and Counseling: Education not indicated  Coordination of Nutrition Care: Continued inpatient monitoring    Goals:  PO nutrition intake will meet >75% of patient estimated nutritional needs within the next 7 days.        Nutrition Monitoring and Evaluation:   Food/Nutrient Intake Outcomes: Diet advancement/tolerance, Food and nutrient intake  Physical Signs/Symptoms Outcomes: Biochemical data, GI status, Weight    Discharge Planning:    (Ensure [employer] or comparable supplement prn as needed)     Electronically signed by Glory Rhoades RD on 7/28/2020 at 10:45 AM    Contact Number: [phone]

## 2020-07-28 NOTE — PROGRESS NOTES
Problem: Psychosis  Goal: *STG: Decreased hallucinations  Description: AEB pt having decreased hallucinations daily while in the hospital  Outcome: Resolved/Not Met  Goal: *STG: Decreased delusional thinking  Description: AEB pt having decreased delusional thinking daily while in the hospital  Outcome: Resolved/Not Met  Goal: *STG: Remains safe in hospital  Description: AEB pt not harming self or others and mely for safety daily while in the hospital.  Outcome: Progressing Towards Goal  Goal: *STG: Participates in individual and group therapy  Description: AEB pt attending 3 groups and all 1:1 sessions daily while in the hospital.  Goal: *STG/LTG: Complies with medication therapy  Description: AEB pt taking all scheduled medications daily while in the hospital.  Outcome: Progressing Towards Goal   Patient denies SI and A/V hallucinations. Medication compliant. More social, attending groups. Denies feeling of paranoia.

## 2020-07-28 NOTE — BSMART NOTE
Pt.is a 70-year-old female with history of Schizophrenia paranoid type, Nicotine Dependence and DID. Pt. has been hospitalized several times this year (multiple admissions at this facility). Pt. is a [facility] PACT client. Pt. was admitted to this facility for having ideations to harm self and hallucinations. Pt.s case was discussed in treatment team this a.m. Pt. is showing some improvement. The team had pt. to join the meeting. Pt expressed she feels better. Pt denies ideations and hallucinations. The team addressed coping skills, safety plan and aftercare. Pt.s mood is, and insight are improving. Pt will be dc tomorrow. Pt will return to her home address with [facility] PACT services. ARUN will continue to assist the pt. with dc planning. ARUN Collateral: ARUN contacted Aishwarya Self Pt.'s   [facility]  PACT CM @ [phone]. ARUN provided Cm with an update.  Pt. Has an appointment with Dr. Cristina Lincoln on 7/30/20 @ 9:30 a.m      Nieves Reynolds MA, LMHP_R

## 2020-07-28 NOTE — GROUP NOTE
[facility]  GROUP DOCUMENTATION INDIVIDUAL                                                                          Group Therapy Note    Date: 7/28/2020    Group Start Time: 3994  Group End Time: 1100  Group Topic: Nursing    [facility] SPECIAL TRTMT 1    Salena Nicole RN    [facility] GROUP DOCUMENTATION GROUP    Group Therapy Note    Attendees:3         Attendance: Attended    Patient's Goal:  Coping Skills    Interventions/techniques: Informed    Follows Directions:  Followed directions    Interactions: Interacted appropriately    Mental Status: Calm    Behavior/appearance: Cooperative    Goals Achieved: Able to engage in interactions and Able to listen to others    Meera Stewart RN

## 2020-07-29 VITALS
HEIGHT: 66 IN | DIASTOLIC BLOOD PRESSURE: 64 MMHG | RESPIRATION RATE: 16 BRPM | BODY MASS INDEX: 23.08 KG/M2 | TEMPERATURE: 97.6 F | SYSTOLIC BLOOD PRESSURE: 105 MMHG | HEART RATE: 71 BPM

## 2020-07-29 PROCEDURE — 74011250637 HC RX REV CODE- 250/637: Performed by: PSYCHIATRY & NEUROLOGY

## 2020-07-29 RX ADMIN — ARIPIPRAZOLE 30 MG: 15 TABLET ORAL at 08:07

## 2020-07-29 NOTE — DISCHARGE SUMMARY
[address] [facility]    Name:  Fredrick Tse  MR#:   [id number]  :  1990  ACCOUNT #:  [de-identified]  ADMIT DATE:  2020  DISCHARGE DATE:  2020    IDENTIFYING DATA:  The patient presented as a 27-year-old  white female who was again admitted in referral from [facility] Emergency Room after presenting there after being out of [facility] only 1-1/2 days. She had history of schizophrenia. She thought the world was forcing thoughts into her head and controlling her and that she was being told to buy certain substances like energy drinks. She had just been discharged from a 10-day hospital stay at [facility]. She thought they had placed her on only Abilify 30 mg daily. [facility]  She said she was uncertain if they were actually checking up on her at all. She had been placed on Aristada injections when she was there previously, and they asked her to continue this and she refused. She is well known to this and multiple other facilities from multiple past admissions going back to . She has been in and out of the hospital repeatedly over the past 2 years. I have tried to put her over to the [facility], and [facility] continues to refuse to take her. She was here on 2020 through 2020 after getting out of a 7-day hospitalization at [facility]. She had previously been on [employer], then on Haldol and Pristiq. She has also had repeated stays at [facility], several hospitals in [address]. She has been on Depakote, clozapine, Pristiq with poor compliance. She had been placed on Trileptal with absolutely no response whatsoever to this since does not tend to have any psychiatric indications.   She had been on [place] Sustenna 156 mg every 4 weeks and chlorpromazine 50 to 100 mg at bedtime. At the time of our most recent admission, she was stabilized with the Aristada injection, haloperidol 5 mg at bedtime, trazodone 50 mg at bedtime as needed for sleep. Every time she has been seen in the past, she has required both an atypical and a typical antipsychotic to be stabilized. Laboratory testing done in the emergency room included a normal CBC and urinalysis with a trace of ketones, moderate leukocyte esterase, 11-20 wbc's and specific gravity 1.012 with 2+ bacteria and yeast.  She had a normal CMP, negative hCG. Urine drug screen positive for PCP, though she had taken cold medications which could account for this. She had a recent negative rapid COVID test.    HOSPITAL COURSE:  The patient was admitted to the [facility] special treatment unit where she was afforded individual, group and milieu therapies. Physical examination was done by Kenzie Whitaker PA-C, and she described history of alcoholism, past cirrhosis, gastroesophageal reflux disease, past history of head injury, past history of stroke, history of  section, rhinoplasty and tubal ligation. While in the hospital, the patient was treated with Abilify 30 mg daily, haloperidol 5 mg at bedtime. She initially was endorsing hallucinations, paranoia, delusional material.  These slowly cleared up, and she was denying these by the time of discharge. She was denying any homicidal or suicidal ideas, and mood was neutral.  She did want to go back to her apartment. CONDITION ON DISCHARGE:  Fair. PROGNOSIS:  Guarded. She has a history of repeated relapses to illness. ASSESSMENT:  AXIS I:  Schizophrenia. Nicotine use disorder, mild. AXIS II:  None. AXIS III:  Gastroesophageal reflux disease. History of stroke. DISPOSITION:  Discharged to self. Follow up with [facility] team.  Follow up with own primary care provider as needed. MEDICATIONS:  1. Abilify 30 mg daily, #30, no refill. 2.  Haloperidol 5 mg at bedtime, #30, no refill.       Nidia Fernandes MD      [id number]  D:  07/29/2020 11:35  T:  07/29/2020 17:28  JOB #:  [id number]

## 2020-07-29 NOTE — BSMART NOTE
Pt.is a 43-year-old female with history of Schizophrenia paranoid type, Nicotine Dependence and DID.  Pt. has been hospitalized several times this year (multiple admissions at this facility).  Pt. is a [employer]. Pt. was admitted to this facility for having ideations to harm self and hallucinations.        Pt. will be dc today to her home address. Pt. with [facility] PACT services. Pt. has an appointment scheduled with Dr. Lorri Trejo PACT psychiatrist 7/30/20 @  9:30a.moody @ [facility] [employer] [id number] phone [phone] fax [phone].        MADDIE RibeiroR

## 2020-07-29 NOTE — BH NOTES
During this shift (3pm-11pm) pt has been in her room isolated for the majority of this shift. Pt leaves room only for meals and medications. Pt tried to sit out in the day area after dinner to watch tv but was unable due to disruptive peer in the milieu. Pt retired to her room for the remainder of the evening after medication time. Staff will continue rounds monitoring pt for changes in behavior, location & safety.

## 2020-07-29 NOTE — DISCHARGE INSTRUCTIONS
BEHAVIORAL HEALTH NURSING DISCHARGE NOTE      The following personal items collected during your admission are returned to you:   Dental Appliance: Dental Appliances: None  Vision:    Hearing Aid:    Jewelry: Jewelry: None  Clothing: Clothing: (Dress, Shoes (1 Pair), Underwear (1 Pair), Bra (1))  Other Valuables: Other Valuables: (Purse,Wallet,Cell Phone,Keys(3),VA ID,EBT,MasterCard (Debit))  Valuables sent to safe: Personal Items Sent to Safe: None      PATIENT INSTRUCTIONS:           The discharge information has been reviewed with the patient. The patient verbalized understanding.         Patient armband removed and shredded

## 2020-07-29 NOTE — PROGRESS NOTES
Patient received discharge instructions . Verbalized understanding of medication and follow up appt. Patient received all personal items. Medicaid cab here for patient .

## 2020-08-05 ENCOUNTER — HOSPITAL ENCOUNTER (EMERGENCY)
Age: 30
Discharge: HOME | End: 2020-08-06
Attending: EMERGENCY MEDICINE
Payer: MEDICAID

## 2020-08-05 VITALS
HEART RATE: 66 BPM | RESPIRATION RATE: 18 BRPM | OXYGEN SATURATION: 97 % | DIASTOLIC BLOOD PRESSURE: 75 MMHG | HEIGHT: 66 IN | WEIGHT: 140 LBS | BODY MASS INDEX: 22.5 KG/M2 | TEMPERATURE: 98.1 F | SYSTOLIC BLOOD PRESSURE: 115 MMHG

## 2020-08-05 DIAGNOSIS — F31.9 BIPOLAR AFFECTIVE DISORDER, REMISSION STATUS UNSPECIFIED (HCC): ICD-10-CM

## 2020-08-05 DIAGNOSIS — R44.1 VISUAL HALLUCINATIONS: Primary | ICD-10-CM

## 2020-08-05 LAB
AMPHET UR QL SCN: NEGATIVE
ANION GAP SERPL CALC-SCNC: 5 MMOL/L (ref 3–18)
BARBITURATES UR QL SCN: NEGATIVE
BASOPHILS # BLD: 0 K/UL (ref 0–0.1)
BASOPHILS NFR BLD: 0 % (ref 0–2)
BENZODIAZ UR QL: NEGATIVE
BUN SERPL-MCNC: 5 MG/DL (ref 7–18)
BUN/CREAT SERPL: 7 (ref 12–20)
CALCIUM SERPL-MCNC: 9.4 MG/DL (ref 8.5–10.1)
CANNABINOIDS UR QL SCN: NEGATIVE
CHLORIDE SERPL-SCNC: 104 MMOL/L (ref 100–111)
CO2 SERPL-SCNC: 28 MMOL/L (ref 21–32)
COCAINE UR QL SCN: NEGATIVE
CREAT SERPL-MCNC: 0.67 MG/DL (ref 0.6–1.3)
DIFFERENTIAL METHOD BLD: NORMAL
EOSINOPHIL # BLD: 0 K/UL (ref 0–0.4)
EOSINOPHIL NFR BLD: 0 % (ref 0–5)
ERYTHROCYTE [DISTWIDTH] IN BLOOD BY AUTOMATED COUNT: 12.3 % (ref 11.6–14.5)
ETHANOL SERPL-MCNC: <3 MG/DL (ref 0–3)
GLUCOSE SERPL-MCNC: 79 MG/DL (ref 74–99)
HCG SERPL QL: NEGATIVE
HCT VFR BLD AUTO: 40.9 % (ref 35–45)
HDSCOM,HDSCOM: NORMAL
HGB BLD-MCNC: 14 G/DL (ref 12–16)
LYMPHOCYTES # BLD: 3.2 K/UL (ref 0.9–3.6)
LYMPHOCYTES NFR BLD: 42 % (ref 21–52)
MCH RBC QN AUTO: 30.4 PG (ref 24–34)
MCHC RBC AUTO-ENTMCNC: 34.2 G/DL (ref 31–37)
MCV RBC AUTO: 88.9 FL (ref 74–97)
METHADONE UR QL: NEGATIVE
MONOCYTES # BLD: 0.6 K/UL (ref 0.05–1.2)
MONOCYTES NFR BLD: 8 % (ref 3–10)
NEUTS SEG # BLD: 3.7 K/UL (ref 1.8–8)
NEUTS SEG NFR BLD: 50 % (ref 40–73)
OPIATES UR QL: NEGATIVE
PCP UR QL: NEGATIVE
PLATELET # BLD AUTO: 230 K/UL (ref 135–420)
PMV BLD AUTO: 10 FL (ref 9.2–11.8)
POTASSIUM SERPL-SCNC: 3.6 MMOL/L (ref 3.5–5.5)
RBC # BLD AUTO: 4.6 M/UL (ref 4.2–5.3)
SODIUM SERPL-SCNC: 137 MMOL/L (ref 136–145)
WBC # BLD AUTO: 7.5 K/UL (ref 4.6–13.2)

## 2020-08-05 PROCEDURE — 99285 EMERGENCY DEPT VISIT HI MDM: CPT

## 2020-08-05 PROCEDURE — 80048 BASIC METABOLIC PNL TOTAL CA: CPT

## 2020-08-05 PROCEDURE — 74011250637 HC RX REV CODE- 250/637: Performed by: EMERGENCY MEDICINE

## 2020-08-05 PROCEDURE — 80307 DRUG TEST PRSMV CHEM ANLYZR: CPT

## 2020-08-05 PROCEDURE — 85025 COMPLETE CBC W/AUTO DIFF WBC: CPT

## 2020-08-05 PROCEDURE — 84703 CHORIONIC GONADOTROPIN ASSAY: CPT

## 2020-08-05 RX ORDER — HALOPERIDOL 5 MG/1
5 TABLET ORAL
Status: DISCONTINUED | OUTPATIENT
Start: 2020-08-05 | End: 2020-08-06 | Stop reason: HOSPADM

## 2020-08-05 RX ORDER — ARIPIPRAZOLE 10 MG/1
30 TABLET ORAL EVERY 24 HOURS
Status: DISCONTINUED | OUTPATIENT
Start: 2020-08-05 | End: 2020-08-06 | Stop reason: HOSPADM

## 2020-08-05 RX ADMIN — HALOPERIDOL 5 MG: 5 TABLET ORAL at 21:40

## 2020-08-05 NOTE — ED PROVIDER NOTES
EMERGENCY DEPARTMENT HISTORY AND PHYSICAL EXAM    6:44 PM      Date: 2020  Patient Name: Elías Nuñez    History of Presenting Illness     Chief Complaint   Patient presents with   [address] Problem         History Provided By: Patient    Additional History (Context): Elías Nuñez is a 27 y.o. female with PMHx as noted below who presents with complaints of visual hallucinations. Patient states she was seen by [facility] for a house visit when found to be confused and having hallucinations. Police were notified and have escorted patient to the emergency department. Patient denies SI/HI. PCP: PAU Wang    Current Facility-Administered Medications   Medication Dose Route Frequency Provider Last Rate Last Dose    ARIPiprazole (ABILIFY) tablet 30 mg  30 mg Oral Q24H Leonor Banks MD   Stopped at 20    haloperidoL (HALDOL) tablet 5 mg  5 mg Oral QHS Leonor Banks MD   5 mg at 20     Current Outpatient Medications   Medication Sig Dispense Refill    ARIPiprazole (ABILIFY) 30 mg tablet Take 1 Tab by mouth daily. Indications: schizophrenia 30 Tab 0    haloperidoL (HALDOL) 5 mg tablet Take 1 Tab by mouth nightly. Indications: schizophrenia 30 Tab 0       Past History     Past Medical History:  Past Medical History:   Diagnosis Date    Alcoholic ([address]     Sober 3 months; Weekly [employer] meeting; Had substance abuse counseling;     Anemia     B[facility] disorder [facility]     Cirrhosis ([facility]     Past not current issue per patient    ETOH abuse     GERD (gastroesophageal reflux disease)     Head injury     Marijuana abuse     Phobia     [facility]    Post partum depression     Pregnancy     Psychiatric disorder     Psychotic disorder ([address]     Depression/  Monica Fatou NP; Bipolar disorder, mood swings.     Schizophrenia ([address]     Stroke [facility])        Past Surgical History:  Past Surgical History:   Procedure Laterality Date    HX  SECTION      HX  SECTION      C section x 2;   &     HX RHINOPLASTY      HX TUBAL LIGATION  2017       Family History:  Family History   Family history unknown: Yes       Social History:  Social History     Tobacco Use    Smoking status: Current Every Day Smoker     Packs/day: 0.25     Years: 15.00     Pack years: 3.75    Smokeless tobacco: Former User   Substance Use Topics    Alcohol use: Yes     Comment: today    Drug use: Not Currently     Types: Other     Comment: Alcohol       Allergies: Allergies   Allergen Reactions    Robitussin [Guaifenesin] Nausea and Vomiting         Review of Systems       Review of Systems   Constitutional: Negative. HENT: Negative. Respiratory: Negative. Cardiovascular: Negative. Gastrointestinal: Negative. Genitourinary: Negative. Musculoskeletal: Negative. Skin: Negative. Neurological: Negative. Psychiatric/Behavioral: Positive for hallucinations. Negative for suicidal ideas. All other systems reviewed and are negative. Physical Exam     Visit Vitals  /75 (BP 1 Location: Right arm, BP Patient Position: Sitting)   Pulse 66   Temp 98.1 °F (36.7 °C)   Resp 18   Ht 5' 6\" (1.676 m)   Wt 63.5 kg (140 lb)   LMP 2020 (Approximate)   SpO2 97%   BMI 22.60 kg/m²         Physical Exam  Vitals signs and nursing note reviewed. Constitutional:       General: She is not in acute distress. Appearance: She is not ill-appearing, toxic-appearing or diaphoretic. HENT:      Head: Normocephalic and atraumatic. Right Ear: External ear normal.      Left Ear: External ear normal.      Nose: Nose normal.      Mouth/Throat:      Mouth: Mucous membranes are moist.      Pharynx: Oropharynx is clear. Eyes:      Extraocular Movements: Extraocular movements intact. Neck:      Musculoskeletal: Neck supple. Cardiovascular:      Rate and Rhythm: Normal rate and regular rhythm. Pulses: Normal pulses.       Heart sounds: Normal heart sounds. Pulmonary:      Effort: Pulmonary effort is normal.      Breath sounds: Normal breath sounds. Abdominal:      General: Bowel sounds are normal. There is no distension. Palpations: Abdomen is soft. There is no mass. Tenderness: There is no abdominal tenderness. There is no guarding or rebound. Skin:     General: Skin is warm and dry. Capillary Refill: Capillary refill takes less than 2 seconds. Neurological:      General: No focal deficit present. Mental Status: She is alert and oriented to person, place, and time. Cranial Nerves: No cranial nerve deficit. Psychiatric:         Mood and Affect: Mood is depressed. Affect is blunt. Diagnostic Study Results     Labs -  Recent Results (from the past 12 hour(s))   CBC WITH AUTOMATED DIFF    Collection Time: 08/05/20  8:03 PM   Result Value Ref Range    WBC 7.5 4.6 - 13.2 K/uL    RBC 4.60 4.20 - 5.30 M/uL    HGB 14.0 12.0 - 16.0 g/dL    HCT 40.9 35.0 - 45.0 %    MCV 88.9 74.0 - 97.0 FL    MCH 30.4 24.0 - 34.0 PG    MCHC 34.2 31.0 - 37.0 g/dL    RDW 12.3 11.6 - 14.5 %    PLATELET 292 954 - 290 K/uL    MPV 10.0 9.2 - 11.8 FL    NEUTROPHILS 50 40 - 73 %    LYMPHOCYTES 42 21 - 52 %    MONOCYTES 8 3 - 10 %    EOSINOPHILS 0 0 - 5 %    BASOPHILS 0 0 - 2 %    ABS. NEUTROPHILS 3.7 1.8 - 8.0 K/UL    ABS. LYMPHOCYTES 3.2 0.9 - 3.6 K/UL    ABS. MONOCYTES 0.6 0.05 - 1.2 K/UL    ABS. EOSINOPHILS 0.0 0.0 - 0.4 K/UL    ABS.  BASOPHILS 0.0 0.0 - 0.1 K/UL    DF AUTOMATED     METABOLIC PANEL, BASIC    Collection Time: 08/05/20  8:03 PM   Result Value Ref Range    Sodium 137 136 - 145 mmol/L    Potassium 3.6 3.5 - 5.5 mmol/L    Chloride 104 100 - 111 mmol/L    CO2 28 21 - 32 mmol/L    Anion gap 5 3.0 - 18 mmol/L    Glucose 79 74 - 99 mg/dL    BUN 5 (L) 7.0 - 18 MG/DL    Creatinine 0.67 0.6 - 1.3 MG/DL    BUN/Creatinine ratio 7 (L) 12 - 20      GFR est AA >60 >60 ml/min/1.73m2    GFR est non-AA >60 >60 ml/min/1.73m2    Calcium 9.4 8.5 - 10.1 MG/DL   HCG QL SERUM    Collection Time: 08/05/20  8:03 PM   Result Value Ref Range    HCG, Ql. Negative NEG     ETHYL ALCOHOL    Collection Time: 08/05/20  8:03 PM   Result Value Ref Range    ALCOHOL(ETHYL),SERUM <3 0 - 3 MG/DL   DRUG SCREEN, URINE    Collection Time: 08/05/20  9:01 PM   Result Value Ref Range    BENZODIAZEPINES Negative NEG      BARBITURATES Negative NEG      THC (TH-CANNABINOL) Negative NEG      OPIATES Negative NEG      PCP(PHENCYCLIDINE) Negative NEG      COCAINE Negative NEG      AMPHETAMINES Negative NEG      METHADONE Negative NEG      HDSCOM (NOTE)        Radiologic Studies -   No orders to display         Medical Decision Making   I am the first provider for this patient. I reviewed the vital signs, available nursing notes, past medical history, past surgical history, family history and social history. Vital Signs-Reviewed the patient's vital signs. Records Reviewed: Nursing Notes and Old Medical Records (Time of Review: 6:44 PM)    ED Course: Progress Notes, Reevaluation, and Consults:  3272 met with patient, reviewed history, performed physical exam.  Will check a CBC, CMP, UA, urine hCG, UDS, and consult psychiatry. 7351 patient seen by psychiatrist Dr. Ellie Ruiz, who feels that the patient does not meet criteria for admission at this time and is cleared to discharge home. She states she has a appointment with her [facility] provider on Friday and also has the PACT team to check on her in the morning. Patient will be prepped for discharge at this time. Provider Notes (Medical Decision Making):   60-year-old female seen in the emergency department for complaints of increased visual hallucinations. She denies any suicidal ideation or homicidal ideation. Patient was medically cleared and seen by psychiatry who felt that the patient did not meet any criteria for admission at this time. Patient was cleared by psychiatry to discharge home.   Patient was advised to keep her appointment with her [facility] provider for Friday. She was advised to return to the emergency department if symptoms worsen, or as needed. Diagnosis     Clinical Impression:   1. Visual hallucinations    2. Bipolar affective disorder, remission status unspecified ([facility]        Disposition: Home    Follow-up Information     Follow up With Specialties Details Why Contact [facility]   keep your appointment as scheduled [address] [phone]  [phone]    [facility] EMERGENCY DEPT Emergency Medicine  If symptoms worsen [address]  [phone]           Patient's Medications   Start Taking    No medications on file   Continue Taking    ARIPIPRAZOLE (ABILIFY) 30 MG TABLET    Take 1 Tab by mouth daily. Indications: schizophrenia    HALOPERIDOL (HALDOL) 5 MG TABLET    Take 1 Tab by mouth nightly. Indications: schizophrenia   These Medications have changed    No medications on file   Stop Taking    No medications on file     Yanick Andrade PA-C    Dictation disclaimer:  Please note that this dictation was completed with [email or web address], the computer voice recognition software. Quite often unanticipated grammatical, syntax, homophones, and other interpretive errors are inadvertently transcribed by the computer software. Please disregard these errors. Please excuse any errors that have escaped final proofreading.

## 2020-08-05 NOTE — ED TRIAGE NOTES
Pt arrives with [employer] after a home visit by [facility] where they found pt confused and having Visual Hallucinations. Pt denies SI/HI at present but states she is having problems and points to her head.

## 2020-08-05 NOTE — ED NOTES
I performed a brief evaluation, including history and physical, of the patient here in triage and I have determined that the patient will need further treatment and evaluation from the main side ER provider. I have placed initial orders to help in expediting patients care. Arrives by law enforcement with complaints of visual hallucinations. Denies SI/HI.       August 05, 2020 at 6:43 PM - ADAN Matute        Visit Vitals  /75 (BP 1 Location: Right arm, BP Patient Position: Sitting)   Pulse 66   Temp 98.1 °F (36.7 °C)   Resp 18   SpO2 97%

## 2020-08-06 NOTE — CONSULTS
Name: Srinivas Hercules                                                                        : 1990  Date: 2020                                                                             Time: 11p et                 Location of patient: [address] ED                                              Location of doctor: [facility], [address] [place] [address]    This evaluation was conducted via telepsychiatry with the assistance of onsite staff     Chief Complaint: I'm ok now      History of Present Illness:   Pt is a 28 yo WF with Schizophrenia, comes to ED, had house visit, was confused and brought to ED. Pt seen and evaluated. Says she just had anxiety and panic attack today, was confused earlier, but says she feels a lot better now, thinking more clearly. Says she was doing really good lately at home, taking meds daily, no issues. Says last admit in July, meds have helped, she doesn't want to change meds a lot, was told by doctors that it wasn't good to change much now. Pt doesn't think another hosp stay will do any good, rather go home, feels safe, says she will f/u with Dr Romel Marie in 2 days, and PACT team will check on her tomorrow. Denies SI, denies any issues, no complaints          SI/ Self harm: denies  HI/Violence: denies  Trauma history: denies  Access to weapons: denies  Legal: denies  Psychiatric History/Treatment History: multiple admissions     Drug/Alcohol History: denies    Medical History:   Past Medical History:   Diagnosis Date    Alcoholic ([address]     Sober 3 months; Weekly AAA meeting; Had substance abuse counseling;     Anemia     Bipolar disorder ([facility] [address] [facility]     Cirrhosis ([facility] [address] [facility]     Past not current issue per patient    ETOH abuse     GERD (gastroesophageal reflux disease)     Head injury     Marijuana abuse     Phobia     [facility]    Post partum depression     Pregnancy     Psychiatric disorder     Psychotic disorder ([facility] [address]     Depression/  Glennie Frankel, NP; Bipolar disorder, mood swings.     Schizophrenia ([facility]     Stroke ([facility] 2011     Medications & Freq:   Current Facility-Administered Medications:     ARIPiprazole (ABILIFY) tablet 30 mg, 30 mg, Oral, Q24H, Jennifer Palacios MD, Stopped at 08/05/20 2036    haloperidoL (HALDOL) tablet 5 mg, 5 mg, Oral, QHS, Jennifer Palacios MD, 5 mg at 08/05/20 2140    Current Outpatient Medications:     ARIPiprazole (ABILIFY) 30 mg tablet, Take 1 Tab by mouth daily. Indications: schizophrenia, Disp: 30 Tab, Rfl: 0    haloperidoL (HALDOL) 5 mg tablet, Take 1 Tab by mouth nightly. Indications: schizophrenia, Disp: 30 Tab, Rfl: 0    Allergies: Allergies   Allergen Reactions    Robitussin [Guaifenesin] Nausea and Vomiting       Family Psych History/History of suicide: unknown  Social History: lives alone, on SSI?                        Mental Status Exam:   Appearance and attire: sitting up in bed  Attitude and behavior: calm, cooperative   Speech: rrr  Mood/Affect: fine/congruent  Thought processes: linear  Thought content: no si/hi  Perception: no avh  Cognition: grossly intact  Insight and judgment: limited          Diagnosis:   Schizophrenia     Treatment Recommendations:    -pt denies any SI, denies any AVH, says she is thinking more clear now, feels better, was just having anxiety  -pt does not meet criteria for admission at this time, cleared to discharge home  -pt says she takes meds daily, continue psych meds   -pt says she has CSB appt with Dr Blane Mae on Friday, will go to appt, also PACT team will check on her tomorrow morning

## 2020-08-06 NOTE — ED NOTES
Patient states she has been hearing voices since she was discharged from [facility]. States symptoms getting worse over past few days making her feel anxious. Reports feeling better since arriving at hospital and would like to go home. Urged patient to wait for tele-psych consult is complete. Patient agreeable with plan. Patient placed in paper scrubs and belongings secured in locker #2.

## 2020-08-06 NOTE — DISCHARGE INSTRUCTIONS
Patient Education        Bipolar Disorder: Care Instructions  Your Care Instructions     Bipolar disorder is an illness that causes extreme mood changes, from times of very high energy (manic episodes) to times of depression. But many people with bipolar disorder show only the symptoms of depression. These moods may cause problems with your work, school, family life, friendships, and how well you function. This disease is also called manic-depression. There is no cure for bipolar disorder, but it can be helped with medicines. Counseling may also help. It is important to take your medicines exactly as prescribed, even when you feel well. You may need lifelong treatment. Follow-up care is a key part of your treatment and safety. Be sure to make and go to all appointments, and call your doctor if you are having problems. It's also a good idea to know your test results and keep a list of the medicines you take. How can you care for yourself at home? · Be safe with medicines. Take your medicines exactly as prescribed. Do not stop or change a medicine without talking to your doctor first. Geetha Ahmadi and your doctor may need to try different combinations of medicines to find what works for you. · Take your medicines on schedule to keep your moods even. When you feel good, you may think that you do not need your medicines. But it is important to keep taking them. · Go to your counseling sessions. Call and talk with your counselor if you can't go to a session or if you don't think the sessions are helping. Do not just stop going. · Get at least 30 minutes of activity on most days of the week. Walking is a good choice. You also may want to do other things, such as running, swimming, or cycling. · Get enough sleep. Keep your room dark and quiet. Try to go to bed at the same time every night. · Eat a healthy diet. This includes whole grains, dairy, fruits, vegetables, and protein. Eat foods from each of these groups.   · Try to lower your stress. Manage your time, build a strong support system, and lead a healthy lifestyle. To lower your stress, try physical activity, slow deep breathing, or getting a massage. · Do not use alcohol or illegal drugs. · Learn the early signs of your mood changes. You can then take steps to help yourself feel better. · Ask for help from friends and family when you need it. You may need help with daily chores when you are depressed. When you are manic, you may need support to control your high energy levels. What should you do if someone in your family has bipolar disorder? · Learn about the disease and the signs that it is getting worse. · Remind your family member that you love him or her. · Make a plan with all family members about how to take care of your loved one when his or her symptoms are bad. · Talk about your fears and concerns and those of other family members. Seek counseling if needed. · Do not focus attention only on the person who is in treatment. · Remind yourself that it will take time for changes to occur. · Do not blame yourself for the disease. · Know your legal rights and the legal rights of your family member. Support groups or counselors can help you with this information. · Take care of yourself. Keep up with your own interests, such as your career, hobbies, and friends. Use exercise, positive self-talk, deep breathing, and other relaxing exercises to help lower your stress. · Give yourself time to grieve. You may need to deal with emotions such as anger, fear, and frustration. After you work through your feelings, you will be better able to care for yourself and your family. · If you are having a hard time with your feelings or with your relationship with your family member, talk with a counselor. When should you call for help? [id number] anytime you think you may need emergency care. For example, call if:  · You feel like hurting yourself or someone else.   · Someone who has bipolar disorder displays dangerous behavior, and you think the person might hurt himself or herself or someone else. Call your doctor now or seek immediate medical care if:  · You hear voices. · Someone you know has bipolar disorder and talks about suicide. Keep the numbers for these national suicide hotlines: 7-505-069-TALK (6-382.278.3191) and 6-410-ZZLZTMB (8-861.154.8113). If a suicide threat seems real, with a specific plan and a way to carry it out, stay with the person, or ask someone you trust to stay with the person, until you can get help. · Someone you know has bipolar disorder and:  ? Starts to give away possessions. ? Is using illegal drugs or drinking alcohol heavily. ? Talks or writes about death, including writing suicide notes or talking about guns, knives, or pills. ? Talks or writes about hurting someone else. ? Starts to spend a lot of time alone. ? Acts very aggressively or suddenly appears calm. ? Talks about beliefs that are not based in reality (delusions). Watch closely for changes in your health, and be sure to contact your doctor if:  · You cannot go to your counseling sessions. Where can you learn more? Go to http://devendra-earnest.info/  Enter K052 in the search box to learn more about \"Bipolar Disorder: Care Instructions. \"  Current as of: January 31, 2020               Content Version: 12.5  © 7216-9531 Healthwise, Incorporated. Care instructions adapted under license by [employer] (which disclaims liability or warranty for this information). If you have questions about a medical condition or this instruction, always ask your healthcare professional. [place] [address] any warranty or liability for your use of this information.

## 2020-09-08 ENCOUNTER — OFFICE VISIT (OUTPATIENT)
Dept: INTERNAL MEDICINE CLINIC | Age: 30
End: 2020-09-08

## 2020-09-08 VITALS
SYSTOLIC BLOOD PRESSURE: 94 MMHG | DIASTOLIC BLOOD PRESSURE: 56 MMHG | WEIGHT: 137.2 LBS | TEMPERATURE: 97.8 F | RESPIRATION RATE: 14 BRPM | HEART RATE: 73 BPM | BODY MASS INDEX: 22.05 KG/M2 | OXYGEN SATURATION: 99 % | HEIGHT: 66 IN

## 2020-09-08 DIAGNOSIS — Z13.89 SCREENING FOR CARDIOVASCULAR, RESPIRATORY, AND GENITOURINARY DISEASES: ICD-10-CM

## 2020-09-08 DIAGNOSIS — Z13.83 SCREENING FOR CARDIOVASCULAR, RESPIRATORY, AND GENITOURINARY DISEASES: ICD-10-CM

## 2020-09-08 DIAGNOSIS — E55.9 VITAMIN D DEFICIENCY: ICD-10-CM

## 2020-09-08 DIAGNOSIS — Z13.1 SCREENING FOR DIABETES MELLITUS: ICD-10-CM

## 2020-09-08 DIAGNOSIS — Z00.00 ROUTINE GENERAL MEDICAL EXAMINATION AT A HEALTH CARE FACILITY: Primary | ICD-10-CM

## 2020-09-08 DIAGNOSIS — Z13.29 SCREENING FOR THYROID DISORDER: ICD-10-CM

## 2020-09-08 DIAGNOSIS — Z13.6 SCREENING FOR CARDIOVASCULAR, RESPIRATORY, AND GENITOURINARY DISEASES: ICD-10-CM

## 2020-09-08 DIAGNOSIS — Z11.1 VISIT FOR TB SKIN TEST: ICD-10-CM

## 2020-09-08 RX ORDER — RISPERIDONE 2 MG/1
TABLET, FILM COATED ORAL
COMMUNITY

## 2020-09-08 RX ORDER — HYDROXYZINE PAMOATE 25 MG/1
25 CAPSULE ORAL
COMMUNITY
End: 2021-03-23

## 2020-09-08 RX ORDER — GABAPENTIN 300 MG/1
300 CAPSULE ORAL
COMMUNITY

## 2020-09-08 RX ORDER — DIVALPROEX SODIUM 250 MG/1
TABLET, EXTENDED RELEASE ORAL
COMMUNITY
End: 2021-03-23

## 2020-09-08 NOTE — PROGRESS NOTES
[address]   Primary Care Office Visit - Complete Physical    Chanda Iverson is a 27 y.o. female presenting for annual physical.  : 1990        Assessment/Plan:     1. Routine general medical examination at a health care facility    - CBC WITH AUTOMATED DIFF; Future  - METABOLIC PANEL, COMPREHENSIVE; Future  - TSH W/ REFLX FREE T4 IF ABNORMAL; Future  - URINALYSIS W/MICROSCOPIC; Future  - HEMOGLOBIN A1C WITH EAG; Future  - VITAMIN D, 25 HYDROXY; Future    2. Screening for cardiovascular, respiratory, and genitourinary diseases    - CBC WITH AUTOMATED DIFF; Future  - METABOLIC PANEL, COMPREHENSIVE; Future  - URINALYSIS W/MICROSCOPIC; Future  - URINALYSIS W/MICROSCOPIC  - METABOLIC PANEL, COMPREHENSIVE  - CBC WITH AUTOMATED DIFF    3. Screening for thyroid disorder    - TSH W/ REFLX FREE T4 IF ABNORMAL; Future  - TSH W/ REFLX FREE T4 IF ABNORMAL    4. Screening for diabetes mellitus    - HEMOGLOBIN A1C WITH EAG; Future  - HEMOGLOBIN A1C WITH EAG    5. Vitamin D deficiency    - VITAMIN D, 25 HYDROXY; Future  - VITAMIN D, 25 HYDROXY    6. Visit for TB skin test    - AMB POC TUBERCULOSIS, INTRADERMAL (SKIN TEST)      Follow-up and Dispositions    · Return in about 1 year (around 2021), or if symptoms worsen or fail to improve, for CPE. Orders & Diagnoses associated with This Encounter:         ICD-10-CM ICD-9-CM   1. Routine general medical examination at a health care facility  Z00.00 V70.0   2. Screening for cardiovascular, respiratory, and genitourinary diseases  Z13.6 V81.2    Z13.89 V81.6    Z13.83 V81.4   3. Screening for thyroid disorder  Z13.29 V77.0   4. Screening for diabetes mellitus  Z13.1 V77.1   5. Vitamin D deficiency  E55.9 268.9   6.  Visit for TB skin test  Z11.1 V74.1       Orders Placed This Encounter    CBC WITH AUTOMATED DIFF     Standing Status:   Future     Number of Occurrences:   1     Standing Expiration Date:   5772    METABOLIC PANEL, COMPREHENSIVE     Standing Status:   Future     Number of Occurrences:   1     Standing Expiration Date:   9/9/2021    TSH W/ REFLX FREE T4 IF ABNORMAL     Standing Status:   Future     Number of Occurrences:   1     Standing Expiration Date:   9/9/2021    URINALYSIS W/MICROSCOPIC     Standing Status:   Future     Number of Occurrences:   1     Standing Expiration Date:   9/9/2021    HEMOGLOBIN A1C WITH EAG     Standing Status:   Future     Number of Occurrences:   1     Standing Expiration Date:   9/9/2021    VITAMIN D, 25 HYDROXY     Standing Status:   Future     Number of Occurrences:   1     Standing Expiration Date:   9/9/2021    AMB POC TUBERCULOSIS, INTRADERMAL (SKIN TEST)    divalproex ER (DEPAKOTE ER) 250 mg ER tablet     Sig: Take  by mouth.  risperiDONE (RisperDAL) 2 mg tablet     Sig: Take  by mouth. 2 mg in am and 3 mg nightly    gabapentin (NEURONTIN) 100 mg capsule     Sig: Take  by mouth three (3) times daily.  hydrOXYzine pamoate (VistariL) 25 mg capsule     Sig: Take 25 mg by mouth two (2) times daily as needed. Best practice advisories reviewed. Management plan & patient instructions reviewed with Reinaldo Canseco, who voiced understanding. This document may have been created with the aid of dictation software. Text may contain errors, particularly phonetic errors. Subjective   History:   Reinaldo Canseco is a 27 y.o. female presenting for an annual physical.    She see psychiatry regularly for medication management. Reports history of anemia has been resolved. She states she always run low with her BP. Past Medical History:   Diagnosis Date    Alcoholic ([address]     Sober 3 months; Weekly AAA meeting;  Had substance abuse counseling;     Anemia     [facility]     Cirrhosis ([facility]     Past not current issue per patient    ETOH abuse     GERD (gastroesophageal reflux disease)     Head injury     Marijuana abuse    [id number] [facility] [address]    Post partum depression     Pregnancy     Psychiatric disorder     Psychotic disorder ([facility]     Depression/  Glen Rhodes, NP; Bipolar disorder, mood swings.  Schizophrenia ([facility]     Stroke [facility])      Past Surgical History:   Procedure Laterality Date    HX  SECTION      HX  SECTION      C section x 2;   &     HX RHINOPLASTY      HX TUBAL LIGATION  2017      reports that she quit smoking about 3 months ago. She has a 3.75 pack-year smoking history. She has quit using smokeless tobacco. She reports current alcohol use. She reports previous drug use. Drug: Other. Social History     Social History Narrative    ** Merged History Encounter **         ;  2 children; unemployed    Disabled related to mental issues/ 2017    2 boys live with her mom. Currently in [address] [place] [address],  [address] History     Tobacco Use   Smoking Status Former Smoker    Packs/day: 0.25    Years: 15.00    Pack years: 3.75    Last attempt to quit: 2020    Years since quittin.2   Smokeless Tobacco Former User   Tobacco Comment    continue not to smoke     Family History   Problem Relation Age of Onset    No Known Problems Mother     No Known Problems Father      Allergies   Allergen Reactions    Robitussin [Guaifenesin] Nausea and Vomiting       Problem List:      Patient Active Problem List    Diagnosis    GERD (gastroesophageal reflux disease)    [facility]    Cigarette nicotine dependence without complication    Schizophrenia (HCC)       Medications:     Current Outpatient Medications   Medication Sig    divalproex ER (DEPAKOTE ER) 250 mg ER tablet Take  by mouth.  risperiDONE (RisperDAL) 2 mg tablet Take  by mouth. 2 mg in am and 3 mg nightly    gabapentin (NEURONTIN) 100 mg capsule Take  by mouth three (3) times daily.  hydrOXYzine pamoate (VistariL) 25 mg capsule Take 25 mg by mouth two (2) times daily as needed.      No current facility-administered medications for this visit. Review of Systems:     Review of Systems   Constitutional: Negative for chills, fever and malaise/fatigue. HENT:        Reports dry mouth    Eyes: Negative for blurred vision and double vision. Respiratory: Negative for cough, shortness of breath and wheezing. Cardiovascular: Negative for chest pain, palpitations and leg swelling. Gastrointestinal: Negative for diarrhea, nausea and vomiting. Appetite increase recently   Genitourinary: Negative for frequency and urgency. Neurological: Negative for dizziness and headaches. Endo/Heme/Allergies: Negative for polydipsia. Psychiatric/Behavioral: Negative for depression. The patient is not nervous/anxious. Objective   Physical Assessment:   VS:    Visit Vitals  BP 94/56   Pulse 73   Temp 97.8 °F (36.6 °C) (Oral)   Resp 14   Ht 5' 6\" (1.676 m)   Wt 137 lb 3.2 oz (62.2 kg)   SpO2 99%   BMI 22.14 kg/m²           Physical Exam  Vitals signs and nursing note reviewed. Constitutional:       General: She is not in acute distress. Appearance: Normal appearance. She is normal weight. She is not ill-appearing. HENT:      Head: Normocephalic and atraumatic. Right Ear: Tympanic membrane, ear canal and external ear normal.      Left Ear: Tympanic membrane, ear canal and external ear normal.      Nose: Nose normal.      Mouth/Throat:      Mouth: Mucous membranes are moist.      Pharynx: Oropharynx is clear. Eyes:      General: No scleral icterus. Extraocular Movements: Extraocular movements intact. Conjunctiva/sclera: Conjunctivae normal.      Pupils: Pupils are equal, round, and reactive to light. Neck:      Musculoskeletal: Normal range of motion. Cardiovascular:      Rate and Rhythm: Normal rate and regular rhythm. Pulses: Normal pulses. Heart sounds: Normal heart sounds. Pulmonary:      Effort: Pulmonary effort is normal. No respiratory distress. Breath sounds: Normal breath sounds. No wheezing. Abdominal:      General: Abdomen is flat. Bowel sounds are normal.      Palpations: Abdomen is soft. There is no mass. Tenderness: There is no abdominal tenderness. There is no guarding or rebound. Hernia: No hernia is present. Musculoskeletal: Normal range of motion. Right lower leg: No edema. Left lower leg: No edema. Lymphadenopathy:      Cervical: No cervical adenopathy. Skin:     General: Skin is warm and dry. Findings: No lesion or rash. Neurological:      General: No focal deficit present. Mental Status: She is alert and oriented to person, place, and time. Psychiatric:         Mood and Affect: Mood normal.         Behavior: Behavior normal.         Thought Content: Thought content normal.         Judgment: Judgment normal.       Records Review:         Recent Labs & Imaging:     No results found for this or any previous visit (from the past 12 hour(s)). Updated lab work and TB skin test done today.

## 2020-09-08 NOTE — PROGRESS NOTES
ROOM # 2    Robinson Miller presents today for   Chief Complaint   Patient presents with    Physical     For housing and paperwork to be filled out       Robinson Miller preferred language for health care discussion is english/other. Is someone accompanying this pt? YES- IN Jaime Burns    Is the patient using any DME equipment during OV? NO    Depression Screening:  3 most recent PHQ Screens 2/20/2019 4/2/2018   Little interest or pleasure in doing things Not at all Not at all   Feeling down, depressed, irritable, or hopeless Not at all Not at all   Total Score PHQ 2 0 0       Learning Assessment:  Learning Assessment 2/20/2019 4/2/2018   PRIMARY LEARNER Patient Patient   HIGHEST LEVEL OF EDUCATION - PRIMARY LEARNER  [address] LEARNER NONE NONE   PRIMARY LANGUAGE ENGLISH ENGLISH   LEARNER PREFERENCE PRIMARY DEMONSTRATION VIDEOS   ANSWERED BY patient Patient   RELATIONSHIP SELF SELF       Abuse Screening:  Abuse Screening Questionnaire 4/2/2018   Do you ever feel afraid of your partner? N   Are you in a relationship with someone who physically or mentally threatens you? N   Is it safe for you to go home? Y       Fall Risk  No flowsheet data found. Health Maintenance reviewed and discussed per provider. Yes    Robinson Miller is due for   Health Maintenance Due   Topic Date Due    Pneumococcal 0-64 years (1 of 1 - PPSV23) 03/13/1996    Flu Vaccine (1) 09/01/2020         Please order/place referral if appropriate. Advance Directive:  1. Do you have an advance directive in place? Patient Reply: NO    2. If not, would you like material regarding how to put one in place? Patient Reply: NO    Coordination of Care:  1. Have you been to the ER, urgent care clinic since your last visit? Hospitalized since your last visit? YES-[facility]-8/17/20    2.  Have you seen or consulted any other health care providers outside of the [facility] since your last visit? Include any pap smears or colon screening.  CSB-PSYCHIATRIC 8/21/20

## 2020-09-08 NOTE — PROGRESS NOTES
PPD Placement note  Henry Nose, 27 y.o. female is here today for placement of PPD test  Reason for PPD test: housing  Pt taken PPD test before: yes  Verified in allergy area and with patient that they are not allergic to the products PPD is made of (Phenol or Tween). No  Is patient taking any oral or IV steroid medication now or have they taken it in the last month? no  Has the patient ever received the BCG vaccine?: no  Has the patient been in recent contact with anyone known or suspected of having active TB disease?: no       Date of exposure (if applicable): n/a       Name of person they were exposed to (if applicable): n/a  O: Alert and oriented in NAD. P:  PPD placed on 9/8/2020. Patient advised to return for reading within 48-72 hours.

## 2020-09-09 LAB
25(OH)D3+25(OH)D2 SERPL-MCNC: 40.9 NG/ML (ref 30–100)
ALBUMIN SERPL-MCNC: 4.3 G/DL (ref 3.9–5)
ALBUMIN/GLOB SERPL: 2.3 {RATIO} (ref 1.2–2.2)
ALP SERPL-CCNC: 43 IU/L (ref 39–117)
ALT SERPL-CCNC: 6 IU/L (ref 0–32)
APPEARANCE UR: CLEAR
AST SERPL-CCNC: 10 IU/L (ref 0–40)
BACTERIA #/AREA URNS HPF: NORMAL /[HPF]
BASOPHILS # BLD AUTO: 0.1 X10E3/UL (ref 0–0.2)
BASOPHILS NFR BLD AUTO: 1 %
BILIRUB SERPL-MCNC: <0.2 MG/DL (ref 0–1.2)
BILIRUB UR QL STRIP: NEGATIVE
BUN SERPL-MCNC: 4 MG/DL (ref 6–20)
BUN/CREAT SERPL: 6 (ref 9–23)
CALCIUM SERPL-MCNC: 9.2 MG/DL (ref 8.7–10.2)
CASTS URNS QL MICRO: NORMAL /LPF
CHLORIDE SERPL-SCNC: 101 MMOL/L (ref 96–106)
CO2 SERPL-SCNC: 25 MMOL/L (ref 20–29)
COLOR UR: YELLOW
CREAT SERPL-MCNC: 0.64 MG/DL (ref 0.57–1)
EOSINOPHIL # BLD AUTO: 0.1 X10E3/UL (ref 0–0.4)
EOSINOPHIL NFR BLD AUTO: 2 %
EPI CELLS #/AREA URNS HPF: NORMAL /HPF (ref 0–10)
ERYTHROCYTE [DISTWIDTH] IN BLOOD BY AUTOMATED COUNT: 13.1 % (ref 11.7–15.4)
EST. AVERAGE GLUCOSE BLD GHB EST-MCNC: 94 MG/DL
GLOBULIN SER CALC-MCNC: 1.9 G/DL (ref 1.5–4.5)
GLUCOSE SERPL-MCNC: 88 MG/DL (ref 65–99)
GLUCOSE UR QL: NEGATIVE
HBA1C MFR BLD: 4.9 % (ref 4.8–5.6)
HCT VFR BLD AUTO: 37.2 % (ref 34–46.6)
HGB BLD-MCNC: 12.3 G/DL (ref 11.1–15.9)
HGB UR QL STRIP: NEGATIVE
IMM GRANULOCYTES # BLD AUTO: 0 X10E3/UL (ref 0–0.1)
IMM GRANULOCYTES NFR BLD AUTO: 0 %
KETONES UR QL STRIP: NEGATIVE
LEUKOCYTE ESTERASE UR QL STRIP: NEGATIVE
LYMPHOCYTES # BLD AUTO: 2.1 X10E3/UL (ref 0.7–3.1)
LYMPHOCYTES NFR BLD AUTO: 37 %
MCH RBC QN AUTO: 29.9 PG (ref 26.6–33)
MCHC RBC AUTO-ENTMCNC: 33.1 G/DL (ref 31.5–35.7)
MCV RBC AUTO: 91 FL (ref 79–97)
MICRO URNS: NORMAL
MICRO URNS: NORMAL
MONOCYTES # BLD AUTO: 0.6 X10E3/UL (ref 0.1–0.9)
MONOCYTES NFR BLD AUTO: 10 %
NEUTROPHILS # BLD AUTO: 2.8 X10E3/UL (ref 1.4–7)
NEUTROPHILS NFR BLD AUTO: 50 %
NITRITE UR QL STRIP: NEGATIVE
PH UR STRIP: 7.5 [PH] (ref 5–7.5)
PLATELET # BLD AUTO: 116 X10E3/UL (ref 150–450)
POTASSIUM SERPL-SCNC: 4.2 MMOL/L (ref 3.5–5.2)
PROT SERPL-MCNC: 6.2 G/DL (ref 6–8.5)
PROT UR QL STRIP: NEGATIVE
RBC # BLD AUTO: 4.11 X10E6/UL (ref 3.77–5.28)
RBC #/AREA URNS HPF: NORMAL /HPF (ref 0–2)
SODIUM SERPL-SCNC: 139 MMOL/L (ref 134–144)
SP GR UR: 1.01 (ref 1–1.03)
UROBILINOGEN UR STRIP-MCNC: 0.2 MG/DL (ref 0.2–1)
WBC # BLD AUTO: 5.6 X10E3/UL (ref 3.4–10.8)
WBC #/AREA URNS HPF: NORMAL /HPF (ref 0–5)

## 2020-09-10 LAB
MM INDURATION POC: 0 MM (ref 0–5)
PPD POC: NEGATIVE NEGATIVE

## 2020-09-10 NOTE — PROGRESS NOTES
PPD Reading Note  PPD read and results entered in [address]. Result: 0 mm induration.   Interpretation: negative  If test not read within 48-72 hours of initial placement, patient advised to repeat in other arm 1-3 weeks after this test.  Allergic reaction: no

## 2020-09-14 NOTE — PROGRESS NOTES
Results reviewed. Please call patient with results. Labs are within acceptable ranges except low platelets which could be from her psychiatric medications she will need to follow up with her psychiatrist ASAP regarding these medications.

## 2020-09-16 ENCOUNTER — TELEPHONE (OUTPATIENT)
Dept: INTERNAL MEDICINE CLINIC | Age: 30
End: 2020-09-16

## 2020-09-16 NOTE — TELEPHONE ENCOUNTER
----- Message from Obdulio Carrion NP sent at 9/14/2020  8:56 AM EDT -----  Results reviewed. Please call patient with results. Labs are within acceptable ranges except low platelets which could be from her psychiatric medications she will need to follow up with her psychiatrist ASAP regarding these medications.

## 2021-03-23 ENCOUNTER — HOSPITAL ENCOUNTER (EMERGENCY)
Age: 31
Discharge: HOME | End: 2021-03-24
Attending: EMERGENCY MEDICINE
Payer: MEDICAID

## 2021-03-23 DIAGNOSIS — R33.9 URINARY RETENTION: Primary | ICD-10-CM

## 2021-03-23 PROCEDURE — 99285 EMERGENCY DEPT VISIT HI MDM: CPT

## 2021-03-24 ENCOUNTER — APPOINTMENT (OUTPATIENT)
Dept: CT IMAGING | Age: 31
End: 2021-03-24
Attending: EMERGENCY MEDICINE
Payer: MEDICAID

## 2021-03-24 VITALS
SYSTOLIC BLOOD PRESSURE: 111 MMHG | HEIGHT: 66 IN | RESPIRATION RATE: 14 BRPM | HEART RATE: 84 BPM | OXYGEN SATURATION: 99 % | WEIGHT: 165 LBS | TEMPERATURE: 97.8 F | BODY MASS INDEX: 26.52 KG/M2 | DIASTOLIC BLOOD PRESSURE: 69 MMHG

## 2021-03-24 LAB
ALBUMIN SERPL-MCNC: 3.6 G/DL (ref 3.4–5)
ALBUMIN/GLOB SERPL: 1.1 {RATIO} (ref 0.8–1.7)
ALP SERPL-CCNC: 59 U/L (ref 45–117)
ALT SERPL-CCNC: 17 U/L (ref 13–56)
AMPHET UR QL SCN: NEGATIVE
ANION GAP SERPL CALC-SCNC: 4 MMOL/L (ref 3–18)
APPEARANCE UR: CLEAR
AST SERPL-CCNC: 8 U/L (ref 10–38)
BACTERIA URNS QL MICRO: NEGATIVE /HPF
BARBITURATES UR QL SCN: NEGATIVE
BASOPHILS # BLD: 0 K/UL (ref 0–0.1)
BASOPHILS NFR BLD: 1 % (ref 0–2)
BENZODIAZ UR QL: NEGATIVE
BILIRUB SERPL-MCNC: 0.2 MG/DL (ref 0.2–1)
BILIRUB UR QL: NEGATIVE
BUN SERPL-MCNC: 7 MG/DL (ref 7–18)
BUN/CREAT SERPL: 10 (ref 12–20)
CALCIUM SERPL-MCNC: 8.8 MG/DL (ref 8.5–10.1)
CANNABINOIDS UR QL SCN: NEGATIVE
CHLORIDE SERPL-SCNC: 106 MMOL/L (ref 100–111)
CO2 SERPL-SCNC: 28 MMOL/L (ref 21–32)
COCAINE UR QL SCN: NEGATIVE
COLOR UR: YELLOW
CREAT SERPL-MCNC: 0.72 MG/DL (ref 0.6–1.3)
DIFFERENTIAL METHOD BLD: ABNORMAL
EOSINOPHIL # BLD: 0 K/UL (ref 0–0.4)
EOSINOPHIL NFR BLD: 0 % (ref 0–5)
EPITH CASTS URNS QL MICRO: NORMAL /LPF (ref 0–5)
ERYTHROCYTE [DISTWIDTH] IN BLOOD BY AUTOMATED COUNT: 13.1 % (ref 11.6–14.5)
ETHANOL SERPL-MCNC: <3 MG/DL (ref 0–3)
GLOBULIN SER CALC-MCNC: 3.3 G/DL (ref 2–4)
GLUCOSE SERPL-MCNC: 96 MG/DL (ref 74–99)
GLUCOSE UR STRIP.AUTO-MCNC: NEGATIVE MG/DL
HCG UR QL: NEGATIVE
HCT VFR BLD AUTO: 38.6 % (ref 35–45)
HDSCOM,HDSCOM: NORMAL
HGB BLD-MCNC: 12.4 G/DL (ref 12–16)
HGB UR QL STRIP: NEGATIVE
KETONES UR QL STRIP.AUTO: NEGATIVE MG/DL
LEUKOCYTE ESTERASE UR QL STRIP.AUTO: ABNORMAL
LYMPHOCYTES # BLD: 2.3 K/UL (ref 0.9–3.6)
LYMPHOCYTES NFR BLD: 38 % (ref 21–52)
MCH RBC QN AUTO: 28.9 PG (ref 24–34)
MCHC RBC AUTO-ENTMCNC: 32.1 G/DL (ref 31–37)
MCV RBC AUTO: 90 FL (ref 74–97)
METHADONE UR QL: NEGATIVE
MONOCYTES # BLD: 0.8 K/UL (ref 0.05–1.2)
MONOCYTES NFR BLD: 13 % (ref 3–10)
NEUTS SEG # BLD: 3 K/UL (ref 1.8–8)
NEUTS SEG NFR BLD: 48 % (ref 40–73)
NITRITE UR QL STRIP.AUTO: NEGATIVE
OPIATES UR QL: NEGATIVE
PCP UR QL: NEGATIVE
PH UR STRIP: 7.5 [PH] (ref 5–8)
PLATELET # BLD AUTO: 233 K/UL (ref 135–420)
PMV BLD AUTO: 9.1 FL (ref 9.2–11.8)
POTASSIUM SERPL-SCNC: 3.8 MMOL/L (ref 3.5–5.5)
PROT SERPL-MCNC: 6.9 G/DL (ref 6.4–8.2)
PROT UR STRIP-MCNC: NEGATIVE MG/DL
RBC # BLD AUTO: 4.29 M/UL (ref 4.2–5.3)
RBC #/AREA URNS HPF: NORMAL /HPF (ref 0–5)
SODIUM SERPL-SCNC: 138 MMOL/L (ref 136–145)
SP GR UR REFRACTOMETRY: 1.01 (ref 1–1.03)
UROBILINOGEN UR QL STRIP.AUTO: 0.2 EU/DL (ref 0.2–1)
WBC # BLD AUTO: 6 K/UL (ref 4.6–13.2)
WBC URNS QL MICRO: NORMAL /HPF (ref 0–4)

## 2021-03-24 PROCEDURE — 80307 DRUG TEST PRSMV CHEM ANLYZR: CPT

## 2021-03-24 PROCEDURE — 87077 CULTURE AEROBIC IDENTIFY: CPT

## 2021-03-24 PROCEDURE — 82077 ASSAY SPEC XCP UR&BREATH IA: CPT

## 2021-03-24 PROCEDURE — 51798 US URINE CAPACITY MEASURE: CPT

## 2021-03-24 PROCEDURE — 85025 COMPLETE CBC W/AUTO DIFF WBC: CPT

## 2021-03-24 PROCEDURE — 81001 URINALYSIS AUTO W/SCOPE: CPT

## 2021-03-24 PROCEDURE — 81025 URINE PREGNANCY TEST: CPT

## 2021-03-24 PROCEDURE — 87186 SC STD MICRODIL/AGAR DIL: CPT

## 2021-03-24 PROCEDURE — 74176 CT ABD & PELVIS W/O CONTRAST: CPT

## 2021-03-24 PROCEDURE — 87086 URINE CULTURE/COLONY COUNT: CPT

## 2021-03-24 PROCEDURE — 80053 COMPREHEN METABOLIC PANEL: CPT

## 2021-03-24 RX ORDER — CLONAZEPAM 0.12 MG/1
0.12 TABLET, ORALLY DISINTEGRATING ORAL 2 TIMES DAILY
COMMUNITY

## 2021-03-24 RX ORDER — METOPROLOL SUCCINATE 100 MG/1
25 TABLET, EXTENDED RELEASE ORAL 2 TIMES DAILY
COMMUNITY
End: 2021-05-17

## 2021-03-24 NOTE — ED TRIAGE NOTES
Arrives by EMS to triage. Patient states she was hearing a male's voice to touch herself 2 hours ago. And since then she hasn't been able to urinate and has generalized abdominal pain. States she last urinated 6 hours ago. States she didn't use any foreign objects.

## 2021-03-24 NOTE — ED NOTES
States a voice told her to touch herself. \"I think I torn my meatus\".  Denies any blood in vaginal area

## 2021-03-24 NOTE — ED NOTES
Bladder scanned this patient per order. Then straight cathed her obtaining 500 mls of clear yellow urine. Nikki Andrea had performed an assessment of her vaginal area where patient states she has burning and numbing and where she thought she tore her meatus. There were no signs of injury. In speaking with patient she stated that an hour before calling EMS she had placed a wide plastic dildo inside of herself using no lubrication. It did not hurt going in but did coming out. She says that she has had UTI symptoms for about three days and was in the process of trying to find transportation to see a doctor for it. When asked if there was something that she thought started the UTI she said it was from [address]. She felt that if she masturbated it would \"normalize\" her system. She uses other items to pleasure herself such as a plastic bottle. She feels as though her meatus is torn, thus not connected and she has been afraid. She states she had a tubal ligation thus she cannot get pregnant    Labs and urine have been sent and she is comfortable in the bed now and states she feels safe.

## 2021-03-24 NOTE — ED PROVIDER NOTES
Elayne Giordano is a 32 y.o. female with noted past medical history until mood mental health issues with complaints of inability urinate for the last 6 hours. Patient states she was touching near her urethra and so she thinks she may have torn it but denies any foreign object placed. She states she has been unable to urinate for the last 6 hours. She feels bloated and distended down below. No history of any significant bladder issues before. She had he was treated for UTI in the past.  No recent instrumentation. No fever chills or sweats. The history is provided by the patient and medical records. Past Medical History:   Diagnosis Date    Alcoholic ([address]     Sober 3 months; Weekly AAA meeting; Had substance abuse counseling;     Anemia     [facility]     Cirrhosis ([facility] [address]     Past not current issue per patient    ETOH abuse     GERD (gastroesophageal reflux disease)     Head injury     Marijuana abuse     Phobia     [place] [employer]    Post partum depression     Pregnancy     Psychiatric disorder     Psychotic disorder ([facility] [address]     Depression/  Tiffanie Child, NP; Bipolar disorder, mood swings.     Schizophrenia ([facility] [address]     Stroke [facility])        Past Surgical History:   Procedure Laterality Date    HX  SECTION      HX  SECTION      C section x 2;   &     HX RHINOPLASTY      HX TUBAL LIGATION  2017         Family History:   Problem Relation Age of Onset    No Known Problems Mother     No Known Problems Father        Social History     Socioeconomic History    Marital status: LEGALLY      Spouse name: Not on file    Number of children: 2    Years of education: Not on file    Highest education level: Not on file   Occupational History    Occupation: disabled   Social Needs    Financial resource strain: Not on file    Food insecurity     Worry: Not on file     Inability: Not on file   [employer] needs     Medical: Not on file Non-medical: Not on file   Tobacco Use    Smoking status: Former Smoker     Packs/day: 0.25     Years: 15.00     Pack years: 3.75     Quit date: 2020     Years since quittin.7    Smokeless tobacco: Former User    Tobacco comment: continue not to smoke   Substance and Sexual Activity    Alcohol use: Yes     Comment: in moderation    Drug use: Not Currently     Types: Other     Comment: Alcohol    Sexual activity: Never   Lifestyle    Physical activity     Days per week: Not on file     Minutes per session: Not on file    Stress: Not on file   Relationships    Social connections     Talks on phone: Not on file     Gets together: Not on file     Attends [employer] service: Not on file     Active member of club or organization: Not on file     Attends meetings of clubs or organizations: Not on file     Relationship status: Not on file    Intimate partner violence     Fear of current or ex partner: Not on file     Emotionally abused: Not on file     Physically abused: Not on file     Forced sexual activity: Not on file   Other Topics Concern    Not on file   Social History Narrative    ** Merged History Encounter **         ;  2 children; unemployed    Disabled related to mental issues/ 2017    2 boys live with her mom. Currently in  [address]: [place] [[email or web address]]    Review of Systems   Constitutional: Negative for fever. HENT: Negative for sore throat. Eyes: Negative for visual disturbance. Respiratory: Negative for shortness of breath. Cardiovascular: Negative for chest pain. Gastrointestinal: Positive for abdominal pain. Genitourinary: Positive for difficulty urinating. Negative for hematuria and vaginal bleeding. Musculoskeletal: Negative for gait problem. Skin: Negative for rash. Neurological: Negative for syncope. Psychiatric/Behavioral: Positive for sleep disturbance.        Vitals:    21 0130 21 0135 21 0145 21 0300   BP: 124/78  120/69 110/65   Pulse:       Resp:       Temp:  97.8 °F (36.6 °C)     SpO2: 99%  99% 97%   Weight:       Height:                Physical Exam  Vitals signs and nursing note reviewed. Constitutional:       General: She is not in acute distress. Appearance: She is well-developed. She is ill-appearing. She is not toxic-appearing or diaphoretic. HENT:      Head: Normocephalic and atraumatic. Right Ear: External ear normal.      Left Ear: External ear normal.      Nose: Nose normal.      Mouth/Throat:      Pharynx: Uvula midline. Eyes:      General: No scleral icterus. Conjunctiva/sclera: Conjunctivae normal.   Neck:      Musculoskeletal: Neck supple. Cardiovascular:      Rate and Rhythm: Normal rate and regular rhythm. Heart sounds: Normal heart sounds. Pulmonary:      Effort: Pulmonary effort is normal.      Breath sounds: Normal breath sounds. Abdominal:      General: There is distension. Palpations: Abdomen is soft. Tenderness: There is abdominal tenderness. There is no right CVA tenderness, left CVA tenderness, guarding or rebound. Genitourinary:     Comments: Unremarkable external exam.  No obvious lesions, swelling or urethral tears. No discharge from the urethra. Musculoskeletal:      Right lower leg: No edema. Left lower leg: No edema. Skin:     General: Skin is warm and dry. Neurological:      Mental Status: She is alert and oriented to person, place, and time.       Gait: Gait normal.   Psychiatric:         Behavior: Behavior normal.          MDM       Procedures  Vitals:  Patient Vitals for the past 12 hrs:   Temp Pulse Resp BP SpO2   03/24/21 0300 -- -- -- 110/65 97 %   03/24/21 0145 -- -- -- 120/69 99 %   03/24/21 0135 97.8 °F (36.6 °C) -- -- -- --   03/24/21 0130 -- -- -- 124/78 99 %   03/24/21 0119 -- -- -- 117/76 --   03/23/21 2250 97.6 °F (36.4 °C) 84 14 126/78 99 %         Medications ordered:   Medications - No data to display Lab findings:  Recent Results (from the past 12 hour(s))   CBC WITH AUTOMATED DIFF    Collection Time: 03/24/21 12:45 AM   Result Value Ref Range    WBC 6.0 4.6 - 13.2 K/uL    RBC 4.29 4. 20 - 5.30 M/uL    HGB 12.4 12.0 - 16.0 g/dL    HCT 38.6 35.0 - 45.0 %    MCV 90.0 74.0 - 97.0 FL    MCH 28.9 24.0 - 34.0 PG    MCHC 32.1 31.0 - 37.0 g/dL    RDW 13.1 11.6 - 14.5 %    PLATELET 511 149 - 264 K/uL    MPV 9.1 (L) 9.2 - 11.8 FL    NEUTROPHILS 48 40 - 73 %    LYMPHOCYTES 38 21 - 52 %    MONOCYTES 13 (H) 3 - 10 %    EOSINOPHILS 0 0 - 5 %    BASOPHILS 1 0 - 2 %    ABS. NEUTROPHILS 3.0 1.8 - 8.0 K/UL    ABS. LYMPHOCYTES 2.3 0.9 - 3.6 K/UL    ABS. MONOCYTES 0.8 0.05 - 1.2 K/UL    ABS. EOSINOPHILS 0.0 0.0 - 0.4 K/UL    ABS. BASOPHILS 0.0 0.0 - 0.1 K/UL    DF AUTOMATED     METABOLIC PANEL, COMPREHENSIVE    Collection Time: 03/24/21 12:45 AM   Result Value Ref Range    Sodium 138 136 - 145 mmol/L    Potassium 3.8 3.5 - 5.5 mmol/L    Chloride 106 100 - 111 mmol/L    CO2 28 21 - 32 mmol/L    Anion gap 4 3.0 - 18 mmol/L    Glucose 96 74 - 99 mg/dL    BUN 7 7.0 - 18 MG/DL    Creatinine 0.72 0.6 - 1.3 MG/DL    BUN/Creatinine ratio 10 (L) 12 - 20      GFR est AA >60 >60 ml/min/1.73m2    GFR est non-AA >60 >60 ml/min/1.73m2    Calcium 8.8 8.5 - 10.1 MG/DL    Bilirubin, total 0.2 0.2 - 1.0 MG/DL    ALT (SGPT) 17 13 - 56 U/L    AST (SGOT) 8 (L) 10 - 38 U/L    Alk.  phosphatase 59 45 - 117 U/L    Protein, total 6.9 6.4 - 8.2 g/dL    Albumin 3.6 3.4 - 5.0 g/dL    Globulin 3.3 2.0 - 4.0 g/dL    A-G Ratio 1.1 0.8 - 1.7     ETHYL ALCOHOL    Collection Time: 03/24/21 12:45 AM   Result Value Ref Range    ALCOHOL(ETHYL),SERUM <3 0 - 3 MG/DL   URINALYSIS W/ RFLX MICROSCOPIC    Collection Time: 03/24/21  1:00 AM   Result Value Ref Range    Color YELLOW      Appearance CLEAR      Specific gravity 1.010 1.005 - 1.030      pH (UA) 7.5 5.0 - 8.0      Protein Negative NEG mg/dL    Glucose Negative NEG mg/dL    Ketone Negative NEG mg/dL    Bilirubin Negative NEG      Blood Negative NEG      Urobilinogen 0.2 0.2 - 1.0 EU/dL    Nitrites Negative NEG      Leukocyte Esterase TRACE (A) NEG     HCG URINE, QL    Collection Time: 03/24/21  1:00 AM   Result Value Ref Range    HCG urine, QL Negative NEG     DRUG SCREEN, URINE    Collection Time: 03/24/21  1:00 AM   Result Value Ref Range    BENZODIAZEPINES Negative NEG      BARBITURATES Negative NEG      THC (TH-CANNABINOL) Negative NEG      OPIATES Negative NEG      PCP(PHENCYCLIDINE) Negative NEG      COCAINE Negative NEG      AMPHETAMINES Negative NEG      METHADONE Negative NEG      HDSCOM (NOTE)    URINE MICROSCOPIC ONLY    Collection Time: 03/24/21  1:00 AM   Result Value Ref Range    WBC 0 to 3 0 - 4 /hpf    RBC 0 to 3 0 - 5 /hpf    Epithelial cells FEW 0 - 5 /lpf    Bacteria Negative NEG /hpf       EKG interpretation by ED Physician:      X-Ray, CT or other radiology findings or impressions:  CT ABD PELV WO CONT    (Results Pending)   No acute findings: No foreign body    Progress notes, Consult notes or additional Procedure notes:   No evidence of infection or need further work-up here. I have discussed with patient and/or family/sig other the results, interpretation of any imaging if performed, suspected diagnosis and treatment plan to include instructions regarding the diagnoses listed to which understanding was expressed with all questions answered      Reevaluation of patient:   stable    Disposition:  Diagnosis:   1.  Urinary retention        Disposition: home    Follow-up Information     Follow up With Specialties Details Why Contact [facility] Schedule an appointment as soon as possible for a visit   Scar Levi [phone] [facility]      Mariano Boeck, MD Urology Schedule an appointment as soon as possible for a visit  with this urologist [address] [place] [address] [phone]              Patient's Medications Start Taking    No medications on file   Continue Taking    BUPROPION HCL (WELLBUTRIN PO)    Take  by mouth nightly. Takes this at night, does not know dose    CLONAZEPAM (KLONOPIN) 0.125 MG DISINTEGRATING TABLET    Take 0.125 mg by mouth two (2) times a day. Unsure of the dodse    GABAPENTIN (NEURONTIN) 100 MG CAPSULE    Take 100 mg by mouth nightly. METOPROLOL SUCCINATE (TOPROL-XL) 100 MG TABLET    Take  by mouth two (2) times a day. Not sure of dose    RISPERIDONE (RISPERDAL) 2 MG TABLET    Take  by mouth. 2 mg in am and 3 mg nightly   These Medications have changed    No medications on file   Stop Taking    DIVALPROEX ER (DEPAKOTE ER) 250 MG ER TABLET    Take  by mouth. HYDROXYZINE PAMOATE (VISTARIL) 25 MG CAPSULE    Take 25 mg by mouth two (2) times daily as needed.

## 2021-03-27 LAB
BACTERIA SPEC CULT: ABNORMAL
CC UR VC: ABNORMAL
SERVICE CMNT-IMP: ABNORMAL

## 2021-05-17 ENCOUNTER — OFFICE VISIT (OUTPATIENT)
Dept: INTERNAL MEDICINE CLINIC | Age: 31
End: 2021-05-17
Payer: MEDICAID

## 2021-05-17 VITALS
SYSTOLIC BLOOD PRESSURE: 111 MMHG | TEMPERATURE: 98.3 F | WEIGHT: 166 LBS | HEIGHT: 66 IN | RESPIRATION RATE: 18 BRPM | HEART RATE: 83 BPM | DIASTOLIC BLOOD PRESSURE: 68 MMHG | OXYGEN SATURATION: 99 % | BODY MASS INDEX: 26.68 KG/M2

## 2021-05-17 DIAGNOSIS — R10.31 CHRONIC BILATERAL LOWER ABDOMINAL PAIN: Primary | ICD-10-CM

## 2021-05-17 DIAGNOSIS — G89.29 CHRONIC BILATERAL LOWER ABDOMINAL PAIN: Primary | ICD-10-CM

## 2021-05-17 DIAGNOSIS — I10 ESSENTIAL HYPERTENSION: ICD-10-CM

## 2021-05-17 DIAGNOSIS — R10.32 CHRONIC BILATERAL LOWER ABDOMINAL PAIN: Primary | ICD-10-CM

## 2021-05-17 PROCEDURE — 99214 OFFICE O/P EST MOD 30 MIN: CPT | Performed by: PHYSICIAN ASSISTANT

## 2021-05-17 RX ORDER — METOPROLOL TARTRATE 25 MG/1
25 TABLET, FILM COATED ORAL 2 TIMES DAILY
COMMUNITY
End: 2021-05-17 | Stop reason: SDUPTHER

## 2021-05-17 RX ORDER — HYDROXYZINE 25 MG/1
TABLET, FILM COATED ORAL
COMMUNITY

## 2021-05-17 RX ORDER — OXCARBAZEPINE 300 MG/1
300 TABLET, FILM COATED ORAL 2 TIMES DAILY
COMMUNITY

## 2021-05-17 RX ORDER — MELATONIN 10 MG
10 CAPSULE ORAL
COMMUNITY

## 2021-05-17 RX ORDER — TOPIRAMATE 25 MG/1
25 TABLET ORAL 2 TIMES DAILY
COMMUNITY

## 2021-05-17 RX ORDER — METOPROLOL TARTRATE 25 MG/1
25 TABLET, FILM COATED ORAL 2 TIMES DAILY
Qty: 60 TAB | Refills: 11 | Status: SHIPPED | OUTPATIENT
Start: 2021-05-17

## 2021-05-17 RX ORDER — CLOZAPINE 100 MG/1
100 TABLET ORAL 2 TIMES DAILY
COMMUNITY

## 2021-05-17 RX ORDER — BUPROPION HYDROCHLORIDE 150 MG/1
150 TABLET ORAL
COMMUNITY

## 2021-05-17 RX ORDER — BENZONATATE 100 MG/1
100 CAPSULE ORAL
COMMUNITY

## 2021-05-17 RX ORDER — ONDANSETRON 4 MG/1
4 TABLET, FILM COATED ORAL
COMMUNITY

## 2021-05-17 RX ORDER — METOPROLOL SUCCINATE 100 MG/1
25 TABLET, EXTENDED RELEASE ORAL 2 TIMES DAILY
Qty: 60 TAB | Refills: 0 | Status: CANCELLED | OUTPATIENT
Start: 2021-05-17

## 2021-05-17 NOTE — PROGRESS NOTES
HISTORY OF PRESENT ILLNESS  Holli Olmstead is a 32 y.o. female. HPI  Ms. Vickey Wills presents for intermittent lower abdominal pain and swelling since she had her son in 2017. She was unable to  follow-up with her OBGYN for this due to psychiatric hospitalization. She states she is currently in a better place to be able to do this at present. She denies acute urinary symptoms. Denies vaginal c/o. Denies recent sexual activity. She underwent a recent CT abd/pelvis that was negative ~2 months ago. CT Results (most recent):  Results from Hospital Encounter encounter on 03/23/21   CT ABD PELV WO CONT    Narrative EXAM: CT of the abdomen and pelvis    INDICATION: Urinary retention. COMPARISON: None. TECHNIQUE: Axial CT imaging of the abdomen and pelvis was performed without  intravenous contrast. Multiplanar reformats were generated. One or more dose  reduction techniques were used on this CT: automated exposure control,  adjustment of the mAs and/or kVp according to patient size, and iterative  reconstruction techniques. The specific techniques used on this CT exam have  been documented in the patient's electronic medical record. Digital Imaging and Communications in Medicine (DICOM) format image data are  available to nonaffiliated external healthcare facilities or entities on a  secure, media free, reciprocally searchable basis with patient authorization for  at least a 12 month period after this study. _______________    FINDINGS:    LOWER CHEST: Unremarkable. LIVER, BILIARY: Liver is normal. No biliary dilation. Gallbladder is  unremarkable. PANCREAS: Normal.    SPLEEN: Normal.    ADRENALS: Normal.    KIDNEYS: Normal.    LYMPH NODES: No enlarged lymph nodes. GASTROINTESTINAL TRACT: No bowel dilation or wall thickening. Normal appendix    PELVIC ORGANS: Unremarkable. VASCULATURE: Unremarkable. BONES: No acute or aggressive osseous abnormalities identified.     OTHER: None.    _______________      Impression No significant abnormality. Preliminary interpretation provided by on-call radiology resident. Past Surgical History:   Procedure Laterality Date    HX  SECTION      HX  SECTION      C section x 2;   &     HX RHINOPLASTY      HX TUBAL LIGATION  2017         2) HTN - needing Metoprolol refill. BP well-controlled with 25 mg BID. 3) Psychiatry meds being managed by Dr. Rehan Weber. Current Outpatient Medications   Medication Sig Dispense Refill    hydrOXYzine HCL (ATARAX) 25 mg tablet Take  by mouth two (2) times daily as needed.  OXcarbazepine (TRILEPTAL) 300 mg tablet Take 300 mg by mouth two (2) times a day.  topiramate (TOPAMAX) 25 mg tablet Take 25 mg by mouth two (2) times a day.  melatonin 10 mg capsule Take 10 mg by mouth nightly.  cloZAPine (CLOZARIL) 100 mg tablet Take 100 mg by mouth two (2) times a day.  buPROPion XL (WELLBUTRIN XL) 150 mg tablet Take 150 mg by mouth every morning.  benzonatate (TESSALON) 100 mg capsule Take 100 mg by mouth three (3) times daily as needed for Cough.  ondansetron hcl (Zofran) 4 mg tablet Take 4 mg by mouth every eight (8) hours as needed for Nausea or Vomiting.  clonazePAM (KlonoPIN) 0.125 mg disintegrating tablet Take 0.125 mg by mouth two (2) times a day. Unsure of the dodse      metoprolol succinate (TOPROL-XL) 100 mg tablet Take 25 mg by mouth two (2) times a day. Not sure of dose       risperiDONE (RisperDAL) 2 mg tablet Take  by mouth. 2 mg in am and 3 mg nightly      gabapentin (NEURONTIN) 300 mg capsule Take 300 mg by mouth nightly. Review of Systems   Gastrointestinal: Positive for abdominal pain. Negative for blood in stool, constipation, diarrhea, nausea and vomiting. Genitourinary: Positive for urgency (chronic). Negative for dysuria and frequency.         (-) vag discharge  (-) vag odor     Visit Vitals  /68 (BP 1 Location: Right upper arm, BP Patient Position: Sitting, BP Cuff Size: Adult)   Pulse 83   Temp 98.3 °F (36.8 °C) (Oral)   Resp 18   Ht 5' 6\" (1.676 m)   Wt 166 lb (75.3 kg)   LMP 05/13/2021   SpO2 99%   BMI 26.79 kg/m²       Physical Exam  Constitutional:       General: She is not in acute distress. Appearance: Normal appearance. She is well-developed. HENT:      Head: Normocephalic and atraumatic. Right Ear: Tympanic membrane, ear canal and external ear normal.      Left Ear: Tympanic membrane, ear canal and external ear normal.      Nose: Nose normal.      Mouth/Throat:      Comments: Mask  Eyes:      General: No scleral icterus. Conjunctiva/sclera: Conjunctivae normal.      Pupils: Pupils are equal, round, and reactive to light. Neck:      Musculoskeletal: Neck supple. Cardiovascular:      Rate and Rhythm: Normal rate and regular rhythm. Pulses: Normal pulses. Dorsalis pedis pulses are 2+ on the right side and 2+ on the left side. Posterior tibial pulses are 2+ on the right side and 2+ on the left side. Heart sounds: Normal heart sounds. No murmur. No gallop. Pulmonary:      Effort: Pulmonary effort is normal. No respiratory distress. Breath sounds: Normal breath sounds. No decreased breath sounds, wheezing, rhonchi or rales. Abdominal:      General: Abdomen is flat. Bowel sounds are normal. There is no distension. Palpations: Abdomen is soft. Tenderness: There is abdominal tenderness (mild lower abdominal TTP) in the right lower quadrant, suprapubic area and left lower quadrant. Musculoskeletal:      Right lower leg: No edema. Left lower leg: No edema. Lymphadenopathy:      Head:      Right side of head: No submandibular or tonsillar adenopathy. Left side of head: No submandibular or tonsillar adenopathy. Cervical: No cervical adenopathy. Upper Body:      Right upper body: No supraclavicular adenopathy.       Left upper body: No supraclavicular adenopathy. Skin:     General: Skin is warm and dry. Neurological:      Mental Status: She is alert and oriented to person, place, and time. Psychiatric:         Speech: Speech normal.         ASSESSMENT and PLAN  Diagnoses and all orders for this visit:    1. Chronic bilateral lower abdominal pain  -     REFERRAL TO OBSTETRICS AND GYNECOLOGY  -     CBC WITH AUTOMATED DIFF; Future  -     METABOLIC PANEL, COMPREHENSIVE; Future  -     URINALYSIS W/ RFLX MICROSCOPIC; Future  -     AMB POC URINE PREGNANCY TEST, VISUAL COLOR COMPARISON   - Not completed. Unsure of reasoning. Order cancelled. 2. Essential hypertension  -     metoprolol tartrate (LOPRESSOR) 25 mg tablet; Take 1 Tab by mouth two (2) times a day. F/u OBGYN and based on results.

## 2021-05-17 NOTE — PROGRESS NOTES
Juan Hernandez is a 32 y.o. female (: 1990) presenting to address:    Chief Complaint   Patient presents with    Abdominal Pain     pain scale 0/10       Vitals:    21 0806   BP: 111/68   Pulse: 83   Resp: 18   Temp: 98.3 °F (36.8 °C)   TempSrc: Oral   SpO2: 99%   Weight: 166 lb (75.3 kg)   Height: 5' 6\" (1.676 m)   PainSc:   0 - No pain   LMP: 2021       Hearing/Vision:   No exam data present    Learning Assessment:     Learning Assessment 2019   PRIMARY LEARNER Patient   HIGHEST LEVEL OF EDUCATION - PRIMARY LEARNER  SOME COLLEGE   BARRIERS PRIMARY LEARNER NONE   PRIMARY LANGUAGE ENGLISH   LEARNER PREFERENCE PRIMARY DEMONSTRATION   ANSWERED BY patient   RELATIONSHIP SELF     Depression Screening:     3 most recent PHQ Screens 2021   Little interest or pleasure in doing things Not at all   Feeling down, depressed, irritable, or hopeless Not at all   Total Score PHQ 2 0     Fall Risk Assessment:   No flowsheet data found. Abuse Screening:     Abuse Screening Questionnaire 2018   Do you ever feel afraid of your partner? N   Are you in a relationship with someone who physically or mentally threatens you? N   Is it safe for you to go home? Y     Coordination of Care Questionaire:   1. Have you been to the ER, urgent care clinic since your last visit? Hospitalized since your last visit? YES lulu    2. Have you seen or consulted any other health care providers outside of the [address] [place] [address] since your last visit? Include any pap smears or colon screening. YES    Advanced Directive:   1. Do you have an Advanced Directive? YES    2. Would you like information on Advanced Directives?  NO

## 2021-07-02 NOTE — PROGRESS NOTES
NUTRITION    Nutrition Screen     RECOMMENDATIONS / PLAN:     - Continue nutritional supplements to optimize nutrition.  - Monitor and encourage po supplement intake. - Continue RD inpatient monitoring and evaluation. NUTRITION DIAGNOSIS & INTERVENTIONS:     - Meals/snacks: general healthy diet  - Medical food supplement therapy: Ensure Enlive, TID  - Collaboration and referral of nutrition care: discussed meal intake with staff    Nutrition Diagnosis: Inadequate energy intake related to appetite and altered mentation as evidenced by pt with variable; overall fair meal intake since admission      ASSESSMENT:     Admitted experiencing delusional thinking and SI. Pt with noted confusion as only drank some juice this am, did not eat breakfast or consume supplements. Per staff pt with fair intake of meals since admission, overall variable intake. Tolerating diet. Appears well nourished. Nutritional intake adequate to meet patients estimated nutritional needs:  No    Diet: DIET REGULAR  DIET NUTRITIONAL SUPPLEMENTS All Meals; ENSURE ENLIVE    Food Allergies:NKFA  Current Appetite: Fair; altered mentation  Appetite/meal intake prior to admission: Poor per pt; reports inadequate intake x months; pt with noted confusion during discussion  Feeding Limitations:  [] Swallowing Difficulty       [] Chewing Difficulty       [] Other   Current Meal Intake: No data found. Gastrointestinal Issues:  [x] Yes: c/o constipation; bowel regimen ordered   Skin Integrity:  WDL    Pertinent Medications:  Reviewed: ascorbic acid, dulcolax prn, colace, famotidine, metamucil fiber, MVI, thiamine  Labs:  Reviewed     Anthropometrics:  Ht Readings from Last 1 Encounters:   10/22/19 5' 6\" (1.676 m)       There were no vitals filed for this visit.     There is no height or weight on file to calculate BMI.  25.1 kg/(m^2) using previously documented wt from 1/23/20    Weight History: Pt reports stable wt hx PTA   Weight Metrics 1/23/2020 12/4/2019 10/22/2019 8/14/2019 7/1/2019 5/30/2019 5/20/2019   Weight 156 lb 180 lb 186 lb 214 lb 215 lb 215 lb 12.8 oz 214 lb 9.6 oz   BMI 25.18 kg/m2 29.05 kg/m2 30.02 kg/m2 34.54 kg/m2 34.7 kg/m2 34.83 kg/m2 34.64 kg/m2       Admitting Diagnosis: Schizophrenia ([facility] [F20.9]  Past Medical History:   Diagnosis Date    Alcoholic ([facility]     Sober 3 months; Weekly AAA meeting; Had substance abuse counseling;     Anemia     Bipolar disorder ([facility]     Cirrhosis ([facility]     Past not current issue per patient    ETOH abuse     GERD (gastroesophageal reflux disease)     Head injury     Marijuana abuse     Phobia     [place] [employer]    Post partum depression     Pregnancy     Psychiatric disorder     Psychotic disorder ([facility]     Depression/  Levern Care, NP; Bipolar disorder, mood swings.  Schizophrenia ([facility]     Stroke [facility]) 2011        Education Needs:        [x] None identified  [] Identified - Not appropriate at this time  []  Identified and addressed - refer to education log  Learning Limitations:   [] None identified  [x] Identified: thought blocking; some confusion noted   Cultural, Latter day & ethnic food preferences identified:  [x] None    [] Yes      ESTIMATED NUTRITION NEEDS:     1742-1887kcal (MSJx1.2-1.3), 57-71 gm protein (0.8-1 gm/kg), 1 mL/kcal  Based on: 71 kg       [x] Actual BW- using wt documented 1/23/20       MONITORING & EVALUATION:     Nutrition Goal(s):   - PO nutrition intake will meet >75% of patient estimated nutritional needs within the next 7 days.    Outcome: New/Initial goal     Monitor: [x] Food and nutrient intake   [x] Food and nutrient administration  [x] Comparative standards   [x] Nutrition-focused physical findings   [x] Anthropometric Measurements   [x] Treatment/therapy   [x] Biochemical data, medical tests, and procedures         Previous Recommendations (for follow-up assessments only):    Not Applicable      Discharge Planning: No nutritional discharge needs at this time.   [x]  Participated in care planning, discharge planning, & interdisciplinary rounds as appropriate      Arnulfo Marcos RD   Pager: [phone] [place]

## 2021-07-30 NOTE — ED NOTES
Pt resting quietly in no apparent distress. This patient has been recommended for inpatient treatment and is awaiting placement. The ED provider has reviewed the patients history and medications, diet, and treatments and will order as necessary.  The patient will be observed and attended to as their medical needs require and/or policy dictates 30-Jul-2021 18:33

## 2021-09-16 NOTE — ED PROVIDER NOTES
HPI Comments: 32 yr old female, hx bipolar disorder and ETOH abuse, , estimated 6 weeks gestation presents to the ED complaining of abdominal cramping and pelvic pain for this past week. Pt states she started having N/V/D this afternoon. Denies vaginal bleeding. No other complaints. Patient is a 32 y.o. female presenting with abdominal pain. Abdominal Pain    Associated symptoms include diarrhea, nausea and vomiting. Pertinent negatives include no fever, no constipation, no dysuria, no frequency, no hematuria, no headaches, no myalgias, no chest pain and no back pain. Past Medical History:   Diagnosis Date    Bipolar disorder ([facility] [address]     ETOH abuse     Marijuana abuse        Past Surgical History:   Procedure Laterality Date    Hx  section           History reviewed. No pertinent family history. Social History     Social History    Marital status: SINGLE     Spouse name: N/A    Number of children: N/A    Years of education: N/A     Occupational History    Not on file. Social History Main Topics    Smoking status: Current Every Day Smoker    Smokeless tobacco: Not on file    Alcohol use Yes    Drug use: Yes     Special: Marijuana    Sexual activity: Not on file     Other Topics Concern    Not on file     Social History Narrative         ALLERGIES: Robitussin [guaifenesin]    Review of Systems   Constitutional: Negative for chills, diaphoresis, fatigue and fever. HENT: Negative for congestion, ear pain, rhinorrhea and sore throat. Eyes: Negative for pain and redness. Respiratory: Negative for cough, shortness of breath, wheezing and stridor. Cardiovascular: Negative for chest pain, palpitations and leg swelling. Gastrointestinal: Positive for abdominal pain, diarrhea, nausea and vomiting. Negative for constipation. Genitourinary: Positive for pelvic pain. Negative for dysuria, flank pain, frequency, hematuria, vaginal bleeding and vaginal discharge. Patient alert, disoriented x4. On RA w/ a weak cough, no tele. CO of mild pain, tx w/ prn pain medication w/ relief. Denies nausea and SOB. BP elevated and HR in 130s, MD notified, Prn medication administered per [facility] w/ relief.  IVF changed to NS 0.9 @ 100, Musculoskeletal: Negative for back pain, myalgias, neck pain and neck stiffness. Skin: Negative for rash and wound. Neurological: Negative for dizziness, seizures, syncope, light-headedness, numbness and headaches. All other systems reviewed and are negative. Vitals:    01/21/17 2204 01/21/17 2330   BP: 109/71 103/62   Pulse: 83    Resp: 16    Temp: 98.3 °F (36.8 °C)    SpO2: 99% 99%   Weight: 66 kg (145 lb 9.6 oz)             Physical Exam   Constitutional: She is oriented to person, place, and time. She appears well-developed and well-nourished. She appears distressed. Mildly distressed   HENT:   Head: Normocephalic. Neck: Normal range of motion. Neck supple. Cardiovascular: Normal rate, regular rhythm and normal heart sounds. Exam reveals no gallop and no friction rub. No murmur heard. Pulmonary/Chest: Effort normal and breath sounds normal. No stridor. No respiratory distress. She has no wheezes. She has no rales. Abdominal: Soft. Bowel sounds are normal. She exhibits no distension and no mass. There is tenderness. There is no rebound and no guarding. Mild generalized diffuse TTP noted without guarding   Genitourinary: Vaginal discharge found. Genitourinary Comments: Moderate amount of thick white vaginal discharge noted on speculum exam, no vaginal bleeding noted, no palpable masses or adnexal tenderness noted, cervix is closed   Musculoskeletal: Normal range of motion. Neurological: She is alert and oriented to person, place, and time. Gait is steady. Able to ambulate without difficulty. Skin: Skin is warm and dry. No rash noted. She is not diaphoretic. No erythema. Psychiatric: She has a normal mood and affect. Her behavior is normal. Thought content normal.   Nursing note and vitals reviewed.        MDM  Number of Diagnoses or Management Options  Diagnosis management comments: Impression:  Abdominal pain pregnancy, BV, N/V, diarrhea    IV inserted   Wet prep: clue cells  RBC 4.11, Hgb 11.6, hct 34.3, RDW 15.6, BUCR 11, SGOT 5, total hcg 14625, UA: urou 0.2, otherwise negative    US: Living 6 week 5 day intrauterine gestation with estimated date of delivery of  9/11/2017. Discussed these results with the pt and provided a copy of the US report. Pt does not present with sx of acute appendicitis. Pt is afebrile and WBC is WNL. Mild generalized abdominal tenderness noted on exam. Recommended close follow-up with OBGYN in 2 days. Patient is stable for discharge at this time. Rx for flagyl given. Rest and follow-up with OBGYN this week. Return to the ED immediately for any new or worsening sx.   Shalini Martin PA-C 2:30 AM          Amount and/or Complexity of Data Reviewed  Clinical lab tests: reviewed and ordered  Tests in the radiology section of CPT®: ordered and reviewed    Risk of Complications, Morbidity, and/or Mortality  Presenting problems: moderate  Diagnostic procedures: moderate  Management options: moderate    Patient Progress  Patient progress: stable    ED Course       Procedures

## 2021-10-18 NOTE — DISCHARGE INSTRUCTIONS
Bacterial Vaginosis: Care Instructions  Your Care Instructions    Bacterial vaginosis is a type of vaginal infection. It is caused by excess growth of certain bacteria that are normally found in the vagina. Symptoms can include itching, swelling, pain when you urinate or have sex, and a gray or yellow discharge with a \"fishy\" odor. It is not considered an infection that is spread through sexual contact. Although symptoms can be annoying and uncomfortable, bacterial vaginosis does not usually cause other health problems. However, if you have it while you are pregnant, it can cause complications. While the infection may go away on its own, most doctors use antibiotics to treat it. You may have been prescribed pills or vaginal cream. With treatment, bacterial vaginosis usually clears up in 5 to 7 days. Follow-up care is a key part of your treatment and safety. Be sure to make and go to all appointments, and call your doctor if you are having problems. It's also a good idea to know your test results and keep a list of the medicines you take. How can you care for yourself at home? · Take your antibiotics as directed. Do not stop taking them just because you feel better. You need to take the full course of antibiotics. · Do not eat or drink anything that contains alcohol if you are taking metronidazole (Flagyl). · Keep using your medicine if you start your period. Use pads instead of tampons while using a vaginal cream or suppository. Tampons can absorb the medicine. · Wear loose cotton clothing. Do not wear nylon and other materials that hold body heat and moisture close to the skin. · Do not scratch. Relieve itching with a cold pack or a cool bath. · Do not wash your vaginal area more than once a day. Use plain water or a mild, unscented soap. Do not douche. When should you call for help?   Watch closely for changes in your health, and be sure to contact your doctor if:  · You have unexpected vaginal bleeding. · You have a fever. · You have new or increased pain in your vagina or pelvis. · You are not getting better after 1 week. · Your symptoms return after you finish the course of your medicine. Where can you learn more? Go to http://devendra-earnest.info/. Leona Dial in the search box to learn more about \"Bacterial Vaginosis: Care Instructions. \"  Current as of: 2016  Content Version: 11.1  © 7340-1986 [employer]. Care instructions adapted under license by [employer] (which disclaims liability or warranty for this information). If you have questions about a medical condition or this instruction, always ask your healthcare professional. [address] any warranty or liability for your use of this information. [employer] Activation    Thank you for requesting access to [employer]. Please follow the instructions below to securely access and download your online medical record. [employer] allows you to send messages to your doctor, view your test results, renew your prescriptions, schedule appointments, and more. How Do I Sign Up? 1. In your internet browser, go to [email or web address]  2. Click on the First Time User? Click Here link in the Sign In box. You will be redirect to the New Member Sign Up page. 3. Enter your [employer] Access Code exactly as it appears below. You will not need to use this code after youve completed the sign-up process. If you do not sign up before the expiration date, you must request a new code. [employer] Access Code: [email or web address]  Expires: 2017  8:37 PM (This is the date your [employer] access code will )    4. Enter the last four digits of your Social Security Number (xxxx) and Date of Birth (mm/dd/yyyy) as indicated and click Submit. You will be taken to the next sign-up page. 5. Create a CFX [employer]Y ID.  This will be your [employer] login ID and cannot be changed, so think of one that is secure and easy to remember. 6. Create a Idea2 password. You can change your password at any time. 7. Enter your Password Reset Question and Answer. This can be used at a later time if you forget your password. 8. Enter your e-mail address. You will receive e-mail notification when new information is available in [address] 9. Click Sign Up. You can now view and download portions of your medical record. 10. Click the Download Summary menu link to download a portable copy of your medical information. Additional Information    If you have questions, please visit the Frequently Asked Questions section of the Idea[employer] website at [email or web address] Remember, Ide[employer] is NOT to be used for urgent needs. For medical emergencies, dial 911. soft/nontender/no distention/bowel sounds normal

## 2022-01-03 ENCOUNTER — HOSPITAL ENCOUNTER (EMERGENCY)
Age: 32
Discharge: PSYCHIATRIC OR SUBSTANCE USE FACILITY | End: 2022-01-04
Attending: STUDENT IN AN ORGANIZED HEALTH CARE EDUCATION/TRAINING PROGRAM
Payer: COMMERCIAL

## 2022-01-03 DIAGNOSIS — F48.9 MENTAL HEALTH PROBLEM: Primary | ICD-10-CM

## 2022-01-03 PROCEDURE — 99284 EMERGENCY DEPT VISIT MOD MDM: CPT

## 2022-01-03 PROCEDURE — 82077 ASSAY SPEC XCP UR&BREATH IA: CPT

## 2022-01-03 PROCEDURE — 80053 COMPREHEN METABOLIC PANEL: CPT

## 2022-01-03 PROCEDURE — 87635 SARS-COV-2 COVID-19 AMP PRB: CPT

## 2022-01-03 PROCEDURE — 85025 COMPLETE CBC W/AUTO DIFF WBC: CPT

## 2022-01-04 VITALS
BODY MASS INDEX: 26.68 KG/M2 | TEMPERATURE: 97.8 F | RESPIRATION RATE: 18 BRPM | SYSTOLIC BLOOD PRESSURE: 112 MMHG | OXYGEN SATURATION: 100 % | DIASTOLIC BLOOD PRESSURE: 63 MMHG | HEART RATE: 84 BPM | WEIGHT: 166 LBS | HEIGHT: 66 IN

## 2022-01-04 LAB
ALBUMIN SERPL-MCNC: 3.8 G/DL (ref 3.4–5)
ALBUMIN/GLOB SERPL: 1.1 {RATIO} (ref 0.8–1.7)
ALP SERPL-CCNC: 61 U/L (ref 45–117)
ALT SERPL-CCNC: 16 U/L (ref 13–56)
AMPHET UR QL SCN: NEGATIVE
ANION GAP SERPL CALC-SCNC: 6 MMOL/L (ref 3–18)
AST SERPL-CCNC: 9 U/L (ref 10–38)
BARBITURATES UR QL SCN: NEGATIVE
BASOPHILS # BLD: 0 K/UL (ref 0–0.1)
BASOPHILS NFR BLD: 0 % (ref 0–2)
BENZODIAZ UR QL: NEGATIVE
BILIRUB SERPL-MCNC: 0.4 MG/DL (ref 0.2–1)
BUN SERPL-MCNC: 9 MG/DL (ref 7–18)
BUN/CREAT SERPL: 14 (ref 12–20)
CALCIUM SERPL-MCNC: 8.9 MG/DL (ref 8.5–10.1)
CANNABINOIDS UR QL SCN: NEGATIVE
CHLORIDE SERPL-SCNC: 109 MMOL/L (ref 100–111)
CO2 SERPL-SCNC: 26 MMOL/L (ref 21–32)
COCAINE UR QL SCN: NEGATIVE
COVID-19 RAPID TEST, COVR: NOT DETECTED
CREAT SERPL-MCNC: 0.65 MG/DL (ref 0.6–1.3)
DIFFERENTIAL METHOD BLD: ABNORMAL
EOSINOPHIL # BLD: 0 K/UL (ref 0–0.4)
EOSINOPHIL NFR BLD: 0 % (ref 0–5)
ERYTHROCYTE [DISTWIDTH] IN BLOOD BY AUTOMATED COUNT: 13.9 % (ref 11.6–14.5)
ETHANOL SERPL-MCNC: <3 MG/DL (ref 0–3)
GLOBULIN SER CALC-MCNC: 3.4 G/DL (ref 2–4)
GLUCOSE SERPL-MCNC: 84 MG/DL (ref 74–99)
HCG UR QL: NEGATIVE
HCT VFR BLD AUTO: 36.5 % (ref 35–45)
HDSCOM,HDSCOM: NORMAL
HGB BLD-MCNC: 11.7 G/DL (ref 12–16)
IMM GRANULOCYTES # BLD AUTO: 0 K/UL (ref 0–0.04)
IMM GRANULOCYTES NFR BLD AUTO: 0 % (ref 0–0.5)
LYMPHOCYTES # BLD: 2.2 K/UL (ref 0.9–3.6)
LYMPHOCYTES NFR BLD: 36 % (ref 21–52)
MCH RBC QN AUTO: 27.1 PG (ref 24–34)
MCHC RBC AUTO-ENTMCNC: 32.1 G/DL (ref 31–37)
MCV RBC AUTO: 84.5 FL (ref 78–100)
METHADONE UR QL: NEGATIVE
MONOCYTES # BLD: 0.7 K/UL (ref 0.05–1.2)
MONOCYTES NFR BLD: 11 % (ref 3–10)
NEUTS SEG # BLD: 3.2 K/UL (ref 1.8–8)
NEUTS SEG NFR BLD: 53 % (ref 40–73)
NRBC # BLD: 0 K/UL (ref 0–0.01)
NRBC BLD-RTO: 0 PER 100 WBC
OPIATES UR QL: NEGATIVE
PCP UR QL: NEGATIVE
PLATELET # BLD AUTO: 215 K/UL (ref 135–420)
PMV BLD AUTO: 9.8 FL (ref 9.2–11.8)
POTASSIUM SERPL-SCNC: 4.2 MMOL/L (ref 3.5–5.5)
PROT SERPL-MCNC: 7.2 G/DL (ref 6.4–8.2)
RBC # BLD AUTO: 4.32 M/UL (ref 4.2–5.3)
SODIUM SERPL-SCNC: 141 MMOL/L (ref 136–145)
SOURCE, COVRS: NORMAL
WBC # BLD AUTO: 6.1 K/UL (ref 4.6–13.2)

## 2022-01-04 PROCEDURE — 80307 DRUG TEST PRSMV CHEM ANLYZR: CPT

## 2022-01-04 PROCEDURE — 81025 URINE PREGNANCY TEST: CPT

## 2022-01-04 NOTE — ED NOTES
Pt is a flight of ideas. Pt states that she is bipolar with didi and that she talks to rats. Pt states that she puts herself into a states so that she can communicate better with the rats.

## 2022-01-04 NOTE — ED NOTES
The patient was signed out to me to follow placement plans by the [facility] services Board. The patient has been placed at [facility] and accepting physician is Dr. Jaye Banda. We will proceed with transfer. Patricia Banda DO 4:36 PM

## 2022-01-04 NOTE — ED TRIAGE NOTES
Pt arrived via [facility] guards. Pt is a TDO being brought for mental evaluation. Pt has left wrist and l=bilateral leg cuffed to stretcher.

## 2022-01-04 NOTE — ED PROVIDER NOTES
EMERGENCY DEPARTMENT HISTORY AND PHYSICAL EXAM    11:30 PM    Date: 1/3/2022  Patient Name: Shamar Sanchez    History of Presenting Illness     Chief Complaint   Patient presents with    Mental Health Problem     TDO       History Provided By: Patient  Location/Duration/Severity/Modifying factors   HPI  Shamar Sanchez is a 32 y.o. female with past no history of bipolar schizophrenia, hypertension presenting under a TDO for medical clearance. Patient is coming from the [facility]. She is not sure why she is here but says that she has been hallucinating despite taking her medications. She is not having any SI or HI. He says that her muscles generally feel sore but is not complaining of any traumatic injury. No other medical complaints today. Denies alcohol tobacco or illicit drug use. History is somewhat limited by historian. PCP: None    Current Outpatient Medications   Medication Sig Dispense Refill    hydrOXYzine HCL (ATARAX) 25 mg tablet Take  by mouth two (2) times daily as needed.  OXcarbazepine (TRILEPTAL) 300 mg tablet Take 300 mg by mouth two (2) times a day.  topiramate (TOPAMAX) 25 mg tablet Take 25 mg by mouth two (2) times a day.  melatonin 10 mg capsule Take 10 mg by mouth nightly.  cloZAPine (CLOZARIL) 100 mg tablet Take 100 mg by mouth two (2) times a day.  buPROPion XL (WELLBUTRIN XL) 150 mg tablet Take 150 mg by mouth every morning.  benzonatate (TESSALON) 100 mg capsule Take 100 mg by mouth three (3) times daily as needed for Cough.  ondansetron hcl (Zofran) 4 mg tablet Take 4 mg by mouth every eight (8) hours as needed for Nausea or Vomiting.  metoprolol tartrate (LOPRESSOR) 25 mg tablet Take 1 Tab by mouth two (2) times a day. 60 Tab 11    clonazePAM (KlonoPIN) 0.125 mg disintegrating tablet Take 0.125 mg by mouth two (2) times a day. Unsure of the dodse      risperiDONE (RisperDAL) 2 mg tablet Take  by mouth.  2 mg in am and 3 mg nightly      gabapentin (NEURONTIN) 300 mg capsule Take 300 mg by mouth nightly. Past History     Past Medical History:  Past Medical History:   Diagnosis Date    Alcoholic ([facility] [address]     Sober 3 months; Weekly [employer] meeting; Had substance abuse counseling;     Anemia     [facility]     Cirrhosis ([facility]     Past not current issue per patient    ETOH abuse     GERD (gastroesophageal reflux disease)     Head injury     Marijuana abuse     Phobia     [facility]    Post partum depression     Pregnancy     Psychiatric disorder     Psychotic disorder ([facility]     Depression/  Russell Spencer NP; Bipolar disorder, mood swings.  Schizophrenia ([facility]     Stroke [facility])        Past Surgical History:  Past Surgical History:   Procedure Laterality Date    HX  SECTION      HX  SECTION      C section x 2;   &     HX RHINOPLASTY      HX TUBAL LIGATION  2017       Family History:  Family History   Problem Relation Age of Onset    No Known Problems Mother     No Known Problems Father        Social History:  Social History     Tobacco Use    Smoking status: Former Smoker     Packs/day: 0.25     Years: 15.00     Pack years: 3.75     Quit date: 2020     Years since quittin.5    Smokeless tobacco: Former User    Tobacco comment: continue not to smoke   Substance Use Topics    Alcohol use: Not Currently     Comment: in moderation    Drug use: Not Currently     Types: Other     Comment: Alcohol       Allergies: Allergies   Allergen Reactions    Robitussin [Guaifenesin] Nausea and Vomiting       I reviewed and confirmed the above information with patient and updated as necessary. Review of Systems     Review of Systems   Constitutional: Negative for diaphoresis and fever. HENT: Negative for ear pain and sore throat. Eyes:        No acute change in vision   Respiratory: Negative for cough and shortness of breath.     Cardiovascular: Negative for chest pain and leg swelling. Gastrointestinal: Negative for abdominal pain and vomiting. Genitourinary: Negative for dysuria. Musculoskeletal: Negative for neck pain. Skin: Negative for wound. Neurological: Negative for weakness and headaches. Psychiatric/Behavioral: Positive for hallucinations. Physical Exam     Visit Vitals  /70   Pulse 60   Temp 97.5 °F (36.4 °C)   Resp 17   SpO2 100%       Physical Exam  Vitals and nursing note reviewed. Constitutional:       Comments: No female lying comfortably in forensic restraints. HENT:      Mouth/Throat:      Mouth: Mucous membranes are moist.   Eyes:      Pupils: Pupils are equal, round, and reactive to light. Cardiovascular:      Rate and Rhythm: Normal rate and regular rhythm. Pulses: Normal pulses. Pulmonary:      Effort: Pulmonary effort is normal.      Breath sounds: Normal breath sounds. Abdominal:      Palpations: Abdomen is soft. Tenderness: There is no abdominal tenderness. Musculoskeletal:         General: No signs of injury. Normal range of motion. Cervical back: Normal range of motion. Skin:     General: Skin is warm. Neurological:      General: No focal deficit present. Mental Status: She is alert and oriented to person, place, and time. Mental status is at baseline.          Diagnostic Study Results     Labs -  Recent Results (from the past 24 hour(s))   CBC WITH AUTOMATED DIFF    Collection Time: 01/03/22 11:55 PM   Result Value Ref Range    WBC 6.1 4.6 - 13.2 K/uL    RBC 4.32 4.20 - 5.30 M/uL    HGB 11.7 (L) 12.0 - 16.0 g/dL    HCT 36.5 35.0 - 45.0 %    MCV 84.5 78.0 - 100.0 FL    MCH 27.1 24.0 - 34.0 PG    MCHC 32.1 31.0 - 37.0 g/dL    RDW 13.9 11.6 - 14.5 %    PLATELET 904 338 - 054 K/uL    MPV 9.8 9.2 - 11.8 FL    NRBC 0.0 0  WBC    ABSOLUTE NRBC 0.00 0.00 - 0.01 K/uL    NEUTROPHILS 53 40 - 73 %    LYMPHOCYTES 36 21 - 52 %    MONOCYTES 11 (H) 3 - 10 %    EOSINOPHILS 0 0 - 5 %    BASOPHILS 0 0 - 2 %    IMMATURE GRANULOCYTES 0 0.0 - 0.5 %    ABS. NEUTROPHILS 3.2 1.8 - 8.0 K/UL    ABS. LYMPHOCYTES 2.2 0.9 - 3.6 K/UL    ABS. MONOCYTES 0.7 0.05 - 1.2 K/UL    ABS. EOSINOPHILS 0.0 0.0 - 0.4 K/UL    ABS. BASOPHILS 0.0 0.0 - 0.1 K/UL    ABS. IMM. GRANS. 0.0 0.00 - 0.04 K/UL    DF AUTOMATED     METABOLIC PANEL, COMPREHENSIVE    Collection Time: 01/03/22 11:55 PM   Result Value Ref Range    Sodium 141 136 - 145 mmol/L    Potassium 4.2 3.5 - 5.5 mmol/L    Chloride 109 100 - 111 mmol/L    CO2 26 21 - 32 mmol/L    Anion gap 6 3.0 - 18 mmol/L    Glucose 84 74 - 99 mg/dL    BUN 9 7.0 - 18 MG/DL    Creatinine 0.65 0.6 - 1.3 MG/DL    BUN/Creatinine ratio 14 12 - 20      GFR est AA >60 >60 ml/min/1.73m2    GFR est non-AA >60 >60 ml/min/1.73m2    Calcium 8.9 8.5 - 10.1 MG/DL    Bilirubin, total 0.4 0.2 - 1.0 MG/DL    ALT (SGPT) 16 13 - 56 U/L    AST (SGOT) 9 (L) 10 - 38 U/L    Alk. phosphatase 61 45 - 117 U/L    Protein, total 7.2 6.4 - 8.2 g/dL    Albumin 3.8 3.4 - 5.0 g/dL    Globulin 3.4 2.0 - 4.0 g/dL    A-G Ratio 1.1 0.8 - 1.7     ETHYL ALCOHOL    Collection Time: 01/03/22 11:55 PM   Result Value Ref Range    ALCOHOL(ETHYL),SERUM <3 0 - 3 MG/DL   COVID-19 RAPID TEST    Collection Time: 01/03/22 11:55 PM   Result Value Ref Range    Specimen source Nasopharyngeal      COVID-19 rapid test Not detected NOTD     HCG URINE, QL    Collection Time: 01/04/22 12:05 AM   Result Value Ref Range    HCG urine, QL Negative NEG     DRUG SCREEN, URINE    Collection Time: 01/04/22 12:05 AM   Result Value Ref Range    BENZODIAZEPINES Negative NEG      BARBITURATES Negative NEG      THC (TH-CANNABINOL) Negative NEG      OPIATES Negative NEG      PCP(PHENCYCLIDINE) Negative NEG      COCAINE Negative NEG      AMPHETAMINES Negative NEG      METHADONE Negative NEG      HDSCOM (NOTE)          Radiologic Studies -   No orders to display           Medical Decision Making   I am the first provider for this patient.     I reviewed the vital signs, available nursing notes, past medical history, past surgical history, family history and social history. Vital Signs-Reviewed the patient's vital signs. Records Reviewed: Nursing Notes and Old Medical Records (Time of Review: 11:30 PM)    Provider Notes (Medical Decision Making):   MDM  43-year-old female here under a TDO consider SI/HI, hallucinations, medication noncompliance, substance-induced disorder, others    ED Course: Progress Notes, Reevaluation, and Consults:  Patient afebrile and hemodynamically normal  Resting comfortably, calm and cooperative  Exam is reassuring    We will screen medical clearance labs, urine, Covid. Screening labs are unremarkable, patient is medically cleared for evaluation by [facility]. Signed out by me to Dr. Octavio Salcedo for additional evaluation. Procedures    Diagnosis     Clinical Impression:   1. Mental health problem        Disposition: TBD    Follow-up Information    None          Patient's Medications   Start Taking    No medications on file   Continue Taking    BENZONATATE (TESSALON) 100 MG CAPSULE    Take 100 mg by mouth three (3) times daily as needed for Cough. BUPROPION XL (WELLBUTRIN XL) 150 MG TABLET    Take 150 mg by mouth every morning. CLONAZEPAM (KLONOPIN) 0.125 MG DISINTEGRATING TABLET    Take 0.125 mg by mouth two (2) times a day. Unsure of the dodse    CLOZAPINE (CLOZARIL) 100 MG TABLET    Take 100 mg by mouth two (2) times a day. GABAPENTIN (NEURONTIN) 300 MG CAPSULE    Take 300 mg by mouth nightly. HYDROXYZINE HCL (ATARAX) 25 MG TABLET    Take  by mouth two (2) times daily as needed. MELATONIN 10 MG CAPSULE    Take 10 mg by mouth nightly. METOPROLOL TARTRATE (LOPRESSOR) 25 MG TABLET    Take 1 Tab by mouth two (2) times a day. ONDANSETRON HCL (ZOFRAN) 4 MG TABLET    Take 4 mg by mouth every eight (8) hours as needed for Nausea or Vomiting. OXCARBAZEPINE (TRILEPTAL) 300 MG TABLET    Take 300 mg by mouth two (2) times a day. RISPERIDONE (RISPERDAL) 2 MG TABLET    Take  by mouth. 2 mg in am and 3 mg nightly    TOPIRAMATE (TOPAMAX) 25 MG TABLET    Take 25 mg by mouth two (2) times a day. These Medications have changed    No medications on file   Stop Taking    No medications on file       Aleta Gross MD   Emergency Medicine   January 4, 2022, 11:30 PM     This note is dictated utilizing Dragon voice recognition software. Unfortunately this leads to occasional typographical errors using the voice recognition. I apologize in advance if the situation occurs. If questions occur please do not hesitate to contact me directly.     Selvin Lu MD

## 2022-02-14 ENCOUNTER — HOSPITAL ENCOUNTER (EMERGENCY)
Age: 32
Discharge: HOME | End: 2022-02-15
Attending: STUDENT IN AN ORGANIZED HEALTH CARE EDUCATION/TRAINING PROGRAM
Payer: MEDICAID

## 2022-02-14 DIAGNOSIS — F23 ACUTE PSYCHOSIS (HCC): ICD-10-CM

## 2022-02-14 DIAGNOSIS — Z00.00 ADULT GENERAL MEDICAL EXAM: Primary | ICD-10-CM

## 2022-02-14 DIAGNOSIS — F31.64 BIPOLAR DISORDER, CURRENT EPISODE MIXED, SEVERE, WITH PSYCHOTIC FEATURES (HCC): ICD-10-CM

## 2022-02-14 LAB
ALBUMIN SERPL-MCNC: 4.1 G/DL (ref 3.4–5)
ALBUMIN/GLOB SERPL: 1.2 {RATIO} (ref 0.8–1.7)
ALP SERPL-CCNC: 74 U/L (ref 45–117)
ALT SERPL-CCNC: 19 U/L (ref 13–56)
AMPHET UR QL SCN: NEGATIVE
ANION GAP SERPL CALC-SCNC: 8 MMOL/L (ref 3–18)
APPEARANCE UR: ABNORMAL
AST SERPL-CCNC: 12 U/L (ref 10–38)
BACTERIA URNS QL MICRO: NEGATIVE /HPF
BARBITURATES UR QL SCN: NEGATIVE
BASOPHILS # BLD: 0 K/UL (ref 0–0.1)
BASOPHILS NFR BLD: 0 % (ref 0–2)
BENZODIAZ UR QL: NEGATIVE
BILIRUB SERPL-MCNC: 0.5 MG/DL (ref 0.2–1)
BILIRUB UR QL: NEGATIVE
BUN SERPL-MCNC: 10 MG/DL (ref 7–18)
BUN/CREAT SERPL: 14 (ref 12–20)
CALCIUM SERPL-MCNC: 9.6 MG/DL (ref 8.5–10.1)
CANNABINOIDS UR QL SCN: NEGATIVE
CHLORIDE SERPL-SCNC: 101 MMOL/L (ref 100–111)
CO2 SERPL-SCNC: 28 MMOL/L (ref 21–32)
COCAINE UR QL SCN: NEGATIVE
COLOR UR: YELLOW
COVID-19 RAPID TEST, COVR: NOT DETECTED
CREAT SERPL-MCNC: 0.73 MG/DL (ref 0.6–1.3)
DIFFERENTIAL METHOD BLD: ABNORMAL
EOSINOPHIL # BLD: 0 K/UL (ref 0–0.4)
EOSINOPHIL NFR BLD: 0 % (ref 0–5)
EPITH CASTS URNS QL MICRO: NORMAL /LPF (ref 0–5)
ERYTHROCYTE [DISTWIDTH] IN BLOOD BY AUTOMATED COUNT: 14.5 % (ref 11.6–14.5)
GLOBULIN SER CALC-MCNC: 3.3 G/DL (ref 2–4)
GLUCOSE SERPL-MCNC: 87 MG/DL (ref 74–99)
GLUCOSE UR STRIP.AUTO-MCNC: NEGATIVE MG/DL
HCG UR QL: NEGATIVE
HCT VFR BLD AUTO: 37.6 % (ref 35–45)
HDSCOM,HDSCOM: NORMAL
HGB BLD-MCNC: 12.4 G/DL (ref 12–16)
HGB UR QL STRIP: NEGATIVE
IMM GRANULOCYTES # BLD AUTO: 0 K/UL (ref 0–0.04)
IMM GRANULOCYTES NFR BLD AUTO: 0 % (ref 0–0.5)
KETONES UR QL STRIP.AUTO: 15 MG/DL
LEUKOCYTE ESTERASE UR QL STRIP.AUTO: ABNORMAL
LYMPHOCYTES # BLD: 1.9 K/UL (ref 0.9–3.6)
LYMPHOCYTES NFR BLD: 24 % (ref 21–52)
MAGNESIUM SERPL-MCNC: 2.2 MG/DL (ref 1.6–2.6)
MCH RBC QN AUTO: 27.6 PG (ref 24–34)
MCHC RBC AUTO-ENTMCNC: 33 G/DL (ref 31–37)
MCV RBC AUTO: 83.6 FL (ref 78–100)
METHADONE UR QL: NEGATIVE
MONOCYTES # BLD: 0.9 K/UL (ref 0.05–1.2)
MONOCYTES NFR BLD: 12 % (ref 3–10)
NEUTS SEG # BLD: 5.1 K/UL (ref 1.8–8)
NEUTS SEG NFR BLD: 64 % (ref 40–73)
NITRITE UR QL STRIP.AUTO: NEGATIVE
NRBC # BLD: 0 K/UL (ref 0–0.01)
NRBC BLD-RTO: 0 PER 100 WBC
OPIATES UR QL: NEGATIVE
PCP UR QL: NEGATIVE
PH UR STRIP: 6 [PH] (ref 5–8)
PHOSPHATE SERPL-MCNC: 3.2 MG/DL (ref 2.5–4.9)
PLATELET # BLD AUTO: 222 K/UL (ref 135–420)
PMV BLD AUTO: 9.1 FL (ref 9.2–11.8)
POTASSIUM SERPL-SCNC: 3.4 MMOL/L (ref 3.5–5.5)
PROT SERPL-MCNC: 7.4 G/DL (ref 6.4–8.2)
PROT UR STRIP-MCNC: ABNORMAL MG/DL
RBC # BLD AUTO: 4.5 M/UL (ref 4.2–5.3)
RBC #/AREA URNS HPF: NORMAL /HPF (ref 0–5)
SODIUM SERPL-SCNC: 137 MMOL/L (ref 136–145)
SOURCE, COVRS: NORMAL
SP GR UR REFRACTOMETRY: 1.01 (ref 1–1.03)
UROBILINOGEN UR QL STRIP.AUTO: 0.2 EU/DL (ref 0.2–1)
WBC # BLD AUTO: 8.1 K/UL (ref 4.6–13.2)
WBC URNS QL MICRO: NORMAL /HPF (ref 0–4)

## 2022-02-14 PROCEDURE — 99283 EMERGENCY DEPT VISIT LOW MDM: CPT

## 2022-02-14 PROCEDURE — 84100 ASSAY OF PHOSPHORUS: CPT

## 2022-02-14 PROCEDURE — 87635 SARS-COV-2 COVID-19 AMP PRB: CPT

## 2022-02-14 PROCEDURE — 80053 COMPREHEN METABOLIC PANEL: CPT

## 2022-02-14 PROCEDURE — 80307 DRUG TEST PRSMV CHEM ANLYZR: CPT

## 2022-02-14 PROCEDURE — 81025 URINE PREGNANCY TEST: CPT

## 2022-02-14 PROCEDURE — 81001 URINALYSIS AUTO W/SCOPE: CPT

## 2022-02-14 PROCEDURE — 85025 COMPLETE CBC W/AUTO DIFF WBC: CPT

## 2022-02-14 PROCEDURE — 83735 ASSAY OF MAGNESIUM: CPT

## 2022-02-15 VITALS
DIASTOLIC BLOOD PRESSURE: 65 MMHG | RESPIRATION RATE: 14 BRPM | HEART RATE: 69 BPM | TEMPERATURE: 98.4 F | SYSTOLIC BLOOD PRESSURE: 101 MMHG | OXYGEN SATURATION: 100 %

## 2022-02-15 NOTE — ED PROVIDER NOTES
Patient 70-year-old female with a history of bipolar disorder, polysubstance abuse, and depression. Patient presents today with primary complaint of medical clearance, patient is currently an inmate at a correctional facility and they are wishing to transfer her to a psychiatric hospital to undergo intensive psychiatric treatment. Patient is noted to demonstrate continued psychotic behavior including auditory hallucinations. Due to patient's acute psychosis history and review of systems is somewhat limited. Patient reports generalized body pain but is unable to offer any specific complaints. Past Medical History:   Diagnosis Date    Alcoholic ([address]     Sober 3 months; Weekly [employer] meeting; Had substance abuse counseling;     Anemia     Bipolar disorder ([address]     Cirrhosis ([address]     Past not current issue per patient    ETOH abuse     GERD (gastroesophageal reflux disease)     Head injury     Marijuana abuse     Phobia     [place] [employer]    Post partum depression     Pregnancy     Psychiatric disorder     Psychotic disorder ([address]     Depression/  Tate Haider, NP; Bipolar disorder, mood swings.     [facility]izophren[facility]     Stroke [facility])        Past Surgical History:   Procedure Laterality Date    HX  SECTION      HX  SECTION      C section x 2;   &     HX RHINOPLASTY      HX TUBAL LIGATION  2017         Family History:   Problem Relation Age of Onset    No Known Problems Mother     No Known Problems Father        Social History     Socioeconomic History    Marital status: LEGALLY      Spouse name: Not on file    Number of children: 2    Years of education: Not on file    Highest education level: Not on file   Occupational History    Occupation: disabled   Tobacco Use    Smoking status: Former Smoker     Packs/day: 0.25     Years: 15.00     Pack years: 3.75     Quit date: 2020     Years since quittin.6    Smokeless tobacco: Former User    Tobacco comment: continue not to smoke   Substance and Sexual Activity    Alcohol use: Not Currently     Comment: in moderation    Drug use: Not Currently     Types: Other     Comment: Alcohol    Sexual activity: Never   Other Topics Concern    Not on file   Social History Narrative    ** Merged History Encounter **         ;  2 children; unemployed    Disabled related to mental issues/ 2017    2 boys live with her mom. Currently in [facility] Strain:     Difficulty of Paying Living Expenses: Not on file   Food Insecurity:     Worried About [address] in the Last Year: Not on file    [place] of Food in the Last Year: Not on file   Transportation Needs:     Lack of Transportation (Medical): Not on file    Lack of Transportation (Non-Medical):  Not on file   Physical Activity:     Days of Exercise per Week: Not on file    Minutes of Exercise per Session: Not on file   Stress:     Feeling of Stress : Not on file   Social Connections:     Frequency of Communication with Friends and Family: Not on file    Frequency of Social Gatherings with Friends and Family: Not on file    Attends Druze Services: Not on file    Active Member of [address] [place] [address] or Organizations: Not on file    Attends Club or Organization Meetings: Not on file    Marital Status: Not on file   Intimate Partner Violence:     Fear of Current or Ex-Partner: Not on file    Emotionally Abused: Not on file    Physically Abused: Not on file    Sexually Abused: Not on file   Housing Stability:     Unable to Pay for Housing in the Last Year: Not on file    Number of [place] in the Last Year: Not on file    Unstable Housing in the Last Year: Not on file         ALLERGIES: Robitussin [guaifenesin]    Review of Systems   Unable to perform ROS: Psychiatric disorder       Vitals:    02/14/22 2136   BP: (!) 102/54   Pulse: 67   Resp: 16   Temp: 97 °F (36.1 °C)   SpO2: 100%            Physical Exam  Constitutional:       General: She is not in acute distress. Appearance: She is not ill-appearing, toxic-appearing or diaphoretic. HENT:      Head: Normocephalic and atraumatic. Right Ear: External ear normal.      Left Ear: External ear normal.      Nose: No congestion or rhinorrhea. Mouth/Throat:      Mouth: Mucous membranes are moist.      Pharynx: No oropharyngeal exudate or posterior oropharyngeal erythema. Eyes:      General:         Right eye: No discharge. Left eye: No discharge. Pupils: Pupils are equal, round, and reactive to light. Neck:      Vascular: No carotid bruit. Cardiovascular:      Rate and Rhythm: Normal rate and regular rhythm. Heart sounds: No murmur heard. No friction rub. No gallop. Pulmonary:      Effort: Pulmonary effort is normal. No respiratory distress. Breath sounds: No stridor. No wheezing, rhonchi or rales. Abdominal:      General: Abdomen is flat. There is no distension. Tenderness: There is no right CVA tenderness, left CVA tenderness, guarding or rebound. Musculoskeletal:         General: No swelling, tenderness, deformity or signs of injury. Cervical back: No rigidity or tenderness. Right lower leg: No edema. Left lower leg: No edema. Lymphadenopathy:      Cervical: No cervical adenopathy. Skin:     General: Skin is warm. Capillary Refill: Capillary refill takes less than 2 seconds. Coloration: Skin is not jaundiced or pale. Findings: No bruising, erythema, lesion or rash. Neurological:      General: No focal deficit present. Mental Status: She is alert and oriented to person, place, and time. Sensory: No sensory deficit. Motor: No weakness. Psychiatric:         Attention and Perception: She perceives auditory hallucinations. She does not perceive visual hallucinations. Thought Content:  Thought content does not include homicidal or suicidal ideation. MDM  Number of Diagnoses or Management Options  Adult general medical exam: new and requires workup  Diagnosis management comments: Patient presents with primary complaint of needing medical clearance prior to definitive psychiatric care. Patient is well-appearing, vital signs are reassuring and patient's screening laboratory studies are similarly reassuring. Provided patient with p.o. which she tolerated well and patient has been medically clear awaiting transfer to appropriate psychiatric facility. Patient is currently under a TDO.        Amount and/or Complexity of Data Reviewed  Clinical lab tests: reviewed           Procedures

## 2022-02-15 NOTE — BSMART NOTE
Crisis Note: Connie Patel, informed this writer that patient is and inmate and patient is in the ED for medical clearance to [facility]. Patient is alert and oriented x 4, calm, cooperative. Patient stated that she is going to [facility], because I was starving myself. Rebekah Mcdermott I'm suicidal, homicidal and I need help. \" Officer is at the bedside.

## 2022-02-15 NOTE — ED NOTES
I have received the patient in turnover from Dr. Emerson French pending placement. The patient has a history of bipolar disorder, polysubstance abuse and depression. She was originally at a correctional facility and needs to be transferred to inpatient psychiatric treatment. She had been under a TDO. She has been accepted at [facility] by Dr. Siena Hernandez. I am filling out the EMTALA form and she will be transported via law enforcement.

## 2022-03-18 PROBLEM — F17.210 CIGARETTE NICOTINE DEPENDENCE WITHOUT COMPLICATION: Status: ACTIVE | Noted: 2018-02-04

## 2022-03-18 PROBLEM — K21.9 GERD (GASTROESOPHAGEAL REFLUX DISEASE): Status: ACTIVE | Noted: 2020-06-09

## 2022-03-19 PROBLEM — F20.9 SCHIZOPHRENIA (HCC): Status: ACTIVE | Noted: 2017-04-18

## 2022-03-20 PROBLEM — F33.9 RECURRENT DEPRESSION (HCC): Status: ACTIVE | Noted: 2018-04-03

## 2022-06-29 NOTE — H&P
[facility] F/u  No chief complaint on file.    MME prescribed prior to seeing patient:  Current MME:    rec  - Further procedures recommended:     - consider Left pudendal nerve block(would be reasonable to discuss with urologist)     - consider ganglion impar block   - Medication Management:    - would agree with continued gradual titration of gabapentin    - consider addition of elavil instead of trazodone at night for both pain and sleep. (would also recommend discussing with urology as well)  - Physical Therapy: Continue  - Clinical Health Psychologist to address issues of relaxation, behavioral change, coping style, and other factors important to improvement: would strongly consider   - Diagnostic Studies: no  - Urine toxicology screen today: no   - Follow up:    - pending decision on how to proceed    Interval   Doing PHYSICAL THERAPY  For pelvic floor with benefit  Doing manipulation with Marion lucrecia Smith chiropracter  Feel may be associated with urolift  Currently working with uro mayhave it removed   May concern is burning in his buttocks and tailone     Feels pain is worse so gang impar may have helped   Pain worse in the groin, test, penis, and upper buttocks  Taking bile salts to help with M  The pain is burning  The pain is stinging   Cont to be constant  Pt cont to be frustrated  The pt feels he is at his end of rope   Denies any new weakness  Denies recent falls  No new incontinence  Of note the pt reports some increased swelling in his LE  The pt reports establishing care with PCP   Pt reports allodynia over test/penis  Reports some benefit with activity and lots of activity  Pt reports being very active with his PHYSICAL THERAPY    Pain history:  Currently being seen by neurology. Suggested further titration of gabapentin for peripheral  neuropathy. Recent LP  Seeing ortho  Seeing urology   Saw neuro surg- did notfeel is was radic in origin . Hx of L5-S1 ant fusion  History and Physical        Patient: Leopold Conception               Sex: female          DOA: 3/2/2020         YOB: 1990      Age:  34 y.o.        LOS:  LOS: 0 days        HPI:     Leopold Conception is a 34 y. o.  female who who was admitted from [address] [place] [address]  After she called 911 and was taken there. There, she was found to be psychotic, making   nonsensical statements, disorganized and paranoid. She also displayed sad affect and   poor insight and judgement. Principal Problem:    Schizophrenia ([facility] (2017)    Active Problems:    Cigarette nicotine dependence without complication (9239)        Past Medical History:   Diagnosis Date    Alcoholic ([facility]     Sober 3 months; Weekly AAA meeting; Had substance abuse counseling;     Anemia     Bipolar disorder ([facility]     Cirrhosis ([facility]     Past not current issue per patient    ETOH abuse     GERD (gastroesophageal reflux disease)     Head injury     Marijuana abuse     Phobia     Kelly [employer]    Post partum depression     Pregnancy     Psychiatric disorder     Psychotic disorder ([facility]     Depression/  Beny Sosa NP; Bipolar disorder, mood swings.     Schizophrenia ([facility]     Stroke [facility])        Past Surgical History:   Procedure Laterality Date    HX  SECTION      HX  SECTION      C section x 2;   &     HX RHINOPLASTY      HX TUBAL LIGATION  2017       Family History   Family history unknown: Yes       Social History     Socioeconomic History    Marital status: LEGALLY      Spouse name: Not on file    Number of children: 2    Years of education: Not on file    Highest education level: Not on file   Occupational History    Occupation: disabled   Tobacco Use    Smoking status: Current Every Day Smoker     Packs/day: 0.50     Years: 15.00     Pack years: 7.50    Smokeless tobacco: Former User   Substance and Sexual Activity    Alcohol use: No     Comment: Sober   Different than prior lumbar symptoms.  Reports some urinary retention/bowel- having leon possibly pulled this wk. Has scope planned   Mustapha Negrete is a 62 year old male who first started having problems with pain 4/21  Symptoms started around the time of his COVID-19 infection.  Burning stinging in his posterior thigh and buttocks    Of note has a hx L5-S1 ant fusion 11yr ago  The pain was only on the right side  Pain radiated to his LE   The pt had surgery with essentially complete resolution of his symptoms   The pt feels his current pain is different    Currently  Possible inciting event covid infection in April   The pt did have a rash that he felt was consistent with shingels first on the right than on the left.  The never had shingels in the past  The pt saw derm who did not feel it was shingels    The pt has burning/numbness over his post thigh/rectum/testicles/penis  Of note worse pain is over his coccyx and in his rectum     The symp are worse on the Left>R  The pain is constant  The pain varies in severity   The pain varies in nature  The pain is sharp   The pain is hot  The pain isburning   Denies any symptoms below his knees   The pain is worse with prolonged in activity/being in one position  Worse with prolonged walking     The pt notes some benefit with changing position  Maybe some benefit with massage    Of note having some bleeding from the catheter     Denies incontinence  Does have retention     The pt not sleeping well- but is sleeping better than before    Pain sig affects his quality of life    Improvement with gabapentin - slowly titrating up dose. Still struggling with am dose 2/2 to sedation     Improvement with a few tabs of steroids- did not have full medrol dose pack       Pain rating: intensity  Averages 10/10 on a 0-10 scale.    Current treatments include:  Mikaela 300 tid  Motrin  acetamin    Previous medication treatments included:   cymbalta   voltaren gel   Lyrica   Other  for x 3 months    Drug use: Not Currently     Types: Other     Comment: Alcohol    Sexual activity: Never   Social History Narrative    ** Merged History Encounter **         ;  2 children; unemployed    Disabled related to mental issues/ 2017    2 boys live with her mom. Currently in [place]       Prior to Admission medications    Medication Sig Start Date End Date Taking? Authorizing Provider   ascorbic acid, vitamin C, (VITAMIN C) 500 mg tablet Take 1 Tab by mouth daily. Indications: inadequate vitamin C 2/21/20   Martha Roberts MD   famotidine (PEPCID) 20 mg tablet Take 1 Tab by mouth two (2) times a day for 30 days. Indications: gastroesophageal reflux disease 2/21/20 3/22/20  Martha Roberts MD   paliperidone palmitate (INVEGA SUSTENNA) 234 mg/1.5 mL injection 1 mL by IntraMUSCular route every four (4) weeks. Indications: schizophrenia 3/16/20   Martha Roberts MD   chlorproMAZINE (THORAZINE) 50 mg tablet Take 1 Tab by mouth nightly for 30 days. Indications: schizophrenia 2/21/20 3/22/20  Martha Roberts MD   desvenlafaxine succinate (PRISTIQ) 50 mg ER tablet Take 1 Tab by mouth daily for 30 days. Indications: major depressive disorder 2/22/20 3/23/20  Martha Roberts MD   docusate sodium (COLACE) 100 mg capsule Take 1 Cap by mouth daily for 30 days. Indications: constipation 2/22/20 3/23/20  Martha Roberts MD   hydrOXYzine pamoate (VISTARIL) 25 mg capsule Take 1 Cap by mouth two (2) times daily as needed for Anxiety. 5/8/19   PAU Mac       Allergies   Allergen Reactions    Robitussin [Guaifenesin] Nausea and Vomiting       Review of Systems  A comprehensive review of systems was negative except for that written in the History of Present Illness.       Physical Exam:      Visit Vitals  /66 (BP 1 Location: Right arm, BP Patient Position: Sitting)   Pulse 65   Temp 97.4 °F (36.3 °C)   Resp 18   Ht 5' 5.5\" (1.664 m)   Wt 149 lb (67.6 kg)   Breastfeeding No   BMI 24.42 kg/m²       Physical Exam:  Physical Exam:   General:  Alert, cooperative, no distress, appears stated age. Eyes:  Conjunctivae/corneas clear. PERRL, EOMs intact. Fundi benign   Ears:  Normal TMs and external ear canals both ears. Nose: Nares normal. Septum midline. Mucosa normal. No drainage or sinus tenderness. Mouth/Throat: Lips, mucosa, and tongue normal. Teeth and gums normal.   Neck: Supple, symmetrical, trachea midline, no adenopathy, thyroid: no enlargement/tenderness/nodules, no carotid bruit and no JVD. Back:   Symmetric, no curvature. ROM normal. No CVA tenderness. Lungs:   Clear to auscultation bilaterally. Heart:  Regular rate and rhythm, S1, S2 normal, no murmur, click, rub or gallop. Abdomen:   Soft, non-tender. Bowel sounds normal. No masses,  No organomegaly. Extremities: Extremities normal, atraumatic, no cyanosis or edema. She complained of TTP in arms and legs. Stated\"I hurt  every where\". Pulses: 2+ and symmetric all extremities. Skin: Skin color, texture, turgor normal. No rashes or lesions   Lymph nodes: Cervical, supraclavicular, and axillary nodes normal.   Neurologic: CNII-XII intact. Normal strength, sensation and reflexes throughout. Assessment/Plan     Patient was controlled with appropriate behavior. Stated she hurt, was sore everywhere  Was lying in bed. Wheel chair at her bedside. Patient will receive Motrin 60mg TID PRN. She will take medications as prescribe and attend all groups.  She will also follow all   discharge orders as written treatments have included:  Mustapha Negrete has not been seen at a pain clinic in the past.    PT: no  Chiropractic: n  Acupuncture: n  TENs Unit: n  Injections:   Pt reports     Past Medical History:  Past Medical History:   Diagnosis Date     Asthma      Past Surgical History:  Past Surgical History:   Procedure Laterality Date     ENT SURGERY      tonsillectomy     GENITOURINARY SURGERY      TURP     ORTHOPEDIC SURGERY      wrist surgery, carpal tunnel 2005     Medications:  Current Outpatient Medications   Medication Sig Dispense Refill     albuterol (PROAIR HFA/PROVENTIL HFA/VENTOLIN HFA) 108 (90 Base) MCG/ACT inhaler Inhale 2 puffs into the lungs every 6 hours       amLODIPine (NORVASC) 10 MG tablet Take 1 tablet (10 mg) by mouth daily 90 tablet 1     gabapentin (NEURONTIN) 300 MG capsule Take 1 capsule (300 mg) by mouth 2 times daily 60 capsule 11     nortriptyline (PAMELOR) 10 MG capsule Take 1 capsule (10 mg) by mouth At Bedtime 30 capsule 1     terazosin (HYTRIN) 1 MG capsule TAKE 1 CAPSULE (1 MG) BY MOUTH DAILY TAKE UP TO 5 MG DAILY. 450 capsule 0     Allergies:     Allergies   Allergen Reactions     Droperidol Other (See Comments)     Extrapyramidal Side Effect     Fenofibrate Headache and Diarrhea              Fentanyl      Other reaction(s): N&V hard to wake up     Keflex [Cephalexin] Itching     Lactose GI Disturbance         Other reaction(s): GI upset     Midazolam      Other reaction(s): N&V, Hard to wake up     Monosodium Glutamate      Other reaction(s): diarrhea, headaches     Pcn [Penicillins] Other (See Comments)     Feels warm and flushed, but tolerates Cephalexin     Atorvastatin Palpitations     Other reaction(s): palpitations     Sertraline Palpitations         Other reaction(s): Unknown     Simvastatin Palpitations     Other reaction(s): palpitations     Sulfa Drugs Headache and Rash     Burning    Other reaction(s): Rash  Other reaction(s): rash and headache     Tetracycline Rash      Burning    Other reaction(s): rash     Social History:     uHistory of chemical dependency treatment: no    Family history:  Family History   Problem Relation Age of Onset     Cerebrovascular Disease Mother      Hypertension Mother      Diabetes Mother      Cancer Father 84        liver cancer     Prostate Cancer Father      Family history of headaches:   n    Review of Systems:  Skin: negative  Eyes: negative  Ears/Nose/Throat: negative  Respiratory: No shortness of breath, dyspnea on exertion, cough, or hemoptysis  Cardiovascular: negative  Gastrointestinal: negative  Genitourinary: negative  Musculoskeletal: negative  Neurologic: negative  Psychiatric: negative  Hematologic/Lymphatic/Immunologic: negative  Endocrine: negative    Physical Exam:  Vitals:    06/29/22 0829   BP: (!) 148/90   Pulse: 101     Exam:  Constitutional: healthy, alert and no distress  Head: normocephalic. Atraumatic.   Eyes: no redness or jaundice noted   card: Negative JVD  Respiratory: Speaking in full sentences no accessory muscles use   gastrointestinal: soft  Skin: no suspicious lesions or rashes  Psychiatric: mentation appears normal and affect normal/bright    Musculoskeletal exam:  Gait/Station/Posture: wnl had to get up multiple times during apt  Cervical spine: ROMwnl       Thoracic spine:  Normal     Lumbar spine:     ROM: wnl   Myofascial tenderness:  M+++   Joshi's:              Straight leg exam: neg on the left   HUMPHREY/FADIR: mild              SI: neg   Greater trochanteric bursa: neg  + TTP over coccyx  Neurologic exam:    Motor:  5/5 UE and LE strength  Reflexes:       Achilles:  +2    Sensory:  (upper and lower extremities):   Light touch: normal    Allodynia: absent - did not do a genital exam    Dysethesia: ++++   Hyperalgesia: absent     Diagnostic tests:       T12-L1: Normal disc signal and disc space height. No focal disc herniation. Normal facets. No spinal canal stenosis or neural foraminal narrowing.      L1-L2: Disc  desiccation and minimal loss of disc space height. Left central disc protrusion. Normal facets. No spinal canal stenosis or neural foraminal narrowing.     L2-L3: Disc desiccation and mild loss of disc space height. Mild circumferential disc bulge. Normal facets. No spinal canal stenosis or neural foraminal narrowing.      L3-L4: Disc desiccation and mild loss of disc space height. Circumferential disc bulge with superimposed right paracentral disc protrusion with narrowing of the right lateral recess and apparent mass effect on the descending/proximal traversing right L4   nerve root. Mild spinal canal stenosis. Mild bilateral neural foraminal stenosis.     L4-L5: Disc desiccation and moderate loss of disc space height. Circumferential disc bulge with superimposed right paracentral slightly caudally directed disc extrusion. There is narrowing of the right lateral recess and apparent mass effect on the   descending/proximal traversing right L5 nerve root. Severe degenerative facet changes. Mild spinal canal stenosis. Mild to moderate bilateral neural foraminal stenosis.     L5-S1: Partial fusion across the disc space. Mild circumferential disc interbody spurring. Mild facet arthropathy. No spinal canal stenosis. No right neural foraminal stenosis. Mild left neural foraminal stenosis.                                                                      IMPRESSION:  1.  Mild to moderate lumbar spondylitic changes superimposed on mild developmental narrowing of the lumbar spinal canal.  2.  At L4-L5, there is a right paracentral slightly caudally directed disc extrusion that narrows the right lateral recess and causes mass effect on the descending/traversing right L5 nerve root.  3.  At L3-L4, there is a right paracentral disc protrusion superimposed on disc bulge with narrowing of the right lateral recess and mass effect on the descending/traversing right L4 nerve root.                                                                    IMPRESSION:   1. No change in slight retrolisthesis of L2 on L3 and anterior slip of  L4 4 L5.  2. Anterior fusion L5-S1 with ossification of the annulus.  3. Degenerative changes as noted above.    Impression:  Postsurgical changes of left total hip arthroplasty with metallic  susceptibility artifact partially compromising assessment.  1. No ischial tuberosity bursitis or collection on either side.   2. Suspect right anterosuperior acetabular labral tearing.          Assessment/Plan:  Mustapha Negrete is a 62 year old male who presents with the complaints of bilat l>r post thigh/rectum/testicles/penis. Currently most sig symp is his rectal pain.  Diagnoses and all orders for this visit:    Coccydynia  -     PAIN INJECTION EVAL/TREAT/FOLLOW UP; Future  -     nortriptyline (PAMELOR) 10 MG capsule; Take 1 capsule (10 mg) by mouth At Bedtime    Rectal pain  -     PAIN INJECTION EVAL/TREAT/FOLLOW UP; Future  -     nortriptyline (PAMELOR) 10 MG capsule; Take 1 capsule (10 mg) by mouth At Bedtime    Neuropathic pain    Pudendal neuralgia    Myofascial muscle pain    Abnormal imaging of central nervous system    Neuropathic pain of flank, unspecified laterality         - Further procedures recommended:     - ordered sacrococcygeal ligament and ganglion impar block    - Medication Management:    - start Pamelor 10mg will titrate as tolerated    - consider medical cannabis   - Physical Therapy: Continue  - Clinical Health Psychologist to address issues of relaxation, behavioral change, coping style, and other factors important to improvement: would strongly consider   - Diagnostic Studies:    - not at this time   - Urine toxicology screen today: no   - Follow up:    - 1-2 months after the procedure            The total TIME spent on this patient on the day of the appointment was 25 minutes.   Time spent preparing to see the patient (reviewing records and tests)  Time spend face to face with the patient  Time  spent ordering tests, medications, procedures and referrals  Time spent Referring and communicating with other healthcare professionals  Documenting clinical information in Epic    Arian Prescott MD  [facility]  This note was created with voice recognition software, and while reviewed for accuracy, typos may remain.

## 2022-11-28 NOTE — ED NOTES
TDO now present in ED PRE-OP DIAGNOSIS:  Incarcerated umbilical hernia 28-Nov-2022 09:35:48  Wendy Guerrero

## 2023-07-27 NOTE — BSMART NOTE
OCCUPATIONAL THERAPY PROGRESS NOTE  Group Time:  1608  Attendance: The patient attended full group. Participation:  The patient participated with moderate elaboration in the activity. Attention:  The patient needed redirection to activity at least once. Interaction:  The patient acknowledges others or responds to questions,  with no spontaneous interaction. .Much more organized in thinking, still hesitant in conversation. Participated as asked in discussion on stress management. Needed frequent reassurance, most comments questioning or tentative in tone. Family History Of Vein Disease (Include Family Member And Type Of Vein Disease):: Mother and sister

## 2023-11-06 ENCOUNTER — OFFICE VISIT (OUTPATIENT)
Facility: CLINIC | Age: 33
End: 2023-11-06
Payer: MEDICAID

## 2023-11-06 VITALS
SYSTOLIC BLOOD PRESSURE: 116 MMHG | RESPIRATION RATE: 20 BRPM | HEART RATE: 103 BPM | OXYGEN SATURATION: 99 % | BODY MASS INDEX: 22.18 KG/M2 | TEMPERATURE: 99.5 F | HEIGHT: 66 IN | WEIGHT: 138 LBS | DIASTOLIC BLOOD PRESSURE: 78 MMHG

## 2023-11-06 DIAGNOSIS — Z76.89 ENCOUNTER TO ESTABLISH CARE WITH NEW DOCTOR: Primary | ICD-10-CM

## 2023-11-06 DIAGNOSIS — F20.9 SCHIZOPHRENIA, UNSPECIFIED TYPE (HCC): ICD-10-CM

## 2023-11-06 DIAGNOSIS — F14.91 HISTORY OF COCAINE USE: ICD-10-CM

## 2023-11-06 PROCEDURE — 99204 OFFICE O/P NEW MOD 45 MIN: CPT | Performed by: NURSE PRACTITIONER

## 2023-11-06 RX ORDER — LITHIUM CARBONATE 300 MG/1
300 CAPSULE ORAL DAILY
COMMUNITY

## 2023-11-06 RX ORDER — LITHIUM CARBONATE 600 MG/1
600 CAPSULE ORAL
COMMUNITY

## 2023-11-06 RX ORDER — HALOPERIDOL 1 MG/1
3 TABLET ORAL 2 TIMES DAILY
COMMUNITY

## 2023-11-06 RX ORDER — ARIPIPRAZOLE 10 MG/1
10 TABLET ORAL DAILY
COMMUNITY
End: 2023-11-06 | Stop reason: CLARIF

## 2023-11-06 RX ORDER — GABAPENTIN 100 MG/1
100 CAPSULE ORAL 3 TIMES DAILY
COMMUNITY

## 2023-11-06 NOTE — PROGRESS NOTES
Chief Complaint   Patient presents with    Establish Care     Just released from [address] [facility] - here to establish PCP

## 2023-11-06 NOTE — PATIENT INSTRUCTIONS
It was a pleasure meeting you. Please have labs drawn at "[facility]" as soon as possible. Return in 2 months. Thank you for choosing [id number].     JUNIE Rodriguez NP

## 2023-11-07 LAB
AMPHET UR QL SCN: NEGATIVE
BARBITURATES UR QL SCN: NEGATIVE
BENZODIAZ UR QL: NEGATIVE
CANNABINOIDS UR QL SCN: NEGATIVE
COCAINE UR QL SCN: NEGATIVE
Lab: NORMAL
METHADONE UR QL: NEGATIVE
OPIATES UR QL: NEGATIVE
PCP UR QL: NEGATIVE

## 2023-11-08 ENCOUNTER — TELEPHONE (OUTPATIENT)
Facility: CLINIC | Age: 33
End: 2023-11-08

## 2023-11-08 ENCOUNTER — NURSE ONLY (OUTPATIENT)
Facility: CLINIC | Age: 33
End: 2023-11-08

## 2023-11-08 DIAGNOSIS — F20.2 CATATONIC SCHIZOPHRENIA (HCC): Primary | ICD-10-CM

## 2023-11-08 RX ORDER — ARIPIPRAZOLE 400 MG
KIT INTRAMUSCULAR
Refills: 0 | OUTPATIENT
Start: 2023-11-08

## 2023-11-08 NOTE — TELEPHONE ENCOUNTER
[facility] [place] is needing a lab test added to patients current labs that were ordered of Absolute Neutrophil Count - please order this test and fax it to the [facility] [place]  Tricia Silva LPN 11/8/2023 4:47 PM

## 2023-11-08 NOTE — PROGRESS NOTES
Patient in office to receive Abilify Maintena injection - due every 28 days - per verbal order of PETRA Torres NP 400mg given IM to left hip - no problems occurred following this injection - next injection due 12/7/2023  Srinivas Chauhan LPN 52/7/2733 6:24 PM

## 2023-11-09 ENCOUNTER — CLINICAL DOCUMENTATION (OUTPATIENT)
Facility: CLINIC | Age: 33
End: 2023-11-09

## 2023-11-09 NOTE — PROGRESS NOTES
Faxed physicians order for OTC form to [facility] at [phone] - confirmation of receipt received by fax  Naty Estrada LPN 00/9/5625 9:76 PM

## 2023-11-13 ENCOUNTER — NURSE TRIAGE (OUTPATIENT)
Dept: OTHER | Facility: CLINIC | Age: 33
End: 2023-11-13

## 2023-11-14 ENCOUNTER — OFFICE VISIT (OUTPATIENT)
Facility: CLINIC | Age: 33
End: 2023-11-14

## 2023-11-14 VITALS
HEIGHT: 66 IN | OXYGEN SATURATION: 98 % | HEART RATE: 89 BPM | DIASTOLIC BLOOD PRESSURE: 79 MMHG | WEIGHT: 139 LBS | RESPIRATION RATE: 20 BRPM | SYSTOLIC BLOOD PRESSURE: 123 MMHG | TEMPERATURE: 97.1 F | BODY MASS INDEX: 22.34 KG/M2

## 2023-11-14 DIAGNOSIS — M79.89 LEG SWELLING: Primary | ICD-10-CM

## 2023-11-14 DIAGNOSIS — J18.9 PNEUMONIA OF LEFT LUNG DUE TO INFECTIOUS ORGANISM, UNSPECIFIED PART OF LUNG: ICD-10-CM

## 2023-11-14 DIAGNOSIS — R10.31 ABDOMINAL DISCOMFORT, BILATERAL LOWER QUADRANT: ICD-10-CM

## 2023-11-14 DIAGNOSIS — R10.32 ABDOMINAL DISCOMFORT, BILATERAL LOWER QUADRANT: ICD-10-CM

## 2023-11-14 DIAGNOSIS — Z86.718 HX OF DEEP VENOUS THROMBOSIS: ICD-10-CM

## 2023-11-14 LAB
BILIRUBIN, URINE, POC: NEGATIVE
BLOOD URINE, POC: NEGATIVE
GLUCOSE URINE, POC: NEGATIVE
KETONES, URINE, POC: NEGATIVE
LEUKOCYTE ESTERASE, URINE, POC: NEGATIVE
NITRITE, URINE, POC: NEGATIVE
PH, URINE, POC: 7.5 (ref 4.6–8)
PROTEIN,URINE, POC: NEGATIVE
SPECIFIC GRAVITY, URINE, POC: 1.02 (ref 1–1.03)
URINALYSIS CLARITY, POC: CLEAR
URINALYSIS COLOR, POC: YELLOW
UROBILINOGEN, POC: NORMAL

## 2023-11-14 RX ORDER — PEN NEEDLE, DIABETIC 32GX 5/32"
1 NEEDLE, DISPOSABLE MISCELLANEOUS AS NEEDED
Qty: 90 EACH | Refills: 0 | Status: SHIPPED | OUTPATIENT
Start: 2023-11-14

## 2023-11-14 NOTE — PATIENT INSTRUCTIONS
It was a pleasure to see you today. 1. Leg swelling    2. Hx of deep venous thrombosis    3. Abdominal discomfort, bilateral lower quadrant       Please have labs drawn and Ultrasound of legs competed as soon as possible. Return in about 3 months (around 2/14/2024). Sooner for any new or worsening symptoms. Thank you for choosing [id number].     JUNIE Pardo NP

## 2023-11-16 LAB — BACTERIA UR CULT: NO GROWTH

## 2023-11-19 ASSESSMENT — ENCOUNTER SYMPTOMS
COUGH: 0
ABDOMINAL PAIN: 0
CONSTIPATION: 0
NAUSEA: 0
CHEST TIGHTNESS: 0
DIARRHEA: 0
WHEEZING: 0
SHORTNESS OF BREATH: 0

## 2023-11-28 NOTE — TELEPHONE ENCOUNTER
Patient states she is very constipated - has not had a BM in a week - abdomen feels very bloated - has a history of this happening with the medications that she takes - started using Miralax yesterday since that is the only therapy on her med list currently - patient states that in the past Milk of Magnesia has helped her with her constipation - will ask Dr Bray for an order for this MOM since her regular provider PETRA Torres NP is out of the office this week  Tricia Silva LPN 11/28/2023 1:14 PM

## 2023-12-02 ASSESSMENT — ENCOUNTER SYMPTOMS
ABDOMINAL PAIN: 0
RHINORRHEA: 0
SHORTNESS OF BREATH: 1
DIARRHEA: 0
COUGH: 1
CONSTIPATION: 0
WHEEZING: 0
CHEST TIGHTNESS: 0
NAUSEA: 0

## 2024-01-22 ENCOUNTER — OFFICE VISIT (OUTPATIENT)
Facility: CLINIC | Age: 34
End: 2024-01-22

## 2024-01-22 VITALS
BODY MASS INDEX: 24.11 KG/M2 | TEMPERATURE: 99.3 F | SYSTOLIC BLOOD PRESSURE: 119 MMHG | WEIGHT: 150 LBS | HEIGHT: 66 IN | OXYGEN SATURATION: 99 % | RESPIRATION RATE: 20 BRPM | DIASTOLIC BLOOD PRESSURE: 73 MMHG | HEART RATE: 93 BPM

## 2024-01-22 DIAGNOSIS — R39.15 URINARY URGENCY: Primary | ICD-10-CM

## 2024-01-22 LAB
BILIRUBIN, URINE, POC: NEGATIVE
BLOOD URINE, POC: NEGATIVE
GLUCOSE URINE, POC: NEGATIVE
KETONES, URINE, POC: NEGATIVE
LEUKOCYTE ESTERASE, URINE, POC: NORMAL
NITRITE, URINE, POC: NEGATIVE
PH, URINE, POC: 7.5 (ref 4.6–8)
PROTEIN,URINE, POC: NEGATIVE
SPECIFIC GRAVITY, URINE, POC: 1.02 (ref 1–1.03)
URINALYSIS CLARITY, POC: CLEAR
URINALYSIS COLOR, POC: YELLOW
UROBILINOGEN, POC: NORMAL

## 2024-01-22 RX ORDER — NITROFURANTOIN 25; 75 MG/1; MG/1
100 CAPSULE ORAL 2 TIMES DAILY
Qty: 20 CAPSULE | Refills: 0 | Status: SHIPPED | OUTPATIENT
Start: 2024-01-22 | End: 2024-02-01

## 2024-01-22 NOTE — PROGRESS NOTES
Chief Complaint   Patient presents with    Urinary Urgency     Has been incontinent of urine a few nights

## 2024-01-22 NOTE — PATIENT INSTRUCTIONS
It was a pleasure to see you today.    1. Urinary urgency         Return in about 4 months (around 5/22/2024).     Thank you for choosing [facility].    JUNIE Vega NP

## 2024-01-24 LAB — BACTERIA UR CULT: NORMAL

## 2024-01-30 ENCOUNTER — TELEPHONE (OUTPATIENT)
Facility: CLINIC | Age: 34
End: 2024-01-30

## 2024-01-30 NOTE — TELEPHONE ENCOUNTER
Efe Marsh () calling to see what pt needs to do since ins only covered 7 day supply of Macrobid 100mg. Please call her asap at [phone]

## 2024-02-05 NOTE — TELEPHONE ENCOUNTER
Called and spoke with staff member. Explained 7 days of macrobid should be sufficient. They verbalized understanding

## 2024-02-13 ENCOUNTER — TELEPHONE (OUTPATIENT)
Facility: CLINIC | Age: 34
End: 2024-02-13

## 2024-02-13 RX ORDER — NITROFURANTOIN 25; 75 MG/1; MG/1
CAPSULE ORAL
Qty: 14 CAPSULE | Refills: 0 | OUTPATIENT
Start: 2024-02-13

## 2024-02-13 NOTE — TELEPHONE ENCOUNTER
Care Coordinator would like for Vivian to discontinue the nicorette gum, because patient is vaping and she feels its just too much! She would like a letter written and faxed to the office! Thankyou    Please call to advise;  [phone]        Fax# [phone]

## 2024-04-09 ENCOUNTER — OFFICE VISIT (OUTPATIENT)
Facility: CLINIC | Age: 34
End: 2024-04-09
Payer: MEDICAID

## 2024-04-09 VITALS
TEMPERATURE: 99.8 F | OXYGEN SATURATION: 99 % | RESPIRATION RATE: 20 BRPM | DIASTOLIC BLOOD PRESSURE: 75 MMHG | BODY MASS INDEX: 24.91 KG/M2 | SYSTOLIC BLOOD PRESSURE: 124 MMHG | HEART RATE: 91 BPM | WEIGHT: 155 LBS | HEIGHT: 66 IN

## 2024-04-09 DIAGNOSIS — Z87.891 QUIT SMOKING: ICD-10-CM

## 2024-04-09 DIAGNOSIS — E87.6 LOW BLOOD POTASSIUM: ICD-10-CM

## 2024-04-09 DIAGNOSIS — D64.9 ANEMIA, UNSPECIFIED TYPE: ICD-10-CM

## 2024-04-09 DIAGNOSIS — R39.15 URINARY URGENCY: Primary | ICD-10-CM

## 2024-04-09 LAB
BILIRUBIN, URINE, POC: NEGATIVE
BLOOD URINE, POC: ABNORMAL
GLUCOSE URINE, POC: NEGATIVE
KETONES, URINE, POC: NEGATIVE
LEUKOCYTE ESTERASE, URINE, POC: NEGATIVE
NITRITE, URINE, POC: NEGATIVE
PH, URINE, POC: 6 (ref 4.6–8)
PROTEIN,URINE, POC: NEGATIVE
SPECIFIC GRAVITY, URINE, POC: 1.01 (ref 1–1.03)
URINALYSIS CLARITY, POC: CLEAR
URINALYSIS COLOR, POC: YELLOW
UROBILINOGEN, POC: ABNORMAL

## 2024-04-09 PROCEDURE — 99214 OFFICE O/P EST MOD 30 MIN: CPT | Performed by: NURSE PRACTITIONER

## 2024-04-09 PROCEDURE — 81003 URINALYSIS AUTO W/O SCOPE: CPT | Performed by: NURSE PRACTITIONER

## 2024-04-09 RX ORDER — DESVENLAFAXINE SUCCINATE 50 MG/1
50 TABLET, EXTENDED RELEASE ORAL DAILY
COMMUNITY
End: 2024-04-09 | Stop reason: CLARIF

## 2024-04-09 RX ORDER — DIVALPROEX SODIUM 500 MG/1
500 TABLET, EXTENDED RELEASE ORAL DAILY
COMMUNITY
End: 2024-04-09 | Stop reason: CLARIF

## 2024-04-09 ASSESSMENT — ENCOUNTER SYMPTOMS
DIARRHEA: 0
COUGH: 0
WHEEZING: 0
NAUSEA: 0
CHEST TIGHTNESS: 0
ABDOMINAL PAIN: 0
SHORTNESS OF BREATH: 0
CONSTIPATION: 0

## 2024-04-09 NOTE — PATIENT INSTRUCTIONS
It was a pleasure to see you today.    1. Urinary urgency    2. Anemia, unspecified type    3. Low blood potassium    4. Quit smoking       Please have CBC and BMP drawn. Nicorette gum prescribed 4 pieces a day.     Return in about 6 months (around 10/9/2024).     Thank you for choosing [facility].    JUNIE Vega - NP

## 2024-04-09 NOTE — PROGRESS NOTES
MAINTENA) 400 MG PRSY Inject 400 mg into the muscle every 28 days for 1 dose 1 each 0    buPROPion (WELLBUTRIN XL) 150 MG extended release tablet Take 1 tablet by mouth      cloZAPine (CLOZARIL) 100 MG tablet Take 175 mg by mouth nightly       No current facility-administered medications for this visit.        Review of Systems   Constitutional:  Negative for activity change, fatigue, fever and unexpected weight change.   HENT:  Negative for nosebleeds.    Eyes:  Negative for visual disturbance.   Respiratory:  Negative for cough, chest tightness, shortness of breath and wheezing.    Cardiovascular:  Negative for chest pain, palpitations and leg swelling.   Gastrointestinal:  Negative for abdominal pain, constipation, diarrhea and nausea.   Endocrine: Negative for polydipsia and polyuria.   Genitourinary:  Positive for urgency. Negative for flank pain.   Musculoskeletal:  Negative for joint swelling and myalgias.   Skin:  Negative for rash.   Neurological:  Negative for dizziness, syncope, weakness, light-headedness, numbness and headaches.   Psychiatric/Behavioral:  The patient is not nervous/anxious.           Objective   Physical Exam  Vitals reviewed.   Constitutional:       General: She is not in acute distress.     Appearance: Normal appearance.   HENT:      Head: Normocephalic.      Nose: Nose normal.      Mouth/Throat:      Mouth: Mucous membranes are moist.   Eyes:      Extraocular Movements: Extraocular movements intact.      Conjunctiva/sclera: Conjunctivae normal.      Pupils: Pupils are equal, round, and reactive to light.   Cardiovascular:      Rate and Rhythm: Normal rate and regular rhythm.      Pulses: Normal pulses.      Heart sounds: Normal heart sounds.   Pulmonary:      Effort: Pulmonary effort is normal.      Breath sounds: Normal breath sounds.   Abdominal:      General: Bowel sounds are normal.   Musculoskeletal:         General: Normal range of motion.      Cervical back: Normal range of

## 2024-04-18 NOTE — ED TRIAGE NOTES
Pt escorted to ED via PD for mental health needs. Pt states her son called them, PD states she called them. Pt with extensive psych hx, multiple admissions. Pt disorganized and withholding of information, states she hears voices sometimes but they tell her things like 'go home' and not to hurt herself, never to hurt herself or anyone else. When asked about visual hallucinations, states 'you probably see them too' and wont elaborate. When asked about SI, pt says \"yeah, I guess\" states she has thought a plan out but doesn't want to share with this nurse.  Denies HI    Pt says they have been changing around her medications and shes \"tired of feelings and all this\"  Pt unable to provide which medications she is on  States \"the Drs are trying to sell stuff to me to get me high and my mind is accelerated and I went mad\" CONSTITUTIONAL: Well-developed; well-nourished; in no acute distress, nontoxic appearing  SKIN: skin exam is warm and dry  HEAD: Normocephalic; atraumatic  CARD: S1, S2 normal, no murmur  RESP: No increased WOB   EXT: LLE: +swelling to medial malleoli extending to distal leg, no overlying skin changes. FROM ankle/knee.   NEURO: awake, alert, following commands, oriented, grossly unremarkable. No Focal deficits. GCS 15.   PSYCH: Cooperative, appropriate.

## 2024-05-29 ENCOUNTER — OFFICE VISIT (OUTPATIENT)
Facility: CLINIC | Age: 34
End: 2024-05-29
Payer: MEDICAID

## 2024-05-29 VITALS
TEMPERATURE: 98.4 F | HEIGHT: 66 IN | RESPIRATION RATE: 20 BRPM | DIASTOLIC BLOOD PRESSURE: 76 MMHG | WEIGHT: 170 LBS | OXYGEN SATURATION: 98 % | SYSTOLIC BLOOD PRESSURE: 116 MMHG | BODY MASS INDEX: 27.32 KG/M2 | HEART RATE: 93 BPM

## 2024-05-29 DIAGNOSIS — G89.29 NECK PAIN, CHRONIC: Primary | ICD-10-CM

## 2024-05-29 DIAGNOSIS — J30.2 SEASONAL ALLERGIES: ICD-10-CM

## 2024-05-29 DIAGNOSIS — M54.2 NECK PAIN, CHRONIC: Primary | ICD-10-CM

## 2024-05-29 DIAGNOSIS — R19.8 ALTERNATING CONSTIPATION AND DIARRHEA: ICD-10-CM

## 2024-05-29 PROCEDURE — 99213 OFFICE O/P EST LOW 20 MIN: CPT | Performed by: NURSE PRACTITIONER

## 2024-05-29 RX ORDER — CLOZAPINE 50 MG/ML
SUSPENSION ORAL
COMMUNITY
Start: 2024-05-25

## 2024-05-29 RX ORDER — LIDOCAINE 50 MG/G
1 PATCH TOPICAL DAILY
Qty: 30 PATCH | Refills: 0 | Status: SHIPPED | OUTPATIENT
Start: 2024-05-29 | End: 2024-06-28

## 2024-05-29 RX ORDER — HYDROXYZINE HYDROCHLORIDE 25 MG/1
25 TABLET, FILM COATED ORAL 3 TIMES DAILY PRN
COMMUNITY

## 2024-05-29 RX ORDER — FLUTICASONE PROPIONATE 50 MCG
2 SPRAY, SUSPENSION (ML) NASAL DAILY
Qty: 16 G | Refills: 0 | Status: SHIPPED | OUTPATIENT
Start: 2024-05-29

## 2024-05-29 ASSESSMENT — PATIENT HEALTH QUESTIONNAIRE - PHQ9
9. THOUGHTS THAT YOU WOULD BE BETTER OFF DEAD, OR OF HURTING YOURSELF: NOT AT ALL
2. FEELING DOWN, DEPRESSED OR HOPELESS: NOT AT ALL
SUM OF ALL RESPONSES TO PHQ QUESTIONS 1-9: 2
6. FEELING BAD ABOUT YOURSELF - OR THAT YOU ARE A FAILURE OR HAVE LET YOURSELF OR YOUR FAMILY DOWN: NOT AT ALL
SUM OF ALL RESPONSES TO PHQ9 QUESTIONS 1 & 2: 0
3. TROUBLE FALLING OR STAYING ASLEEP: SEVERAL DAYS
4. FEELING TIRED OR HAVING LITTLE ENERGY: NOT AT ALL
5. POOR APPETITE OR OVEREATING: NOT AT ALL
10. IF YOU CHECKED OFF ANY PROBLEMS, HOW DIFFICULT HAVE THESE PROBLEMS MADE IT FOR YOU TO DO YOUR WORK, TAKE CARE OF THINGS AT HOME, OR GET ALONG WITH OTHER PEOPLE: NOT DIFFICULT AT ALL
SUM OF ALL RESPONSES TO PHQ QUESTIONS 1-9: 2
7. TROUBLE CONCENTRATING ON THINGS, SUCH AS READING THE NEWSPAPER OR WATCHING TELEVISION: SEVERAL DAYS
1. LITTLE INTEREST OR PLEASURE IN DOING THINGS: NOT AT ALL
8. MOVING OR SPEAKING SO SLOWLY THAT OTHER PEOPLE COULD HAVE NOTICED. OR THE OPPOSITE, BEING SO FIGETY OR RESTLESS THAT YOU HAVE BEEN MOVING AROUND A LOT MORE THAN USUAL: NOT AT ALL

## 2024-05-29 NOTE — PATIENT INSTRUCTIONS
It was a pleasure to see you today.    1. Neck pain, chronic    2. Alternating constipation and diarrhea    3. Seasonal allergies         No follow-ups on file.     Thank you for choosing [facility].    JUNIE Vega NP

## 2024-05-29 NOTE — PROGRESS NOTES
Jimena Stubbs (:  1990) is a 34 y.o. female who presents to the office today for the following:  Hernia (Feels a pain at her belly button) and New Med Request (For irritable bowels and Flonase)      Assessment & Plan   ASSESSMENT/PLAN:  1. Neck pain, chronic  2. Alternating constipation and diarrhea  3. Seasonal allergies      No results found for any visits on 24.     Return in about 4 months (around 2024).         Subjective   SUBJECTIVE/OBJECTIVE:  HPI  Pt presents to the clinic for several complaints. Pt is requesting lidocaine patch refill for chronic neck pain. Pt is also having alternating diarrhea and constipation alternating consistent with IBS. Pt requesting Metamucil Flonase and Lidocaine patch prescriptions. Pt denies any other complaints or concerns.      Allergies   Allergen Reactions    Guaifenesin Nausea And Vomiting     Current Outpatient Medications   Medication Sig Dispense Refill    VERSACLOZ 50 MG/ML SUSP       hydrOXYzine HCl (ATARAX) 25 MG tablet Take 1 tablet by mouth 3 times daily as needed      lidocaine (LIDODERM) 5 % Place 1 patch onto the skin daily 12 hours on, 12 hours off. 30 patch 0    fluticasone (FLONASE) 50 MCG/ACT nasal spray 2 sprays by Each Nostril route daily 16 g 0    psyllium (KONSYL) 60.3 % PACK powder Take 1 packet by mouth daily 90 packet 0    nicotine polacrilex (NICORETTE) 4 MG gum Can have 4 pieces a day 110 each 3    venlafaxine (EFFEXOR XR) 75 MG extended release capsule 1 capsule daily      haloperidol (HALDOL) 1 MG tablet Take 3 tablets by mouth 2 times daily      lithium 300 MG capsule Take 1 capsule by mouth daily      lithium 600 MG capsule Take 1 capsule by mouth nightly      ARIPiprazole ER (ABILIFY MAINTENA) 400 MG PRSY Inject 400 mg into the muscle every 28 days for 1 dose 1 each 0    cloZAPine (CLOZARIL) 100 MG tablet Take 175 mg by mouth nightly       No current facility-administered medications for this visit.        Review of

## 2024-06-11 RX ORDER — LIDOCAINE 50 MG/G
PATCH TOPICAL
Qty: 30 PATCH | Refills: 0 | OUTPATIENT
Start: 2024-06-11

## 2024-06-11 RX ORDER — FLUTICASONE PROPIONATE 50 MCG
SPRAY, SUSPENSION (ML) NASAL
Qty: 16 G | Refills: 0 | OUTPATIENT
Start: 2024-06-11

## 2024-07-11 RX ORDER — LIDOCAINE 50 MG/G
PATCH TOPICAL
Qty: 30 PATCH | Refills: 0 | Status: SHIPPED | OUTPATIENT
Start: 2024-07-11

## 2024-07-11 RX ORDER — FLUTICASONE PROPIONATE 50 MCG
SPRAY, SUSPENSION (ML) NASAL
Qty: 16 G | Refills: 0 | Status: SHIPPED | OUTPATIENT
Start: 2024-07-11

## 2024-08-07 NOTE — TELEPHONE ENCOUNTER
Requested Prescriptions     Pending Prescriptions Disp Refills    nicotine polacrilex (NICORETTE) 4 MG gum [Pharmacy Med Name: NICOTINE 4 MG CHEWING GUM]  0     Sig: CAN CHEW 4 PIECES A DAY    KONSYL DAILY FIBER 28.3 % PACK [Pharmacy Med Name: KONSYL PSYLLIUM FIBER PACKET] 30 packet 0     Sig: MIX 1 PACKET IN 8OZ OF WATER AND DRINK BY MOUTH ONCE A DAY     Last fill  [facility] 4/9/2024 for #110 w/ 3 addtnl  [facility] 5/29/2024 for #90 w/ no addtnl  Last OV 5/29/2024  Next OV 10/10/2024    Radha Kulkarni LPN

## 2024-08-09 RX ORDER — PSYLLIUM HUSK 3.4 G/12G
GRANULE ORAL
Qty: 30 PACKET | Refills: 0 | Status: SHIPPED | OUTPATIENT
Start: 2024-08-09

## 2024-09-17 LAB
BASOPHILS # BLD AUTO: 0 X10E3/UL (ref 0–0.2)
BASOPHILS NFR BLD AUTO: 0 %
BUN SERPL-MCNC: 8 MG/DL (ref 6–20)
BUN/CREAT SERPL: 10 (ref 9–23)
CALCIUM SERPL-MCNC: 8.9 MG/DL (ref 8.7–10.2)
CHLORIDE SERPL-SCNC: 106 MMOL/L (ref 96–106)
CO2 SERPL-SCNC: 21 MMOL/L (ref 20–29)
CREAT SERPL-MCNC: 0.82 MG/DL (ref 0.57–1)
EGFRCR SERPLBLD CKD-EPI 2021: 96 ML/MIN/1.73
EOSINOPHIL # BLD AUTO: 0.1 X10E3/UL (ref 0–0.4)
EOSINOPHIL NFR BLD AUTO: 2 %
ERYTHROCYTE [DISTWIDTH] IN BLOOD BY AUTOMATED COUNT: 14 % (ref 11.7–15.4)
GLUCOSE SERPL-MCNC: 100 MG/DL (ref 70–99)
HCT VFR BLD AUTO: 39.1 % (ref 34–46.6)
HGB BLD-MCNC: 12.1 G/DL (ref 11.1–15.9)
IMM GRANULOCYTES # BLD AUTO: 0 X10E3/UL (ref 0–0.1)
IMM GRANULOCYTES NFR BLD AUTO: 0 %
LYMPHOCYTES # BLD AUTO: 1.6 X10E3/UL (ref 0.7–3.1)
LYMPHOCYTES NFR BLD AUTO: 26 %
MCH RBC QN AUTO: 26.8 PG (ref 26.6–33)
MCHC RBC AUTO-ENTMCNC: 30.9 G/DL (ref 31.5–35.7)
MCV RBC AUTO: 87 FL (ref 79–97)
MONOCYTES # BLD AUTO: 0.4 X10E3/UL (ref 0.1–0.9)
MONOCYTES NFR BLD AUTO: 7 %
NEUTROPHILS # BLD AUTO: 3.9 X10E3/UL (ref 1.4–7)
NEUTROPHILS NFR BLD AUTO: 65 %
PLATELET # BLD AUTO: 260 X10E3/UL (ref 150–450)
POTASSIUM SERPL-SCNC: 4.1 MMOL/L (ref 3.5–5.2)
RBC # BLD AUTO: 4.52 X10E6/UL (ref 3.77–5.28)
SODIUM SERPL-SCNC: 141 MMOL/L (ref 134–144)
WBC # BLD AUTO: 6.1 X10E3/UL (ref 3.4–10.8)

## 2024-09-25 NOTE — PROGRESS NOTES
Behavioral Health Progress Note    Admit Date: 7/20/2020  Hospital day 8    Vitals :   Patient Vitals for the past 8 hrs:   BP Temp Pulse Resp Height   07/28/20 1039 -- -- -- -- 5' 6\" (1.676 m)   07/28/20 0803 90/75 98 °F (36.7 °C) 99 16 --     Labs:  No results found for this or any previous visit (from the past 24 hour(s)).   Meds:   Current Facility-Administered Medications   Medication Dose Route Frequency    haloperidoL (HALDOL) tablet 5 mg  5 mg Oral QHS    nicotine buccal (POLACRILEX) lozenge 2 mg  2 mg Oral Q2H PRN    haloperidol lactate (HALDOL) injection 5 mg  5 mg IntraMUSCular Q6H PRN    haloperidoL (HALDOL) tablet 5 mg  5 mg Oral Q6H PRN    traZODone (DESYREL) tablet 50 mg  50 mg Oral QHS PRN    hydrOXYzine pamoate (VISTARIL) capsule 50 mg  50 mg Oral Q4H PRN    benztropine (COGENTIN) tablet 1 mg  1 mg Oral Q6H PRN    benztropine (COGENTIN) injection 1 mg  1 mg IntraMUSCular Q6H PRN    ARIPiprazole (ABILIFY) tablet 30 mg  30 mg Oral DAILY    acetaminophen (TYLENOL) tablet 650 mg  650 mg Oral Q6H PRN      Hospital Problems: Principal Problem:    Schizophrenia ([address] (4/18/2017)    Active Problems:    Cigarette nicotine dependence without complication (2/6/9212)        Subjective:   Medication side effects: none  none    Mental Status Exam  Sensorium: alert  Orientation: only aware of  time, place and person  Relations: cooperative  Eye Contact: appropriate  Appearance: shows no evidence of impairment  Thought Process: normal rate of thoughts and poor abstract reasoning/computation   Thought Content: no evidence of impairment   Suicidal: denies   Homicidal: none   Mood: is euthymic   Affect: stable  Memory: shows no evidence of impairment     Concentration: fair  Abstraction: concrete  Insight: The patient shows little insight    OR Fair  Judgement: is psychologically impaired OR  Fair    Assessment/Plan:   improved    Continue close observation  Patient had treatment team attended by physician, scheduled   nurse, social work, administration and patient. She denies auditory hallucinations or paranoia today. Mood is improved. She is able to contract for safety. She was able to smile somewhat today. She did have a blood pressure initially at 78/58 but when repeated 90/75. She is not on blood pressure medicines. She denies any feelings of being dizzy or lightheaded. She does wish to be discharged home with outpatient follow-up. She would plan on going back to her own apartment at transitional housing. We will continue with reality orientation and antipsychotic medication management. I would anticipate discharge tomorrow morning.

## 2024-10-10 ENCOUNTER — OFFICE VISIT (OUTPATIENT)
Facility: CLINIC | Age: 34
End: 2024-10-10

## 2024-10-10 VITALS
DIASTOLIC BLOOD PRESSURE: 80 MMHG | SYSTOLIC BLOOD PRESSURE: 117 MMHG | BODY MASS INDEX: 28.77 KG/M2 | HEART RATE: 108 BPM | HEIGHT: 66 IN | WEIGHT: 179 LBS | TEMPERATURE: 98.9 F | OXYGEN SATURATION: 97 % | RESPIRATION RATE: 16 BRPM

## 2024-10-10 DIAGNOSIS — Z23 NEEDS FLU SHOT: ICD-10-CM

## 2024-10-10 DIAGNOSIS — Z87.891 QUIT SMOKING: ICD-10-CM

## 2024-10-10 DIAGNOSIS — F20.9 SCHIZOPHRENIA, UNSPECIFIED TYPE (HCC): Primary | ICD-10-CM

## 2024-10-10 NOTE — PROGRESS NOTES
Jimena Stubbs (:  1990) is a 34 y.o. 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 Schizophrenia, unspecified type (HCC)  2. Needs flu shot  -     Influenza, FLUCELVAX Trivalent, (age 6 mo+) IM, Preservative Free, 0.5mL  3. Quit smoking      No results found for any visits on 10/10/24.     Return in about 6 months (around 4/10/2025) for blood pressure, cholesterol check.         Subjective   SUBJECTIVE/OBJECTIVE:  HPI  Pt here for follow up. Pmhx of schizophrenia. Pt has make up on and hair fixed. Pt speaking more clearly and more attentive than in past. Pt reports this is due to her \"aura\" and \"self actualizing\"  and having \"positive\" rather than negative \" energy.   Pt has gained some weight due to feeling better.   Pt denies any complaints or concerns.  Pt states considering going back to college to major in psychology.  Pt given flu vaccine. Pt reports quitting smoking.     Allergies   Allergen Reactions    Guaifenesin Nausea And Vomiting     Current Outpatient Medications   Medication Sig Dispense Refill    KONSYL DAILY FIBER 28.3 % PACK MIX 1 PACKET IN 8OZ OF WATER AND DRINK BY MOUTH ONCE A DAY 30 packet 0    lidocaine (LIDODERM) 5 % PLACE 1 PATCH ONTO THE SKIN ONCE A DAY. 12 HOURS ON THEN 12 HOURS OFF. 30 patch 0    fluticasone (FLONASE) 50 MCG/ACT nasal spray SPRAY 2 SPRAYS INTO EACH NOSTRIL ONCE A DAY 16 g 0    VERSACLOZ 50 MG/ML SUSP       hydrOXYzine HCl (ATARAX) 25 MG tablet Take 1 tablet by mouth 3 times daily as needed      venlafaxine (EFFEXOR XR) 75 MG extended release capsule 1 capsule daily      lithium 300 MG capsule Take 1 capsule by mouth daily      lithium 600 MG capsule Take 1 capsule by mouth nightly      ARIPiprazole ER (ABILIFY MAINTENA) 400 MG PRSY Inject 400 mg into the muscle every 28 days for 1 dose 1

## 2024-10-10 NOTE — PATIENT INSTRUCTIONS
It was a pleasure to see you today.    1. Schizophrenia, unspecified type (HCC)    2. Needs flu shot    3. Quit smoking         Return in about 6 months (around 4/10/2025) for blood pressure, cholesterol check.     Thank you for choosing [facility].    JUNIE Vega NP

## 2024-10-10 NOTE — PROGRESS NOTES
Chief Complaint   Patient presents with    Anemia     6 month follow up - reports feels better than she ever has - GOING BACK TO COLLE[employer] TO STUDY PSYCHOLOGY

## 2024-11-12 ENCOUNTER — CLINICAL DOCUMENTATION (OUTPATIENT)
Facility: CLINIC | Age: 34
End: 2024-11-12

## 2024-11-12 NOTE — PROGRESS NOTES
Chief Complaint   Patient presents with    medical records request from [facility]     Faxed last office visit notes and lab results to [phone] per request received from [facility] - confirmation of receipt received by fax  Tricia Silva LPN 11/12/2024 10:54 AM

## 2024-12-09 ENCOUNTER — OFFICE VISIT (OUTPATIENT)
Facility: CLINIC | Age: 34
End: 2024-12-09

## 2024-12-09 VITALS
TEMPERATURE: 99.6 F | HEIGHT: 66 IN | SYSTOLIC BLOOD PRESSURE: 126 MMHG | WEIGHT: 194 LBS | HEART RATE: 117 BPM | DIASTOLIC BLOOD PRESSURE: 83 MMHG | OXYGEN SATURATION: 98 % | BODY MASS INDEX: 31.18 KG/M2 | RESPIRATION RATE: 16 BRPM

## 2024-12-09 DIAGNOSIS — R39.15 URINARY URGENCY: Primary | ICD-10-CM

## 2024-12-09 DIAGNOSIS — Z13.220 SCREENING FOR HYPERLIPIDEMIA: ICD-10-CM

## 2024-12-09 DIAGNOSIS — F41.9 ANXIETY AND DEPRESSION: ICD-10-CM

## 2024-12-09 DIAGNOSIS — R73.9 HYPERGLYCEMIA: ICD-10-CM

## 2024-12-09 DIAGNOSIS — Z13.29 SCREENING FOR HYPOTHYROIDISM: ICD-10-CM

## 2024-12-09 DIAGNOSIS — F32.A ANXIETY AND DEPRESSION: ICD-10-CM

## 2024-12-09 DIAGNOSIS — Z87.891 QUIT SMOKING: ICD-10-CM

## 2024-12-09 DIAGNOSIS — R10.31 ABDOMINAL DISCOMFORT, BILATERAL LOWER QUADRANT: ICD-10-CM

## 2024-12-09 DIAGNOSIS — F20.9 SCHIZOPHRENIA, UNSPECIFIED TYPE (HCC): ICD-10-CM

## 2024-12-09 DIAGNOSIS — R10.32 ABDOMINAL DISCOMFORT, BILATERAL LOWER QUADRANT: ICD-10-CM

## 2024-12-09 LAB
BILIRUBIN, URINE, POC: NORMAL
BLOOD URINE, POC: NEGATIVE
GLUCOSE URINE, POC: NEGATIVE
KETONES, URINE, POC: NEGATIVE
LEUKOCYTE ESTERASE, URINE, POC: NEGATIVE
NITRITE, URINE, POC: NEGATIVE
PH, URINE, POC: 5.5 (ref 4.6–8)
PROTEIN,URINE, POC: NEGATIVE
SPECIFIC GRAVITY, URINE, POC: 1.01 (ref 1–1.03)
URINALYSIS CLARITY, POC: CLEAR
URINALYSIS COLOR, POC: YELLOW
UROBILINOGEN, POC: NORMAL

## 2024-12-09 RX ORDER — PHENAZOPYRIDINE HYDROCHLORIDE 100 MG/1
100 TABLET, FILM COATED ORAL 3 TIMES DAILY PRN
Qty: 20 TABLET | Refills: 0 | Status: SHIPPED | OUTPATIENT
Start: 2024-12-09

## 2024-12-09 ASSESSMENT — ENCOUNTER SYMPTOMS
BACK PAIN: 1
SHORTNESS OF BREATH: 0
COUGH: 0
CHEST TIGHTNESS: 0
WHEEZING: 0
ABDOMINAL PAIN: 0
NAUSEA: 0
DIARRHEA: 0
CONSTIPATION: 0

## 2024-12-09 NOTE — PATIENT INSTRUCTIONS
It was a pleasure to see you today.    1. Urinary urgency    2. Quit smoking    3. Schizophrenia, unspecified type (HCC)    4. Anxiety and depression    5. Abdominal discomfort, bilateral lower quadrant    6. Screening for hyperlipidemia    7. Hyperglycemia    8. Screening for hypothyroidism    9. BMI 31.0-31.9,adult         No follow-ups on file.     Thank you for choosing [facility].    JUNIE Vega - NP

## 2024-12-09 NOTE — PROGRESS NOTES
Chief Complaint   Patient presents with    Urinary Urgency     Strong odor to urine    Back Pain     Mid back       
MG tablet Take 1 tablet by mouth 3 times daily as needed      venlafaxine (EFFEXOR XR) 75 MG extended release capsule 1 capsule daily      haloperidol (HALDOL) 1 MG tablet Take 3 tablets by mouth 2 times daily      lithium 300 MG capsule Take 1 capsule by mouth daily      lithium 600 MG capsule Take 1 capsule by mouth nightly      ARIPiprazole ER (ABILIFY MAINTENA) 400 MG PRSY Inject 400 mg into the muscle every 28 days for 1 dose 1 each 0    cloZAPine (CLOZARIL) 100 MG tablet Take 175 mg by mouth nightly       No current facility-administered medications for this visit.        Review of Systems   Constitutional:  Negative for activity change, fatigue, fever and unexpected weight change.   HENT:  Negative for nosebleeds.    Eyes:  Negative for visual disturbance.   Respiratory:  Negative for cough, chest tightness, shortness of breath and wheezing.    Cardiovascular:  Negative for chest pain, palpitations and leg swelling.   Gastrointestinal:  Negative for abdominal pain, constipation, diarrhea and nausea.   Endocrine: Negative for polydipsia and polyuria.   Genitourinary:  Positive for dysuria. Negative for flank pain.   Musculoskeletal:  Positive for back pain. Negative for joint swelling and myalgias.   Skin:  Negative for rash.   Neurological:  Negative for dizziness, syncope, weakness, light-headedness, numbness and headaches.   Psychiatric/Behavioral:  The patient is not nervous/anxious.           Objective   Physical Exam  Vitals reviewed.   Constitutional:       General: She is not in acute distress.     Appearance: Normal appearance.   HENT:      Head: Normocephalic.      Nose: Nose normal.      Mouth/Throat:      Mouth: Mucous membranes are moist.   Eyes:      Extraocular Movements: Extraocular movements intact.      Conjunctiva/sclera: Conjunctivae normal.      Pupils: Pupils are equal, round, and reactive to light.   Cardiovascular:      Rate and Rhythm: Normal rate and regular rhythm.      Pulses:

## 2025-03-07 SDOH — ECONOMIC STABILITY: FOOD INSECURITY: WITHIN THE PAST 12 MONTHS, YOU WORRIED THAT YOUR FOOD WOULD RUN OUT BEFORE YOU GOT MONEY TO BUY MORE.: SOMETIMES TRUE

## 2025-03-07 SDOH — ECONOMIC STABILITY: FOOD INSECURITY: WITHIN THE PAST 12 MONTHS, THE FOOD YOU BOUGHT JUST DIDN'T LAST AND YOU DIDN'T HAVE MONEY TO GET MORE.: NEVER TRUE

## 2025-03-07 SDOH — ECONOMIC STABILITY: INCOME INSECURITY: IN THE LAST 12 MONTHS, WAS THERE A TIME WHEN YOU WERE NOT ABLE TO PAY THE MORTGAGE OR RENT ON TIME?: NO

## 2025-03-07 SDOH — ECONOMIC STABILITY: TRANSPORTATION INSECURITY
IN THE PAST 12 MONTHS, HAS THE LACK OF TRANSPORTATION KEPT YOU FROM MEDICAL APPOINTMENTS OR FROM GETTING MEDICATIONS?: NO

## 2025-03-07 SDOH — ECONOMIC STABILITY: TRANSPORTATION INSECURITY
IN THE PAST 12 MONTHS, HAS LACK OF TRANSPORTATION KEPT YOU FROM MEETINGS, WORK, OR FROM GETTING THINGS NEEDED FOR DAILY LIVING?: NO

## 2025-03-10 ENCOUNTER — OFFICE VISIT (OUTPATIENT)
Facility: CLINIC | Age: 35
End: 2025-03-10
Payer: MEDICAID

## 2025-03-10 VITALS
SYSTOLIC BLOOD PRESSURE: 126 MMHG | TEMPERATURE: 99.1 F | BODY MASS INDEX: 31.5 KG/M2 | HEART RATE: 95 BPM | WEIGHT: 196 LBS | HEIGHT: 66 IN | RESPIRATION RATE: 16 BRPM | DIASTOLIC BLOOD PRESSURE: 80 MMHG | OXYGEN SATURATION: 99 %

## 2025-03-10 DIAGNOSIS — F41.9 ANXIETY AND DEPRESSION: ICD-10-CM

## 2025-03-10 DIAGNOSIS — F20.9 SCHIZOPHRENIA, UNSPECIFIED TYPE (HCC): Primary | ICD-10-CM

## 2025-03-10 DIAGNOSIS — F32.A ANXIETY AND DEPRESSION: ICD-10-CM

## 2025-03-10 PROCEDURE — 99213 OFFICE O/P EST LOW 20 MIN: CPT | Performed by: NURSE PRACTITIONER

## 2025-03-10 ASSESSMENT — PATIENT HEALTH QUESTIONNAIRE - PHQ9
2. FEELING DOWN, DEPRESSED OR HOPELESS: NOT AT ALL
4. FEELING TIRED OR HAVING LITTLE ENERGY: NOT AT ALL
SUM OF ALL RESPONSES TO PHQ QUESTIONS 1-9: 0
5. POOR APPETITE OR OVEREATING: NOT AT ALL
9. THOUGHTS THAT YOU WOULD BE BETTER OFF DEAD, OR OF HURTING YOURSELF: NOT AT ALL
3. TROUBLE FALLING OR STAYING ASLEEP: NOT AT ALL
10. IF YOU CHECKED OFF ANY PROBLEMS, HOW DIFFICULT HAVE THESE PROBLEMS MADE IT FOR YOU TO DO YOUR WORK, TAKE CARE OF THINGS AT HOME, OR GET ALONG WITH OTHER PEOPLE: NOT DIFFICULT AT ALL
SUM OF ALL RESPONSES TO PHQ QUESTIONS 1-9: 0
1. LITTLE INTEREST OR PLEASURE IN DOING THINGS: NOT AT ALL
7. TROUBLE CONCENTRATING ON THINGS, SUCH AS READING THE NEWSPAPER OR WATCHING TELEVISION: NOT AT ALL
6. FEELING BAD ABOUT YOURSELF - OR THAT YOU ARE A FAILURE OR HAVE LET YOURSELF OR YOUR FAMILY DOWN: NOT AT ALL
SUM OF ALL RESPONSES TO PHQ QUESTIONS 1-9: 0
SUM OF ALL RESPONSES TO PHQ QUESTIONS 1-9: 0
8. MOVING OR SPEAKING SO SLOWLY THAT OTHER PEOPLE COULD HAVE NOTICED. OR THE OPPOSITE, BEING SO FIGETY OR RESTLESS THAT YOU HAVE BEEN MOVING AROUND A LOT MORE THAN USUAL: NOT AT ALL

## 2025-03-10 ASSESSMENT — ENCOUNTER SYMPTOMS
CHEST TIGHTNESS: 0
DIARRHEA: 0
COUGH: 0
CONSTIPATION: 0
ABDOMINAL PAIN: 0
WHEEZING: 0
SHORTNESS OF BREATH: 0
NAUSEA: 0

## 2025-03-10 NOTE — PROGRESS NOTES
Jimena Stubbs (:  1990) is a 34 y.o. female who presents to the office today for the following:  Anxiety (Follow up on anxiety and depression - reports feeling \"at a good place\"  )      Assessment & Plan   ASSESSMENT/PLAN:  1. Schizophrenia, unspecified type (HCC)  2. Anxiety and depression      No results found for any visits on 03/10/25.     Return in about 6 months (around 9/10/2025).         Subjective   SUBJECTIVE/OBJECTIVE:  Anxiety  Patient reports no chest pain, dizziness, nausea, nervous/anxious behavior, palpitations or shortness of breath.       Pt presents to the clinic for follow up. Pt states is in a good place. Pt reports is sleeping well, eating healthy, Pt reports taking classes on line. 2025 Pt was seen in the ER because friend called police for a well check when she did not answer phone. Labs unremarkable and cleared by psych through telemedicine. Pt sees her psychiatrist every 6 weeks. Pt denies any other complaints or concerns.     Allergies   Allergen Reactions    Guaifenesin Nausea And Vomiting     Current Outpatient Medications   Medication Sig Dispense Refill    KONSYL DAILY FIBER 28.3 % PACK MIX 1 PACKET IN 8OZ OF WATER AND DRINK BY MOUTH ONCE A DAY 30 packet 0    lidocaine (LIDODERM) 5 % PLACE 1 PATCH ONTO THE SKIN ONCE A DAY. 12 HOURS ON THEN 12 HOURS OFF. 30 patch 0    fluticasone (FLONASE) 50 MCG/ACT nasal spray SPRAY 2 SPRAYS INTO EACH NOSTRIL ONCE A DAY 16 g 0    VERSACLOZ 50 MG/ML SUSP       hydrOXYzine HCl (ATARAX) 25 MG tablet Take 1 tablet by mouth 3 times daily as needed      venlafaxine (EFFEXOR XR) 75 MG extended release capsule 1 capsule daily      haloperidol (HALDOL) 1 MG tablet Take 3 tablets by mouth 2 times daily      lithium 300 MG capsule Take 1 capsule by mouth daily      lithium 600 MG capsule Take 1 capsule by mouth nightly      ARIPiprazole ER (ABILIFY MAINTENA) 400 MG PRSY Inject 400 mg into the muscle every 28 days for 1 dose 1 each 0

## 2025-03-10 NOTE — PROGRESS NOTES
Chief Complaint   Patient presents with    Anxiety     Follow up on anxiety and depression - reports feeling \"at a good place\"

## 2025-03-10 NOTE — PATIENT INSTRUCTIONS
It was a pleasure to see you today.    1. Schizophrenia, unspecified type (HCC)    2. Anxiety and depression         Return in about 6 months (around 9/10/2025).     Thank you for choosing [facility].    JUNIE Vega - NP Patient Education        Eating Healthy Foods: Care Instructions  With every meal, you can make healthy food choices. Try to eat a variety of fruits, vegetables, whole grains, lean proteins, and low-fat dairy products. This can help you get the right balance of nutrients, including vitamins and minerals. Small changes add up over time. You can start by adding one healthy food to your meals each day.    Try to make half your plate fruits and vegetables, one-fourth whole grains, and one-fourth lean proteins. Try including dairy with your meals.   Eat more fruits and vegetables. Try to have them with most meals and snacks.   Foods for healthy eating        Fruits   These can be fresh, frozen, canned, or dried.  Try to choose whole fruit rather than fruit juice.  Eat a variety of colors.        Vegetables   These can be fresh, frozen, canned, or dried.  Beans, peas, and lentils count too.        Whole grains   Choose whole-grain breads, cereals, and noodles.  Try brown rice.        Lean proteins   These can include lean meat, poultry, fish, and eggs.  You can also have tofu, beans, peas, lentils, nuts, and seeds.        Dairy   Try milk, yogurt, and cheese.  Choose low-fat or fat-free when you can.  If you need to, use lactose-free milk or fortified plant-based milk products, such as soy milk.        Water   Drink water when you're thirsty.  Limit sugar-sweetened drinks, including soda, fruit drinks, and sports drinks.  Where can you learn more?  Go to https://www.[employer].net/patientEd and enter T756 to learn more about \"Eating Healthy Foods: Care Instructions.\"  Current as of: September 20, 2023  Content Version: 14.3  © 2024 [facility] [employer] [facility].   Care

## 2025-03-18 ENCOUNTER — RESULTS FOLLOW-UP (OUTPATIENT)
Facility: CLINIC | Age: 35
End: 2025-03-18

## 2025-03-18 LAB
CHOLEST SERPL-MCNC: 191 MG/DL (ref 100–199)
HBA1C MFR BLD: 7 % (ref 4.8–5.6)
HDLC SERPL-MCNC: 62 MG/DL
IMP & REVIEW OF LAB RESULTS: NORMAL
LDLC SERPL CALC-MCNC: 96 MG/DL (ref 0–99)
Lab: NORMAL
TRIGL SERPL-MCNC: 192 MG/DL (ref 0–149)
VLDLC SERPL CALC-MCNC: 33 MG/DL (ref 5–40)

## 2025-06-09 NOTE — BH NOTES
Awake The documentation for this period (0100 hrs-0300 hrs) is being entered following the guidelines as defined in the [facility] downtime policy by Billie Cartwright. Pt downtime sheets are in patient chart.

## 2025-07-24 ENCOUNTER — OFFICE VISIT (OUTPATIENT)
Facility: CLINIC | Age: 35
End: 2025-07-24
Payer: MEDICAID

## 2025-07-24 VITALS
BODY MASS INDEX: 31.18 KG/M2 | SYSTOLIC BLOOD PRESSURE: 115 MMHG | HEIGHT: 66 IN | RESPIRATION RATE: 20 BRPM | TEMPERATURE: 99.1 F | WEIGHT: 194 LBS | OXYGEN SATURATION: 96 % | DIASTOLIC BLOOD PRESSURE: 76 MMHG | HEART RATE: 73 BPM

## 2025-07-24 DIAGNOSIS — F32.A ANXIETY AND DEPRESSION: ICD-10-CM

## 2025-07-24 DIAGNOSIS — F41.9 ANXIETY AND DEPRESSION: ICD-10-CM

## 2025-07-24 DIAGNOSIS — F20.9 SCHIZOPHRENIA, UNSPECIFIED TYPE (HCC): Primary | ICD-10-CM

## 2025-07-24 PROCEDURE — 99213 OFFICE O/P EST LOW 20 MIN: CPT | Performed by: NURSE PRACTITIONER

## 2025-07-24 NOTE — PROGRESS NOTES
Chief Complaint   Patient presents with    Headache     2 days headache - Ibuprofen made it worse - pain is throbbing    Referral - General     Wants to talk about needing a caregiver

## 2025-07-24 NOTE — PATIENT INSTRUCTIONS
It was a pleasure to see you today.    1. Schizophrenia, unspecified type (HCC)    2. Anxiety and depression       Go to the ER for any thoughts or suicide or homicide     Return in about 1 week (around 7/31/2025).     Thank you for choosing [facility].    JUNIE Vega NP

## 2025-07-31 ENCOUNTER — OFFICE VISIT (OUTPATIENT)
Facility: CLINIC | Age: 35
End: 2025-07-31
Payer: MEDICAID

## 2025-07-31 VITALS
SYSTOLIC BLOOD PRESSURE: 109 MMHG | OXYGEN SATURATION: 97 % | DIASTOLIC BLOOD PRESSURE: 70 MMHG | RESPIRATION RATE: 16 BRPM | HEART RATE: 90 BPM | BODY MASS INDEX: 31.82 KG/M2 | WEIGHT: 198 LBS | HEIGHT: 66 IN | TEMPERATURE: 98 F

## 2025-07-31 DIAGNOSIS — F20.9 SCHIZOPHRENIA, UNSPECIFIED TYPE (HCC): Primary | ICD-10-CM

## 2025-07-31 DIAGNOSIS — F32.A ANXIETY AND DEPRESSION: ICD-10-CM

## 2025-07-31 DIAGNOSIS — F41.9 ANXIETY AND DEPRESSION: ICD-10-CM

## 2025-07-31 PROCEDURE — 99212 OFFICE O/P EST SF 10 MIN: CPT | Performed by: NURSE PRACTITIONER

## 2025-07-31 ASSESSMENT — ENCOUNTER SYMPTOMS
CHEST TIGHTNESS: 0
WHEEZING: 0
SHORTNESS OF BREATH: 0
CONSTIPATION: 0
COUGH: 0
NAUSEA: 0
DIARRHEA: 0
ABDOMINAL PAIN: 0

## 2025-07-31 NOTE — PATIENT INSTRUCTIONS
It was a pleasure to see you today.    1. Schizophrenia, unspecified type (HCC)    2. Anxiety and depression         Return in about 3 months (around 10/31/2025).     Thank you for choosing [facility].    JUNIE Vega NP

## 2025-07-31 NOTE — PROGRESS NOTES
Chief Complaint   Patient presents with    Anxiety     1 week follow up - anxiety/ depression

## 2025-07-31 NOTE — PROGRESS NOTES
Jimena Stubbs (:  1990) is a 35 y.o. female who presents to the office today for the following:  Anxiety (1 week follow up - anxiety/ depression )      Assessment & Plan   ASSESSMENT/PLAN:  1. Schizophrenia, unspecified type (HCC)  2. Anxiety and depression      No results found for any visits on 25.     Return in about 3 months (around 10/31/2025).         Subjective   SUBJECTIVE/OBJECTIVE:  Patient presents to the clinic for follow-up of new medication for her schizophrenia that her psychiatrist put her on.  Patient was seen last week and was having a difficult time coping.  Patient had just been taken off Versacloz and placed on Haldol.  After a rough week, patient states she is feeling much better and much more put together.  However, this past week was difficult.  Patient states was able to use her coping skills to get through it.    Anxiety  Patient reports no chest pain, dizziness, nausea, nervous/anxious behavior, palpitations or shortness of breath.           Allergies   Allergen Reactions    Guaifenesin Nausea And Vomiting     Current Outpatient Medications   Medication Sig Dispense Refill    hydrOXYzine HCl (ATARAX) 25 MG tablet Take 1 tablet by mouth 3 times daily as needed      venlafaxine (EFFEXOR XR) 75 MG extended release capsule 1 capsule daily      haloperidol (HALDOL) 1 MG tablet Take 3 tablets by mouth 2 times daily      lithium 300 MG capsule Take 1 capsule by mouth daily      lithium 600 MG capsule Take 1 capsule by mouth nightly      ARIPiprazole ER (ABILIFY MAINTENA) 400 MG PRSY Inject 400 mg into the muscle every 28 days for 1 dose 1 each 0     No current facility-administered medications for this visit.        Review of Systems   Constitutional:  Negative for activity change, fatigue, fever and unexpected weight change.   HENT:  Negative for nosebleeds.    Eyes:  Negative for visual disturbance.   Respiratory:  Negative for cough, chest tightness, shortness of breath and